# Patient Record
Sex: FEMALE | Race: BLACK OR AFRICAN AMERICAN | Employment: PART TIME | URBAN - NONMETROPOLITAN AREA
[De-identification: names, ages, dates, MRNs, and addresses within clinical notes are randomized per-mention and may not be internally consistent; named-entity substitution may affect disease eponyms.]

---

## 2017-02-09 ENCOUNTER — TELEPHONE (OUTPATIENT)
Dept: NEPHROLOGY | Age: 42
End: 2017-02-09

## 2017-04-20 ENCOUNTER — OFFICE VISIT (OUTPATIENT)
Dept: CARDIOLOGY | Age: 42
End: 2017-04-20

## 2017-04-20 VITALS
HEART RATE: 72 BPM | SYSTOLIC BLOOD PRESSURE: 150 MMHG | WEIGHT: 191 LBS | BODY MASS INDEX: 31.82 KG/M2 | HEIGHT: 65 IN | DIASTOLIC BLOOD PRESSURE: 92 MMHG

## 2017-04-20 DIAGNOSIS — I25.10 CORONARY ARTERY DISEASE INVOLVING NATIVE CORONARY ARTERY OF NATIVE HEART WITHOUT ANGINA PECTORIS: ICD-10-CM

## 2017-04-20 DIAGNOSIS — E78.01 FAMILIAL HYPERCHOLESTEROLEMIA: ICD-10-CM

## 2017-04-20 DIAGNOSIS — I10 ESSENTIAL HYPERTENSION: Primary | ICD-10-CM

## 2017-04-20 PROCEDURE — 99213 OFFICE O/P EST LOW 20 MIN: CPT | Performed by: NUCLEAR MEDICINE

## 2017-04-20 RX ORDER — HYDROXYZINE HYDROCHLORIDE 10 MG/1
10 TABLET, FILM COATED ORAL DAILY PRN
COMMUNITY
End: 2021-02-09 | Stop reason: ALTCHOICE

## 2017-04-20 RX ORDER — HYOSCYAMINE SULFATE 0.125 MG
125 TABLET ORAL EVERY 4 HOURS PRN
COMMUNITY
End: 2019-09-16

## 2017-04-20 RX ORDER — CHLORTHALIDONE 25 MG/1
25 TABLET ORAL DAILY
COMMUNITY
End: 2018-05-22 | Stop reason: ALTCHOICE

## 2017-04-20 RX ORDER — BUTALBITAL, ACETAMINOPHEN AND CAFFEINE 50; 325; 40 MG/1; MG/1; MG/1
1 TABLET ORAL EVERY 4 HOURS PRN
COMMUNITY
End: 2018-05-22 | Stop reason: ALTCHOICE

## 2018-04-19 ENCOUNTER — HOSPITAL ENCOUNTER (OUTPATIENT)
Age: 43
Discharge: HOME | End: 2018-04-19
Payer: COMMERCIAL

## 2018-04-19 LAB
ALBUMIN SERPL-MCNC: 4.3 G/DL (ref 3.5–5.1)
ALP BLD-CCNC: 86 U/L (ref 38–126)
ALT SERPL-CCNC: 7 U/L (ref 11–66)
ANION GAP SERPL CALCULATED.3IONS-SCNC: 13 MEQ/L (ref 8–16)
ANISOCYTOSIS: ABNORMAL
AST SERPL-CCNC: 9 U/L (ref 5–40)
BASOPHILS # BLD: 0.6 %
BASOPHILS ABSOLUTE: 0 THOU/MM3 (ref 0–0.1)
BILIRUB SERPL-MCNC: 0.3 MG/DL (ref 0.3–1.2)
BUN BLDV-MCNC: 8 MG/DL (ref 7–22)
CALCIUM SERPL-MCNC: 9.3 MG/DL (ref 8.5–10.5)
CHLORIDE BLD-SCNC: 100 MEQ/L (ref 98–111)
CHOLESTEROL, TOTAL: 188 MG/DL (ref 100–199)
CO2: 24 MEQ/L (ref 23–33)
CREAT SERPL-MCNC: 0.6 MG/DL (ref 0.4–1.2)
EOSINOPHIL # BLD: 1.2 %
EOSINOPHILS ABSOLUTE: 0.1 THOU/MM3 (ref 0–0.4)
GFR SERPL CREATININE-BSD FRML MDRD: > 90 ML/MIN/1.73M2
GLUCOSE BLD-MCNC: 105 MG/DL (ref 70–108)
HCT VFR BLD CALC: 36.9 % (ref 37–47)
HDLC SERPL-MCNC: 58 MG/DL
HEMOGLOBIN: 12.3 GM/DL (ref 12–16)
LDL CHOLESTEROL CALCULATED: 109 MG/DL
LYMPHOCYTES # BLD: 43.1 %
LYMPHOCYTES ABSOLUTE: 2.3 THOU/MM3 (ref 1–4.8)
MCH RBC QN AUTO: 27.8 PG (ref 27–31)
MCHC RBC AUTO-ENTMCNC: 33.3 GM/DL (ref 33–37)
MCV RBC AUTO: 83.6 FL (ref 81–99)
MONOCYTES # BLD: 7.3 %
MONOCYTES ABSOLUTE: 0.4 THOU/MM3 (ref 0.4–1.3)
NUCLEATED RED BLOOD CELLS: 0 /100 WBC
PDW BLD-RTO: 14.6 % (ref 11.5–14.5)
PLATELET # BLD: 322 THOU/MM3 (ref 130–400)
PMV BLD AUTO: 8.6 FL (ref 7.4–10.4)
POTASSIUM SERPL-SCNC: 3.8 MEQ/L (ref 3.5–5.2)
RBC # BLD: 4.41 MILL/MM3 (ref 4.2–5.4)
SEG NEUTROPHILS: 47.8 %
SEGMENTED NEUTROPHILS ABSOLUTE COUNT: 2.6 THOU/MM3 (ref 1.8–7.7)
SODIUM BLD-SCNC: 137 MEQ/L (ref 135–145)
TOTAL PROTEIN: 7.3 G/DL (ref 6.1–8)
TRIGL SERPL-MCNC: 107 MG/DL (ref 0–199)
TSH SERPL DL<=0.05 MIU/L-ACNC: 1.07 UIU/ML (ref 0.4–4.2)
WBC # BLD: 5.4 THOU/MM3 (ref 4.8–10.8)

## 2018-04-19 PROCEDURE — 84443 ASSAY THYROID STIM HORMONE: CPT

## 2018-04-19 PROCEDURE — 80053 COMPREHEN METABOLIC PANEL: CPT

## 2018-04-19 PROCEDURE — 80061 LIPID PANEL: CPT

## 2018-04-19 PROCEDURE — 85025 COMPLETE CBC W/AUTO DIFF WBC: CPT

## 2018-04-19 PROCEDURE — 36415 COLL VENOUS BLD VENIPUNCTURE: CPT

## 2018-05-02 ENCOUNTER — HOSPITAL ENCOUNTER (EMERGENCY)
Age: 43
Discharge: HOME | End: 2018-05-02
Payer: COMMERCIAL

## 2018-05-02 VITALS
SYSTOLIC BLOOD PRESSURE: 159 MMHG | BODY MASS INDEX: 32.65 KG/M2 | RESPIRATION RATE: 18 BRPM | OXYGEN SATURATION: 100 % | TEMPERATURE: 98.3 F | DIASTOLIC BLOOD PRESSURE: 88 MMHG | WEIGHT: 196 LBS | HEIGHT: 65 IN | HEART RATE: 79 BPM

## 2018-05-02 DIAGNOSIS — L02.91 ABSCESS: Primary | ICD-10-CM

## 2018-05-02 PROCEDURE — 99282 EMERGENCY DEPT VISIT SF MDM: CPT

## 2018-05-02 RX ORDER — CLINDAMYCIN HYDROCHLORIDE 150 MG/1
450 CAPSULE ORAL 3 TIMES DAILY
Qty: 90 CAPSULE | Refills: 0 | Status: SHIPPED | OUTPATIENT
Start: 2018-05-02 | End: 2018-05-12

## 2018-05-02 RX ORDER — HYDROCODONE BITARTRATE AND ACETAMINOPHEN 5; 325 MG/1; MG/1
1 TABLET ORAL EVERY 6 HOURS PRN
Qty: 10 TABLET | Refills: 0 | Status: SHIPPED | OUTPATIENT
Start: 2018-05-02 | End: 2018-05-05

## 2018-05-02 ASSESSMENT — PAIN SCALES - GENERAL: PAINLEVEL_OUTOF10: 7

## 2018-05-02 ASSESSMENT — PAIN DESCRIPTION - ORIENTATION: ORIENTATION: LOWER

## 2018-05-02 ASSESSMENT — ENCOUNTER SYMPTOMS
NAUSEA: 0
ROS SKIN COMMENTS: +ABSCESS
VOMITING: 0
COLOR CHANGE: 0

## 2018-05-02 ASSESSMENT — PAIN DESCRIPTION - DESCRIPTORS: DESCRIPTORS: SHARP

## 2018-05-02 ASSESSMENT — PAIN DESCRIPTION - LOCATION: LOCATION: BACK

## 2018-05-04 ENCOUNTER — HOSPITAL ENCOUNTER (EMERGENCY)
Age: 43
Discharge: HOME | End: 2018-05-04
Payer: COMMERCIAL

## 2018-05-04 VITALS
TEMPERATURE: 98.6 F | SYSTOLIC BLOOD PRESSURE: 159 MMHG | RESPIRATION RATE: 16 BRPM | HEART RATE: 95 BPM | DIASTOLIC BLOOD PRESSURE: 103 MMHG | WEIGHT: 196 LBS | HEIGHT: 65 IN | OXYGEN SATURATION: 99 % | BODY MASS INDEX: 32.65 KG/M2

## 2018-05-04 DIAGNOSIS — L03.319 CELLULITIS AND ABSCESS OF TRUNK: Primary | ICD-10-CM

## 2018-05-04 DIAGNOSIS — L02.219 CELLULITIS AND ABSCESS OF TRUNK: Primary | ICD-10-CM

## 2018-05-04 PROCEDURE — 10061 I&D ABSCESS COMP/MULTIPLE: CPT

## 2018-05-04 PROCEDURE — 99282 EMERGENCY DEPT VISIT SF MDM: CPT

## 2018-05-04 RX ORDER — LIDOCAINE HYDROCHLORIDE AND EPINEPHRINE 10; 10 MG/ML; UG/ML
20 INJECTION, SOLUTION INFILTRATION; PERINEURAL ONCE
Status: DISCONTINUED | OUTPATIENT
Start: 2018-05-04 | End: 2018-05-04 | Stop reason: HOSPADM

## 2018-05-04 ASSESSMENT — ENCOUNTER SYMPTOMS
EYE PAIN: 0
DIARRHEA: 0
SHORTNESS OF BREATH: 0
VOMITING: 0
NAUSEA: 0
SORE THROAT: 0
RHINORRHEA: 0
WHEEZING: 0
COUGH: 0
ABDOMINAL PAIN: 0
BACK PAIN: 0
EYE DISCHARGE: 0

## 2018-05-04 ASSESSMENT — PAIN DESCRIPTION - DESCRIPTORS: DESCRIPTORS: ACHING;BURNING;SHARP

## 2018-05-04 ASSESSMENT — PAIN DESCRIPTION - LOCATION: LOCATION: BACK

## 2018-05-04 ASSESSMENT — PAIN DESCRIPTION - PAIN TYPE: TYPE: ACUTE PAIN

## 2018-05-04 ASSESSMENT — PAIN DESCRIPTION - FREQUENCY: FREQUENCY: CONTINUOUS

## 2018-05-04 ASSESSMENT — PAIN SCALES - GENERAL: PAINLEVEL_OUTOF10: 9

## 2018-05-22 ENCOUNTER — TELEPHONE (OUTPATIENT)
Dept: CARDIOLOGY CLINIC | Age: 43
End: 2018-05-22

## 2018-05-22 ENCOUNTER — OFFICE VISIT (OUTPATIENT)
Dept: SURGERY | Age: 43
End: 2018-05-22
Payer: COMMERCIAL

## 2018-05-22 VITALS
DIASTOLIC BLOOD PRESSURE: 88 MMHG | RESPIRATION RATE: 18 BRPM | OXYGEN SATURATION: 99 % | SYSTOLIC BLOOD PRESSURE: 122 MMHG | HEIGHT: 65 IN | TEMPERATURE: 99.2 F | BODY MASS INDEX: 33.15 KG/M2 | HEART RATE: 88 BPM | WEIGHT: 199 LBS

## 2018-05-22 DIAGNOSIS — S21.209A WOUND OF BACK, UNSPECIFIED LATERALITY, INITIAL ENCOUNTER: Primary | ICD-10-CM

## 2018-05-22 PROCEDURE — G8417 CALC BMI ABV UP PARAM F/U: HCPCS | Performed by: SURGERY

## 2018-05-22 PROCEDURE — 1036F TOBACCO NON-USER: CPT | Performed by: SURGERY

## 2018-05-22 PROCEDURE — 99213 OFFICE O/P EST LOW 20 MIN: CPT | Performed by: SURGERY

## 2018-05-22 PROCEDURE — G8427 DOCREV CUR MEDS BY ELIG CLIN: HCPCS | Performed by: SURGERY

## 2018-05-22 RX ORDER — ERGOCALCIFEROL 1.25 MG/1
50000 CAPSULE ORAL WEEKLY
COMMUNITY

## 2018-05-22 RX ORDER — OMEPRAZOLE 40 MG/1
40 CAPSULE, DELAYED RELEASE ORAL DAILY PRN
COMMUNITY
End: 2019-09-16

## 2018-05-23 ENCOUNTER — TELEPHONE (OUTPATIENT)
Dept: SURGERY | Age: 43
End: 2018-05-23

## 2018-05-23 ENCOUNTER — OFFICE VISIT (OUTPATIENT)
Dept: CARDIOLOGY CLINIC | Age: 43
End: 2018-05-23
Payer: COMMERCIAL

## 2018-05-23 VITALS
BODY MASS INDEX: 32.82 KG/M2 | WEIGHT: 197 LBS | SYSTOLIC BLOOD PRESSURE: 132 MMHG | HEIGHT: 65 IN | HEART RATE: 65 BPM | DIASTOLIC BLOOD PRESSURE: 80 MMHG

## 2018-05-23 DIAGNOSIS — E78.01 FAMILIAL HYPERCHOLESTEROLEMIA: ICD-10-CM

## 2018-05-23 DIAGNOSIS — I10 ESSENTIAL HYPERTENSION: Primary | ICD-10-CM

## 2018-05-23 DIAGNOSIS — Z98.890 S/P CARDIAC CATH: ICD-10-CM

## 2018-05-23 PROCEDURE — 99213 OFFICE O/P EST LOW 20 MIN: CPT | Performed by: NUCLEAR MEDICINE

## 2018-05-23 PROCEDURE — G8427 DOCREV CUR MEDS BY ELIG CLIN: HCPCS | Performed by: NUCLEAR MEDICINE

## 2018-05-23 PROCEDURE — 93000 ELECTROCARDIOGRAM COMPLETE: CPT | Performed by: NUCLEAR MEDICINE

## 2018-05-23 PROCEDURE — G8417 CALC BMI ABV UP PARAM F/U: HCPCS | Performed by: NUCLEAR MEDICINE

## 2018-05-23 PROCEDURE — 1036F TOBACCO NON-USER: CPT | Performed by: NUCLEAR MEDICINE

## 2018-06-04 ENCOUNTER — ANESTHESIA EVENT (OUTPATIENT)
Dept: OPERATING ROOM | Age: 43
End: 2018-06-04
Payer: COMMERCIAL

## 2018-06-04 ENCOUNTER — ANESTHESIA (OUTPATIENT)
Dept: OPERATING ROOM | Age: 43
End: 2018-06-04
Payer: COMMERCIAL

## 2018-06-04 ENCOUNTER — HOSPITAL ENCOUNTER (OUTPATIENT)
Age: 43
Setting detail: OUTPATIENT SURGERY
Discharge: HOME | End: 2018-06-04
Attending: SURGERY | Admitting: SURGERY
Payer: COMMERCIAL

## 2018-06-04 VITALS
TEMPERATURE: 97.5 F | SYSTOLIC BLOOD PRESSURE: 125 MMHG | DIASTOLIC BLOOD PRESSURE: 64 MMHG | OXYGEN SATURATION: 100 % | RESPIRATION RATE: 10 BRPM

## 2018-06-04 VITALS
RESPIRATION RATE: 16 BRPM | HEIGHT: 65 IN | WEIGHT: 194.89 LBS | BODY MASS INDEX: 32.47 KG/M2 | SYSTOLIC BLOOD PRESSURE: 156 MMHG | DIASTOLIC BLOOD PRESSURE: 81 MMHG | HEART RATE: 72 BPM | TEMPERATURE: 97.6 F | OXYGEN SATURATION: 98 %

## 2018-06-04 DIAGNOSIS — L72.9 INFECTED CYST OF SKIN: Primary | ICD-10-CM

## 2018-06-04 DIAGNOSIS — L08.9 INFECTED CYST OF SKIN: Primary | ICD-10-CM

## 2018-06-04 LAB
GLUCOSE BLD-MCNC: 110 MG/DL (ref 70–108)
POTASSIUM SERPL-SCNC: 3.9 MEQ/L (ref 3.5–5.2)

## 2018-06-04 PROCEDURE — 7100000011 HC PHASE II RECOVERY - ADDTL 15 MIN: Performed by: SURGERY

## 2018-06-04 PROCEDURE — 84132 ASSAY OF SERUM POTASSIUM: CPT

## 2018-06-04 PROCEDURE — 6360000002 HC RX W HCPCS: Performed by: NURSE ANESTHETIST, CERTIFIED REGISTERED

## 2018-06-04 PROCEDURE — 82948 REAGENT STRIP/BLOOD GLUCOSE: CPT

## 2018-06-04 PROCEDURE — 2500000003 HC RX 250 WO HCPCS: Performed by: SURGERY

## 2018-06-04 PROCEDURE — 88304 TISSUE EXAM BY PATHOLOGIST: CPT

## 2018-06-04 PROCEDURE — 36415 COLL VENOUS BLD VENIPUNCTURE: CPT

## 2018-06-04 PROCEDURE — 11406 EXC TR-EXT B9+MARG >4.0 CM: CPT | Performed by: SURGERY

## 2018-06-04 PROCEDURE — 3700000000 HC ANESTHESIA ATTENDED CARE: Performed by: SURGERY

## 2018-06-04 PROCEDURE — 7100000001 HC PACU RECOVERY - ADDTL 15 MIN: Performed by: SURGERY

## 2018-06-04 PROCEDURE — 2580000003 HC RX 258: Performed by: NURSE ANESTHETIST, CERTIFIED REGISTERED

## 2018-06-04 PROCEDURE — 12032 INTMD RPR S/A/T/EXT 2.6-7.5: CPT | Performed by: SURGERY

## 2018-06-04 PROCEDURE — 7100000000 HC PACU RECOVERY - FIRST 15 MIN: Performed by: SURGERY

## 2018-06-04 PROCEDURE — 3600000012 HC SURGERY LEVEL 2 ADDTL 15MIN: Performed by: SURGERY

## 2018-06-04 PROCEDURE — 3600000002 HC SURGERY LEVEL 2 BASE: Performed by: SURGERY

## 2018-06-04 PROCEDURE — 2580000003 HC RX 258

## 2018-06-04 PROCEDURE — 7100000010 HC PHASE II RECOVERY - FIRST 15 MIN: Performed by: SURGERY

## 2018-06-04 PROCEDURE — 3700000001 HC ADD 15 MINUTES (ANESTHESIA): Performed by: SURGERY

## 2018-06-04 PROCEDURE — 2500000003 HC RX 250 WO HCPCS: Performed by: NURSE ANESTHETIST, CERTIFIED REGISTERED

## 2018-06-04 PROCEDURE — 6360000002 HC RX W HCPCS: Performed by: ANESTHESIOLOGY

## 2018-06-04 RX ORDER — ONDANSETRON 2 MG/ML
INJECTION INTRAMUSCULAR; INTRAVENOUS PRN
Status: DISCONTINUED | OUTPATIENT
Start: 2018-06-04 | End: 2018-06-04 | Stop reason: SDUPTHER

## 2018-06-04 RX ORDER — MIDAZOLAM HYDROCHLORIDE 1 MG/ML
INJECTION INTRAMUSCULAR; INTRAVENOUS PRN
Status: DISCONTINUED | OUTPATIENT
Start: 2018-06-04 | End: 2018-06-04 | Stop reason: SDUPTHER

## 2018-06-04 RX ORDER — LABETALOL HYDROCHLORIDE 5 MG/ML
5 INJECTION, SOLUTION INTRAVENOUS EVERY 10 MIN PRN
Status: DISCONTINUED | OUTPATIENT
Start: 2018-06-04 | End: 2018-06-04 | Stop reason: HOSPADM

## 2018-06-04 RX ORDER — OXYCODONE HYDROCHLORIDE AND ACETAMINOPHEN 5; 325 MG/1; MG/1
1 TABLET ORAL EVERY 6 HOURS PRN
Qty: 24 TABLET | Refills: 0 | Status: SHIPPED | OUTPATIENT
Start: 2018-06-04 | End: 2018-06-11

## 2018-06-04 RX ORDER — METOPROLOL SUCCINATE 25 MG/1
25 TABLET, EXTENDED RELEASE ORAL ONCE
Status: DISCONTINUED | OUTPATIENT
Start: 2018-06-04 | End: 2018-06-04 | Stop reason: HOSPADM

## 2018-06-04 RX ORDER — LIDOCAINE HYDROCHLORIDE 20 MG/ML
INJECTION, SOLUTION INFILTRATION; PERINEURAL PRN
Status: DISCONTINUED | OUTPATIENT
Start: 2018-06-04 | End: 2018-06-04 | Stop reason: SDUPTHER

## 2018-06-04 RX ORDER — SODIUM CHLORIDE 9 MG/ML
INJECTION, SOLUTION INTRAVENOUS CONTINUOUS PRN
Status: DISCONTINUED | OUTPATIENT
Start: 2018-06-04 | End: 2018-06-04 | Stop reason: SDUPTHER

## 2018-06-04 RX ORDER — SUCCINYLCHOLINE CHLORIDE 20 MG/ML
INJECTION INTRAMUSCULAR; INTRAVENOUS PRN
Status: DISCONTINUED | OUTPATIENT
Start: 2018-06-04 | End: 2018-06-04 | Stop reason: SDUPTHER

## 2018-06-04 RX ORDER — SODIUM CHLORIDE 9 MG/ML
INJECTION, SOLUTION INTRAVENOUS CONTINUOUS
Status: DISCONTINUED | OUTPATIENT
Start: 2018-06-04 | End: 2018-06-04 | Stop reason: HOSPADM

## 2018-06-04 RX ORDER — FENTANYL CITRATE 50 UG/ML
50 INJECTION, SOLUTION INTRAMUSCULAR; INTRAVENOUS EVERY 5 MIN PRN
Status: DISCONTINUED | OUTPATIENT
Start: 2018-06-04 | End: 2018-06-04 | Stop reason: HOSPADM

## 2018-06-04 RX ORDER — MEPERIDINE HYDROCHLORIDE 25 MG/ML
12.5 INJECTION INTRAMUSCULAR; INTRAVENOUS; SUBCUTANEOUS EVERY 5 MIN PRN
Status: DISCONTINUED | OUTPATIENT
Start: 2018-06-04 | End: 2018-06-04 | Stop reason: HOSPADM

## 2018-06-04 RX ORDER — BUPIVACAINE HYDROCHLORIDE AND EPINEPHRINE 5; 5 MG/ML; UG/ML
INJECTION, SOLUTION EPIDURAL; INTRACAUDAL; PERINEURAL PRN
Status: DISCONTINUED | OUTPATIENT
Start: 2018-06-04 | End: 2018-06-04 | Stop reason: HOSPADM

## 2018-06-04 RX ORDER — PROPOFOL 10 MG/ML
INJECTION, EMULSION INTRAVENOUS PRN
Status: DISCONTINUED | OUTPATIENT
Start: 2018-06-04 | End: 2018-06-04 | Stop reason: SDUPTHER

## 2018-06-04 RX ORDER — FENTANYL CITRATE 50 UG/ML
INJECTION, SOLUTION INTRAMUSCULAR; INTRAVENOUS PRN
Status: DISCONTINUED | OUTPATIENT
Start: 2018-06-04 | End: 2018-06-04 | Stop reason: SDUPTHER

## 2018-06-04 RX ORDER — DEXAMETHASONE SODIUM PHOSPHATE 4 MG/ML
INJECTION, SOLUTION INTRA-ARTICULAR; INTRALESIONAL; INTRAMUSCULAR; INTRAVENOUS; SOFT TISSUE PRN
Status: DISCONTINUED | OUTPATIENT
Start: 2018-06-04 | End: 2018-06-04 | Stop reason: SDUPTHER

## 2018-06-04 RX ORDER — ONDANSETRON 2 MG/ML
4 INJECTION INTRAMUSCULAR; INTRAVENOUS
Status: DISCONTINUED | OUTPATIENT
Start: 2018-06-04 | End: 2018-06-04 | Stop reason: HOSPADM

## 2018-06-04 RX ADMIN — HYDROMORPHONE HYDROCHLORIDE 0.25 MG: 1 INJECTION, SOLUTION INTRAMUSCULAR; INTRAVENOUS; SUBCUTANEOUS at 12:43

## 2018-06-04 RX ADMIN — SODIUM CHLORIDE: 9 INJECTION, SOLUTION INTRAVENOUS at 11:34

## 2018-06-04 RX ADMIN — DEXAMETHASONE SODIUM PHOSPHATE 4 MG: 4 INJECTION, SOLUTION INTRAMUSCULAR; INTRAVENOUS at 11:48

## 2018-06-04 RX ADMIN — Medication 140 MG: at 11:38

## 2018-06-04 RX ADMIN — LIDOCAINE HYDROCHLORIDE 100 MG: 20 INJECTION, SOLUTION INFILTRATION; PERINEURAL at 11:38

## 2018-06-04 RX ADMIN — PROPOFOL 170 MG: 10 INJECTION, EMULSION INTRAVENOUS at 11:38

## 2018-06-04 RX ADMIN — FENTANYL CITRATE 100 MCG: 50 INJECTION INTRAMUSCULAR; INTRAVENOUS at 11:38

## 2018-06-04 RX ADMIN — MIDAZOLAM HYDROCHLORIDE 2 MG: 1 INJECTION, SOLUTION INTRAMUSCULAR; INTRAVENOUS at 11:34

## 2018-06-04 RX ADMIN — ONDANSETRON HYDROCHLORIDE 4 MG: 4 INJECTION, SOLUTION INTRAMUSCULAR; INTRAVENOUS at 12:11

## 2018-06-04 RX ADMIN — SODIUM CHLORIDE: 9 INJECTION, SOLUTION INTRAVENOUS at 10:51

## 2018-06-04 ASSESSMENT — PULMONARY FUNCTION TESTS
PIF_VALUE: 19
PIF_VALUE: 2
PIF_VALUE: 19
PIF_VALUE: 18
PIF_VALUE: 16
PIF_VALUE: 20
PIF_VALUE: 2
PIF_VALUE: 23
PIF_VALUE: 19
PIF_VALUE: 19
PIF_VALUE: 1
PIF_VALUE: 19
PIF_VALUE: 18
PIF_VALUE: 19
PIF_VALUE: 1
PIF_VALUE: 21
PIF_VALUE: 2
PIF_VALUE: 18
PIF_VALUE: 19
PIF_VALUE: 1
PIF_VALUE: 19
PIF_VALUE: 19
PIF_VALUE: 20
PIF_VALUE: 1
PIF_VALUE: 19
PIF_VALUE: 20
PIF_VALUE: 19
PIF_VALUE: 7
PIF_VALUE: 19
PIF_VALUE: 19
PIF_VALUE: 2
PIF_VALUE: 19
PIF_VALUE: 0
PIF_VALUE: 18
PIF_VALUE: 16
PIF_VALUE: 18
PIF_VALUE: 19
PIF_VALUE: 18
PIF_VALUE: 18
PIF_VALUE: 19
PIF_VALUE: 18
PIF_VALUE: 19
PIF_VALUE: 18
PIF_VALUE: 18
PIF_VALUE: 2

## 2018-06-04 ASSESSMENT — PAIN SCALES - WONG BAKER
WONGBAKER_NUMERICALRESPONSE: 0
WONGBAKER_NUMERICALRESPONSE: 2

## 2018-06-04 ASSESSMENT — PAIN DESCRIPTION - PAIN TYPE
TYPE: SURGICAL PAIN
TYPE: SURGICAL PAIN

## 2018-06-04 ASSESSMENT — PAIN SCALES - GENERAL
PAINLEVEL_OUTOF10: 4
PAINLEVEL_OUTOF10: 5
PAINLEVEL_OUTOF10: 4
PAINLEVEL_OUTOF10: 0

## 2018-06-04 ASSESSMENT — PAIN DESCRIPTION - LOCATION
LOCATION: BACK
LOCATION: BACK

## 2018-06-04 ASSESSMENT — PAIN DESCRIPTION - PROGRESSION: CLINICAL_PROGRESSION: GRADUALLY IMPROVING

## 2018-06-04 ASSESSMENT — PAIN DESCRIPTION - ONSET: ONSET: ON-GOING

## 2018-06-05 ENCOUNTER — TELEPHONE (OUTPATIENT)
Dept: SURGERY | Age: 43
End: 2018-06-05

## 2018-06-07 ENCOUNTER — TELEPHONE (OUTPATIENT)
Dept: SURGERY | Age: 43
End: 2018-06-07

## 2018-06-11 ASSESSMENT — ENCOUNTER SYMPTOMS
RHINORRHEA: 0
PHOTOPHOBIA: 0
COLOR CHANGE: 0
CHOKING: 0
ABDOMINAL PAIN: 1
SINUS PAIN: 0
EYE REDNESS: 0
SORE THROAT: 0
BACK PAIN: 0
WHEEZING: 0
SHORTNESS OF BREATH: 0
SINUS PRESSURE: 0
EYE DISCHARGE: 0
APNEA: 0
TROUBLE SWALLOWING: 0
ABDOMINAL DISTENTION: 1
COUGH: 0
STRIDOR: 0
CHEST TIGHTNESS: 0
ANAL BLEEDING: 1
FACIAL SWELLING: 0
VOICE CHANGE: 0
EYE ITCHING: 0
EYE PAIN: 0

## 2018-06-14 ENCOUNTER — OFFICE VISIT (OUTPATIENT)
Dept: SURGERY | Age: 43
End: 2018-06-14

## 2018-06-14 ENCOUNTER — TELEPHONE (OUTPATIENT)
Dept: SURGERY | Age: 43
End: 2018-06-14

## 2018-06-14 VITALS
DIASTOLIC BLOOD PRESSURE: 84 MMHG | RESPIRATION RATE: 16 BRPM | WEIGHT: 196.9 LBS | HEIGHT: 65 IN | OXYGEN SATURATION: 98 % | TEMPERATURE: 97.2 F | HEART RATE: 90 BPM | SYSTOLIC BLOOD PRESSURE: 124 MMHG | BODY MASS INDEX: 32.8 KG/M2

## 2018-06-14 DIAGNOSIS — T14.8XXA WOUND INFECTION: Primary | ICD-10-CM

## 2018-06-14 DIAGNOSIS — Z98.890 HX OF REMOVAL OF CYST: ICD-10-CM

## 2018-06-14 DIAGNOSIS — L08.9 WOUND INFECTION: Primary | ICD-10-CM

## 2018-06-14 PROCEDURE — 99024 POSTOP FOLLOW-UP VISIT: CPT | Performed by: NURSE PRACTITIONER

## 2018-06-14 RX ORDER — CIPROFLOXACIN 500 MG/1
500 TABLET, FILM COATED ORAL 2 TIMES DAILY
Qty: 20 TABLET | Refills: 0 | Status: ON HOLD | OUTPATIENT
Start: 2018-06-14 | End: 2018-06-19

## 2018-06-14 ASSESSMENT — ENCOUNTER SYMPTOMS
VOICE CHANGE: 0
BACK PAIN: 0
EYE DISCHARGE: 0
APNEA: 0
STRIDOR: 0
BLOOD IN STOOL: 0
TROUBLE SWALLOWING: 0
DIARRHEA: 0
WHEEZING: 0
EYE ITCHING: 0
RHINORRHEA: 0
ROS SKIN COMMENTS: BACK
COUGH: 0
ABDOMINAL PAIN: 0
NAUSEA: 0
SORE THROAT: 0
CONSTIPATION: 0
COLOR CHANGE: 0
EYE REDNESS: 0
ANAL BLEEDING: 0
SINUS PRESSURE: 0
PHOTOPHOBIA: 0
ABDOMINAL DISTENTION: 0
EYE PAIN: 0
FACIAL SWELLING: 0
CHEST TIGHTNESS: 0
CHOKING: 0
SHORTNESS OF BREATH: 0
VOMITING: 0
RECTAL PAIN: 0

## 2018-06-18 ENCOUNTER — TELEPHONE (OUTPATIENT)
Dept: SURGERY | Age: 43
End: 2018-06-18

## 2018-06-18 ENCOUNTER — OFFICE VISIT (OUTPATIENT)
Dept: SURGERY | Age: 43
End: 2018-06-18

## 2018-06-18 VITALS
WEIGHT: 197.5 LBS | HEART RATE: 84 BPM | RESPIRATION RATE: 18 BRPM | BODY MASS INDEX: 32.9 KG/M2 | OXYGEN SATURATION: 97 % | DIASTOLIC BLOOD PRESSURE: 64 MMHG | SYSTOLIC BLOOD PRESSURE: 118 MMHG | HEIGHT: 65 IN | TEMPERATURE: 98.6 F

## 2018-06-18 DIAGNOSIS — Z51.89 VISIT FOR WOUND CHECK: ICD-10-CM

## 2018-06-18 DIAGNOSIS — Z22.322 MRSA (METHICILLIN RESISTANT STAPH AUREUS) CULTURE POSITIVE: Primary | ICD-10-CM

## 2018-06-18 PROCEDURE — 99024 POSTOP FOLLOW-UP VISIT: CPT | Performed by: NURSE PRACTITIONER

## 2018-06-18 RX ORDER — CLINDAMYCIN HYDROCHLORIDE 300 MG/1
300 CAPSULE ORAL 3 TIMES DAILY
Qty: 30 CAPSULE | Refills: 0 | Status: SHIPPED | OUTPATIENT
Start: 2018-06-18 | End: 2018-06-19 | Stop reason: SINTOL

## 2018-06-18 RX ORDER — IBUPROFEN 800 MG/1
800 TABLET ORAL EVERY 8 HOURS PRN
Qty: 21 TABLET | Refills: 1 | Status: ON HOLD | OUTPATIENT
Start: 2018-06-18 | End: 2019-09-23 | Stop reason: HOSPADM

## 2018-06-18 ASSESSMENT — ENCOUNTER SYMPTOMS
SHORTNESS OF BREATH: 0
WHEEZING: 0
EYE DISCHARGE: 0
CONSTIPATION: 0
VOICE CHANGE: 0
RECTAL PAIN: 0
NAUSEA: 0
SINUS PRESSURE: 0
COUGH: 0
BACK PAIN: 1
EYE ITCHING: 0
EYE PAIN: 0
APNEA: 0
ABDOMINAL DISTENTION: 0
TROUBLE SWALLOWING: 0
SORE THROAT: 0
SINUS PAIN: 0
ABDOMINAL PAIN: 0
EYE REDNESS: 0
BLOOD IN STOOL: 0
DIARRHEA: 0
CHEST TIGHTNESS: 0
COLOR CHANGE: 0
FACIAL SWELLING: 0
RHINORRHEA: 0
CHOKING: 0
ANAL BLEEDING: 0
STRIDOR: 0
PHOTOPHOBIA: 0
VOMITING: 0

## 2018-06-19 ENCOUNTER — HOSPITAL ENCOUNTER (INPATIENT)
Age: 43
LOS: 3 days | Discharge: HOME HEALTH | DRG: 721 | End: 2018-06-22
Attending: SURGERY | Admitting: SURGERY
Payer: COMMERCIAL

## 2018-06-19 ENCOUNTER — OFFICE VISIT (OUTPATIENT)
Dept: SURGERY | Age: 43
End: 2018-06-19

## 2018-06-19 VITALS
SYSTOLIC BLOOD PRESSURE: 130 MMHG | WEIGHT: 197 LBS | HEIGHT: 65 IN | BODY MASS INDEX: 32.82 KG/M2 | HEART RATE: 100 BPM | RESPIRATION RATE: 18 BRPM | DIASTOLIC BLOOD PRESSURE: 74 MMHG | TEMPERATURE: 98.5 F | OXYGEN SATURATION: 98 %

## 2018-06-19 DIAGNOSIS — Z51.89 VISIT FOR WOUND CHECK: Primary | ICD-10-CM

## 2018-06-19 PROBLEM — T81.9XXA POSTOPERATIVE OR SURGICAL COMPLICATION, INITIAL ENCOUNTER: Status: ACTIVE | Noted: 2018-06-19

## 2018-06-19 LAB
AEROBIC CULTURE: ABNORMAL
ANAEROBIC CULTURE: ABNORMAL
ANION GAP SERPL CALCULATED.3IONS-SCNC: 11 MEQ/L (ref 8–16)
BUN BLDV-MCNC: 8 MG/DL (ref 7–22)
CALCIUM SERPL-MCNC: 9.6 MG/DL (ref 8.5–10.5)
CHLORIDE BLD-SCNC: 102 MEQ/L (ref 98–111)
CO2: 26 MEQ/L (ref 23–33)
CREAT SERPL-MCNC: 0.6 MG/DL (ref 0.4–1.2)
GFR SERPL CREATININE-BSD FRML MDRD: > 90 ML/MIN/1.73M2
GLUCOSE BLD-MCNC: 85 MG/DL (ref 70–108)
GRAM STAIN RESULT: ABNORMAL
HCT VFR BLD CALC: 36.2 % (ref 37–47)
HEMOGLOBIN: 11.8 GM/DL (ref 12–16)
MCH RBC QN AUTO: 27.3 PG (ref 27–31)
MCHC RBC AUTO-ENTMCNC: 32.7 GM/DL (ref 33–37)
MCV RBC AUTO: 83.5 FL (ref 81–99)
ORGANISM: ABNORMAL
PDW BLD-RTO: 14.9 % (ref 11.5–14.5)
PLATELET # BLD: 463 THOU/MM3 (ref 130–400)
PMV BLD AUTO: 7.7 FL (ref 7.4–10.4)
POTASSIUM SERPL-SCNC: 4.3 MEQ/L (ref 3.5–5.2)
RBC # BLD: 4.34 MILL/MM3 (ref 4.2–5.4)
SODIUM BLD-SCNC: 139 MEQ/L (ref 135–145)
WBC # BLD: 7.1 THOU/MM3 (ref 4.8–10.8)

## 2018-06-19 PROCEDURE — 85027 COMPLETE CBC AUTOMATED: CPT

## 2018-06-19 PROCEDURE — 6370000000 HC RX 637 (ALT 250 FOR IP): Performed by: NURSE PRACTITIONER

## 2018-06-19 PROCEDURE — 99222 1ST HOSP IP/OBS MODERATE 55: CPT | Performed by: SURGERY

## 2018-06-19 PROCEDURE — 99024 POSTOP FOLLOW-UP VISIT: CPT | Performed by: NURSE PRACTITIONER

## 2018-06-19 PROCEDURE — 1200000000 HC SEMI PRIVATE

## 2018-06-19 PROCEDURE — 99222 1ST HOSP IP/OBS MODERATE 55: CPT | Performed by: NURSE PRACTITIONER

## 2018-06-19 PROCEDURE — 80048 BASIC METABOLIC PNL TOTAL CA: CPT

## 2018-06-19 PROCEDURE — 36415 COLL VENOUS BLD VENIPUNCTURE: CPT

## 2018-06-19 PROCEDURE — 6360000002 HC RX W HCPCS: Performed by: NURSE PRACTITIONER

## 2018-06-19 RX ORDER — OXYBUTYNIN CHLORIDE 10 MG/1
10 TABLET, EXTENDED RELEASE ORAL DAILY
Status: DISCONTINUED | OUTPATIENT
Start: 2018-06-19 | End: 2018-06-22 | Stop reason: HOSPADM

## 2018-06-19 RX ORDER — AMLODIPINE BESYLATE 5 MG/1
5 TABLET ORAL DAILY
Status: DISCONTINUED | OUTPATIENT
Start: 2018-06-20 | End: 2018-06-22 | Stop reason: HOSPADM

## 2018-06-19 RX ORDER — PANTOPRAZOLE SODIUM 40 MG/1
40 TABLET, DELAYED RELEASE ORAL
Status: DISCONTINUED | OUTPATIENT
Start: 2018-06-19 | End: 2018-06-22 | Stop reason: HOSPADM

## 2018-06-19 RX ORDER — LINEZOLID 2 MG/ML
600 INJECTION, SOLUTION INTRAVENOUS EVERY 12 HOURS
Status: DISCONTINUED | OUTPATIENT
Start: 2018-06-19 | End: 2018-06-22 | Stop reason: HOSPADM

## 2018-06-19 RX ORDER — HYOSCYAMINE SULFATE 0.125 MG
125 TABLET,DISINTEGRATING ORAL EVERY 4 HOURS PRN
Status: DISCONTINUED | OUTPATIENT
Start: 2018-06-19 | End: 2018-06-22 | Stop reason: HOSPADM

## 2018-06-19 RX ORDER — OMEPRAZOLE 40 MG/1
40 CAPSULE, DELAYED RELEASE ORAL EVERY MORNING
Status: DISCONTINUED | OUTPATIENT
Start: 2018-06-19 | End: 2018-06-19 | Stop reason: CLARIF

## 2018-06-19 RX ORDER — HYDROCODONE BITARTRATE AND ACETAMINOPHEN 5; 325 MG/1; MG/1
1 TABLET ORAL EVERY 6 HOURS PRN
Status: DISCONTINUED | OUTPATIENT
Start: 2018-06-19 | End: 2018-06-19

## 2018-06-19 RX ORDER — HYDROXYZINE HYDROCHLORIDE 10 MG/1
10 TABLET, FILM COATED ORAL DAILY PRN
Status: DISCONTINUED | OUTPATIENT
Start: 2018-06-19 | End: 2018-06-22 | Stop reason: HOSPADM

## 2018-06-19 RX ORDER — ATORVASTATIN CALCIUM 20 MG/1
20 TABLET, FILM COATED ORAL DAILY
Status: DISCONTINUED | OUTPATIENT
Start: 2018-06-19 | End: 2018-06-22 | Stop reason: HOSPADM

## 2018-06-19 RX ORDER — IBUPROFEN 800 MG/1
800 TABLET ORAL EVERY 8 HOURS PRN
Status: DISCONTINUED | OUTPATIENT
Start: 2018-06-19 | End: 2018-06-22 | Stop reason: HOSPADM

## 2018-06-19 RX ORDER — HYDROCODONE BITARTRATE AND ACETAMINOPHEN 5; 325 MG/1; MG/1
1 TABLET ORAL EVERY 6 HOURS PRN
Status: DISCONTINUED | OUTPATIENT
Start: 2018-06-19 | End: 2018-06-22 | Stop reason: HOSPADM

## 2018-06-19 RX ORDER — ONDANSETRON 2 MG/ML
4 INJECTION INTRAMUSCULAR; INTRAVENOUS EVERY 6 HOURS PRN
Status: DISCONTINUED | OUTPATIENT
Start: 2018-06-19 | End: 2018-06-22 | Stop reason: HOSPADM

## 2018-06-19 RX ORDER — METOPROLOL TARTRATE 100 MG/1
100 TABLET ORAL 2 TIMES DAILY
Status: DISCONTINUED | OUTPATIENT
Start: 2018-06-19 | End: 2018-06-22 | Stop reason: HOSPADM

## 2018-06-19 RX ADMIN — HYDROCODONE BITARTRATE AND ACETAMINOPHEN 1 TABLET: 5; 325 TABLET ORAL at 14:33

## 2018-06-19 RX ADMIN — IBUPROFEN 800 MG: 800 TABLET ORAL at 21:36

## 2018-06-19 RX ADMIN — ATORVASTATIN CALCIUM 20 MG: 20 TABLET, FILM COATED ORAL at 20:04

## 2018-06-19 RX ADMIN — METOPROLOL TARTRATE 100 MG: 100 TABLET, FILM COATED ORAL at 20:03

## 2018-06-19 RX ADMIN — IBUPROFEN 800 MG: 800 TABLET ORAL at 13:35

## 2018-06-19 RX ADMIN — LINEZOLID 600 MG: 600 INJECTION, SOLUTION INTRAVENOUS at 16:43

## 2018-06-19 RX ADMIN — OXYBUTYNIN CHLORIDE 10 MG: 10 TABLET, FILM COATED, EXTENDED RELEASE ORAL at 20:04

## 2018-06-19 RX ADMIN — METFORMIN HYDROCHLORIDE 500 MG: 500 TABLET ORAL at 17:26

## 2018-06-19 RX ADMIN — PANTOPRAZOLE SODIUM 40 MG: 40 TABLET, DELAYED RELEASE ORAL at 17:26

## 2018-06-19 RX ADMIN — ONDANSETRON 4 MG: 2 INJECTION INTRAMUSCULAR; INTRAVENOUS at 18:29

## 2018-06-19 RX ADMIN — HYDROCODONE BITARTRATE AND ACETAMINOPHEN 1 TABLET: 5; 325 TABLET ORAL at 23:37

## 2018-06-19 RX ADMIN — MUPIROCIN: 20 OINTMENT TOPICAL at 20:04

## 2018-06-19 ASSESSMENT — ENCOUNTER SYMPTOMS
BACK PAIN: 0
STRIDOR: 0
TROUBLE SWALLOWING: 0
NAUSEA: 0
SINUS PRESSURE: 0
DIARRHEA: 0
ANAL BLEEDING: 0
SORE THROAT: 0
CONSTIPATION: 0
ABDOMINAL DISTENTION: 0
COLOR CHANGE: 0
RHINORRHEA: 0
ABDOMINAL PAIN: 0
EYE REDNESS: 0
CHOKING: 0
SHORTNESS OF BREATH: 0
VOICE CHANGE: 0
EYE PAIN: 0
WHEEZING: 0
FACIAL SWELLING: 0
VOMITING: 0
APNEA: 0
RECTAL PAIN: 0
EYE ITCHING: 0
BLOOD IN STOOL: 0
PHOTOPHOBIA: 0
COUGH: 0
CHEST TIGHTNESS: 0
EYE DISCHARGE: 0

## 2018-06-19 ASSESSMENT — PAIN DESCRIPTION - ONSET: ONSET: ON-GOING

## 2018-06-19 ASSESSMENT — PAIN DESCRIPTION - PAIN TYPE: TYPE: ACUTE PAIN

## 2018-06-19 ASSESSMENT — PAIN SCALES - GENERAL
PAINLEVEL_OUTOF10: 8
PAINLEVEL_OUTOF10: 7
PAINLEVEL_OUTOF10: 7
PAINLEVEL_OUTOF10: 8
PAINLEVEL_OUTOF10: 5
PAINLEVEL_OUTOF10: 6

## 2018-06-19 ASSESSMENT — PAIN DESCRIPTION - LOCATION: LOCATION: HEAD

## 2018-06-19 ASSESSMENT — PAIN DESCRIPTION - DESCRIPTORS: DESCRIPTORS: HEADACHE

## 2018-06-19 ASSESSMENT — PAIN DESCRIPTION - FREQUENCY: FREQUENCY: CONTINUOUS

## 2018-06-20 PROCEDURE — APPSS30 APP SPLIT SHARED TIME 16-30 MINUTES: Performed by: NURSE PRACTITIONER

## 2018-06-20 PROCEDURE — 1200000000 HC SEMI PRIVATE

## 2018-06-20 PROCEDURE — 6370000000 HC RX 637 (ALT 250 FOR IP): Performed by: NURSE PRACTITIONER

## 2018-06-20 PROCEDURE — 99232 SBSQ HOSP IP/OBS MODERATE 35: CPT | Performed by: SURGERY

## 2018-06-20 PROCEDURE — 99024 POSTOP FOLLOW-UP VISIT: CPT | Performed by: NURSE PRACTITIONER

## 2018-06-20 PROCEDURE — 6360000002 HC RX W HCPCS: Performed by: NURSE PRACTITIONER

## 2018-06-20 RX ADMIN — METOPROLOL TARTRATE 100 MG: 100 TABLET, FILM COATED ORAL at 08:38

## 2018-06-20 RX ADMIN — ONDANSETRON 4 MG: 2 INJECTION INTRAMUSCULAR; INTRAVENOUS at 16:14

## 2018-06-20 RX ADMIN — AMLODIPINE BESYLATE 5 MG: 5 TABLET ORAL at 08:38

## 2018-06-20 RX ADMIN — LINEZOLID 600 MG: 600 INJECTION, SOLUTION INTRAVENOUS at 16:14

## 2018-06-20 RX ADMIN — IBUPROFEN 800 MG: 800 TABLET ORAL at 08:37

## 2018-06-20 RX ADMIN — METOPROLOL TARTRATE 100 MG: 100 TABLET, FILM COATED ORAL at 20:39

## 2018-06-20 RX ADMIN — HYDROCODONE BITARTRATE AND ACETAMINOPHEN 1 TABLET: 5; 325 TABLET ORAL at 16:24

## 2018-06-20 RX ADMIN — METFORMIN HYDROCHLORIDE 500 MG: 500 TABLET ORAL at 17:15

## 2018-06-20 RX ADMIN — OXYBUTYNIN CHLORIDE 10 MG: 10 TABLET, FILM COATED, EXTENDED RELEASE ORAL at 20:39

## 2018-06-20 RX ADMIN — IBUPROFEN 800 MG: 800 TABLET ORAL at 22:25

## 2018-06-20 RX ADMIN — LINEZOLID 600 MG: 600 INJECTION, SOLUTION INTRAVENOUS at 03:55

## 2018-06-20 RX ADMIN — PANTOPRAZOLE SODIUM 40 MG: 40 TABLET, DELAYED RELEASE ORAL at 05:50

## 2018-06-20 RX ADMIN — MUPIROCIN: 20 OINTMENT TOPICAL at 20:39

## 2018-06-20 RX ADMIN — MUPIROCIN: 20 OINTMENT TOPICAL at 08:39

## 2018-06-20 RX ADMIN — ATORVASTATIN CALCIUM 20 MG: 20 TABLET, FILM COATED ORAL at 20:39

## 2018-06-20 RX ADMIN — ONDANSETRON 4 MG: 2 INJECTION INTRAMUSCULAR; INTRAVENOUS at 03:55

## 2018-06-20 ASSESSMENT — PAIN SCALES - GENERAL
PAINLEVEL_OUTOF10: 4
PAINLEVEL_OUTOF10: 7
PAINLEVEL_OUTOF10: 7
PAINLEVEL_OUTOF10: 4
PAINLEVEL_OUTOF10: 5
PAINLEVEL_OUTOF10: 5

## 2018-06-20 ASSESSMENT — PAIN DESCRIPTION - PAIN TYPE
TYPE: ACUTE PAIN
TYPE: ACUTE PAIN

## 2018-06-20 ASSESSMENT — PAIN DESCRIPTION - ORIENTATION
ORIENTATION: LOWER
ORIENTATION: LOWER

## 2018-06-20 ASSESSMENT — PAIN DESCRIPTION - LOCATION
LOCATION: BACK
LOCATION: BACK

## 2018-06-21 LAB
ANION GAP SERPL CALCULATED.3IONS-SCNC: 12 MEQ/L (ref 8–16)
BUN BLDV-MCNC: 13 MG/DL (ref 7–22)
CALCIUM SERPL-MCNC: 9.2 MG/DL (ref 8.5–10.5)
CHLORIDE BLD-SCNC: 104 MEQ/L (ref 98–111)
CO2: 24 MEQ/L (ref 23–33)
CREAT SERPL-MCNC: 0.8 MG/DL (ref 0.4–1.2)
GFR SERPL CREATININE-BSD FRML MDRD: > 90 ML/MIN/1.73M2
GLUCOSE BLD-MCNC: 114 MG/DL (ref 70–108)
POTASSIUM SERPL-SCNC: 4 MEQ/L (ref 3.5–5.2)
SODIUM BLD-SCNC: 140 MEQ/L (ref 135–145)

## 2018-06-21 PROCEDURE — 99232 SBSQ HOSP IP/OBS MODERATE 35: CPT | Performed by: SURGERY

## 2018-06-21 PROCEDURE — 6370000000 HC RX 637 (ALT 250 FOR IP): Performed by: NURSE PRACTITIONER

## 2018-06-21 PROCEDURE — 6360000002 HC RX W HCPCS: Performed by: NURSE PRACTITIONER

## 2018-06-21 PROCEDURE — 36415 COLL VENOUS BLD VENIPUNCTURE: CPT

## 2018-06-21 PROCEDURE — 80048 BASIC METABOLIC PNL TOTAL CA: CPT

## 2018-06-21 PROCEDURE — 99024 POSTOP FOLLOW-UP VISIT: CPT | Performed by: NURSE PRACTITIONER

## 2018-06-21 PROCEDURE — 1200000000 HC SEMI PRIVATE

## 2018-06-21 PROCEDURE — APPSS30 APP SPLIT SHARED TIME 16-30 MINUTES: Performed by: NURSE PRACTITIONER

## 2018-06-21 RX ADMIN — METOPROLOL TARTRATE 100 MG: 100 TABLET, FILM COATED ORAL at 21:06

## 2018-06-21 RX ADMIN — AMLODIPINE BESYLATE 5 MG: 5 TABLET ORAL at 08:53

## 2018-06-21 RX ADMIN — LINEZOLID 600 MG: 600 INJECTION, SOLUTION INTRAVENOUS at 16:04

## 2018-06-21 RX ADMIN — HYDROXYZINE HYDROCHLORIDE 10 MG: 10 TABLET ORAL at 16:37

## 2018-06-21 RX ADMIN — IBUPROFEN 800 MG: 800 TABLET ORAL at 21:11

## 2018-06-21 RX ADMIN — ONDANSETRON 4 MG: 2 INJECTION INTRAMUSCULAR; INTRAVENOUS at 04:04

## 2018-06-21 RX ADMIN — ONDANSETRON 4 MG: 2 INJECTION INTRAMUSCULAR; INTRAVENOUS at 16:08

## 2018-06-21 RX ADMIN — METOPROLOL TARTRATE 100 MG: 100 TABLET, FILM COATED ORAL at 08:53

## 2018-06-21 RX ADMIN — LINEZOLID 600 MG: 600 INJECTION, SOLUTION INTRAVENOUS at 04:04

## 2018-06-21 RX ADMIN — OXYBUTYNIN CHLORIDE 10 MG: 10 TABLET, FILM COATED, EXTENDED RELEASE ORAL at 21:06

## 2018-06-21 RX ADMIN — MUPIROCIN: 20 OINTMENT TOPICAL at 08:52

## 2018-06-21 RX ADMIN — PANTOPRAZOLE SODIUM 40 MG: 40 TABLET, DELAYED RELEASE ORAL at 06:52

## 2018-06-21 RX ADMIN — MUPIROCIN: 20 OINTMENT TOPICAL at 21:06

## 2018-06-21 RX ADMIN — ATORVASTATIN CALCIUM 20 MG: 20 TABLET, FILM COATED ORAL at 21:06

## 2018-06-21 ASSESSMENT — PAIN DESCRIPTION - DESCRIPTORS: DESCRIPTORS: BURNING

## 2018-06-21 ASSESSMENT — PAIN DESCRIPTION - PROGRESSION
CLINICAL_PROGRESSION: NOT CHANGED

## 2018-06-21 ASSESSMENT — PAIN DESCRIPTION - ONSET: ONSET: ON-GOING

## 2018-06-21 ASSESSMENT — PAIN SCALES - GENERAL
PAINLEVEL_OUTOF10: 4
PAINLEVEL_OUTOF10: 4
PAINLEVEL_OUTOF10: 5
PAINLEVEL_OUTOF10: 4

## 2018-06-21 ASSESSMENT — PAIN DESCRIPTION - PAIN TYPE: TYPE: ACUTE PAIN;SURGICAL PAIN

## 2018-06-21 ASSESSMENT — PAIN DESCRIPTION - ORIENTATION: ORIENTATION: LOWER

## 2018-06-21 ASSESSMENT — PAIN DESCRIPTION - LOCATION: LOCATION: BACK

## 2018-06-21 ASSESSMENT — PAIN DESCRIPTION - FREQUENCY: FREQUENCY: CONTINUOUS

## 2018-06-22 VITALS
SYSTOLIC BLOOD PRESSURE: 113 MMHG | TEMPERATURE: 98.6 F | DIASTOLIC BLOOD PRESSURE: 59 MMHG | RESPIRATION RATE: 18 BRPM | BODY MASS INDEX: 32.4 KG/M2 | WEIGHT: 194.5 LBS | OXYGEN SATURATION: 98 % | HEIGHT: 65 IN | HEART RATE: 70 BPM

## 2018-06-22 PROCEDURE — 99239 HOSP IP/OBS DSCHRG MGMT >30: CPT | Performed by: SURGERY

## 2018-06-22 PROCEDURE — 6370000000 HC RX 637 (ALT 250 FOR IP): Performed by: NURSE PRACTITIONER

## 2018-06-22 PROCEDURE — 99239 HOSP IP/OBS DSCHRG MGMT >30: CPT | Performed by: NURSE PRACTITIONER

## 2018-06-22 PROCEDURE — 6360000002 HC RX W HCPCS: Performed by: NURSE PRACTITIONER

## 2018-06-22 RX ORDER — ONDANSETRON 4 MG/1
4 TABLET, FILM COATED ORAL EVERY 8 HOURS PRN
Qty: 20 TABLET | Refills: 0 | Status: ON HOLD | OUTPATIENT
Start: 2018-06-22 | End: 2019-09-17

## 2018-06-22 RX ORDER — LINEZOLID 600 MG/1
600 TABLET, FILM COATED ORAL 2 TIMES DAILY
Qty: 20 TABLET | Refills: 0 | Status: SHIPPED | OUTPATIENT
Start: 2018-06-22 | End: 2018-07-02

## 2018-06-22 RX ADMIN — ONDANSETRON 4 MG: 2 INJECTION INTRAMUSCULAR; INTRAVENOUS at 04:08

## 2018-06-22 RX ADMIN — LINEZOLID 600 MG: 600 INJECTION, SOLUTION INTRAVENOUS at 04:08

## 2018-06-22 RX ADMIN — METOPROLOL TARTRATE 100 MG: 100 TABLET, FILM COATED ORAL at 08:01

## 2018-06-22 RX ADMIN — LINEZOLID 600 MG: 600 INJECTION, SOLUTION INTRAVENOUS at 15:42

## 2018-06-22 RX ADMIN — PANTOPRAZOLE SODIUM 40 MG: 40 TABLET, DELAYED RELEASE ORAL at 06:14

## 2018-06-22 RX ADMIN — MUPIROCIN: 20 OINTMENT TOPICAL at 08:02

## 2018-06-22 RX ADMIN — METFORMIN HYDROCHLORIDE 500 MG: 500 TABLET ORAL at 17:13

## 2018-06-22 RX ADMIN — ONDANSETRON 4 MG: 2 INJECTION INTRAMUSCULAR; INTRAVENOUS at 15:42

## 2018-06-22 RX ADMIN — AMLODIPINE BESYLATE 5 MG: 5 TABLET ORAL at 08:01

## 2018-06-22 ASSESSMENT — PAIN DESCRIPTION - PROGRESSION
CLINICAL_PROGRESSION: NOT CHANGED

## 2018-06-22 ASSESSMENT — PAIN DESCRIPTION - ORIENTATION: ORIENTATION: LOWER

## 2018-06-22 ASSESSMENT — PAIN DESCRIPTION - LOCATION: LOCATION: BACK

## 2018-06-22 ASSESSMENT — PAIN DESCRIPTION - DESCRIPTORS: DESCRIPTORS: BURNING

## 2018-06-22 ASSESSMENT — PAIN SCALES - GENERAL
PAINLEVEL_OUTOF10: 0
PAINLEVEL_OUTOF10: 3

## 2018-06-22 ASSESSMENT — PAIN DESCRIPTION - FREQUENCY: FREQUENCY: CONTINUOUS

## 2018-06-22 ASSESSMENT — PAIN DESCRIPTION - PAIN TYPE: TYPE: ACUTE PAIN;SURGICAL PAIN

## 2018-06-22 ASSESSMENT — PAIN DESCRIPTION - ONSET: ONSET: ON-GOING

## 2018-06-26 ENCOUNTER — TELEPHONE (OUTPATIENT)
Dept: SURGERY | Age: 43
End: 2018-06-26

## 2018-06-28 ENCOUNTER — OFFICE VISIT (OUTPATIENT)
Dept: SURGERY | Age: 43
End: 2018-06-28

## 2018-06-28 VITALS
TEMPERATURE: 98.8 F | HEART RATE: 81 BPM | OXYGEN SATURATION: 98 % | SYSTOLIC BLOOD PRESSURE: 124 MMHG | DIASTOLIC BLOOD PRESSURE: 82 MMHG | RESPIRATION RATE: 16 BRPM | BODY MASS INDEX: 33.39 KG/M2 | HEIGHT: 65 IN | WEIGHT: 200.4 LBS

## 2018-06-28 DIAGNOSIS — Z51.89 VISIT FOR WOUND CHECK: Primary | ICD-10-CM

## 2018-06-28 PROCEDURE — 99024 POSTOP FOLLOW-UP VISIT: CPT | Performed by: NURSE PRACTITIONER

## 2018-06-28 ASSESSMENT — ENCOUNTER SYMPTOMS
SORE THROAT: 0
COLOR CHANGE: 0
SHORTNESS OF BREATH: 0
ABDOMINAL PAIN: 0
RECTAL PAIN: 0
ABDOMINAL DISTENTION: 0
APNEA: 0
CHOKING: 0
SINUS PRESSURE: 0
DIARRHEA: 0
RHINORRHEA: 0
VOICE CHANGE: 0
EYE ITCHING: 0
STRIDOR: 0
COUGH: 0
CONSTIPATION: 0
ROS SKIN COMMENTS: BACK
ANAL BLEEDING: 0
NAUSEA: 0
EYE DISCHARGE: 0
WHEEZING: 0
EYE REDNESS: 0
EYE PAIN: 0
VOMITING: 0
FACIAL SWELLING: 0
TROUBLE SWALLOWING: 0
CHEST TIGHTNESS: 0
BLOOD IN STOOL: 0
PHOTOPHOBIA: 0

## 2018-07-05 ENCOUNTER — OFFICE VISIT (OUTPATIENT)
Dept: SURGERY | Age: 43
End: 2018-07-05

## 2018-07-05 VITALS
OXYGEN SATURATION: 99 % | RESPIRATION RATE: 18 BRPM | SYSTOLIC BLOOD PRESSURE: 148 MMHG | HEART RATE: 106 BPM | DIASTOLIC BLOOD PRESSURE: 94 MMHG | TEMPERATURE: 99.5 F

## 2018-07-05 DIAGNOSIS — Z51.89 VISIT FOR WOUND CHECK: Primary | ICD-10-CM

## 2018-07-05 PROCEDURE — 99024 POSTOP FOLLOW-UP VISIT: CPT | Performed by: NURSE PRACTITIONER

## 2018-07-05 ASSESSMENT — ENCOUNTER SYMPTOMS
EYE ITCHING: 0
SORE THROAT: 0
ABDOMINAL DISTENTION: 0
PHOTOPHOBIA: 0
BLOOD IN STOOL: 0
FACIAL SWELLING: 0
ANAL BLEEDING: 0
VOMITING: 0
CHEST TIGHTNESS: 0
EYE DISCHARGE: 0
WHEEZING: 0
SINUS PRESSURE: 0
GASTROINTESTINAL NEGATIVE: 1
DIARRHEA: 0
ABDOMINAL PAIN: 0
BACK PAIN: 0
APNEA: 0
COUGH: 0
CHOKING: 0
VOICE CHANGE: 0
CONSTIPATION: 0
TROUBLE SWALLOWING: 0
EYE REDNESS: 0
SINUS PAIN: 0
RECTAL PAIN: 0
RESPIRATORY NEGATIVE: 1
SHORTNESS OF BREATH: 0
NAUSEA: 1
RHINORRHEA: 0
EYE PAIN: 0
COLOR CHANGE: 0
STRIDOR: 0

## 2018-07-05 NOTE — PROGRESS NOTES
needed for Pain 21 tablet 1    metFORMIN (GLUCOPHAGE) 500 MG tablet Take 500 mg by mouth Daily with supper      omeprazole (PRILOSEC) 40 MG delayed release capsule Take 40 mg by mouth daily as needed       vitamin D (ERGOCALCIFEROL) 10182 units CAPS capsule Take 50,000 Units by mouth once a week      hydrOXYzine (ATARAX) 10 MG tablet Take 10 mg by mouth daily as needed       hyoscyamine (ANASPAZ;LEVSIN) 125 MCG tablet Take 125 mcg by mouth every 4 hours as needed for Cramping      atorvastatin (LIPITOR) 20 MG tablet TAKE 1 TABLET BY MOUTH DAILY 30 tablet 8    oxybutynin (DITROPAN XL) 10 MG CR tablet Take 1 tablet by mouth daily 30 tablet 2    amLODIPine (NORVASC) 5 MG tablet Take 1 tablet by mouth daily 30 tablet 3    metoprolol (LOPRESSOR) 100 MG tablet Take 1 tablet by mouth 2 times daily 60 tablet 11    ondansetron (ZOFRAN) 4 MG tablet Take 1 tablet by mouth every 8 hours as needed for Nausea or Vomiting 20 tablet 0     No current facility-administered medications for this visit. 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 Shiloh Gunderson, DO    Penicillins Hives    Clindamycin/Lincomycin Rash    Ciprofloxacin Hives    Doxycycline Hives    Lisinopril      Attacked kidneys       Subjective:      Review of Systems   Constitutional: Positive for activity change and fever. Respiratory: Negative. Cardiovascular: Negative. Gastrointestinal: Negative. Genitourinary: Negative. Musculoskeletal: Negative. Neurological: Negative. Psychiatric/Behavioral: Negative.         Objective:     BP (!) 148/94 (Site: Right Arm, Position: Sitting, Cuff Size: Medium Adult)   Pulse 106   Temp 99.5 °F (37.5 °C) (Tympanic)   Resp 18   LMP 01/20/2013   SpO2 99%     Wt Readings from Last 3 Encounters:   06/28/18 200 lb 6.4 oz (90.9 kg) 06/19/18 194 lb 8 oz (88.2 kg)   06/19/18 197 lb (89.4 kg)       Physical Exam   Constitutional: She appears well-developed. HENT:   Head: Normocephalic. Neck: Normal range of motion. Pulmonary/Chest: Effort normal.   Abdominal: Soft. Musculoskeletal: Normal range of motion. Neurological: She is alert. Skin: Skin is warm. Assessment/Plan:     Venkata Vitale was seen today for wound check. Diagnoses and all orders for this visit:    Visit for wound check        Return in about 10 days (around 7/15/2018). Patient Instructions   Light packing wet to dry, if any changes in wound please call office for a recheck      Discussed use, benefit, and side effects of prescribed medications. All patient questions answered. Pt voiced understanding.      Electronically signed by JUAN Parkinson CNP on 7/5/2018 at 2:34 PM

## 2018-07-05 NOTE — PROGRESS NOTES
every 4 hours as needed for Cramping      atorvastatin (LIPITOR) 20 MG tablet TAKE 1 TABLET BY MOUTH DAILY 30 tablet 8    oxybutynin (DITROPAN XL) 10 MG CR tablet Take 1 tablet by mouth daily 30 tablet 2    amLODIPine (NORVASC) 5 MG tablet Take 1 tablet by mouth daily 30 tablet 3    metoprolol (LOPRESSOR) 100 MG tablet Take 1 tablet by mouth 2 times daily 60 tablet 11    ondansetron (ZOFRAN) 4 MG tablet Take 1 tablet by mouth every 8 hours as needed for Nausea or Vomiting 20 tablet 0     No current facility-administered medications for this visit. 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 Juana Gunderson, DO    Penicillins Hives    Clindamycin/Lincomycin Rash    Ciprofloxacin Hives    Doxycycline Hives    Lisinopril      Attacked kidneys       Subjective:      Review of Systems   Constitutional: Positive for appetite change, diaphoresis, fatigue and fever. Negative for activity change, chills and unexpected weight change. HENT: Negative for congestion, dental problem, drooling, ear discharge, ear pain, facial swelling, hearing loss, mouth sores, nosebleeds, postnasal drip, rhinorrhea, sinus pain, sinus pressure, sneezing, sore throat, tinnitus, trouble swallowing and voice change. Eyes: Negative for photophobia, pain, discharge, redness, itching and visual disturbance. Respiratory: Negative for apnea, cough, choking, chest tightness, shortness of breath, wheezing and stridor. Cardiovascular: Negative for chest pain, palpitations and leg swelling. Gastrointestinal: Positive for nausea. Negative for abdominal distention, abdominal pain, anal bleeding, blood in stool, constipation, diarrhea, rectal pain and vomiting.    Genitourinary: Negative for decreased urine volume, difficulty urinating, dyspareunia, dysuria, enuresis, flank pain, frequency, genital sores, hematuria, menstrual problem, pelvic pain, urgency, vaginal bleeding, vaginal discharge and vaginal pain. Musculoskeletal: Negative for arthralgias, back pain, gait problem, joint swelling, myalgias, neck pain and neck stiffness. Skin: Positive for wound. Negative for color change, pallor and rash. Neurological: Negative for dizziness, tremors, seizures, syncope, facial asymmetry, speech difficulty, weakness, light-headedness, numbness and headaches. Hematological: Negative for adenopathy. Does not bruise/bleed easily. Psychiatric/Behavioral: Positive for sleep disturbance. Negative for agitation, behavioral problems, confusion, decreased concentration, dysphoric mood, hallucinations, self-injury and suicidal ideas. The patient is not nervous/anxious and is not hyperactive. Objective:     BP (!) 148/94 (Site: Right Arm, Position: Sitting, Cuff Size: Medium Adult)   Pulse 106   Temp 99.5 °F (37.5 °C) (Tympanic)   Resp 18   LMP 01/20/2013   SpO2 99%     Wt Readings from Last 3 Encounters:   06/28/18 200 lb 6.4 oz (90.9 kg)   06/19/18 194 lb 8 oz (88.2 kg)   06/19/18 197 lb (89.4 kg)       Physical Exam    Assessment/Plan:     There are no diagnoses linked to this encounter. No Follow-up on file. Patient Instructions   Light packing wet to dry, if any changes in wound please call office for a recheck      Discussed use, benefit, and side effects of prescribed medications. All patient questions answered. Pt voiced understanding.      Electronically signed by JUAN Moody CNP on 7/5/2018 at 2:22 PM

## 2018-07-17 ENCOUNTER — OFFICE VISIT (OUTPATIENT)
Dept: SURGERY | Age: 43
End: 2018-07-17

## 2018-07-17 VITALS
HEIGHT: 65 IN | WEIGHT: 201 LBS | RESPIRATION RATE: 16 BRPM | DIASTOLIC BLOOD PRESSURE: 82 MMHG | OXYGEN SATURATION: 98 % | HEART RATE: 89 BPM | SYSTOLIC BLOOD PRESSURE: 130 MMHG | TEMPERATURE: 98.8 F | BODY MASS INDEX: 33.49 KG/M2

## 2018-07-17 DIAGNOSIS — Z51.89 VISIT FOR WOUND CHECK: Primary | ICD-10-CM

## 2018-07-17 PROCEDURE — 99024 POSTOP FOLLOW-UP VISIT: CPT | Performed by: NURSE PRACTITIONER

## 2018-07-17 ASSESSMENT — ENCOUNTER SYMPTOMS
BLOOD IN STOOL: 0
ANAL BLEEDING: 0
ROS SKIN COMMENTS: BACK
EYE PAIN: 0
VOMITING: 0
CHEST TIGHTNESS: 0
STRIDOR: 0
CHOKING: 0
RECTAL PAIN: 0
CONSTIPATION: 0
FACIAL SWELLING: 0
SORE THROAT: 0
EYE ITCHING: 0
ABDOMINAL PAIN: 0
BACK PAIN: 1
WHEEZING: 0
COUGH: 0
ABDOMINAL DISTENTION: 0
APNEA: 0
VOICE CHANGE: 0
EYE DISCHARGE: 0
NAUSEA: 0
EYE REDNESS: 0
TROUBLE SWALLOWING: 0
SINUS PRESSURE: 0
DIARRHEA: 0
SHORTNESS OF BREATH: 0
COLOR CHANGE: 0
RHINORRHEA: 0
PHOTOPHOBIA: 0

## 2018-07-17 NOTE — PATIENT INSTRUCTIONS
Continue to watch wound and watch for signs of infection,   Follow up with PCP for back pain   Call if any changes to the wound  Take ibuprofen for pain

## 2018-07-17 NOTE — PROGRESS NOTES
[facility]  [facility]  [address] [address] [address]  [address] [address]  Dept: [phone]  Dept Fax: [phone]  Loc: [phone]    Visit Date: 7/17/2018       Nini Londono is a 38 y.o. female who presents today for:  Chief Complaint   Patient presents with    Post-Op Check     10 day fu- S/p Excisional debridement; skin, subcutaneous tissue to muscle, 6 x 4-6/4/18-last seen in office 6/28       HPI:     Reese Walker presents today for a  post op wound check. Today She is doing better. She states she overall improved from last visit. The surgical wound looks good, pink and moist. The wound is closing nicely. The size is 1.5cm depth x 3cm length. No fevers, no signs of infection. She is in good spirits today. She is having back pain, no known injuries or strains. This could muscle strain from having the wound and carrying herself differently. She takes ibuprofen for relief and it is bothersome. I discussed following up with PCP for x rays to make sure there is no injury. She may need to start building strength in her lower back. Therapy could be an option if needed.   Follow up in 2 weeks for a wound check     Past Medical History:   Diagnosis Date    Diabetes mellitus ([address]     Hypertension     Unspecified cerebral artery occlusion with cerebral infarction 1995      Past Surgical History:   Procedure Laterality Date    BREAST SURGERY Left 02/10/2015    I & D Dr. Sloan Bending  April 2013     total    HYSTEROSCOPY      ME EXC SKIN BENIG 2.1-3CM TRUNK,ARM,LEG N/A 6/4/2018    EXCISION OF BACK MASS performed by Ivy Nunez MD at [address] [place] [address] History   Problem Relation Age of Onset    Heart Disease Mother     High Blood Pressure Mother     Arthritis Mother     Breast Cancer Maternal Cousin         dx premenapausal    High Blood Pressure Sister      Social History   Substance Use Topics    Smoking status: Never Smoker

## 2018-07-31 ENCOUNTER — OFFICE VISIT (OUTPATIENT)
Dept: SURGERY | Age: 43
End: 2018-07-31

## 2018-07-31 VITALS
SYSTOLIC BLOOD PRESSURE: 118 MMHG | HEART RATE: 84 BPM | BODY MASS INDEX: 32.92 KG/M2 | RESPIRATION RATE: 18 BRPM | OXYGEN SATURATION: 98 % | HEIGHT: 65 IN | TEMPERATURE: 97.4 F | WEIGHT: 197.6 LBS | DIASTOLIC BLOOD PRESSURE: 80 MMHG

## 2018-07-31 DIAGNOSIS — Z51.89 VISIT FOR WOUND CHECK: Primary | ICD-10-CM

## 2018-07-31 PROCEDURE — 99024 POSTOP FOLLOW-UP VISIT: CPT | Performed by: NURSE PRACTITIONER

## 2018-07-31 ASSESSMENT — ENCOUNTER SYMPTOMS
RHINORRHEA: 0
EYE DISCHARGE: 0
VOMITING: 0
FACIAL SWELLING: 0
SINUS PRESSURE: 0
CHEST TIGHTNESS: 0
APNEA: 0
BACK PAIN: 0
ANAL BLEEDING: 0
EYE ITCHING: 0
EYE REDNESS: 0
ABDOMINAL PAIN: 0
EYE PAIN: 0
ABDOMINAL DISTENTION: 0
ROS SKIN COMMENTS: BACK
TROUBLE SWALLOWING: 0
CONSTIPATION: 0
BLOOD IN STOOL: 0
CHOKING: 0
NAUSEA: 0
SORE THROAT: 0
COUGH: 0
VOICE CHANGE: 0
DIARRHEA: 0
COLOR CHANGE: 0
RECTAL PAIN: 0
WHEEZING: 0
SHORTNESS OF BREATH: 0
STRIDOR: 0
PHOTOPHOBIA: 0

## 2018-07-31 NOTE — PROGRESS NOTES
[address]  [address] [address]  Dept: [phone]  Dept Fax: [phone]  Loc: [phone]    Visit Date: 7/31/2018       Jim Green is a 38 y.o. female who presents today for:  Chief Complaint   Patient presents with    Post-Op Check     S/p Excisional debridement; skin, subcutaneous tissue to muscle, 6 x 4-6/4/18-last seen in office 6/28       HPI:     William Colon presents today for a post op wound check. Today She has no complaints today, she states she feels much better, the wound was very superficial. May apply dry dressing until healed. No signs of infection. The wound looks good.   No follow up needed, call with concerns       Past Medical History:   Diagnosis Date    Diabetes mellitus ([address]     Hypertension     Unspecified cerebral artery occlusion with cerebral infarction 1995      Past Surgical History:   Procedure Laterality Date    BREAST SURGERY Left 02/10/2015    I & D Dr. Jemal Bain  April 2013     total    HYSTEROSCOPY      LA EXC SKIN BENIG 2.1-3CM TRUNK,ARM,LEG N/A 6/4/2018    EXCISION OF BACK MASS performed by Ama Mattson MD at [facility] History   Problem Relation Age of Onset    Heart Disease Mother     High Blood Pressure Mother     Arthritis Mother     Breast Cancer Maternal Cousin         dx premenapausal    High Blood Pressure Sister      Social History   Substance Use Topics    Smoking status: Never Smoker    Smokeless tobacco: Never Used    Alcohol use No        Current Outpatient Prescriptions   Medication Sig Dispense Refill    ondansetron (ZOFRAN) 4 MG tablet Take 1 tablet by mouth every 8 hours as needed for Nausea or Vomiting 20 tablet 0    ibuprofen (IBU) 800 MG tablet Take 1 tablet by mouth every 8 hours as needed for Pain 21 tablet 1    metFORMIN (GLUCOPHAGE) 500 MG tablet Take 500 mg by mouth Daily with supper      constipation, diarrhea, nausea, rectal pain and vomiting. Genitourinary: Negative for decreased urine volume, difficulty urinating, dyspareunia, dysuria, enuresis, flank pain, frequency, genital sores, hematuria, menstrual problem, pelvic pain, urgency, vaginal bleeding, vaginal discharge and vaginal pain. Musculoskeletal: Negative for arthralgias, back pain, gait problem, joint swelling, myalgias, neck pain and neck stiffness. Skin: Positive for wound. Negative for color change, pallor and rash. back   Neurological: Negative for dizziness, tremors, seizures, syncope, facial asymmetry, speech difficulty, weakness, light-headedness, numbness and headaches. Hematological: Negative for adenopathy. Does not bruise/bleed easily. Psychiatric/Behavioral: Negative for agitation, behavioral problems, confusion, decreased concentration, dysphoric mood, hallucinations, self-injury, sleep disturbance and suicidal ideas. The patient is not nervous/anxious and is not hyperactive. Objective:     /80 (Site: Right Arm, Position: Sitting, Cuff Size: Medium Adult)   Pulse 84   Temp 97.4 °F (36.3 °C) (Tympanic)   Resp 18   Ht 5' 5\" (1.651 m)   Wt 197 lb 9.6 oz (89.6 kg)   LMP 01/20/2013   SpO2 98%   Breastfeeding? No   BMI 32.88 kg/m²     Wt Readings from Last 3 Encounters:   07/31/18 197 lb 9.6 oz (89.6 kg)   07/17/18 201 lb (91.2 kg)   06/28/18 200 lb 6.4 oz (90.9 kg)       Physical Exam   Constitutional: She appears well-developed. HENT:   Head: Normocephalic. Neck: Normal range of motion. Pulmonary/Chest: Effort normal.   Abdominal: Soft. Musculoskeletal: Normal range of motion. Neurological: She is alert. Skin: Skin is warm. Assessment/Plan:     There are no diagnoses linked to this encounter. Return if symptoms worsen or fail to improve.     Patient Instructions   No follow up needed, call with concerns       Discussed use, benefit, and side effects of prescribed

## 2018-08-03 ENCOUNTER — TELEPHONE (OUTPATIENT)
Dept: SURGERY | Age: 43
End: 2018-08-03

## 2018-08-08 DIAGNOSIS — Z86.14 HISTORY OF MRSA INFECTION: ICD-10-CM

## 2018-08-08 DIAGNOSIS — Z86.14 HISTORY OF MRSA INFECTION: Primary | ICD-10-CM

## 2018-08-10 LAB — MRSA SCREEN: NORMAL

## 2018-09-27 NOTE — TELEPHONE ENCOUNTER
Pt was in office at [facility] pt BP was 172/118 and the 160/102  HR 86. Pt has no symptoms. Any recommendations?

## 2018-09-28 RX ORDER — AMLODIPINE BESYLATE AND BENAZEPRIL HYDROCHLORIDE 5; 20 MG/1; MG/1
1 CAPSULE ORAL DAILY
COMMUNITY
End: 2018-09-28 | Stop reason: SDUPTHER

## 2018-09-28 RX ORDER — AMLODIPINE BESYLATE AND BENAZEPRIL HYDROCHLORIDE 5; 20 MG/1; MG/1
1 CAPSULE ORAL DAILY
Qty: 30 CAPSULE | Refills: 11 | Status: SHIPPED | OUTPATIENT
Start: 2018-09-28 | End: 2019-09-03

## 2019-03-13 ENCOUNTER — HOSPITAL ENCOUNTER (EMERGENCY)
Age: 44
Discharge: HOME | End: 2019-03-13
Payer: COMMERCIAL

## 2019-03-13 VITALS
HEART RATE: 65 BPM | RESPIRATION RATE: 16 BRPM | WEIGHT: 195 LBS | DIASTOLIC BLOOD PRESSURE: 73 MMHG | HEIGHT: 65 IN | TEMPERATURE: 97.9 F | OXYGEN SATURATION: 99 % | SYSTOLIC BLOOD PRESSURE: 145 MMHG | BODY MASS INDEX: 32.49 KG/M2

## 2019-03-13 DIAGNOSIS — G44.89 OTHER HEADACHE SYNDROME: Primary | ICD-10-CM

## 2019-03-13 PROCEDURE — 96374 THER/PROPH/DIAG INJ IV PUSH: CPT

## 2019-03-13 PROCEDURE — 96375 TX/PRO/DX INJ NEW DRUG ADDON: CPT

## 2019-03-13 PROCEDURE — 2580000003 HC RX 258: Performed by: PHYSICIAN ASSISTANT

## 2019-03-13 PROCEDURE — 99283 EMERGENCY DEPT VISIT LOW MDM: CPT

## 2019-03-13 PROCEDURE — 6360000002 HC RX W HCPCS: Performed by: PHYSICIAN ASSISTANT

## 2019-03-13 RX ORDER — METOCLOPRAMIDE HYDROCHLORIDE 5 MG/ML
10 INJECTION INTRAMUSCULAR; INTRAVENOUS ONCE
Status: COMPLETED | OUTPATIENT
Start: 2019-03-13 | End: 2019-03-13

## 2019-03-13 RX ORDER — LORAZEPAM 2 MG/ML
1 INJECTION INTRAMUSCULAR ONCE
Status: COMPLETED | OUTPATIENT
Start: 2019-03-13 | End: 2019-03-13

## 2019-03-13 RX ORDER — BUTALBITAL, ACETAMINOPHEN AND CAFFEINE 50; 325; 40 MG/1; MG/1; MG/1
1 TABLET ORAL EVERY 4 HOURS PRN
Qty: 20 TABLET | Refills: 0 | Status: SHIPPED | OUTPATIENT
Start: 2019-03-13 | End: 2021-03-16

## 2019-03-13 RX ORDER — KETOROLAC TROMETHAMINE 30 MG/ML
30 INJECTION, SOLUTION INTRAMUSCULAR; INTRAVENOUS ONCE
Status: COMPLETED | OUTPATIENT
Start: 2019-03-13 | End: 2019-03-13

## 2019-03-13 RX ORDER — 0.9 % SODIUM CHLORIDE 0.9 %
1000 INTRAVENOUS SOLUTION INTRAVENOUS ONCE
Status: COMPLETED | OUTPATIENT
Start: 2019-03-13 | End: 2019-03-13

## 2019-03-13 RX ADMIN — METOCLOPRAMIDE 10 MG: 5 INJECTION, SOLUTION INTRAMUSCULAR; INTRAVENOUS at 11:43

## 2019-03-13 RX ADMIN — LORAZEPAM 1 MG: 2 INJECTION INTRAMUSCULAR; INTRAVENOUS at 11:45

## 2019-03-13 RX ADMIN — KETOROLAC TROMETHAMINE 30 MG: 30 INJECTION, SOLUTION INTRAMUSCULAR at 11:44

## 2019-03-13 RX ADMIN — SODIUM CHLORIDE 1000 ML: 9 INJECTION, SOLUTION INTRAVENOUS at 11:44

## 2019-03-13 ASSESSMENT — ENCOUNTER SYMPTOMS
PHOTOPHOBIA: 0
EYE DISCHARGE: 0
COUGH: 0
EYE PAIN: 0
WHEEZING: 0
ABDOMINAL PAIN: 0
SHORTNESS OF BREATH: 0
RHINORRHEA: 0
DIARRHEA: 0
VOMITING: 0
SORE THROAT: 0
BACK PAIN: 0
NAUSEA: 0

## 2019-03-13 ASSESSMENT — PAIN DESCRIPTION - ONSET: ONSET: ON-GOING

## 2019-03-13 ASSESSMENT — PAIN DESCRIPTION - PROGRESSION: CLINICAL_PROGRESSION: GRADUALLY WORSENING

## 2019-03-13 ASSESSMENT — PAIN DESCRIPTION - LOCATION: LOCATION: HEAD

## 2019-03-13 ASSESSMENT — PAIN SCALES - GENERAL
PAINLEVEL_OUTOF10: 5
PAINLEVEL_OUTOF10: 10
PAINLEVEL_OUTOF10: 10

## 2019-03-13 ASSESSMENT — PAIN DESCRIPTION - DESCRIPTORS: DESCRIPTORS: THROBBING;SHARP

## 2019-03-13 ASSESSMENT — PAIN DESCRIPTION - ORIENTATION: ORIENTATION: POSTERIOR

## 2019-03-13 ASSESSMENT — PAIN DESCRIPTION - PAIN TYPE: TYPE: ACUTE PAIN

## 2019-03-13 ASSESSMENT — PAIN DESCRIPTION - FREQUENCY: FREQUENCY: CONTINUOUS

## 2019-06-06 ENCOUNTER — HOSPITAL ENCOUNTER (OUTPATIENT)
Age: 44
Setting detail: SPECIMEN
Discharge: HOME | End: 2019-06-06
Payer: COMMERCIAL

## 2019-06-06 LAB
ABSOLUTE EOS #: 0.07 K/UL (ref 0–0.44)
ABSOLUTE IMMATURE GRANULOCYTE: <0.03 K/UL (ref 0–0.3)
ABSOLUTE LYMPH #: 2.38 K/UL (ref 1.1–3.7)
ABSOLUTE MONO #: 0.37 K/UL (ref 0.1–1.2)
ALBUMIN SERPL-MCNC: 4.3 G/DL (ref 3.5–5.2)
ALBUMIN/GLOBULIN RATIO: 1.6 (ref 1–2.5)
ALP BLD-CCNC: 95 U/L (ref 35–104)
ALT SERPL-CCNC: 8 U/L (ref 5–33)
ANION GAP SERPL CALCULATED.3IONS-SCNC: 11 MMOL/L (ref 9–17)
AST SERPL-CCNC: 8 U/L
BASOPHILS # BLD: 1 % (ref 0–2)
BASOPHILS ABSOLUTE: 0.03 K/UL (ref 0–0.2)
BILIRUB SERPL-MCNC: 0.19 MG/DL (ref 0.3–1.2)
BUN BLDV-MCNC: 7 MG/DL (ref 6–20)
BUN/CREAT BLD: ABNORMAL (ref 9–20)
CALCIUM SERPL-MCNC: 9.4 MG/DL (ref 8.6–10.4)
CHLORIDE BLD-SCNC: 106 MMOL/L (ref 98–107)
CHOLESTEROL/HDL RATIO: 3.5
CHOLESTEROL: 159 MG/DL
CO2: 25 MMOL/L (ref 20–31)
CREAT SERPL-MCNC: 0.56 MG/DL (ref 0.5–0.9)
DIFFERENTIAL TYPE: ABNORMAL
EOSINOPHILS RELATIVE PERCENT: 1 % (ref 1–4)
GFR AFRICAN AMERICAN: >60 ML/MIN
GFR NON-AFRICAN AMERICAN: >60 ML/MIN
GFR SERPL CREATININE-BSD FRML MDRD: ABNORMAL ML/MIN/{1.73_M2}
GFR SERPL CREATININE-BSD FRML MDRD: ABNORMAL ML/MIN/{1.73_M2}
GLUCOSE BLD-MCNC: 95 MG/DL (ref 70–99)
HCT VFR BLD CALC: 40.9 % (ref 36.3–47.1)
HDLC SERPL-MCNC: 45 MG/DL
HEMOGLOBIN: 12.4 G/DL (ref 11.9–15.1)
IMMATURE GRANULOCYTES: 0 %
LDL CHOLESTEROL: 99 MG/DL (ref 0–130)
LYMPHOCYTES # BLD: 48 % (ref 24–43)
MCH RBC QN AUTO: 27 PG (ref 25.2–33.5)
MCHC RBC AUTO-ENTMCNC: 30.3 G/DL (ref 28.4–34.8)
MCV RBC AUTO: 89.1 FL (ref 82.6–102.9)
MONOCYTES # BLD: 7 % (ref 3–12)
NRBC AUTOMATED: 0 PER 100 WBC
PDW BLD-RTO: 14.8 % (ref 11.8–14.4)
PLATELET # BLD: 375 K/UL (ref 138–453)
PLATELET ESTIMATE: ABNORMAL
PMV BLD AUTO: 10.6 FL (ref 8.1–13.5)
POTASSIUM SERPL-SCNC: 3.9 MMOL/L (ref 3.7–5.3)
RBC # BLD: 4.59 M/UL (ref 3.95–5.11)
RBC # BLD: ABNORMAL 10*6/UL
SEG NEUTROPHILS: 43 % (ref 36–65)
SEGMENTED NEUTROPHILS ABSOLUTE COUNT: 2.12 K/UL (ref 1.5–8.1)
SODIUM BLD-SCNC: 142 MMOL/L (ref 135–144)
TOTAL PROTEIN: 7 G/DL (ref 6.4–8.3)
TRIGL SERPL-MCNC: 74 MG/DL
VITAMIN D 25-HYDROXY: 12.7 NG/ML (ref 30–100)
VLDLC SERPL CALC-MCNC: NORMAL MG/DL (ref 1–30)
WBC # BLD: 5 K/UL (ref 3.5–11.3)
WBC # BLD: ABNORMAL 10*3/UL

## 2019-09-03 ENCOUNTER — OFFICE VISIT (OUTPATIENT)
Dept: CARDIOLOGY CLINIC | Age: 44
End: 2019-09-03
Payer: COMMERCIAL

## 2019-09-03 VITALS
HEART RATE: 83 BPM | DIASTOLIC BLOOD PRESSURE: 74 MMHG | SYSTOLIC BLOOD PRESSURE: 136 MMHG | WEIGHT: 191.2 LBS | BODY MASS INDEX: 31.82 KG/M2

## 2019-09-03 DIAGNOSIS — E78.01 FAMILIAL HYPERCHOLESTEROLEMIA: ICD-10-CM

## 2019-09-03 DIAGNOSIS — I10 ESSENTIAL HYPERTENSION: Primary | ICD-10-CM

## 2019-09-03 DIAGNOSIS — I25.10 CORONARY ARTERY DISEASE INVOLVING NATIVE CORONARY ARTERY OF NATIVE HEART WITHOUT ANGINA PECTORIS: ICD-10-CM

## 2019-09-03 PROCEDURE — 1036F TOBACCO NON-USER: CPT | Performed by: NUCLEAR MEDICINE

## 2019-09-03 PROCEDURE — G8599 NO ASA/ANTIPLAT THER USE RNG: HCPCS | Performed by: NUCLEAR MEDICINE

## 2019-09-03 PROCEDURE — 93000 ELECTROCARDIOGRAM COMPLETE: CPT | Performed by: NUCLEAR MEDICINE

## 2019-09-03 PROCEDURE — 99214 OFFICE O/P EST MOD 30 MIN: CPT | Performed by: NUCLEAR MEDICINE

## 2019-09-03 PROCEDURE — G8427 DOCREV CUR MEDS BY ELIG CLIN: HCPCS | Performed by: NUCLEAR MEDICINE

## 2019-09-03 PROCEDURE — G8417 CALC BMI ABV UP PARAM F/U: HCPCS | Performed by: NUCLEAR MEDICINE

## 2019-09-03 RX ORDER — CLONIDINE HYDROCHLORIDE 0.2 MG/1
0.2 TABLET ORAL 2 TIMES DAILY
Status: ON HOLD | COMMUNITY
End: 2019-09-17

## 2019-09-03 RX ORDER — AMLODIPINE BESYLATE 5 MG/1
5 TABLET ORAL DAILY
Qty: 30 TABLET | Refills: 11 | Status: SHIPPED | OUTPATIENT
Start: 2019-09-03 | End: 2021-03-16

## 2019-09-03 RX ORDER — CHLORTHALIDONE 25 MG/1
25 TABLET ORAL DAILY
COMMUNITY
End: 2021-03-16

## 2019-09-03 NOTE — PROGRESS NOTES
100 MG tablet Take 1 tablet by mouth 2 times daily 60 tablet 11    butalbital-acetaminophen-caffeine (ESGIC) -40 MG per tablet Take 1 tablet by mouth every 4 hours as needed for Headaches (Patient not taking: Reported on 9/3/2019) 20 tablet 0    amLODIPine-benazepril (LOTREL) 5-20 MG per capsule Take 1 capsule by mouth daily (Patient not taking: Reported on 9/3/2019) 30 capsule 11    ondansetron (ZOFRAN) 4 MG tablet Take 1 tablet by mouth every 8 hours as needed for Nausea or Vomiting (Patient not taking: Reported on 9/3/2019) 20 tablet 0    ibuprofen (IBU) 800 MG tablet Take 1 tablet by mouth every 8 hours as needed for Pain 21 tablet 1    omeprazole (PRILOSEC) 40 MG delayed release capsule Take 40 mg by mouth daily as needed       vitamin D (ERGOCALCIFEROL) 66554 units CAPS capsule Take 50,000 Units by mouth once a week      hydrOXYzine (ATARAX) 10 MG tablet Take 10 mg by mouth daily as needed       hyoscyamine (ANASPAZ;LEVSIN) 125 MCG tablet Take 125 mcg by mouth every 4 hours as needed for Cramping      oxybutynin (DITROPAN XL) 10 MG CR tablet Take 1 tablet by mouth daily (Patient not taking: Reported on 9/3/2019) 30 tablet 2     No current facility-administered medications for this visit. 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 Tawnya Gunedrson,     Penicillins Hives    Clindamycin/Lincomycin Rash    Ciprofloxacin Hives    Doxycycline Hives    Lisinopril      Attacked kidneys     Health Maintenance   Topic Date Due    HIV screen  08/12/1992    DTaP/Tdap/Td vaccine (1 - Tdap) 08/12/1996    Cervical cancer screen  08/12/1998    A1C test (Diabetic or Prediabetic)  02/10/2016    Flu vaccine (1) 09/01/2019    Potassium monitoring  06/06/2020    Creatinine monitoring  06/06/2020    Lipid screen  06/06/2024    Pneumococcal 0-64 years Vaccine  Aged Out       Subjective:  Review of Systems  General:   No fever, no chills, No fatigue or weight loss  Pulmonary:    No dyspnea, no wheezing  Cardiac:    Denies recent chest pain,   GI:     No nausea or vomiting, no abdominal pain  Neuro:    No dizziness or light headedness,   Musculoskeletal:  No recent active issues  Extremities:   No edema, good peripheral pulses        Objective:  Physical Exam  /74 (Site: Right Upper Arm, Position: Sitting, Cuff Size: Large Adult)   Pulse 83   Wt 191 lb 3.2 oz (86.7 kg)   LMP 01/20/2013   BMI 31.82 kg/m²   General:   Well developed, well nourished  Lungs:   Clear to auscultation  Heart:    Normal S1 S2, Slight murmur. no rubs, no gallops  Abdomen:   Soft, non tender, no organomegalies, positive bowel sounds  Extremities:   No edema, no cyanosis, good peripheral pulses  Neurological:   Awake, alert, oriented. No obvious focal deficits  Musculoskelatal:  No obvious deformities    Assessment:      Diagnosis Orders   1. Essential hypertension  EKG 12 lead   2. Familial hypercholesterolemia     3. Coronary artery disease involving native coronary artery of native heart without angina pectoris       Uncontrolled BP  Issues with ACEi before  Atypical symptoms but higher risk patient   Chest pain at times      Plan:  No follow-ups on file. Stop hydralazine  Start amlodipine 5 mg   Increase the dose if needed  Non invasive cardiac testing will be ordered to further evaluate for any ischemic or structural heart disease as a cause of the patient symptoms. We will proceed with a Stress Cardiolite test and echo soon. Continue risk factor modification and medical management  Thank you for allowing me to participate in the care of your patient.  Please don't hesitate to contact me regarding any further issues related to the patient care    Orders Placed:  Orders Placed This Encounter   Procedures    EKG 12 lead     Order Specific Question:   Reason for Exam?

## 2019-09-13 ENCOUNTER — TELEPHONE (OUTPATIENT)
Dept: CARDIOLOGY CLINIC | Age: 44
End: 2019-09-13

## 2019-09-13 DIAGNOSIS — I25.10 CORONARY ARTERY DISEASE INVOLVING NATIVE CORONARY ARTERY OF NATIVE HEART WITHOUT ANGINA PECTORIS: Primary | ICD-10-CM

## 2019-09-13 DIAGNOSIS — R07.9 CHEST PAIN AT REST: ICD-10-CM

## 2019-09-13 NOTE — TELEPHONE ENCOUNTER
[address] [place] [address] is requesting a peer to peer  [place] [facility]  Peer to peer# [phone]  Tracking# [id number]    Please advise.

## 2019-09-16 ENCOUNTER — APPOINTMENT (OUTPATIENT)
Dept: CT IMAGING | Age: 44
DRG: 364 | End: 2019-09-16
Payer: COMMERCIAL

## 2019-09-16 ENCOUNTER — HOSPITAL ENCOUNTER (INPATIENT)
Age: 44
LOS: 6 days | Discharge: HOME HEALTH | DRG: 364 | End: 2019-09-23
Attending: EMERGENCY MEDICINE | Admitting: HOSPITALIST
Payer: COMMERCIAL

## 2019-09-16 DIAGNOSIS — B95.62 MRSA CELLULITIS: Primary | ICD-10-CM

## 2019-09-16 DIAGNOSIS — L03.90 MRSA CELLULITIS: Primary | ICD-10-CM

## 2019-09-16 DIAGNOSIS — Z98.890 S/P DEBRIDEMENT: ICD-10-CM

## 2019-09-16 LAB
ALBUMIN SERPL-MCNC: 4.3 G/DL (ref 3.5–5.1)
ALP BLD-CCNC: 82 U/L (ref 38–126)
ALT SERPL-CCNC: 6 U/L (ref 11–66)
AMPHETAMINE+METHAMPHETAMINE URINE SCREEN: NEGATIVE
ANION GAP SERPL CALCULATED.3IONS-SCNC: 14 MEQ/L (ref 8–16)
APTT: 32.9 SECONDS (ref 22–38)
AST SERPL-CCNC: 8 U/L (ref 5–40)
BACTERIA: ABNORMAL /HPF
BARBITURATE QUANTITATIVE URINE: NEGATIVE
BASOPHILS # BLD: 0.2 %
BASOPHILS ABSOLUTE: 0 THOU/MM3 (ref 0–0.1)
BENZODIAZEPINE QUANTITATIVE URINE: NEGATIVE
BILIRUB SERPL-MCNC: 0.3 MG/DL (ref 0.3–1.2)
BILIRUBIN DIRECT: < 0.2 MG/DL (ref 0–0.3)
BILIRUBIN URINE: NEGATIVE
BLOOD, URINE: ABNORMAL
BUN BLDV-MCNC: 8 MG/DL (ref 7–22)
CALCIUM SERPL-MCNC: 9.4 MG/DL (ref 8.5–10.5)
CANNABINOID QUANTITATIVE URINE: NEGATIVE
CASTS 2: ABNORMAL /LPF
CASTS UA: ABNORMAL /LPF
CHARACTER, URINE: CLEAR
CHLORIDE BLD-SCNC: 100 MEQ/L (ref 98–111)
CO2: 22 MEQ/L (ref 23–33)
COCAINE METABOLITE QUANTITATIVE URINE: NEGATIVE
COLOR: YELLOW
CREAT SERPL-MCNC: 0.8 MG/DL (ref 0.4–1.2)
CRYSTALS, UA: ABNORMAL
EKG ATRIAL RATE: 108 BPM
EKG P AXIS: 39 DEGREES
EKG P-R INTERVAL: 140 MS
EKG Q-T INTERVAL: 332 MS
EKG QRS DURATION: 74 MS
EKG QTC CALCULATION (BAZETT): 444 MS
EKG T AXIS: -8 DEGREES
EKG VENTRICULAR RATE: 108 BPM
EOSINOPHIL # BLD: 0.1 %
EOSINOPHILS ABSOLUTE: 0 THOU/MM3 (ref 0–0.4)
EPITHELIAL CELLS, UA: ABNORMAL /HPF
ERYTHROCYTE [DISTWIDTH] IN BLOOD BY AUTOMATED COUNT: 15 % (ref 11.5–14.5)
ERYTHROCYTE [DISTWIDTH] IN BLOOD BY AUTOMATED COUNT: 47.5 FL (ref 35–45)
ETHYL ALCOHOL, SERUM: < 0.01 %
GLUCOSE BLD-MCNC: 132 MG/DL (ref 70–108)
GLUCOSE URINE: NEGATIVE MG/DL
HCT VFR BLD CALC: 36.5 % (ref 37–47)
HEMOGLOBIN: 11.5 GM/DL (ref 12–16)
IMMATURE GRANS (ABS): 0.11 THOU/MM3 (ref 0–0.07)
IMMATURE GRANULOCYTES: 1 %
INR BLD: 1.15 (ref 0.85–1.13)
KETONES, URINE: NEGATIVE
LEUKOCYTE ESTERASE, URINE: NEGATIVE
LIPASE: 14.6 U/L (ref 5.6–51.3)
LYMPHOCYTES # BLD: 7.1 %
LYMPHOCYTES ABSOLUTE: 1.2 THOU/MM3 (ref 1–4.8)
MAGNESIUM: 1.8 MG/DL (ref 1.6–2.4)
MCH RBC QN AUTO: 27.3 PG (ref 26–33)
MCHC RBC AUTO-ENTMCNC: 31.5 GM/DL (ref 32.2–35.5)
MCV RBC AUTO: 86.5 FL (ref 81–99)
MISCELLANEOUS 2: ABNORMAL
MONOCYTES # BLD: 6.5 %
MONOCYTES ABSOLUTE: 1.1 THOU/MM3 (ref 0.4–1.3)
NITRITE, URINE: NEGATIVE
NUCLEATED RED BLOOD CELLS: 0 /100 WBC
OPIATES, URINE: POSITIVE
OSMOLALITY CALCULATION: 272.2 MOSMOL/KG (ref 275–300)
OXYCODONE: NEGATIVE
PH UA: 6.5 (ref 5–9)
PHENCYCLIDINE QUANTITATIVE URINE: NEGATIVE
PLATELET # BLD: 315 THOU/MM3 (ref 130–400)
PMV BLD AUTO: 9.9 FL (ref 9.4–12.4)
POTASSIUM SERPL-SCNC: 3.4 MEQ/L (ref 3.5–5.2)
PRO-BNP: 83.1 PG/ML (ref 0–450)
PROTEIN UA: NEGATIVE
RBC # BLD: 4.22 MILL/MM3 (ref 4.2–5.4)
RBC URINE: ABNORMAL /HPF
RENAL EPITHELIAL, UA: ABNORMAL
SEG NEUTROPHILS: 85.5 %
SEGMENTED NEUTROPHILS ABSOLUTE COUNT: 14.8 THOU/MM3 (ref 1.8–7.7)
SODIUM BLD-SCNC: 136 MEQ/L (ref 135–145)
SPECIFIC GRAVITY, URINE: 1.02 (ref 1–1.03)
TOTAL PROTEIN: 7.5 G/DL (ref 6.1–8)
TROPONIN T: < 0.01 NG/ML
UROBILINOGEN, URINE: 0.2 EU/DL (ref 0–1)
WBC # BLD: 17.3 THOU/MM3 (ref 4.8–10.8)
WBC UA: ABNORMAL /HPF
YEAST: ABNORMAL

## 2019-09-16 PROCEDURE — 82248 BILIRUBIN DIRECT: CPT

## 2019-09-16 PROCEDURE — 36415 COLL VENOUS BLD VENIPUNCTURE: CPT

## 2019-09-16 PROCEDURE — 96374 THER/PROPH/DIAG INJ IV PUSH: CPT

## 2019-09-16 PROCEDURE — 2580000003 HC RX 258: Performed by: EMERGENCY MEDICINE

## 2019-09-16 PROCEDURE — 99285 EMERGENCY DEPT VISIT HI MDM: CPT

## 2019-09-16 PROCEDURE — 83735 ASSAY OF MAGNESIUM: CPT

## 2019-09-16 PROCEDURE — G0480 DRUG TEST DEF 1-7 CLASSES: HCPCS

## 2019-09-16 PROCEDURE — 84484 ASSAY OF TROPONIN QUANT: CPT

## 2019-09-16 PROCEDURE — 74177 CT ABD & PELVIS W/CONTRAST: CPT

## 2019-09-16 PROCEDURE — 85730 THROMBOPLASTIN TIME PARTIAL: CPT

## 2019-09-16 PROCEDURE — 83880 ASSAY OF NATRIURETIC PEPTIDE: CPT

## 2019-09-16 PROCEDURE — 85610 PROTHROMBIN TIME: CPT

## 2019-09-16 PROCEDURE — 85025 COMPLETE CBC W/AUTO DIFF WBC: CPT

## 2019-09-16 PROCEDURE — 2709999900 HC NON-CHARGEABLE SUPPLY

## 2019-09-16 PROCEDURE — 80307 DRUG TEST PRSMV CHEM ANLYZR: CPT

## 2019-09-16 PROCEDURE — 93005 ELECTROCARDIOGRAM TRACING: CPT | Performed by: EMERGENCY MEDICINE

## 2019-09-16 PROCEDURE — 6360000004 HC RX CONTRAST MEDICATION: Performed by: EMERGENCY MEDICINE

## 2019-09-16 PROCEDURE — 6360000002 HC RX W HCPCS: Performed by: EMERGENCY MEDICINE

## 2019-09-16 PROCEDURE — 96375 TX/PRO/DX INJ NEW DRUG ADDON: CPT

## 2019-09-16 PROCEDURE — 80053 COMPREHEN METABOLIC PANEL: CPT

## 2019-09-16 PROCEDURE — 81001 URINALYSIS AUTO W/SCOPE: CPT

## 2019-09-16 PROCEDURE — C9113 INJ PANTOPRAZOLE SODIUM, VIA: HCPCS | Performed by: EMERGENCY MEDICINE

## 2019-09-16 PROCEDURE — 83690 ASSAY OF LIPASE: CPT

## 2019-09-16 RX ORDER — ONDANSETRON 2 MG/ML
4 INJECTION INTRAMUSCULAR; INTRAVENOUS ONCE
Status: COMPLETED | OUTPATIENT
Start: 2019-09-16 | End: 2019-09-16

## 2019-09-16 RX ORDER — 0.9 % SODIUM CHLORIDE 0.9 %
10 VIAL (ML) INJECTION DAILY
Status: DISCONTINUED | OUTPATIENT
Start: 2019-09-17 | End: 2019-09-23 | Stop reason: HOSPADM

## 2019-09-16 RX ORDER — PANTOPRAZOLE SODIUM 40 MG/10ML
40 INJECTION, POWDER, LYOPHILIZED, FOR SOLUTION INTRAVENOUS ONCE
Status: COMPLETED | OUTPATIENT
Start: 2019-09-16 | End: 2019-09-16

## 2019-09-16 RX ORDER — 0.9 % SODIUM CHLORIDE 0.9 %
1000 INTRAVENOUS SOLUTION INTRAVENOUS ONCE
Status: COMPLETED | OUTPATIENT
Start: 2019-09-16 | End: 2019-09-16

## 2019-09-16 RX ORDER — MORPHINE SULFATE 4 MG/ML
4 INJECTION, SOLUTION INTRAMUSCULAR; INTRAVENOUS ONCE
Status: COMPLETED | OUTPATIENT
Start: 2019-09-16 | End: 2019-09-16

## 2019-09-16 RX ADMIN — SODIUM CHLORIDE 1000 ML: 9 INJECTION, SOLUTION INTRAVENOUS at 21:47

## 2019-09-16 RX ADMIN — MORPHINE SULFATE 4 MG: 4 INJECTION INTRAVENOUS at 21:47

## 2019-09-16 RX ADMIN — ONDANSETRON 4 MG: 2 INJECTION INTRAMUSCULAR; INTRAVENOUS at 21:19

## 2019-09-16 RX ADMIN — PANTOPRAZOLE SODIUM 40 MG: 40 INJECTION, POWDER, FOR SOLUTION INTRAVENOUS at 21:19

## 2019-09-16 RX ADMIN — IOPAMIDOL 80 ML: 755 INJECTION, SOLUTION INTRAVENOUS at 22:34

## 2019-09-16 ASSESSMENT — ENCOUNTER SYMPTOMS
WHEEZING: 0
EYE DISCHARGE: 0
SINUS PRESSURE: 0
ABDOMINAL DISTENTION: 1
ABDOMINAL PAIN: 0
CHEST TIGHTNESS: 0
SHORTNESS OF BREATH: 0
TROUBLE SWALLOWING: 0
COUGH: 0
PHOTOPHOBIA: 0
BLOOD IN STOOL: 0
CONSTIPATION: 0
VOMITING: 0
EYE ITCHING: 0
EYE REDNESS: 0
EYE PAIN: 0
RHINORRHEA: 0
DIARRHEA: 0
VOICE CHANGE: 0
CHOKING: 0
SORE THROAT: 0
NAUSEA: 0
BACK PAIN: 0

## 2019-09-16 ASSESSMENT — PAIN SCALES - GENERAL
PAINLEVEL_OUTOF10: 10
PAINLEVEL_OUTOF10: 8

## 2019-09-16 ASSESSMENT — PAIN DESCRIPTION - PAIN TYPE
TYPE: ACUTE PAIN
TYPE: ACUTE PAIN

## 2019-09-16 ASSESSMENT — PAIN DESCRIPTION - LOCATION
LOCATION: ABDOMEN
LOCATION: ABDOMEN

## 2019-09-16 ASSESSMENT — PAIN DESCRIPTION - DESCRIPTORS: DESCRIPTORS: SHARP

## 2019-09-16 ASSESSMENT — PAIN DESCRIPTION - FREQUENCY: FREQUENCY: CONTINUOUS

## 2019-09-16 ASSESSMENT — PAIN DESCRIPTION - ORIENTATION: ORIENTATION: RIGHT;LOWER

## 2019-09-17 LAB
GLUCOSE BLD-MCNC: 103 MG/DL (ref 70–108)
GLUCOSE BLD-MCNC: 125 MG/DL (ref 70–108)
GLUCOSE BLD-MCNC: 133 MG/DL (ref 70–108)
GLUCOSE BLD-MCNC: 150 MG/DL (ref 70–108)
GLUCOSE BLD-MCNC: 161 MG/DL (ref 70–108)
GLUCOSE BLD-MCNC: 99 MG/DL (ref 70–108)

## 2019-09-17 PROCEDURE — 2580000003 HC RX 258: Performed by: HOSPITALIST

## 2019-09-17 PROCEDURE — 6360000002 HC RX W HCPCS: Performed by: HOSPITALIST

## 2019-09-17 PROCEDURE — 2709999900 HC NON-CHARGEABLE SUPPLY

## 2019-09-17 PROCEDURE — 6370000000 HC RX 637 (ALT 250 FOR IP): Performed by: HOSPITALIST

## 2019-09-17 PROCEDURE — 99223 1ST HOSP IP/OBS HIGH 75: CPT | Performed by: HOSPITALIST

## 2019-09-17 PROCEDURE — 2580000003 HC RX 258: Performed by: EMERGENCY MEDICINE

## 2019-09-17 PROCEDURE — 2580000003 HC RX 258: Performed by: INTERNAL MEDICINE

## 2019-09-17 PROCEDURE — 96375 TX/PRO/DX INJ NEW DRUG ADDON: CPT

## 2019-09-17 PROCEDURE — 82948 REAGENT STRIP/BLOOD GLUCOSE: CPT

## 2019-09-17 PROCEDURE — 6360000002 HC RX W HCPCS: Performed by: EMERGENCY MEDICINE

## 2019-09-17 PROCEDURE — 6370000000 HC RX 637 (ALT 250 FOR IP): Performed by: INTERNAL MEDICINE

## 2019-09-17 PROCEDURE — 6370000000 HC RX 637 (ALT 250 FOR IP): Performed by: EMERGENCY MEDICINE

## 2019-09-17 PROCEDURE — 1200000000 HC SEMI PRIVATE

## 2019-09-17 RX ORDER — OXYBUTYNIN CHLORIDE 10 MG/1
10 TABLET, EXTENDED RELEASE ORAL DAILY
Status: DISCONTINUED | OUTPATIENT
Start: 2019-09-17 | End: 2019-09-23 | Stop reason: HOSPADM

## 2019-09-17 RX ORDER — HYDROXYZINE HYDROCHLORIDE 10 MG/1
10 TABLET, FILM COATED ORAL EVERY 6 HOURS PRN
Status: DISCONTINUED | OUTPATIENT
Start: 2019-09-17 | End: 2019-09-23 | Stop reason: HOSPADM

## 2019-09-17 RX ORDER — SODIUM CHLORIDE 0.9 % (FLUSH) 0.9 %
10 SYRINGE (ML) INJECTION EVERY 12 HOURS SCHEDULED
Status: DISCONTINUED | OUTPATIENT
Start: 2019-09-17 | End: 2019-09-23 | Stop reason: HOSPADM

## 2019-09-17 RX ORDER — POTASSIUM CHLORIDE 750 MG/1
40 TABLET, FILM COATED, EXTENDED RELEASE ORAL ONCE
Status: COMPLETED | OUTPATIENT
Start: 2019-09-17 | End: 2019-09-17

## 2019-09-17 RX ORDER — CLONIDINE HYDROCHLORIDE 0.2 MG/1
0.2 TABLET ORAL 2 TIMES DAILY
Status: DISCONTINUED | OUTPATIENT
Start: 2019-09-17 | End: 2019-09-23 | Stop reason: HOSPADM

## 2019-09-17 RX ORDER — METOPROLOL TARTRATE 50 MG/1
100 TABLET, FILM COATED ORAL 2 TIMES DAILY
Status: DISCONTINUED | OUTPATIENT
Start: 2019-09-17 | End: 2019-09-23 | Stop reason: HOSPADM

## 2019-09-17 RX ORDER — KETOROLAC TROMETHAMINE 30 MG/ML
15 INJECTION, SOLUTION INTRAMUSCULAR; INTRAVENOUS ONCE
Status: COMPLETED | OUTPATIENT
Start: 2019-09-17 | End: 2019-09-17

## 2019-09-17 RX ORDER — AMLODIPINE BESYLATE 5 MG/1
5 TABLET ORAL DAILY
Status: DISCONTINUED | OUTPATIENT
Start: 2019-09-17 | End: 2019-09-23 | Stop reason: HOSPADM

## 2019-09-17 RX ORDER — ACETAMINOPHEN 325 MG/1
650 TABLET ORAL EVERY 6 HOURS PRN
Status: DISCONTINUED | OUTPATIENT
Start: 2019-09-17 | End: 2019-09-23 | Stop reason: HOSPADM

## 2019-09-17 RX ORDER — OXYCODONE HYDROCHLORIDE AND ACETAMINOPHEN 5; 325 MG/1; MG/1
1 TABLET ORAL ONCE
Status: COMPLETED | OUTPATIENT
Start: 2019-09-17 | End: 2019-09-17

## 2019-09-17 RX ORDER — FENTANYL CITRATE 50 UG/ML
25 INJECTION, SOLUTION INTRAMUSCULAR; INTRAVENOUS ONCE
Status: COMPLETED | OUTPATIENT
Start: 2019-09-17 | End: 2019-09-17

## 2019-09-17 RX ORDER — 0.9 % SODIUM CHLORIDE 0.9 %
500 INTRAVENOUS SOLUTION INTRAVENOUS ONCE
Status: COMPLETED | OUTPATIENT
Start: 2019-09-17 | End: 2019-09-17

## 2019-09-17 RX ORDER — METFORMIN HYDROCHLORIDE 500 MG/1
500 TABLET, EXTENDED RELEASE ORAL NIGHTLY
COMMUNITY

## 2019-09-17 RX ORDER — ONDANSETRON 2 MG/ML
4 INJECTION INTRAMUSCULAR; INTRAVENOUS EVERY 6 HOURS PRN
Status: DISCONTINUED | OUTPATIENT
Start: 2019-09-17 | End: 2019-09-23 | Stop reason: HOSPADM

## 2019-09-17 RX ORDER — IBUPROFEN 800 MG/1
800 TABLET ORAL EVERY 6 HOURS PRN
Status: ON HOLD | COMMUNITY
End: 2019-09-23 | Stop reason: HOSPADM

## 2019-09-17 RX ORDER — SODIUM CHLORIDE 0.9 % (FLUSH) 0.9 %
10 SYRINGE (ML) INJECTION PRN
Status: DISCONTINUED | OUTPATIENT
Start: 2019-09-17 | End: 2019-09-23 | Stop reason: HOSPADM

## 2019-09-17 RX ORDER — BUTALBITAL, ACETAMINOPHEN AND CAFFEINE 50; 325; 40 MG/1; MG/1; MG/1
1 TABLET ORAL EVERY 6 HOURS PRN
Status: DISCONTINUED | OUTPATIENT
Start: 2019-09-17 | End: 2019-09-23 | Stop reason: HOSPADM

## 2019-09-17 RX ORDER — SODIUM CHLORIDE 9 MG/ML
INJECTION, SOLUTION INTRAVENOUS CONTINUOUS
Status: DISCONTINUED | OUTPATIENT
Start: 2019-09-17 | End: 2019-09-21

## 2019-09-17 RX ORDER — CHLORTHALIDONE 25 MG/1
25 TABLET ORAL DAILY
Status: DISCONTINUED | OUTPATIENT
Start: 2019-09-17 | End: 2019-09-23 | Stop reason: HOSPADM

## 2019-09-17 RX ORDER — ATORVASTATIN CALCIUM 20 MG/1
20 TABLET, FILM COATED ORAL NIGHTLY
Status: DISCONTINUED | OUTPATIENT
Start: 2019-09-17 | End: 2019-09-23 | Stop reason: HOSPADM

## 2019-09-17 RX ADMIN — MEROPENEM 1 G: 1 INJECTION, POWDER, FOR SOLUTION INTRAVENOUS at 03:25

## 2019-09-17 RX ADMIN — KETOROLAC TROMETHAMINE 15 MG: 30 INJECTION, SOLUTION INTRAMUSCULAR at 00:33

## 2019-09-17 RX ADMIN — METFORMIN HYDROCHLORIDE 500 MG: 500 TABLET ORAL at 02:26

## 2019-09-17 RX ADMIN — VANCOMYCIN HYDROCHLORIDE 1250 MG: 5 INJECTION, POWDER, LYOPHILIZED, FOR SOLUTION INTRAVENOUS at 13:27

## 2019-09-17 RX ADMIN — INSULIN LISPRO 1 UNITS: 100 INJECTION, SOLUTION INTRAVENOUS; SUBCUTANEOUS at 20:24

## 2019-09-17 RX ADMIN — OXYCODONE HYDROCHLORIDE AND ACETAMINOPHEN 1 TABLET: 5; 325 TABLET ORAL at 00:35

## 2019-09-17 RX ADMIN — ACETAMINOPHEN 650 MG: 325 TABLET ORAL at 20:13

## 2019-09-17 RX ADMIN — ENOXAPARIN SODIUM 40 MG: 40 INJECTION SUBCUTANEOUS at 13:27

## 2019-09-17 RX ADMIN — SODIUM CHLORIDE: 9 INJECTION, SOLUTION INTRAVENOUS at 02:26

## 2019-09-17 RX ADMIN — SODIUM CHLORIDE 500 ML: 9 INJECTION, SOLUTION INTRAVENOUS at 17:45

## 2019-09-17 RX ADMIN — ATORVASTATIN CALCIUM 20 MG: 20 TABLET, FILM COATED ORAL at 20:24

## 2019-09-17 RX ADMIN — FENTANYL CITRATE 25 MCG: 50 INJECTION INTRAMUSCULAR; INTRAVENOUS at 01:13

## 2019-09-17 RX ADMIN — POTASSIUM CHLORIDE 40 MEQ: 750 TABLET, FILM COATED, EXTENDED RELEASE ORAL at 13:27

## 2019-09-17 RX ADMIN — INSULIN LISPRO 1 UNITS: 100 INJECTION, SOLUTION INTRAVENOUS; SUBCUTANEOUS at 02:58

## 2019-09-17 RX ADMIN — VANCOMYCIN HYDROCHLORIDE 1250 MG: 1 INJECTION, POWDER, LYOPHILIZED, FOR SOLUTION INTRAVENOUS at 01:14

## 2019-09-17 RX ADMIN — ONDANSETRON 4 MG: 2 INJECTION INTRAMUSCULAR; INTRAVENOUS at 13:27

## 2019-09-17 RX ADMIN — ACETAMINOPHEN 650 MG: 325 TABLET ORAL at 14:59

## 2019-09-17 RX ADMIN — METOPROLOL TARTRATE 100 MG: 50 TABLET, FILM COATED ORAL at 02:27

## 2019-09-17 RX ADMIN — CLONIDINE HYDROCHLORIDE 0.2 MG: 0.2 TABLET ORAL at 02:29

## 2019-09-17 ASSESSMENT — PAIN DESCRIPTION - PROGRESSION
CLINICAL_PROGRESSION: NOT CHANGED
CLINICAL_PROGRESSION: GRADUALLY IMPROVING
CLINICAL_PROGRESSION: GRADUALLY IMPROVING

## 2019-09-17 ASSESSMENT — PAIN DESCRIPTION - ORIENTATION
ORIENTATION: RIGHT;LOWER
ORIENTATION: RIGHT;LOWER
ORIENTATION: RIGHT;LEFT
ORIENTATION: RIGHT;LOWER

## 2019-09-17 ASSESSMENT — PAIN DESCRIPTION - LOCATION
LOCATION: ABDOMEN
LOCATION: HEAD
LOCATION: ABDOMEN

## 2019-09-17 ASSESSMENT — PAIN SCALES - GENERAL
PAINLEVEL_OUTOF10: 8
PAINLEVEL_OUTOF10: 5
PAINLEVEL_OUTOF10: 4
PAINLEVEL_OUTOF10: 7
PAINLEVEL_OUTOF10: 8
PAINLEVEL_OUTOF10: 8
PAINLEVEL_OUTOF10: 5
PAINLEVEL_OUTOF10: 7
PAINLEVEL_OUTOF10: 5
PAINLEVEL_OUTOF10: 5

## 2019-09-17 ASSESSMENT — PAIN DESCRIPTION - DESCRIPTORS
DESCRIPTORS: SHARP
DESCRIPTORS: ACHING

## 2019-09-17 ASSESSMENT — PAIN DESCRIPTION - ONSET
ONSET: ON-GOING
ONSET: ON-GOING

## 2019-09-17 ASSESSMENT — PAIN DESCRIPTION - PAIN TYPE
TYPE: ACUTE PAIN

## 2019-09-17 ASSESSMENT — PAIN DESCRIPTION - FREQUENCY
FREQUENCY: CONTINUOUS

## 2019-09-18 LAB
ALBUMIN SERPL-MCNC: 3.1 G/DL (ref 3.5–5.1)
ALP BLD-CCNC: 64 U/L (ref 38–126)
ALT SERPL-CCNC: < 5 U/L (ref 11–66)
ANION GAP SERPL CALCULATED.3IONS-SCNC: 11 MEQ/L (ref 8–16)
AST SERPL-CCNC: 6 U/L (ref 5–40)
BILIRUB SERPL-MCNC: 0.3 MG/DL (ref 0.3–1.2)
BUN BLDV-MCNC: 5 MG/DL (ref 7–22)
CALCIUM SERPL-MCNC: 8.6 MG/DL (ref 8.5–10.5)
CHLORIDE BLD-SCNC: 105 MEQ/L (ref 98–111)
CO2: 20 MEQ/L (ref 23–33)
CREAT SERPL-MCNC: 0.7 MG/DL (ref 0.4–1.2)
ERYTHROCYTE [DISTWIDTH] IN BLOOD BY AUTOMATED COUNT: 15.2 % (ref 11.5–14.5)
ERYTHROCYTE [DISTWIDTH] IN BLOOD BY AUTOMATED COUNT: 49.1 FL (ref 35–45)
GFR SERPL CREATININE-BSD FRML MDRD: > 90 ML/MIN/1.73M2
GLUCOSE BLD-MCNC: 105 MG/DL (ref 70–108)
GLUCOSE BLD-MCNC: 108 MG/DL (ref 70–108)
GLUCOSE BLD-MCNC: 114 MG/DL (ref 70–108)
GLUCOSE BLD-MCNC: 82 MG/DL (ref 70–108)
GLUCOSE BLD-MCNC: 97 MG/DL (ref 70–108)
HCT VFR BLD CALC: 31.5 % (ref 37–47)
HEMOGLOBIN: 9.8 GM/DL (ref 12–16)
MCH RBC QN AUTO: 27.4 PG (ref 26–33)
MCHC RBC AUTO-ENTMCNC: 31.1 GM/DL (ref 32.2–35.5)
MCV RBC AUTO: 88 FL (ref 81–99)
PLATELET # BLD: 254 THOU/MM3 (ref 130–400)
PMV BLD AUTO: 9.9 FL (ref 9.4–12.4)
POTASSIUM REFLEX MAGNESIUM: 3.7 MEQ/L (ref 3.5–5.2)
POTASSIUM SERPL-SCNC: 3.7 MEQ/L (ref 3.5–5.2)
RBC # BLD: 3.58 MILL/MM3 (ref 4.2–5.4)
SODIUM BLD-SCNC: 136 MEQ/L (ref 135–145)
TOTAL PROTEIN: 6 G/DL (ref 6.1–8)
VANCOMYCIN TROUGH: 6.7 UG/ML (ref 5–15)
WBC # BLD: 14.2 THOU/MM3 (ref 4.8–10.8)

## 2019-09-18 PROCEDURE — 6360000002 HC RX W HCPCS: Performed by: HOSPITALIST

## 2019-09-18 PROCEDURE — 80202 ASSAY OF VANCOMYCIN: CPT

## 2019-09-18 PROCEDURE — 36415 COLL VENOUS BLD VENIPUNCTURE: CPT

## 2019-09-18 PROCEDURE — 80053 COMPREHEN METABOLIC PANEL: CPT

## 2019-09-18 PROCEDURE — 6370000000 HC RX 637 (ALT 250 FOR IP): Performed by: HOSPITALIST

## 2019-09-18 PROCEDURE — 85027 COMPLETE CBC AUTOMATED: CPT

## 2019-09-18 PROCEDURE — 2580000003 HC RX 258: Performed by: HOSPITALIST

## 2019-09-18 PROCEDURE — 2709999900 HC NON-CHARGEABLE SUPPLY

## 2019-09-18 PROCEDURE — 2580000003 HC RX 258: Performed by: PHARMACIST

## 2019-09-18 PROCEDURE — 87077 CULTURE AEROBIC IDENTIFY: CPT

## 2019-09-18 PROCEDURE — 1200000000 HC SEMI PRIVATE

## 2019-09-18 PROCEDURE — 87186 SC STD MICRODIL/AGAR DIL: CPT

## 2019-09-18 PROCEDURE — 80048 BASIC METABOLIC PNL TOTAL CA: CPT

## 2019-09-18 PROCEDURE — 82948 REAGENT STRIP/BLOOD GLUCOSE: CPT

## 2019-09-18 PROCEDURE — 87070 CULTURE OTHR SPECIMN AEROBIC: CPT

## 2019-09-18 PROCEDURE — 87205 SMEAR GRAM STAIN: CPT

## 2019-09-18 PROCEDURE — 99232 SBSQ HOSP IP/OBS MODERATE 35: CPT | Performed by: INTERNAL MEDICINE

## 2019-09-18 PROCEDURE — 87147 CULTURE TYPE IMMUNOLOGIC: CPT

## 2019-09-18 PROCEDURE — 6360000002 HC RX W HCPCS: Performed by: PHARMACIST

## 2019-09-18 RX ADMIN — VANCOMYCIN HYDROCHLORIDE 1250 MG: 5 INJECTION, POWDER, LYOPHILIZED, FOR SOLUTION INTRAVENOUS at 00:03

## 2019-09-18 RX ADMIN — SODIUM CHLORIDE: 9 INJECTION, SOLUTION INTRAVENOUS at 00:03

## 2019-09-18 RX ADMIN — CHLORTHALIDONE 25 MG: 25 TABLET ORAL at 11:02

## 2019-09-18 RX ADMIN — AMLODIPINE BESYLATE 5 MG: 5 TABLET ORAL at 11:04

## 2019-09-18 RX ADMIN — VANCOMYCIN HYDROCHLORIDE 1000 MG: 1 INJECTION, POWDER, LYOPHILIZED, FOR SOLUTION INTRAVENOUS at 21:27

## 2019-09-18 RX ADMIN — CLONIDINE HYDROCHLORIDE 0.2 MG: 0.2 TABLET ORAL at 21:20

## 2019-09-18 RX ADMIN — METOPROLOL TARTRATE 100 MG: 50 TABLET, FILM COATED ORAL at 11:02

## 2019-09-18 RX ADMIN — METOPROLOL TARTRATE 100 MG: 50 TABLET, FILM COATED ORAL at 21:20

## 2019-09-18 RX ADMIN — CLONIDINE HYDROCHLORIDE 0.2 MG: 0.2 TABLET ORAL at 11:02

## 2019-09-18 RX ADMIN — SODIUM CHLORIDE: 9 INJECTION, SOLUTION INTRAVENOUS at 14:42

## 2019-09-18 RX ADMIN — BUTALBITAL, ACETAMINOPHEN, AND CAFFEINE 1 TABLET: 50; 325; 40 TABLET ORAL at 00:04

## 2019-09-18 RX ADMIN — ATORVASTATIN CALCIUM 20 MG: 20 TABLET, FILM COATED ORAL at 21:20

## 2019-09-18 RX ADMIN — Medication 10 ML: at 21:20

## 2019-09-18 ASSESSMENT — PAIN SCALES - GENERAL
PAINLEVEL_OUTOF10: 0
PAINLEVEL_OUTOF10: 6
PAINLEVEL_OUTOF10: 0
PAINLEVEL_OUTOF10: 7

## 2019-09-19 ENCOUNTER — ANESTHESIA (OUTPATIENT)
Dept: OPERATING ROOM | Age: 44
DRG: 364 | End: 2019-09-19
Payer: COMMERCIAL

## 2019-09-19 ENCOUNTER — ANESTHESIA EVENT (OUTPATIENT)
Dept: OPERATING ROOM | Age: 44
DRG: 364 | End: 2019-09-19
Payer: COMMERCIAL

## 2019-09-19 VITALS
RESPIRATION RATE: 7 BRPM | DIASTOLIC BLOOD PRESSURE: 63 MMHG | SYSTOLIC BLOOD PRESSURE: 121 MMHG | OXYGEN SATURATION: 100 %

## 2019-09-19 LAB
AEROBIC CULTURE: ABNORMAL
AEROBIC CULTURE: ABNORMAL
ANION GAP SERPL CALCULATED.3IONS-SCNC: 12 MEQ/L (ref 8–16)
BUN BLDV-MCNC: 5 MG/DL (ref 7–22)
CALCIUM SERPL-MCNC: 9 MG/DL (ref 8.5–10.5)
CHLORIDE BLD-SCNC: 104 MEQ/L (ref 98–111)
CO2: 21 MEQ/L (ref 23–33)
CREAT SERPL-MCNC: 0.6 MG/DL (ref 0.4–1.2)
ERYTHROCYTE [DISTWIDTH] IN BLOOD BY AUTOMATED COUNT: 15 % (ref 11.5–14.5)
ERYTHROCYTE [DISTWIDTH] IN BLOOD BY AUTOMATED COUNT: 48.6 FL (ref 35–45)
GFR SERPL CREATININE-BSD FRML MDRD: > 90 ML/MIN/1.73M2
GLUCOSE BLD-MCNC: 104 MG/DL (ref 70–108)
GLUCOSE BLD-MCNC: 148 MG/DL (ref 70–108)
GLUCOSE BLD-MCNC: 148 MG/DL (ref 70–108)
GLUCOSE BLD-MCNC: 88 MG/DL (ref 70–108)
GLUCOSE BLD-MCNC: 95 MG/DL (ref 70–108)
GRAM STAIN RESULT: ABNORMAL
HCT VFR BLD CALC: 32.6 % (ref 37–47)
HEMOGLOBIN: 10 GM/DL (ref 12–16)
MCH RBC QN AUTO: 27.1 PG (ref 26–33)
MCHC RBC AUTO-ENTMCNC: 30.7 GM/DL (ref 32.2–35.5)
MCV RBC AUTO: 88.3 FL (ref 81–99)
ORGANISM: ABNORMAL
PLATELET # BLD: 268 THOU/MM3 (ref 130–400)
PMV BLD AUTO: 9.8 FL (ref 9.4–12.4)
POTASSIUM SERPL-SCNC: 3.5 MEQ/L (ref 3.5–5.2)
RBC # BLD: 3.69 MILL/MM3 (ref 4.2–5.4)
SODIUM BLD-SCNC: 137 MEQ/L (ref 135–145)
WBC # BLD: 9.9 THOU/MM3 (ref 4.8–10.8)

## 2019-09-19 PROCEDURE — 99253 IP/OBS CNSLTJ NEW/EST LOW 45: CPT | Performed by: SURGERY

## 2019-09-19 PROCEDURE — 2580000003 HC RX 258: Performed by: SURGERY

## 2019-09-19 PROCEDURE — 6370000000 HC RX 637 (ALT 250 FOR IP): Performed by: SURGERY

## 2019-09-19 PROCEDURE — 6360000002 HC RX W HCPCS: Performed by: ANESTHESIOLOGY

## 2019-09-19 PROCEDURE — 87147 CULTURE TYPE IMMUNOLOGIC: CPT

## 2019-09-19 PROCEDURE — 1200000000 HC SEMI PRIVATE

## 2019-09-19 PROCEDURE — 6370000000 HC RX 637 (ALT 250 FOR IP): Performed by: HOSPITALIST

## 2019-09-19 PROCEDURE — 36415 COLL VENOUS BLD VENIPUNCTURE: CPT

## 2019-09-19 PROCEDURE — 0J9B0ZZ DRAINAGE OF PERINEUM SUBCUTANEOUS TISSUE AND FASCIA, OPEN APPROACH: ICD-10-PCS | Performed by: SURGERY

## 2019-09-19 PROCEDURE — 6360000002 HC RX W HCPCS: Performed by: SURGERY

## 2019-09-19 PROCEDURE — 3700000001 HC ADD 15 MINUTES (ANESTHESIA): Performed by: SURGERY

## 2019-09-19 PROCEDURE — 87070 CULTURE OTHR SPECIMN AEROBIC: CPT

## 2019-09-19 PROCEDURE — 56405 I&D VULVA/PERINEAL ABSCESS: CPT | Performed by: SURGERY

## 2019-09-19 PROCEDURE — 87077 CULTURE AEROBIC IDENTIFY: CPT

## 2019-09-19 PROCEDURE — 2709999900 HC NON-CHARGEABLE SUPPLY: Performed by: SURGERY

## 2019-09-19 PROCEDURE — 87075 CULTR BACTERIA EXCEPT BLOOD: CPT

## 2019-09-19 PROCEDURE — 7100000001 HC PACU RECOVERY - ADDTL 15 MIN: Performed by: SURGERY

## 2019-09-19 PROCEDURE — 7100000000 HC PACU RECOVERY - FIRST 15 MIN: Performed by: SURGERY

## 2019-09-19 PROCEDURE — 80048 BASIC METABOLIC PNL TOTAL CA: CPT

## 2019-09-19 PROCEDURE — 3600000002 HC SURGERY LEVEL 2 BASE: Performed by: SURGERY

## 2019-09-19 PROCEDURE — 2580000003 HC RX 258: Performed by: PHARMACIST

## 2019-09-19 PROCEDURE — 87186 SC STD MICRODIL/AGAR DIL: CPT

## 2019-09-19 PROCEDURE — 6360000002 HC RX W HCPCS: Performed by: PHARMACIST

## 2019-09-19 PROCEDURE — 2580000003 HC RX 258: Performed by: HOSPITALIST

## 2019-09-19 PROCEDURE — 3600000012 HC SURGERY LEVEL 2 ADDTL 15MIN: Performed by: SURGERY

## 2019-09-19 PROCEDURE — 85027 COMPLETE CBC AUTOMATED: CPT

## 2019-09-19 PROCEDURE — 3700000000 HC ANESTHESIA ATTENDED CARE: Performed by: SURGERY

## 2019-09-19 PROCEDURE — 82948 REAGENT STRIP/BLOOD GLUCOSE: CPT

## 2019-09-19 PROCEDURE — 99231 SBSQ HOSP IP/OBS SF/LOW 25: CPT | Performed by: INTERNAL MEDICINE

## 2019-09-19 PROCEDURE — 2709999900 HC NON-CHARGEABLE SUPPLY

## 2019-09-19 PROCEDURE — 87205 SMEAR GRAM STAIN: CPT

## 2019-09-19 RX ORDER — PROMETHAZINE HYDROCHLORIDE 25 MG/ML
12.5 INJECTION, SOLUTION INTRAMUSCULAR; INTRAVENOUS
Status: DISCONTINUED | OUTPATIENT
Start: 2019-09-19 | End: 2019-09-19 | Stop reason: HOSPADM

## 2019-09-19 RX ORDER — MEPERIDINE HYDROCHLORIDE 25 MG/ML
12.5 INJECTION INTRAMUSCULAR; INTRAVENOUS; SUBCUTANEOUS EVERY 5 MIN PRN
Status: DISCONTINUED | OUTPATIENT
Start: 2019-09-19 | End: 2019-09-19 | Stop reason: HOSPADM

## 2019-09-19 RX ORDER — FENTANYL CITRATE 50 UG/ML
25 INJECTION, SOLUTION INTRAMUSCULAR; INTRAVENOUS EVERY 5 MIN PRN
Status: DISCONTINUED | OUTPATIENT
Start: 2019-09-19 | End: 2019-09-19 | Stop reason: HOSPADM

## 2019-09-19 RX ORDER — ONDANSETRON 2 MG/ML
INJECTION INTRAMUSCULAR; INTRAVENOUS PRN
Status: DISCONTINUED | OUTPATIENT
Start: 2019-09-19 | End: 2019-09-19 | Stop reason: SDUPTHER

## 2019-09-19 RX ORDER — OXYCODONE HYDROCHLORIDE AND ACETAMINOPHEN 5; 325 MG/1; MG/1
1 TABLET ORAL EVERY 4 HOURS PRN
Status: DISCONTINUED | OUTPATIENT
Start: 2019-09-19 | End: 2019-09-23 | Stop reason: HOSPADM

## 2019-09-19 RX ORDER — DEXAMETHASONE SODIUM PHOSPHATE 4 MG/ML
INJECTION, SOLUTION INTRA-ARTICULAR; INTRALESIONAL; INTRAMUSCULAR; INTRAVENOUS; SOFT TISSUE PRN
Status: DISCONTINUED | OUTPATIENT
Start: 2019-09-19 | End: 2019-09-19 | Stop reason: SDUPTHER

## 2019-09-19 RX ORDER — FENTANYL CITRATE 50 UG/ML
50 INJECTION, SOLUTION INTRAMUSCULAR; INTRAVENOUS EVERY 5 MIN PRN
Status: DISCONTINUED | OUTPATIENT
Start: 2019-09-19 | End: 2019-09-19 | Stop reason: HOSPADM

## 2019-09-19 RX ORDER — LABETALOL 20 MG/4 ML (5 MG/ML) INTRAVENOUS SYRINGE
5 EVERY 10 MIN PRN
Status: DISCONTINUED | OUTPATIENT
Start: 2019-09-19 | End: 2019-09-19 | Stop reason: HOSPADM

## 2019-09-19 RX ORDER — FENTANYL CITRATE 50 UG/ML
INJECTION, SOLUTION INTRAMUSCULAR; INTRAVENOUS PRN
Status: DISCONTINUED | OUTPATIENT
Start: 2019-09-19 | End: 2019-09-19 | Stop reason: SDUPTHER

## 2019-09-19 RX ORDER — PROPOFOL 10 MG/ML
INJECTION, EMULSION INTRAVENOUS PRN
Status: DISCONTINUED | OUTPATIENT
Start: 2019-09-19 | End: 2019-09-19 | Stop reason: SDUPTHER

## 2019-09-19 RX ADMIN — SODIUM CHLORIDE: 9 INJECTION, SOLUTION INTRAVENOUS at 06:08

## 2019-09-19 RX ADMIN — OXYCODONE HYDROCHLORIDE AND ACETAMINOPHEN 1 TABLET: 5; 325 TABLET ORAL at 20:11

## 2019-09-19 RX ADMIN — DEXAMETHASONE SODIUM PHOSPHATE 8 MG: 4 INJECTION, SOLUTION INTRAMUSCULAR; INTRAVENOUS at 12:48

## 2019-09-19 RX ADMIN — ATORVASTATIN CALCIUM 20 MG: 20 TABLET, FILM COATED ORAL at 20:10

## 2019-09-19 RX ADMIN — SODIUM CHLORIDE: 9 INJECTION, SOLUTION INTRAVENOUS at 14:30

## 2019-09-19 RX ADMIN — METOPROLOL TARTRATE 100 MG: 50 TABLET, FILM COATED ORAL at 09:35

## 2019-09-19 RX ADMIN — ONDANSETRON HYDROCHLORIDE 4 MG: 4 INJECTION, SOLUTION INTRAMUSCULAR; INTRAVENOUS at 12:48

## 2019-09-19 RX ADMIN — VANCOMYCIN HYDROCHLORIDE 1000 MG: 1 INJECTION, POWDER, LYOPHILIZED, FOR SOLUTION INTRAVENOUS at 14:30

## 2019-09-19 RX ADMIN — HYDROMORPHONE HYDROCHLORIDE 0.5 MG: 1 INJECTION, SOLUTION INTRAMUSCULAR; INTRAVENOUS; SUBCUTANEOUS at 15:58

## 2019-09-19 RX ADMIN — FENTANYL CITRATE 50 MCG: 50 INJECTION INTRAMUSCULAR; INTRAVENOUS at 13:24

## 2019-09-19 RX ADMIN — FENTANYL CITRATE 50 MCG: 50 INJECTION INTRAMUSCULAR; INTRAVENOUS at 13:48

## 2019-09-19 RX ADMIN — PROPOFOL 150 MG: 10 INJECTION, EMULSION INTRAVENOUS at 12:48

## 2019-09-19 RX ADMIN — METOPROLOL TARTRATE 100 MG: 50 TABLET, FILM COATED ORAL at 20:10

## 2019-09-19 RX ADMIN — VANCOMYCIN HYDROCHLORIDE 1000 MG: 1 INJECTION, POWDER, LYOPHILIZED, FOR SOLUTION INTRAVENOUS at 21:56

## 2019-09-19 RX ADMIN — FENTANYL CITRATE 50 MCG: 50 INJECTION INTRAMUSCULAR; INTRAVENOUS at 12:48

## 2019-09-19 RX ADMIN — AMLODIPINE BESYLATE 5 MG: 5 TABLET ORAL at 09:35

## 2019-09-19 RX ADMIN — FENTANYL CITRATE 50 MCG: 50 INJECTION INTRAMUSCULAR; INTRAVENOUS at 13:30

## 2019-09-19 RX ADMIN — CLONIDINE HYDROCHLORIDE 0.2 MG: 0.2 TABLET ORAL at 20:10

## 2019-09-19 RX ADMIN — INSULIN LISPRO 1 UNITS: 100 INJECTION, SOLUTION INTRAVENOUS; SUBCUTANEOUS at 22:05

## 2019-09-19 RX ADMIN — VANCOMYCIN HYDROCHLORIDE 1000 MG: 1 INJECTION, POWDER, LYOPHILIZED, FOR SOLUTION INTRAVENOUS at 04:46

## 2019-09-19 RX ADMIN — FENTANYL CITRATE 50 MCG: 50 INJECTION INTRAMUSCULAR; INTRAVENOUS at 13:01

## 2019-09-19 RX ADMIN — METFORMIN HYDROCHLORIDE 500 MG: 500 TABLET ORAL at 18:00

## 2019-09-19 ASSESSMENT — PAIN DESCRIPTION - PAIN TYPE
TYPE: SURGICAL PAIN
TYPE: ACUTE PAIN

## 2019-09-19 ASSESSMENT — PULMONARY FUNCTION TESTS
PIF_VALUE: 14
PIF_VALUE: 2
PIF_VALUE: 11
PIF_VALUE: 20
PIF_VALUE: 3
PIF_VALUE: 21
PIF_VALUE: 10
PIF_VALUE: 12
PIF_VALUE: 5
PIF_VALUE: 11
PIF_VALUE: 2
PIF_VALUE: 10
PIF_VALUE: 2
PIF_VALUE: 2
PIF_VALUE: 0
PIF_VALUE: 5
PIF_VALUE: 11
PIF_VALUE: 17
PIF_VALUE: 2
PIF_VALUE: 10
PIF_VALUE: 10
PIF_VALUE: 3
PIF_VALUE: 8
PIF_VALUE: 2

## 2019-09-19 ASSESSMENT — PAIN SCALES - GENERAL
PAINLEVEL_OUTOF10: 7
PAINLEVEL_OUTOF10: 7
PAINLEVEL_OUTOF10: 3
PAINLEVEL_OUTOF10: 4
PAINLEVEL_OUTOF10: 6
PAINLEVEL_OUTOF10: 3
PAINLEVEL_OUTOF10: 8
PAINLEVEL_OUTOF10: 0
PAINLEVEL_OUTOF10: 8
PAINLEVEL_OUTOF10: 8

## 2019-09-19 ASSESSMENT — PAIN DESCRIPTION - ORIENTATION
ORIENTATION: RIGHT;LEFT

## 2019-09-19 ASSESSMENT — PAIN DESCRIPTION - LOCATION
LOCATION: GROIN
LOCATION: ABDOMEN
LOCATION: GROIN
LOCATION: GROIN

## 2019-09-19 ASSESSMENT — PAIN DESCRIPTION - ONSET
ONSET: ON-GOING

## 2019-09-19 ASSESSMENT — PAIN DESCRIPTION - PROGRESSION
CLINICAL_PROGRESSION: NOT CHANGED

## 2019-09-19 ASSESSMENT — PAIN - FUNCTIONAL ASSESSMENT: PAIN_FUNCTIONAL_ASSESSMENT: ACTIVITIES ARE NOT PREVENTED

## 2019-09-19 ASSESSMENT — PAIN DESCRIPTION - FREQUENCY
FREQUENCY: CONTINUOUS

## 2019-09-19 ASSESSMENT — PAIN DESCRIPTION - DESCRIPTORS
DESCRIPTORS: ACHING

## 2019-09-19 NOTE — ANESTHESIA PRE PROCEDURE
dosing (placeholder)   Other RX Placeholder Redia Muta, DO        sodium chloride (PF) 0.9 % injection 10 mL  10 mL Intravenous Daily Redia Muta, DO           Allergies: Allergies   Allergen Reactions    Latex Hives    Bactrim [Sulfamethoxazole-Trimethoprim] Other (See Comments)     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 Myrnajulio Kinger, DO    Penicillins Hives    Clindamycin/Lincomycin Rash    Ciprofloxacin Hives    Doxycycline Hives    Lisinopril      Attacked kidneys       Problem List:    Patient Active Problem List   Diagnosis Code    Left breast abscess N61.1    Fibrocystic breast changes N60.19    Cellulitis L03.90    Acute kidney injury ([facility] N17.9    Vomiting R11.10    HTN (hypertension) I10    S/P cardiac cath- 6/22/16-  All coronaries are patent but LAD. there is moderate eccentric plaque noted on the proximal and ostail LAD, giving ostail prox LAD 30% nstenosis, edp 13 mmhg, ef 65%- med R Z98.890    Postoperative or surgical complication, initial encounter T81. 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 Jose Omer  April 2013     total    HYSTEROSCOPY      GA EXC SKIN BENIG 2.1-3CM TRUNK,ARM,LEG N/A 6/4/2018    EXCISION OF BACK MASS performed by David Galdamez MD at [facility] History:    Social History     Tobacco Use    Smoking status: Never Smoker    Smokeless tobacco: Never Used   Substance Use Topics    Alcohol use:  No                                Counseling given: Not Answered      Vital Signs (Current):   Vitals:    09/18/19 2310 09/19/19 0343 09/19/19 0915 09/19/19 1130   BP: 131/79 126/75 134/81 (!)

## 2019-09-20 LAB
ANION GAP SERPL CALCULATED.3IONS-SCNC: 12 MEQ/L (ref 8–16)
BUN BLDV-MCNC: 9 MG/DL (ref 7–22)
CALCIUM SERPL-MCNC: 9.5 MG/DL (ref 8.5–10.5)
CHLORIDE BLD-SCNC: 101 MEQ/L (ref 98–111)
CO2: 24 MEQ/L (ref 23–33)
CREAT SERPL-MCNC: 0.6 MG/DL (ref 0.4–1.2)
ERYTHROCYTE [DISTWIDTH] IN BLOOD BY AUTOMATED COUNT: 14.7 % (ref 11.5–14.5)
ERYTHROCYTE [DISTWIDTH] IN BLOOD BY AUTOMATED COUNT: 47.2 FL (ref 35–45)
GFR SERPL CREATININE-BSD FRML MDRD: > 90 ML/MIN/1.73M2
GLUCOSE BLD-MCNC: 102 MG/DL (ref 70–108)
GLUCOSE BLD-MCNC: 125 MG/DL (ref 70–108)
GLUCOSE BLD-MCNC: 134 MG/DL (ref 70–108)
GLUCOSE BLD-MCNC: 146 MG/DL (ref 70–108)
GLUCOSE BLD-MCNC: 149 MG/DL (ref 70–108)
HCT VFR BLD CALC: 33.4 % (ref 37–47)
HEMOGLOBIN: 10.7 GM/DL (ref 12–16)
MCH RBC QN AUTO: 27.8 PG (ref 26–33)
MCHC RBC AUTO-ENTMCNC: 32 GM/DL (ref 32.2–35.5)
MCV RBC AUTO: 86.8 FL (ref 81–99)
PLATELET # BLD: 342 THOU/MM3 (ref 130–400)
PMV BLD AUTO: 9.9 FL (ref 9.4–12.4)
POTASSIUM SERPL-SCNC: 4.7 MEQ/L (ref 3.5–5.2)
RBC # BLD: 3.85 MILL/MM3 (ref 4.2–5.4)
SODIUM BLD-SCNC: 137 MEQ/L (ref 135–145)
WBC # BLD: 14.2 THOU/MM3 (ref 4.8–10.8)

## 2019-09-20 PROCEDURE — 36415 COLL VENOUS BLD VENIPUNCTURE: CPT

## 2019-09-20 PROCEDURE — 6360000002 HC RX W HCPCS: Performed by: SURGERY

## 2019-09-20 PROCEDURE — 80202 ASSAY OF VANCOMYCIN: CPT

## 2019-09-20 PROCEDURE — 2580000003 HC RX 258: Performed by: SURGERY

## 2019-09-20 PROCEDURE — APPSS30 APP SPLIT SHARED TIME 16-30 MINUTES: Performed by: NURSE PRACTITIONER

## 2019-09-20 PROCEDURE — 85027 COMPLETE CBC AUTOMATED: CPT

## 2019-09-20 PROCEDURE — 82948 REAGENT STRIP/BLOOD GLUCOSE: CPT

## 2019-09-20 PROCEDURE — 6370000000 HC RX 637 (ALT 250 FOR IP): Performed by: SURGERY

## 2019-09-20 PROCEDURE — 80048 BASIC METABOLIC PNL TOTAL CA: CPT

## 2019-09-20 PROCEDURE — 2709999900 HC NON-CHARGEABLE SUPPLY

## 2019-09-20 PROCEDURE — 6370000000 HC RX 637 (ALT 250 FOR IP): Performed by: NURSE PRACTITIONER

## 2019-09-20 PROCEDURE — 99024 POSTOP FOLLOW-UP VISIT: CPT | Performed by: NURSE PRACTITIONER

## 2019-09-20 PROCEDURE — 99232 SBSQ HOSP IP/OBS MODERATE 35: CPT | Performed by: INTERNAL MEDICINE

## 2019-09-20 PROCEDURE — 1200000000 HC SEMI PRIVATE

## 2019-09-20 RX ORDER — DOCUSATE SODIUM 100 MG/1
100 CAPSULE, LIQUID FILLED ORAL DAILY
Status: DISCONTINUED | OUTPATIENT
Start: 2019-09-20 | End: 2019-09-23 | Stop reason: HOSPADM

## 2019-09-20 RX ORDER — POLYETHYLENE GLYCOL 3350 17 G/17G
17 POWDER, FOR SOLUTION ORAL DAILY PRN
Status: DISCONTINUED | OUTPATIENT
Start: 2019-09-20 | End: 2019-09-23 | Stop reason: HOSPADM

## 2019-09-20 RX ADMIN — OXYCODONE HYDROCHLORIDE AND ACETAMINOPHEN 1 TABLET: 5; 325 TABLET ORAL at 08:55

## 2019-09-20 RX ADMIN — HYDROMORPHONE HYDROCHLORIDE 0.5 MG: 1 INJECTION, SOLUTION INTRAMUSCULAR; INTRAVENOUS; SUBCUTANEOUS at 12:18

## 2019-09-20 RX ADMIN — DOCUSATE SODIUM 100 MG: 100 CAPSULE, LIQUID FILLED ORAL at 12:28

## 2019-09-20 RX ADMIN — VANCOMYCIN HYDROCHLORIDE 1000 MG: 1 INJECTION, POWDER, LYOPHILIZED, FOR SOLUTION INTRAVENOUS at 21:18

## 2019-09-20 RX ADMIN — CHLORTHALIDONE 25 MG: 25 TABLET ORAL at 08:46

## 2019-09-20 RX ADMIN — VANCOMYCIN HYDROCHLORIDE 1000 MG: 1 INJECTION, POWDER, LYOPHILIZED, FOR SOLUTION INTRAVENOUS at 12:28

## 2019-09-20 RX ADMIN — CLONIDINE HYDROCHLORIDE 0.2 MG: 0.2 TABLET ORAL at 20:38

## 2019-09-20 RX ADMIN — ATORVASTATIN CALCIUM 20 MG: 20 TABLET, FILM COATED ORAL at 20:38

## 2019-09-20 RX ADMIN — VANCOMYCIN HYDROCHLORIDE 1000 MG: 1 INJECTION, POWDER, LYOPHILIZED, FOR SOLUTION INTRAVENOUS at 05:32

## 2019-09-20 RX ADMIN — OXYBUTYNIN CHLORIDE 10 MG: 10 TABLET, EXTENDED RELEASE ORAL at 08:46

## 2019-09-20 RX ADMIN — Medication 10 ML: at 20:39

## 2019-09-20 RX ADMIN — HYDROMORPHONE HYDROCHLORIDE 0.5 MG: 1 INJECTION, SOLUTION INTRAMUSCULAR; INTRAVENOUS; SUBCUTANEOUS at 00:29

## 2019-09-20 RX ADMIN — METOPROLOL TARTRATE 100 MG: 50 TABLET, FILM COATED ORAL at 20:38

## 2019-09-20 RX ADMIN — Medication 10 ML: at 08:47

## 2019-09-20 RX ADMIN — OXYCODONE HYDROCHLORIDE AND ACETAMINOPHEN 1 TABLET: 5; 325 TABLET ORAL at 23:02

## 2019-09-20 RX ADMIN — METFORMIN HYDROCHLORIDE 500 MG: 500 TABLET ORAL at 18:10

## 2019-09-20 RX ADMIN — METOPROLOL TARTRATE 100 MG: 50 TABLET, FILM COATED ORAL at 08:46

## 2019-09-20 RX ADMIN — SODIUM CHLORIDE: 9 INJECTION, SOLUTION INTRAVENOUS at 00:35

## 2019-09-20 RX ADMIN — CLONIDINE HYDROCHLORIDE 0.2 MG: 0.2 TABLET ORAL at 08:46

## 2019-09-20 RX ADMIN — AMLODIPINE BESYLATE 5 MG: 5 TABLET ORAL at 08:46

## 2019-09-20 RX ADMIN — ENOXAPARIN SODIUM 40 MG: 40 INJECTION SUBCUTANEOUS at 08:56

## 2019-09-20 ASSESSMENT — PAIN DESCRIPTION - DESCRIPTORS
DESCRIPTORS: ACHING

## 2019-09-20 ASSESSMENT — PAIN SCALES - GENERAL
PAINLEVEL_OUTOF10: 9
PAINLEVEL_OUTOF10: 8
PAINLEVEL_OUTOF10: 2
PAINLEVEL_OUTOF10: 4
PAINLEVEL_OUTOF10: 6
PAINLEVEL_OUTOF10: 8
PAINLEVEL_OUTOF10: 5
PAINLEVEL_OUTOF10: 7
PAINLEVEL_OUTOF10: 3

## 2019-09-20 ASSESSMENT — PAIN DESCRIPTION - PROGRESSION
CLINICAL_PROGRESSION: NOT CHANGED

## 2019-09-20 ASSESSMENT — PAIN DESCRIPTION - ORIENTATION
ORIENTATION: MID;LOWER
ORIENTATION: RIGHT;LEFT
ORIENTATION: MID
ORIENTATION: MID;LOWER
ORIENTATION: MID

## 2019-09-20 ASSESSMENT — PAIN DESCRIPTION - PAIN TYPE
TYPE: SURGICAL PAIN

## 2019-09-20 ASSESSMENT — PAIN DESCRIPTION - FREQUENCY
FREQUENCY: CONTINUOUS

## 2019-09-20 ASSESSMENT — PAIN DESCRIPTION - LOCATION
LOCATION: OTHER (COMMENT)
LOCATION: ABDOMEN

## 2019-09-20 ASSESSMENT — PAIN DESCRIPTION - ONSET
ONSET: ON-GOING

## 2019-09-20 ASSESSMENT — PAIN - FUNCTIONAL ASSESSMENT
PAIN_FUNCTIONAL_ASSESSMENT: ACTIVITIES ARE NOT PREVENTED

## 2019-09-21 LAB
ERYTHROCYTE [DISTWIDTH] IN BLOOD BY AUTOMATED COUNT: 14.8 % (ref 11.5–14.5)
ERYTHROCYTE [DISTWIDTH] IN BLOOD BY AUTOMATED COUNT: 48 FL (ref 35–45)
GLUCOSE BLD-MCNC: 83 MG/DL (ref 70–108)
GLUCOSE BLD-MCNC: 92 MG/DL (ref 70–108)
GLUCOSE BLD-MCNC: 93 MG/DL (ref 70–108)
HCT VFR BLD CALC: 30.3 % (ref 37–47)
HEMOGLOBIN: 9.5 GM/DL (ref 12–16)
MCH RBC QN AUTO: 27.5 PG (ref 26–33)
MCHC RBC AUTO-ENTMCNC: 31.4 GM/DL (ref 32.2–35.5)
MCV RBC AUTO: 87.6 FL (ref 81–99)
PLATELET # BLD: 332 THOU/MM3 (ref 130–400)
PMV BLD AUTO: 9.3 FL (ref 9.4–12.4)
RBC # BLD: 3.46 MILL/MM3 (ref 4.2–5.4)
VANCOMYCIN TROUGH: 13.9 UG/ML (ref 5–15)
WBC # BLD: 9.1 THOU/MM3 (ref 4.8–10.8)

## 2019-09-21 PROCEDURE — 36415 COLL VENOUS BLD VENIPUNCTURE: CPT

## 2019-09-21 PROCEDURE — 6360000002 HC RX W HCPCS: Performed by: SURGERY

## 2019-09-21 PROCEDURE — 6370000000 HC RX 637 (ALT 250 FOR IP): Performed by: NURSE PRACTITIONER

## 2019-09-21 PROCEDURE — 6370000000 HC RX 637 (ALT 250 FOR IP): Performed by: SURGERY

## 2019-09-21 PROCEDURE — 85027 COMPLETE CBC AUTOMATED: CPT

## 2019-09-21 PROCEDURE — 99024 POSTOP FOLLOW-UP VISIT: CPT | Performed by: SURGERY

## 2019-09-21 PROCEDURE — 99232 SBSQ HOSP IP/OBS MODERATE 35: CPT | Performed by: INTERNAL MEDICINE

## 2019-09-21 PROCEDURE — 2580000003 HC RX 258: Performed by: SURGERY

## 2019-09-21 PROCEDURE — 1200000000 HC SEMI PRIVATE

## 2019-09-21 PROCEDURE — 2709999900 HC NON-CHARGEABLE SUPPLY

## 2019-09-21 PROCEDURE — 82948 REAGENT STRIP/BLOOD GLUCOSE: CPT

## 2019-09-21 RX ORDER — OXYCODONE HYDROCHLORIDE AND ACETAMINOPHEN 5; 325 MG/1; MG/1
1 TABLET ORAL EVERY 6 HOURS PRN
Qty: 28 TABLET | Refills: 0 | Status: SHIPPED | OUTPATIENT
Start: 2019-09-21 | End: 2019-09-28

## 2019-09-21 RX ADMIN — AMLODIPINE BESYLATE 5 MG: 5 TABLET ORAL at 09:29

## 2019-09-21 RX ADMIN — METOPROLOL TARTRATE 100 MG: 50 TABLET, FILM COATED ORAL at 09:28

## 2019-09-21 RX ADMIN — VANCOMYCIN HYDROCHLORIDE 1000 MG: 1 INJECTION, POWDER, LYOPHILIZED, FOR SOLUTION INTRAVENOUS at 05:03

## 2019-09-21 RX ADMIN — VANCOMYCIN HYDROCHLORIDE 1000 MG: 1 INJECTION, POWDER, LYOPHILIZED, FOR SOLUTION INTRAVENOUS at 21:26

## 2019-09-21 RX ADMIN — ENOXAPARIN SODIUM 40 MG: 40 INJECTION SUBCUTANEOUS at 09:30

## 2019-09-21 RX ADMIN — ATORVASTATIN CALCIUM 20 MG: 20 TABLET, FILM COATED ORAL at 21:25

## 2019-09-21 RX ADMIN — VANCOMYCIN HYDROCHLORIDE 1000 MG: 1 INJECTION, POWDER, LYOPHILIZED, FOR SOLUTION INTRAVENOUS at 12:58

## 2019-09-21 RX ADMIN — Medication 10 ML: at 21:26

## 2019-09-21 RX ADMIN — METOPROLOL TARTRATE 100 MG: 50 TABLET, FILM COATED ORAL at 21:25

## 2019-09-21 RX ADMIN — Medication 10 ML: at 09:20

## 2019-09-21 RX ADMIN — DOCUSATE SODIUM 100 MG: 100 CAPSULE, LIQUID FILLED ORAL at 09:30

## 2019-09-21 RX ADMIN — POLYETHYLENE GLYCOL 3350 17 G: 17 POWDER, FOR SOLUTION ORAL at 09:30

## 2019-09-21 RX ADMIN — HYDROMORPHONE HYDROCHLORIDE 0.5 MG: 1 INJECTION, SOLUTION INTRAMUSCULAR; INTRAVENOUS; SUBCUTANEOUS at 05:03

## 2019-09-21 RX ADMIN — OXYCODONE HYDROCHLORIDE AND ACETAMINOPHEN 1 TABLET: 5; 325 TABLET ORAL at 21:27

## 2019-09-21 RX ADMIN — CHLORTHALIDONE 25 MG: 25 TABLET ORAL at 09:29

## 2019-09-21 RX ADMIN — CLONIDINE HYDROCHLORIDE 0.2 MG: 0.2 TABLET ORAL at 21:25

## 2019-09-21 RX ADMIN — ONDANSETRON 4 MG: 2 INJECTION INTRAMUSCULAR; INTRAVENOUS at 09:20

## 2019-09-21 ASSESSMENT — PAIN DESCRIPTION - FREQUENCY
FREQUENCY: CONTINUOUS
FREQUENCY: CONTINUOUS

## 2019-09-21 ASSESSMENT — PAIN DESCRIPTION - DESCRIPTORS
DESCRIPTORS: ACHING

## 2019-09-21 ASSESSMENT — PAIN DESCRIPTION - PROGRESSION
CLINICAL_PROGRESSION: GRADUALLY IMPROVING
CLINICAL_PROGRESSION: GRADUALLY WORSENING
CLINICAL_PROGRESSION: GRADUALLY IMPROVING

## 2019-09-21 ASSESSMENT — PAIN DESCRIPTION - ONSET
ONSET: ON-GOING

## 2019-09-21 ASSESSMENT — PAIN DESCRIPTION - PAIN TYPE
TYPE: SURGICAL PAIN
TYPE: SURGICAL PAIN
TYPE: ACUTE PAIN

## 2019-09-21 ASSESSMENT — PAIN SCALES - GENERAL
PAINLEVEL_OUTOF10: 8
PAINLEVEL_OUTOF10: 8
PAINLEVEL_OUTOF10: 5

## 2019-09-21 ASSESSMENT — PAIN - FUNCTIONAL ASSESSMENT
PAIN_FUNCTIONAL_ASSESSMENT: ACTIVITIES ARE NOT PREVENTED
PAIN_FUNCTIONAL_ASSESSMENT: ACTIVITIES ARE NOT PREVENTED

## 2019-09-21 ASSESSMENT — PAIN DESCRIPTION - ORIENTATION
ORIENTATION: LOWER;MID
ORIENTATION: RIGHT
ORIENTATION: LOWER;MID

## 2019-09-21 ASSESSMENT — PAIN DESCRIPTION - LOCATION
LOCATION: HAND
LOCATION: ABDOMEN
LOCATION: ABDOMEN

## 2019-09-22 LAB
ERYTHROCYTE [DISTWIDTH] IN BLOOD BY AUTOMATED COUNT: 14.9 % (ref 11.5–14.5)
ERYTHROCYTE [DISTWIDTH] IN BLOOD BY AUTOMATED COUNT: 47.3 FL (ref 35–45)
GLUCOSE BLD-MCNC: 110 MG/DL (ref 70–108)
GLUCOSE BLD-MCNC: 124 MG/DL (ref 70–108)
GLUCOSE BLD-MCNC: 131 MG/DL (ref 70–108)
GLUCOSE BLD-MCNC: 140 MG/DL (ref 70–108)
HCT VFR BLD CALC: 32.1 % (ref 37–47)
HEMOGLOBIN: 10.2 GM/DL (ref 12–16)
MCH RBC QN AUTO: 27.6 PG (ref 26–33)
MCHC RBC AUTO-ENTMCNC: 31.8 GM/DL (ref 32.2–35.5)
MCV RBC AUTO: 86.8 FL (ref 81–99)
PLATELET # BLD: 369 THOU/MM3 (ref 130–400)
PMV BLD AUTO: 9.4 FL (ref 9.4–12.4)
RBC # BLD: 3.7 MILL/MM3 (ref 4.2–5.4)
WBC # BLD: 8.6 THOU/MM3 (ref 4.8–10.8)

## 2019-09-22 PROCEDURE — 99232 SBSQ HOSP IP/OBS MODERATE 35: CPT | Performed by: INTERNAL MEDICINE

## 2019-09-22 PROCEDURE — 6360000002 HC RX W HCPCS: Performed by: SURGERY

## 2019-09-22 PROCEDURE — 85027 COMPLETE CBC AUTOMATED: CPT

## 2019-09-22 PROCEDURE — 2580000003 HC RX 258: Performed by: SURGERY

## 2019-09-22 PROCEDURE — 6370000000 HC RX 637 (ALT 250 FOR IP): Performed by: SURGERY

## 2019-09-22 PROCEDURE — 6370000000 HC RX 637 (ALT 250 FOR IP): Performed by: NURSE PRACTITIONER

## 2019-09-22 PROCEDURE — 2709999900 HC NON-CHARGEABLE SUPPLY

## 2019-09-22 PROCEDURE — 1200000000 HC SEMI PRIVATE

## 2019-09-22 PROCEDURE — 94760 N-INVAS EAR/PLS OXIMETRY 1: CPT

## 2019-09-22 PROCEDURE — 36415 COLL VENOUS BLD VENIPUNCTURE: CPT

## 2019-09-22 PROCEDURE — 82948 REAGENT STRIP/BLOOD GLUCOSE: CPT

## 2019-09-22 RX ADMIN — INSULIN LISPRO 1 UNITS: 100 INJECTION, SOLUTION INTRAVENOUS; SUBCUTANEOUS at 20:13

## 2019-09-22 RX ADMIN — OXYBUTYNIN CHLORIDE 10 MG: 10 TABLET, EXTENDED RELEASE ORAL at 09:35

## 2019-09-22 RX ADMIN — VANCOMYCIN HYDROCHLORIDE 1000 MG: 1 INJECTION, POWDER, LYOPHILIZED, FOR SOLUTION INTRAVENOUS at 20:05

## 2019-09-22 RX ADMIN — Medication 10 ML: at 20:05

## 2019-09-22 RX ADMIN — OXYCODONE HYDROCHLORIDE AND ACETAMINOPHEN 1 TABLET: 5; 325 TABLET ORAL at 21:15

## 2019-09-22 RX ADMIN — Medication 10 ML: at 09:36

## 2019-09-22 RX ADMIN — VANCOMYCIN HYDROCHLORIDE 1000 MG: 1 INJECTION, POWDER, LYOPHILIZED, FOR SOLUTION INTRAVENOUS at 04:28

## 2019-09-22 RX ADMIN — ENOXAPARIN SODIUM 40 MG: 40 INJECTION SUBCUTANEOUS at 09:36

## 2019-09-22 RX ADMIN — CLONIDINE HYDROCHLORIDE 0.2 MG: 0.2 TABLET ORAL at 09:35

## 2019-09-22 RX ADMIN — AMLODIPINE BESYLATE 5 MG: 5 TABLET ORAL at 09:35

## 2019-09-22 RX ADMIN — DOCUSATE SODIUM 100 MG: 100 CAPSULE, LIQUID FILLED ORAL at 09:35

## 2019-09-22 RX ADMIN — CHLORTHALIDONE 25 MG: 25 TABLET ORAL at 09:35

## 2019-09-22 RX ADMIN — METOPROLOL TARTRATE 100 MG: 50 TABLET, FILM COATED ORAL at 09:35

## 2019-09-22 RX ADMIN — VANCOMYCIN HYDROCHLORIDE 1000 MG: 1 INJECTION, POWDER, LYOPHILIZED, FOR SOLUTION INTRAVENOUS at 12:49

## 2019-09-22 RX ADMIN — POLYETHYLENE GLYCOL 3350 17 G: 17 POWDER, FOR SOLUTION ORAL at 09:37

## 2019-09-22 RX ADMIN — ATORVASTATIN CALCIUM 20 MG: 20 TABLET, FILM COATED ORAL at 20:05

## 2019-09-22 RX ADMIN — SODIUM CHLORIDE, PRESERVATIVE FREE 10 ML: 5 INJECTION INTRAVENOUS at 04:27

## 2019-09-22 ASSESSMENT — PAIN - FUNCTIONAL ASSESSMENT
PAIN_FUNCTIONAL_ASSESSMENT: ACTIVITIES ARE NOT PREVENTED

## 2019-09-22 ASSESSMENT — PAIN DESCRIPTION - ONSET
ONSET: ON-GOING

## 2019-09-22 ASSESSMENT — PAIN DESCRIPTION - DESCRIPTORS
DESCRIPTORS: ACHING
DESCRIPTORS: ACHING;CONSTANT

## 2019-09-22 ASSESSMENT — PAIN DESCRIPTION - ORIENTATION
ORIENTATION: LOWER;MID
ORIENTATION: LOWER
ORIENTATION: LOWER;MID
ORIENTATION: LOWER

## 2019-09-22 ASSESSMENT — PAIN DESCRIPTION - PROGRESSION
CLINICAL_PROGRESSION: NOT CHANGED

## 2019-09-22 ASSESSMENT — PAIN DESCRIPTION - PAIN TYPE
TYPE: ACUTE PAIN;SURGICAL PAIN
TYPE: ACUTE PAIN;SURGICAL PAIN
TYPE: ACUTE PAIN
TYPE: ACUTE PAIN

## 2019-09-22 ASSESSMENT — PAIN DESCRIPTION - FREQUENCY
FREQUENCY: CONTINUOUS

## 2019-09-22 ASSESSMENT — PAIN SCALES - GENERAL
PAINLEVEL_OUTOF10: 7
PAINLEVEL_OUTOF10: 6
PAINLEVEL_OUTOF10: 6
PAINLEVEL_OUTOF10: 5

## 2019-09-22 ASSESSMENT — PAIN DESCRIPTION - LOCATION
LOCATION: ABDOMEN

## 2019-09-23 VITALS
RESPIRATION RATE: 16 BRPM | SYSTOLIC BLOOD PRESSURE: 131 MMHG | WEIGHT: 195.2 LBS | HEART RATE: 67 BPM | HEIGHT: 65 IN | DIASTOLIC BLOOD PRESSURE: 66 MMHG | BODY MASS INDEX: 32.52 KG/M2 | OXYGEN SATURATION: 97 % | TEMPERATURE: 98.3 F

## 2019-09-23 LAB
CREAT SERPL-MCNC: 1 MG/DL (ref 0.4–1.2)
GLUCOSE BLD-MCNC: 118 MG/DL (ref 70–108)
GLUCOSE BLD-MCNC: 119 MG/DL (ref 70–108)

## 2019-09-23 PROCEDURE — 99239 HOSP IP/OBS DSCHRG MGMT >30: CPT | Performed by: INTERNAL MEDICINE

## 2019-09-23 PROCEDURE — 6360000002 HC RX W HCPCS: Performed by: SURGERY

## 2019-09-23 PROCEDURE — 82948 REAGENT STRIP/BLOOD GLUCOSE: CPT

## 2019-09-23 PROCEDURE — 82565 ASSAY OF CREATININE: CPT

## 2019-09-23 PROCEDURE — 6370000000 HC RX 637 (ALT 250 FOR IP): Performed by: NURSE PRACTITIONER

## 2019-09-23 PROCEDURE — 6370000000 HC RX 637 (ALT 250 FOR IP): Performed by: SURGERY

## 2019-09-23 PROCEDURE — 2709999900 HC NON-CHARGEABLE SUPPLY

## 2019-09-23 PROCEDURE — 36415 COLL VENOUS BLD VENIPUNCTURE: CPT

## 2019-09-23 PROCEDURE — 2580000003 HC RX 258: Performed by: SURGERY

## 2019-09-23 RX ORDER — OXYBUTYNIN CHLORIDE 10 MG/1
10 TABLET, EXTENDED RELEASE ORAL DAILY
Qty: 30 TABLET | Refills: 3 | Status: SHIPPED | OUTPATIENT
Start: 2019-09-24 | End: 2021-03-16

## 2019-09-23 RX ORDER — PSEUDOEPHEDRINE HCL 30 MG
100 TABLET ORAL DAILY
Qty: 30 CAPSULE | Refills: 0 | Status: SHIPPED | OUTPATIENT
Start: 2019-09-24 | End: 2021-02-09

## 2019-09-23 RX ORDER — SULFAMETHOXAZOLE AND TRIMETHOPRIM 800; 160 MG/1; MG/1
1 TABLET ORAL 2 TIMES DAILY
Qty: 10 TABLET | Refills: 0 | Status: SHIPPED | OUTPATIENT
Start: 2019-09-23 | End: 2019-09-28

## 2019-09-23 RX ADMIN — AMLODIPINE BESYLATE 5 MG: 5 TABLET ORAL at 09:10

## 2019-09-23 RX ADMIN — METOPROLOL TARTRATE 100 MG: 50 TABLET, FILM COATED ORAL at 12:41

## 2019-09-23 RX ADMIN — DOCUSATE SODIUM 100 MG: 100 CAPSULE, LIQUID FILLED ORAL at 09:12

## 2019-09-23 RX ADMIN — VANCOMYCIN HYDROCHLORIDE 1000 MG: 1 INJECTION, POWDER, LYOPHILIZED, FOR SOLUTION INTRAVENOUS at 13:06

## 2019-09-23 RX ADMIN — ENOXAPARIN SODIUM 40 MG: 40 INJECTION SUBCUTANEOUS at 09:12

## 2019-09-23 RX ADMIN — VANCOMYCIN HYDROCHLORIDE 1000 MG: 1 INJECTION, POWDER, LYOPHILIZED, FOR SOLUTION INTRAVENOUS at 05:04

## 2019-09-23 RX ADMIN — MAGNESIUM HYDROXIDE 30 ML: 400 SUSPENSION ORAL at 05:07

## 2019-09-23 RX ADMIN — OXYBUTYNIN CHLORIDE 10 MG: 10 TABLET, EXTENDED RELEASE ORAL at 09:10

## 2019-09-23 RX ADMIN — SODIUM CHLORIDE, PRESERVATIVE FREE 10 ML: 5 INJECTION INTRAVENOUS at 05:04

## 2019-09-23 ASSESSMENT — PAIN DESCRIPTION - PROGRESSION
CLINICAL_PROGRESSION: NOT CHANGED
CLINICAL_PROGRESSION: NOT CHANGED

## 2019-09-23 ASSESSMENT — PAIN SCALES - GENERAL
PAINLEVEL_OUTOF10: 4
PAINLEVEL_OUTOF10: 6

## 2019-09-23 ASSESSMENT — PAIN DESCRIPTION - LOCATION
LOCATION: ABDOMEN
LOCATION: ABDOMEN

## 2019-09-23 ASSESSMENT — PAIN DESCRIPTION - FREQUENCY
FREQUENCY: CONTINUOUS
FREQUENCY: CONTINUOUS

## 2019-09-23 ASSESSMENT — PAIN DESCRIPTION - DESCRIPTORS
DESCRIPTORS: ACHING
DESCRIPTORS: ACHING;CONSTANT

## 2019-09-23 ASSESSMENT — PAIN DESCRIPTION - ORIENTATION
ORIENTATION: LOWER;MID
ORIENTATION: LOWER;MID

## 2019-09-23 ASSESSMENT — PAIN DESCRIPTION - ONSET
ONSET: ON-GOING
ONSET: ON-GOING

## 2019-09-23 ASSESSMENT — PAIN DESCRIPTION - PAIN TYPE
TYPE: ACUTE PAIN
TYPE: ACUTE PAIN;SURGICAL PAIN

## 2019-09-24 LAB
AEROBIC CULTURE: ABNORMAL
ANAEROBIC CULTURE: ABNORMAL
GRAM STAIN RESULT: ABNORMAL
ORGANISM: ABNORMAL

## 2019-09-25 ENCOUNTER — HOSPITAL ENCOUNTER (OUTPATIENT)
Dept: NON INVASIVE DIAGNOSTICS | Age: 44
Discharge: HOME | End: 2019-09-25
Payer: COMMERCIAL

## 2019-09-25 DIAGNOSIS — I25.10 CORONARY ARTERY DISEASE INVOLVING NATIVE CORONARY ARTERY OF NATIVE HEART WITHOUT ANGINA PECTORIS: ICD-10-CM

## 2019-09-25 DIAGNOSIS — R07.9 CHEST PAIN AT REST: ICD-10-CM

## 2019-09-25 PROCEDURE — 93017 CV STRESS TEST TRACING ONLY: CPT

## 2019-10-02 ENCOUNTER — OFFICE VISIT (OUTPATIENT)
Dept: SURGERY | Age: 44
End: 2019-10-02

## 2019-10-02 VITALS
BODY MASS INDEX: 31.28 KG/M2 | HEART RATE: 92 BPM | TEMPERATURE: 97.7 F | OXYGEN SATURATION: 100 % | SYSTOLIC BLOOD PRESSURE: 128 MMHG | WEIGHT: 188 LBS | DIASTOLIC BLOOD PRESSURE: 82 MMHG | RESPIRATION RATE: 20 BRPM

## 2019-10-02 DIAGNOSIS — R63.4 WEIGHT LOSS: ICD-10-CM

## 2019-10-02 DIAGNOSIS — R11.0 NAUSEA: ICD-10-CM

## 2019-10-02 DIAGNOSIS — Z51.89 VISIT FOR WOUND CHECK: Primary | ICD-10-CM

## 2019-10-02 LAB
GFR SERPL CREATININE-BSD FRML MDRD: 61 ML/MIN/1.73M2
GFR SERPL CREATININE-BSD FRML MDRD: 79 ML/MIN/1.73M2

## 2019-10-02 PROCEDURE — 99024 POSTOP FOLLOW-UP VISIT: CPT | Performed by: NURSE PRACTITIONER

## 2019-10-02 RX ORDER — ONDANSETRON 4 MG/1
4 TABLET, FILM COATED ORAL EVERY 6 HOURS PRN
Qty: 20 TABLET | Refills: 1 | Status: SHIPPED | OUTPATIENT
Start: 2019-10-02 | End: 2021-02-09 | Stop reason: ALTCHOICE

## 2019-10-02 ASSESSMENT — ENCOUNTER SYMPTOMS
EYE DISCHARGE: 0
DIARRHEA: 0
SINUS PAIN: 0
SHORTNESS OF BREATH: 0
SINUS PRESSURE: 0
RECTAL PAIN: 0
EYE ITCHING: 0
EYE REDNESS: 0
NAUSEA: 1
EYE PAIN: 0
BACK PAIN: 0
CHOKING: 0
RHINORRHEA: 0
TROUBLE SWALLOWING: 0
ABDOMINAL PAIN: 0
SORE THROAT: 0
COLOR CHANGE: 0
FACIAL SWELLING: 0
STRIDOR: 0
BLOOD IN STOOL: 0
ROS SKIN COMMENTS: SURGICAL INCISION
ANAL BLEEDING: 0
WHEEZING: 0
PHOTOPHOBIA: 0
VOICE CHANGE: 0
VOMITING: 1
APNEA: 0
CONSTIPATION: 0
COUGH: 0
ABDOMINAL DISTENTION: 0
CHEST TIGHTNESS: 0

## 2020-10-28 ENCOUNTER — HOSPITAL ENCOUNTER (OUTPATIENT)
Age: 45
Setting detail: SPECIMEN
Discharge: HOME | End: 2020-10-28
Payer: COMMERCIAL

## 2020-10-28 LAB
ABSOLUTE EOS #: 0.07 K/UL (ref 0–0.44)
ABSOLUTE IMMATURE GRANULOCYTE: <0.03 K/UL (ref 0–0.3)
ABSOLUTE LYMPH #: 2.33 K/UL (ref 1.1–3.7)
ABSOLUTE MONO #: 0.51 K/UL (ref 0.1–1.2)
ALBUMIN SERPL-MCNC: 4.2 G/DL (ref 3.5–5.2)
ALBUMIN/GLOBULIN RATIO: 1.4 (ref 1–2.5)
ALP BLD-CCNC: 89 U/L (ref 35–104)
ALT SERPL-CCNC: 9 U/L (ref 5–33)
ANION GAP SERPL CALCULATED.3IONS-SCNC: 14 MMOL/L (ref 9–17)
AST SERPL-CCNC: 10 U/L
BASOPHILS # BLD: 0 % (ref 0–2)
BASOPHILS ABSOLUTE: <0.03 K/UL (ref 0–0.2)
BILIRUB SERPL-MCNC: 0.21 MG/DL (ref 0.3–1.2)
BUN BLDV-MCNC: 12 MG/DL (ref 6–20)
BUN/CREAT BLD: ABNORMAL (ref 9–20)
CALCIUM SERPL-MCNC: 9.1 MG/DL (ref 8.6–10.4)
CHLORIDE BLD-SCNC: 103 MMOL/L (ref 98–107)
CHOLESTEROL/HDL RATIO: 3.6
CHOLESTEROL: 160 MG/DL
CO2: 22 MMOL/L (ref 20–31)
CREAT SERPL-MCNC: 0.54 MG/DL (ref 0.5–0.9)
DIFFERENTIAL TYPE: ABNORMAL
EOSINOPHILS RELATIVE PERCENT: 1 % (ref 1–4)
GFR AFRICAN AMERICAN: >60 ML/MIN
GFR NON-AFRICAN AMERICAN: >60 ML/MIN
GFR SERPL CREATININE-BSD FRML MDRD: ABNORMAL ML/MIN/{1.73_M2}
GFR SERPL CREATININE-BSD FRML MDRD: ABNORMAL ML/MIN/{1.73_M2}
GLUCOSE BLD-MCNC: 120 MG/DL (ref 70–99)
HCT VFR BLD CALC: 39.5 % (ref 36.3–47.1)
HDLC SERPL-MCNC: 45 MG/DL
HEMOGLOBIN: 12 G/DL (ref 11.9–15.1)
IMMATURE GRANULOCYTES: 0 %
LDL CHOLESTEROL: 97 MG/DL (ref 0–130)
LYMPHOCYTES # BLD: 41 % (ref 24–43)
MCH RBC QN AUTO: 27 PG (ref 25.2–33.5)
MCHC RBC AUTO-ENTMCNC: 30.4 G/DL (ref 28.4–34.8)
MCV RBC AUTO: 88.8 FL (ref 82.6–102.9)
MONOCYTES # BLD: 9 % (ref 3–12)
NRBC AUTOMATED: 0 PER 100 WBC
PDW BLD-RTO: 14.6 % (ref 11.8–14.4)
PLATELET # BLD: 353 K/UL (ref 138–453)
PLATELET ESTIMATE: ABNORMAL
PMV BLD AUTO: 10.3 FL (ref 8.1–13.5)
POTASSIUM SERPL-SCNC: 3.7 MMOL/L (ref 3.7–5.3)
RBC # BLD: 4.45 M/UL (ref 3.95–5.11)
RBC # BLD: ABNORMAL 10*6/UL
SEG NEUTROPHILS: 49 % (ref 36–65)
SEGMENTED NEUTROPHILS ABSOLUTE COUNT: 2.78 K/UL (ref 1.5–8.1)
SODIUM BLD-SCNC: 139 MMOL/L (ref 135–144)
TOTAL PROTEIN: 7.1 G/DL (ref 6.4–8.3)
TRIGL SERPL-MCNC: 89 MG/DL
VLDLC SERPL CALC-MCNC: NORMAL MG/DL (ref 1–30)
WBC # BLD: 5.7 K/UL (ref 3.5–11.3)
WBC # BLD: ABNORMAL 10*3/UL

## 2021-01-28 ENCOUNTER — HOSPITAL ENCOUNTER (EMERGENCY)
Age: 46
Discharge: HOME | End: 2021-01-28
Payer: COMMERCIAL

## 2021-01-28 ENCOUNTER — APPOINTMENT (OUTPATIENT)
Dept: INTERVENTIONAL RADIOLOGY/VASCULAR | Age: 46
End: 2021-01-28
Payer: COMMERCIAL

## 2021-01-28 VITALS
RESPIRATION RATE: 18 BRPM | OXYGEN SATURATION: 100 % | DIASTOLIC BLOOD PRESSURE: 90 MMHG | TEMPERATURE: 98.4 F | HEART RATE: 101 BPM | SYSTOLIC BLOOD PRESSURE: 168 MMHG

## 2021-01-28 DIAGNOSIS — I16.0 HYPERTENSIVE URGENCY: ICD-10-CM

## 2021-01-28 DIAGNOSIS — S86.811A STRAIN OF RIGHT CALF MUSCLE: Primary | ICD-10-CM

## 2021-01-28 LAB
ANION GAP SERPL CALCULATED.3IONS-SCNC: 10 MEQ/L (ref 8–16)
BASOPHILS # BLD: 0.3 %
BASOPHILS ABSOLUTE: 0 THOU/MM3 (ref 0–0.1)
BUN BLDV-MCNC: 9 MG/DL (ref 7–22)
CALCIUM SERPL-MCNC: 9.7 MG/DL (ref 8.5–10.5)
CHLORIDE BLD-SCNC: 103 MEQ/L (ref 98–111)
CO2: 25 MEQ/L (ref 23–33)
CREAT SERPL-MCNC: 0.6 MG/DL (ref 0.4–1.2)
D-DIMER QUANTITATIVE: 251 NG/ML FEU (ref 0–500)
EKG ATRIAL RATE: 101 BPM
EKG P AXIS: 52 DEGREES
EKG P-R INTERVAL: 136 MS
EKG Q-T INTERVAL: 328 MS
EKG QRS DURATION: 70 MS
EKG QTC CALCULATION (BAZETT): 425 MS
EKG R AXIS: 19 DEGREES
EKG T AXIS: 14 DEGREES
EKG VENTRICULAR RATE: 101 BPM
EOSINOPHIL # BLD: 0.6 %
EOSINOPHILS ABSOLUTE: 0.1 THOU/MM3 (ref 0–0.4)
ERYTHROCYTE [DISTWIDTH] IN BLOOD BY AUTOMATED COUNT: 14.6 % (ref 11.5–14.5)
ERYTHROCYTE [DISTWIDTH] IN BLOOD BY AUTOMATED COUNT: 46.6 FL (ref 35–45)
GFR SERPL CREATININE-BSD FRML MDRD: > 90 ML/MIN/1.73M2
GLUCOSE BLD-MCNC: 115 MG/DL (ref 70–108)
HCT VFR BLD CALC: 40 % (ref 37–47)
HEMOGLOBIN: 12.8 GM/DL (ref 12–16)
IMMATURE GRANS (ABS): 0.02 THOU/MM3 (ref 0–0.07)
IMMATURE GRANULOCYTES: 0.2 %
LYMPHOCYTES # BLD: 18.2 %
LYMPHOCYTES ABSOLUTE: 1.8 THOU/MM3 (ref 1–4.8)
MCH RBC QN AUTO: 27.8 PG (ref 26–33)
MCHC RBC AUTO-ENTMCNC: 32 GM/DL (ref 32.2–35.5)
MCV RBC AUTO: 87 FL (ref 81–99)
MONOCYTES # BLD: 6.3 %
MONOCYTES ABSOLUTE: 0.6 THOU/MM3 (ref 0.4–1.3)
NUCLEATED RED BLOOD CELLS: 0 /100 WBC
OSMOLALITY CALCULATION: 275.3 MOSMOL/KG (ref 275–300)
PLATELET # BLD: 351 THOU/MM3 (ref 130–400)
PMV BLD AUTO: 9.7 FL (ref 9.4–12.4)
POTASSIUM SERPL-SCNC: 4.2 MEQ/L (ref 3.5–5.2)
RBC # BLD: 4.6 MILL/MM3 (ref 4.2–5.4)
SEG NEUTROPHILS: 74.4 %
SEGMENTED NEUTROPHILS ABSOLUTE COUNT: 7.3 THOU/MM3 (ref 1.8–7.7)
SODIUM BLD-SCNC: 138 MEQ/L (ref 135–145)
TROPONIN T: < 0.01 NG/ML
WBC # BLD: 9.8 THOU/MM3 (ref 4.8–10.8)

## 2021-01-28 PROCEDURE — 93005 ELECTROCARDIOGRAM TRACING: CPT | Performed by: EMERGENCY MEDICINE

## 2021-01-28 PROCEDURE — 84484 ASSAY OF TROPONIN QUANT: CPT

## 2021-01-28 PROCEDURE — 85025 COMPLETE CBC W/AUTO DIFF WBC: CPT

## 2021-01-28 PROCEDURE — 6370000000 HC RX 637 (ALT 250 FOR IP): Performed by: NURSE PRACTITIONER

## 2021-01-28 PROCEDURE — 85379 FIBRIN DEGRADATION QUANT: CPT

## 2021-01-28 PROCEDURE — 99282 EMERGENCY DEPT VISIT SF MDM: CPT

## 2021-01-28 PROCEDURE — 93971 EXTREMITY STUDY: CPT

## 2021-01-28 PROCEDURE — 80048 BASIC METABOLIC PNL TOTAL CA: CPT

## 2021-01-28 PROCEDURE — 36415 COLL VENOUS BLD VENIPUNCTURE: CPT

## 2021-01-28 RX ORDER — CLONIDINE HYDROCHLORIDE 0.1 MG/1
0.1 TABLET ORAL ONCE
Status: COMPLETED | OUTPATIENT
Start: 2021-01-28 | End: 2021-01-28

## 2021-01-28 RX ADMIN — CLONIDINE HYDROCHLORIDE 0.1 MG: 0.1 TABLET ORAL at 13:25

## 2021-01-28 ASSESSMENT — PAIN SCALES - GENERAL: PAINLEVEL_OUTOF10: 6

## 2021-01-28 ASSESSMENT — ENCOUNTER SYMPTOMS
CHEST TIGHTNESS: 0
SHORTNESS OF BREATH: 0
COLOR CHANGE: 1
BACK PAIN: 0

## 2021-01-28 ASSESSMENT — PAIN DESCRIPTION - LOCATION: LOCATION: LEG

## 2021-01-28 ASSESSMENT — PAIN DESCRIPTION - PAIN TYPE: TYPE: ACUTE PAIN

## 2021-01-28 ASSESSMENT — PAIN DESCRIPTION - DESCRIPTORS: DESCRIPTORS: SHARP

## 2021-01-28 ASSESSMENT — PAIN DESCRIPTION - ORIENTATION: ORIENTATION: RIGHT

## 2021-01-28 NOTE — ED PROVIDER NOTES
[facility] Emergency Department    CHIEF COMPLAINT       Chief Complaint   Patient presents with    Hypertension    Leg Pain     right       Nurses Notes reviewed and I agree except as noted in the HPI. HISTORY OF PRESENT ILLNESS    Sravani Rocha tita 39 y.o. female who presents to the ED for evaluation of right leg pain. Patient states she had a virtual visit today, they noted her blood pressure was very elevated. She notes she is been having pain to her right lower leg. For the last couple of weeks. She noted some bruising to the medial calf recently. She denies any trauma. She denies a history of blood clotting disorder. She notes she has had kidney failure in the past but does not describe how that occurred. She does have a history of hypertension. Says she has been taking her medicines appropriately. She had she got her second COVID-19 vaccine yesterday. She denies fevers or chills, she had some sharp intermittent chest pain. She denies diarrhea nausea or vomiting. Denies any change in urination. Denies decreased urination. She notes some slight swelling to her right lower leg. Electronic medical record she does have a history of CVA, diabetes, hypertension. HPI was provided by the patient. REVIEW OF SYSTEMS     Review of Systems   Constitutional: Negative for activity change, chills, fatigue and fever. Respiratory: Negative for chest tightness and shortness of breath. Cardiovascular: Positive for chest pain. Genitourinary: Negative for decreased urine volume and difficulty urinating. Musculoskeletal: Positive for myalgias. Negative for arthralgias, back pain, gait problem and joint swelling. Skin: Positive for color change. Negative for rash and wound. Allergic/Immunologic: Negative for immunocompromised state. Neurological: Negative for dizziness, weakness, light-headedness, numbness and headaches. Hematological: Does not bruise/bleed easily. Psychiatric/Behavioral: Negative for agitation, behavioral problems and confusion. PAST MEDICAL HISTORY     Past Medical History:   Diagnosis Date    Diabetes mellitus ([facility] [address]     Hypertension     Unspecified cerebral artery occlusion with cerebral infarction 1995       SURGICALHISTORY      has a past surgical history that includes cyst removal; hysteroscopy; Hysterectomy (April 2013); Breast surgery (Left, 02/10/2015); pr exc skin benig 2.1-3cm trunk,arm,leg (N/A, 6/4/2018); and DISSECTION GROIN (N/A, 9/19/2019).     CURRENT MEDICATIONS       Discharge Medication List as of 1/28/2021  1:45 PM      CONTINUE these medications which have NOT CHANGED    Details   ondansetron (ZOFRAN) 4 MG tablet Take 1 tablet by mouth every 6 hours as needed for Nausea, Disp-20 tablet, R-1Normal      oxybutynin (DITROPAN XL) 10 MG extended release tablet Take 1 tablet by mouth daily, Disp-30 tablet, R-3Normal      docusate sodium (COLACE, DULCOLAX) 100 MG CAPS Take 100 mg by mouth daily, Disp-30 capsule, R-0Normal      cloNIDine (CATAPRES) patch Place 0.2 mg onto the skin every 7 days On FridaysHistorical Med      metFORMIN (GLUCOPHAGE-XR) 500 MG extended release tablet Take 500 mg by mouth daily (with breakfast) Does not take everydayHistorical Med      chlorthalidone (HYGROTON) 25 MG tablet Take 25 mg by mouth dailyHistorical Med      amLODIPine (NORVASC) 5 MG tablet Take 1 tablet by mouth daily, Disp-30 tablet, R-11Normal      butalbital-acetaminophen-caffeine (ESGIC) -40 MG per tablet Take 1 tablet by mouth every 4 hours as needed for Headaches, Disp-20 tablet, R-0Print      vitamin D (ERGOCALCIFEROL) 62335 units CAPS capsule Take 50,000 Units by mouth once a weekHistorical Med      hydrOXYzine (ATARAX) 10 MG tablet Take 10 mg by mouth daily as needed Historical Med      atorvastatin (LIPITOR) 20 MG tablet TAKE 1 TABLET BY MOUTH DAILY, Disp-30 tablet, R-8Normal      metoprolol (LOPRESSOR) 100 MG tablet Take 1 tablet ** Merged History Encounter **            PHYSICAL EXAM     INITIAL VITALS:  temperature is 98.4 °F (36.9 °C). Her blood pressure is 168/90 (abnormal) and her pulse is 101. Her respiration is 18 and oxygen saturation is 100%. Physical Exam  Vitals signs and nursing note reviewed. Constitutional:       Appearance: Normal appearance. She is overweight. HENT:      Head: Normocephalic. Nose: Nose normal.      Mouth/Throat:      Mouth: Mucous membranes are moist.   Neck:      Musculoskeletal: Normal range of motion and neck supple. No neck rigidity. Cardiovascular:      Rate and Rhythm: Normal rate. Pulses: Normal pulses. Pulmonary:      Effort: Pulmonary effort is normal.      Breath sounds: Normal breath sounds. Musculoskeletal:      Right knee: She exhibits normal range of motion and no swelling. Right upper leg: She exhibits tenderness. She exhibits no bony tenderness and no swelling. Right lower leg: She exhibits tenderness. She exhibits no bony tenderness, no swelling and no deformity. No edema. Neurological:      Mental Status: She is alert. DIFFERENTIAL DIAGNOSIS:   DVT, contusion, superficial venous thrombosis, phlebitis, PE, hypertensive urgency, hypertensive emergency    DIAGNOSTIC RESULTS          RADIOLOGY: non-plainfilm images(s) such as CT, Ultrasound and MRI are read by the radiologist.  Plain radiographic images are visualized and preliminarily interpreted by the emergency physician unless otherwise stated below. VL DUP LOWER EXTREMITY VENOUS RIGHT   Final Result   No evidence of a DVT. **This report has been created using voice recognition software. It may contain minor errors which are inherent in voice recognition technology. **      Final report electronically signed by DR Thu Nichols on 1/28/2021 1:24 PM            LABS:   Labs Reviewed   CBC WITH AUTO DIFFERENTIAL - Abnormal; Notable for the following components:       Result Value    [facility] 32.0 (*)     RDW-CV 14.6 (*)     RDW-SD 46.6 (*)     All other components within normal limits   BASIC METABOLIC PANEL - Abnormal; Notable for the following components:    Glucose 115 (*)     All other components within normal limits   TROPONIN   D-DIMER, QUANTITATIVE   ANION GAP   GLOMERULAR FILTRATION RATE, ESTIMATED   OSMOLALITY       EMERGENCY DEPARTMENT COURSE:   Vitals:    Vitals:    01/28/21 1146 01/28/21 1147 01/28/21 1324   BP:  (!) 180/86 (!) 168/90   Pulse: 101     Resp: 18     Temp: 98.4 °F (36.9 °C)     SpO2: 100%       MDM    Patient was seen and evaluated in the emergency department, patient appears to be in no acute distress, vital signs are reviewed, significant hypertension was noted. Physical exam is completed, she has significant tenderness to the right medial calf, she has pain into her medial thigh, she has some ecchymosis to the medial calf, appears to be consistent with her varicose veins. Labs and imaging were ordered. No significant findings were noted. No endorgan damage, no DVT noted. Think this is most likely a muscle strain, most likely the color changes been present she just has never noticed it. She is advised to ice, elevate, use topical Voltaren. She is advised to take clonidine if she is symptomatic and her blood pressures greater than 811 systolically. She verbalized understanding plan of care. Medications   cloNIDine (CATAPRES) tablet 0.1 mg (0.1 mg Oral Given 1/28/21 1325)       Patient was seenindependently by myself. The patient's final impression and disposition and plan was determined by myself. CRITICAL CARE:   None    CONSULTS:  None    PROCEDURES:  None    FINAL IMPRESSION     1. Strain of right calf muscle    2.  Hypertensive urgency          DISPOSITION/PLAN   Patient discharged in stable condition  PATIENT REFERREDTO:  JUAN Xiong - CNP  [facility]  [phone]    Call   For follow up and evaluation      DISCHARGE

## 2021-01-28 NOTE — ED TRIAGE NOTES
Pt presents to the ED for hypertension. Pt states she had an appointment with her PCP today regarding leg pain and her BP was 195/132.

## 2021-02-09 ENCOUNTER — OFFICE VISIT (OUTPATIENT)
Dept: CARDIOLOGY CLINIC | Age: 46
End: 2021-02-09
Payer: COMMERCIAL

## 2021-02-09 VITALS
HEIGHT: 65 IN | SYSTOLIC BLOOD PRESSURE: 170 MMHG | WEIGHT: 205 LBS | DIASTOLIC BLOOD PRESSURE: 102 MMHG | HEART RATE: 68 BPM | BODY MASS INDEX: 34.16 KG/M2

## 2021-02-09 DIAGNOSIS — E78.5 DYSLIPIDEMIA: ICD-10-CM

## 2021-02-09 DIAGNOSIS — I10 UNCONTROLLED HYPERTENSION: Primary | ICD-10-CM

## 2021-02-09 DIAGNOSIS — Z98.890 S/P CARDIAC CATH: ICD-10-CM

## 2021-02-09 PROCEDURE — G8484 FLU IMMUNIZE NO ADMIN: HCPCS | Performed by: NURSE PRACTITIONER

## 2021-02-09 PROCEDURE — 1036F TOBACCO NON-USER: CPT | Performed by: NURSE PRACTITIONER

## 2021-02-09 PROCEDURE — 99213 OFFICE O/P EST LOW 20 MIN: CPT | Performed by: NURSE PRACTITIONER

## 2021-02-09 PROCEDURE — G8427 DOCREV CUR MEDS BY ELIG CLIN: HCPCS | Performed by: NURSE PRACTITIONER

## 2021-02-09 PROCEDURE — G8417 CALC BMI ABV UP PARAM F/U: HCPCS | Performed by: NURSE PRACTITIONER

## 2021-02-09 RX ORDER — LOSARTAN POTASSIUM 25 MG/1
25 TABLET ORAL DAILY
Qty: 90 TABLET | Refills: 1 | Status: SHIPPED | OUTPATIENT
Start: 2021-02-09 | End: 2021-03-16

## 2021-02-09 RX ORDER — HYDRALAZINE HYDROCHLORIDE 25 MG/1
25 TABLET, FILM COATED ORAL DAILY
COMMUNITY
End: 2021-03-16

## 2021-02-09 RX ORDER — CLONIDINE HYDROCHLORIDE 0.3 MG/1
0.3 TABLET ORAL 4 TIMES DAILY
COMMUNITY
End: 2021-11-05

## 2021-02-09 RX ORDER — OMEPRAZOLE 20 MG/1
20 CAPSULE, DELAYED RELEASE ORAL DAILY
COMMUNITY
End: 2021-11-05

## 2021-02-09 NOTE — PROGRESS NOTES
[facility] PROFESSIONAL SERVICES  [facility]   [address]   [address] [address]   Dept: [phone]   Dept Fax: [phone]   Loc: [phone]      Chief Complaint   Patient presents with    Follow-Up from [address] Hypertension     ED visit f/u for HTN in 38 y/o female with history of HTN for many years, mild CAD, HLD. Denies chest pain, palpitations, ÓSCAR, lightheadedness, dizziness or syncope. Reports some sob with exertion and notices some chest pressure when B/P is elevated. Not having symptoms on a daily basis. Has been active at work and home without chest pain or sob. Denies chest pain, palpitations, ÓSCAR, lightheadedness, dizziness or syncope. Reports taking lisinopril years ago without difficulty until antibiotics were started and then had kidney failure that resolved with stopping antibiotic and lisinopril. She is on several antihypertensives and remains with higher B/P. Home readings are 170's-190's/80's-100's per patient. She states she has never had any renal artery ultrasounds. She has been eating fast food and some higher sodium foods and adding pink himalayan salt. Cardiologist:  Dr. Julia Glasgow, last seen 9/3/2019        General:   No fever, no chills, No fatigue or weight loss  Pulmonary:    +intermittent dyspnea on exertion, no wheezing  Cardiac:    Denies recent chest pain   GI:     No nausea or vomiting, no abdominal pain  Neuro:    No dizziness or light headedness  Musculoskeletal:  No recent active issues  Extremities:   No edema, good peripheral pulses      Past Medical History:   Diagnosis Date    Diabetes mellitus ([address]     Hypertension     Unspecified cerebral artery occlusion with cerebral infarction 1995       Allergies   Allergen Reactions    Latex Hives    Bactrim [Sulfamethoxazole-Trimethoprim] Other (See Comments)     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 [facility] [place]  Smoking status: Never Smoker    Smokeless tobacco: Never Used   Substance and Sexual Activity    Alcohol use: No    Drug use: No    Sexual activity: Yes     Partners: Male   Lifestyle    Physical activity     Days per week: None     Minutes per session: None    Stress: None   Relationships    Social connections     Talks on phone: None     Gets together: None     Attends Holiness service: None     Active member of club or organization: None     Attends meetings of clubs or organizations: None     Relationship status: None    Intimate partner violence     Fear of current or ex partner: None     Emotionally abused: None     Physically abused: None     Forced sexual activity: None   Other Topics Concern    None   Social History Narrative    ** Merged History Encounter **            Family History   Problem Relation Age of Onset    Heart Disease Mother     High Blood Pressure Mother     Arthritis Mother     Breast Cancer Maternal Cousin         dx premenapausal    High Blood Pressure Sister        Blood pressure (!) 170/102, pulse 68, height 5' 5\" (1.651 m), weight 205 lb (93 kg), last menstrual period 01/20/2013, not currently breastfeeding. General:   Well developed, well nourished  Lungs:   Clear to auscultation  Heart:    Normal S1 S2, No murmur, rubs, or gallops  Abdomen:   Soft, non tender, no organomegalies, positive bowel sounds  Extremities:   No edema, no cyanosis, good peripheral pulses  Neurological:   Awake, alert, oriented. No obvious focal deficits  Musculoskeletal:  No obvious deformities    EKG:          Diagnosis Orders   1. Uncontrolled hypertension     2. S/P cardiac cath- 6/22/16-  All coronaries are patent but LAD. there is moderate eccentric plaque noted on the proximal and ostail LAD, giving ostail prox LAD 30% nstenosis, edp 13 mmhg, ef 65%- med R     3.  Dyslipidemia         Orders Placed This Encounter   Procedures    US DUP ABD PEL RETRO SCROT COMPLETE

## 2021-02-16 ENCOUNTER — HOSPITAL ENCOUNTER (OUTPATIENT)
Age: 46
Discharge: HOME | End: 2021-02-16
Payer: COMMERCIAL

## 2021-02-16 DIAGNOSIS — I10 UNCONTROLLED HYPERTENSION: ICD-10-CM

## 2021-02-16 LAB
ANION GAP SERPL CALCULATED.3IONS-SCNC: 9 MEQ/L (ref 8–16)
BUN BLDV-MCNC: 12 MG/DL (ref 7–22)
CALCIUM SERPL-MCNC: 9.7 MG/DL (ref 8.5–10.5)
CHLORIDE BLD-SCNC: 104 MEQ/L (ref 98–111)
CO2: 26 MEQ/L (ref 23–33)
CREAT SERPL-MCNC: 0.8 MG/DL (ref 0.4–1.2)
GLUCOSE BLD-MCNC: 88 MG/DL (ref 70–108)
MAGNESIUM: 2.1 MG/DL (ref 1.6–2.4)
POTASSIUM SERPL-SCNC: 4.2 MEQ/L (ref 3.5–5.2)
SODIUM BLD-SCNC: 139 MEQ/L (ref 135–145)

## 2021-02-16 PROCEDURE — 36415 COLL VENOUS BLD VENIPUNCTURE: CPT

## 2021-02-16 PROCEDURE — 83735 ASSAY OF MAGNESIUM: CPT

## 2021-02-16 PROCEDURE — 80048 BASIC METABOLIC PNL TOTAL CA: CPT

## 2021-02-19 ENCOUNTER — HOSPITAL ENCOUNTER (OUTPATIENT)
Dept: ULTRASOUND IMAGING | Age: 46
Discharge: HOME | End: 2021-02-19
Payer: COMMERCIAL

## 2021-02-19 DIAGNOSIS — I10 UNCONTROLLED HYPERTENSION: ICD-10-CM

## 2021-02-19 PROCEDURE — 93975 VASCULAR STUDY: CPT

## 2021-02-25 ENCOUNTER — HOSPITAL ENCOUNTER (OUTPATIENT)
Age: 46
Setting detail: SPECIMEN
Discharge: HOME | End: 2021-02-25
Payer: COMMERCIAL

## 2021-02-25 LAB
ABSOLUTE EOS #: 0.08 K/UL (ref 0–0.44)
ABSOLUTE IMMATURE GRANULOCYTE: <0.03 K/UL (ref 0–0.3)
ABSOLUTE LYMPH #: 3.57 K/UL (ref 1.1–3.7)
ABSOLUTE MONO #: 0.52 K/UL (ref 0.1–1.2)
ALBUMIN SERPL-MCNC: 4.4 G/DL (ref 3.5–5.2)
ALBUMIN/GLOBULIN RATIO: 1.5 (ref 1–2.5)
ALP BLD-CCNC: 86 U/L (ref 35–104)
ALT SERPL-CCNC: 8 U/L (ref 5–33)
ANION GAP SERPL CALCULATED.3IONS-SCNC: 10 MMOL/L (ref 9–17)
AST SERPL-CCNC: 11 U/L
BASOPHILS # BLD: 1 % (ref 0–2)
BASOPHILS ABSOLUTE: 0.04 K/UL (ref 0–0.2)
BILIRUB SERPL-MCNC: 0.21 MG/DL (ref 0.3–1.2)
BUN BLDV-MCNC: 7 MG/DL (ref 6–20)
BUN/CREAT BLD: ABNORMAL (ref 9–20)
CALCIUM SERPL-MCNC: 9.2 MG/DL (ref 8.6–10.4)
CHLORIDE BLD-SCNC: 102 MMOL/L (ref 98–107)
CO2: 26 MMOL/L (ref 20–31)
CREAT SERPL-MCNC: 0.54 MG/DL (ref 0.5–0.9)
DIFFERENTIAL TYPE: ABNORMAL
EOSINOPHILS RELATIVE PERCENT: 1 % (ref 1–4)
GFR AFRICAN AMERICAN: >60 ML/MIN
GFR NON-AFRICAN AMERICAN: >60 ML/MIN
GFR SERPL CREATININE-BSD FRML MDRD: ABNORMAL ML/MIN/{1.73_M2}
GFR SERPL CREATININE-BSD FRML MDRD: ABNORMAL ML/MIN/{1.73_M2}
GLUCOSE BLD-MCNC: 95 MG/DL (ref 70–99)
HCT VFR BLD CALC: 36.1 % (ref 36.3–47.1)
HEMOGLOBIN: 11.2 G/DL (ref 11.9–15.1)
IMMATURE GRANULOCYTES: 0 %
LYMPHOCYTES # BLD: 45 % (ref 24–43)
MAGNESIUM: 1.9 MG/DL (ref 1.6–2.6)
MCH RBC QN AUTO: 27.1 PG (ref 25.2–33.5)
MCHC RBC AUTO-ENTMCNC: 31 G/DL (ref 28.4–34.8)
MCV RBC AUTO: 87.4 FL (ref 82.6–102.9)
MONOCYTES # BLD: 7 % (ref 3–12)
NRBC AUTOMATED: 0 PER 100 WBC
PDW BLD-RTO: 14.7 % (ref 11.8–14.4)
PLATELET # BLD: 355 K/UL (ref 138–453)
PLATELET ESTIMATE: ABNORMAL
PMV BLD AUTO: 10.3 FL (ref 8.1–13.5)
POTASSIUM SERPL-SCNC: 3.4 MMOL/L (ref 3.7–5.3)
RBC # BLD: 4.13 M/UL (ref 3.95–5.11)
RBC # BLD: ABNORMAL 10*6/UL
SEG NEUTROPHILS: 46 % (ref 36–65)
SEGMENTED NEUTROPHILS ABSOLUTE COUNT: 3.58 K/UL (ref 1.5–8.1)
SODIUM BLD-SCNC: 138 MMOL/L (ref 135–144)
TOTAL PROTEIN: 7.3 G/DL (ref 6.4–8.3)
WBC # BLD: 7.8 K/UL (ref 3.5–11.3)
WBC # BLD: ABNORMAL 10*3/UL

## 2021-02-26 ENCOUNTER — HOSPITAL ENCOUNTER (OUTPATIENT)
Age: 46
Setting detail: SPECIMEN
Discharge: HOME | End: 2021-02-26
Payer: COMMERCIAL

## 2021-03-01 LAB
METANEPH/PLASMA INTERP: NORMAL
METANEPHRINE: 0.12 NMOL/L (ref 0–0.49)
NORMETANEPHRINE PLASMA: 0.35 NMOL/L (ref 0–0.89)

## 2021-03-03 LAB — GFR SERPL CREATININE-BSD FRML MDRD: 78 ML/MIN/1.73M2

## 2021-03-04 LAB
RENIN ACTIVITY: 1.3 NG/ML/HR
RENIN COMMENT: NORMAL

## 2021-03-16 ENCOUNTER — OFFICE VISIT (OUTPATIENT)
Dept: NEPHROLOGY | Age: 46
End: 2021-03-16
Payer: COMMERCIAL

## 2021-03-16 ENCOUNTER — TELEPHONE (OUTPATIENT)
Dept: NEPHROLOGY | Age: 46
End: 2021-03-16

## 2021-03-16 VITALS
SYSTOLIC BLOOD PRESSURE: 193 MMHG | DIASTOLIC BLOOD PRESSURE: 107 MMHG | WEIGHT: 202 LBS | HEART RATE: 101 BPM | OXYGEN SATURATION: 95 % | BODY MASS INDEX: 33.61 KG/M2

## 2021-03-16 DIAGNOSIS — I70.1 RENAL ARTERY STENOSIS (HCC): ICD-10-CM

## 2021-03-16 DIAGNOSIS — I10 ESSENTIAL HYPERTENSION: Primary | ICD-10-CM

## 2021-03-16 PROCEDURE — G8417 CALC BMI ABV UP PARAM F/U: HCPCS | Performed by: INTERNAL MEDICINE

## 2021-03-16 PROCEDURE — G8427 DOCREV CUR MEDS BY ELIG CLIN: HCPCS | Performed by: INTERNAL MEDICINE

## 2021-03-16 PROCEDURE — 99203 OFFICE O/P NEW LOW 30 MIN: CPT | Performed by: INTERNAL MEDICINE

## 2021-03-16 PROCEDURE — G8484 FLU IMMUNIZE NO ADMIN: HCPCS | Performed by: INTERNAL MEDICINE

## 2021-03-16 PROCEDURE — 1036F TOBACCO NON-USER: CPT | Performed by: INTERNAL MEDICINE

## 2021-03-16 RX ORDER — IBUPROFEN 800 MG/1
800 TABLET ORAL EVERY 6 HOURS PRN
Status: ON HOLD | COMMUNITY
End: 2021-03-31

## 2021-03-16 RX ORDER — LOSARTAN POTASSIUM 100 MG/1
100 TABLET ORAL DAILY
COMMUNITY

## 2021-03-16 RX ORDER — POTASSIUM CHLORIDE 20 MEQ/1
20 TABLET, EXTENDED RELEASE ORAL DAILY
COMMUNITY
End: 2021-03-16

## 2021-03-16 RX ORDER — CHLORTHALIDONE 25 MG/1
25 TABLET ORAL DAILY
Qty: 60 TABLET | Refills: 1 | Status: SHIPPED | OUTPATIENT
Start: 2021-03-16 | End: 2021-04-06 | Stop reason: ALTCHOICE

## 2021-03-16 RX ORDER — MAGNESIUM OXIDE 400 MG/1
400 TABLET ORAL DAILY
COMMUNITY

## 2021-03-16 RX ORDER — POTASSIUM CHLORIDE 20 MEQ/1
20 TABLET, EXTENDED RELEASE ORAL 2 TIMES DAILY
Qty: 60 TABLET | Refills: 3 | Status: SHIPPED | OUTPATIENT
Start: 2021-03-16 | End: 2021-04-06 | Stop reason: ALTCHOICE

## 2021-03-16 NOTE — TELEPHONE ENCOUNTER
I called [employer] to see if pt needs prior auth for code 95368. Per [facility] at [facility], this code does not require prior auth. The reference number is:  [id number]. Do you want Dr. Darrius Gary to do stenting if pt needs it?

## 2021-03-16 NOTE — TELEPHONE ENCOUNTER
I spoke w/Dr. Rangel Rajan and he states Dr. Dionne Quintana can do the stenting if it is appropriate.

## 2021-03-16 NOTE — PROGRESS NOTES
Nephrology Consult Note  Patient's Name: Faina Craft  10:42 AM  3/16/2021    Nephrologist: Roni Perez    Reason for Consult:  Uncontrolled  Requesting Physician:  No att. providers found  PCP: JUAN Lara CNP    Chief Complaint: High blood pressure  Assessment   Diagnosis Orders   1. Essential hypertension  Basic Metabolic Panel    Magnesium   2. Renal artery stenosis (HCC)  IR ANGIO RENAL SUPERSELECT INCL RIGHT    IR ANGIO RENAL SUPERSELECT INCL LEFT    Catecholamines, Plasma Fractionated    TSH with Reflex    Aldosterone    Renin    Basic Metabolic Panel    Magnesium         Plan    1. I discussed my thoughts at length with the patient. 2.  She appeared to have understood. 3.  I addressed her questions. 4.  It may be true that she does have a strong family history  of hypertension, however she developed hypertension at age 25 discovered incidentally after she had a stroke at that time. Therefore a strong case can be made for possible secondary hypertension. 5.  Discussed that with her. 6.  Renal artery stenosis or fibromuscular dysplasia is high in the differential diagnosis for secondary hypertension due to the fact that  she developed acute kidney injury when she was placed on lisinopril according to her. That was in 2015. I have not verified that. 7.  We will proceed with bilateral renal angiogram.  8.  Procedure and indication discussed with her. 9.  TSH and free T4 levels  10. Plasma aldosterone  renin ratio  11. Plasma free catecholamine levels  12. I do realize some of these were done in the recent past  13. Increased chlorthalidone from 25 mg a day to twice a day  14. Increase potassium supplement from 20 mEq a day to twice a day  15. Low-salt diet information provided to her  16. I strongly encouraged her to exercise and lose some weight  17.   Check serum creatinine level before the CT with intravenous contrast since the last serum creatinine level was  3 months ago though it was normal consistently  18. Return visit in 4 weeks with home blood pressure record        History Obtained From: Patient and electronic medical record  History of Present Ilness:    June Mcintosh is a 39 y.o. female with history of hypertension diagnosed at age 25 when she had a cerebrovascular accident. Since then, the hypertension has been difficult to control. Currently she is on 4 antihypertensive agents. Blood pressure remains high. She has palpitations  sometimes. She also has diaphoresis especially at night. She has a strong family history of hypertension mostly from her mom's side. Her sister also has high blood pressure difficult to control but she had it in her 25s and may be early 35s. No use of illicit drugs according to her. She had  multiple diagnostic evaluations done including catecholamine level that were normal.  The last last thyroid study was in April 2018 and it was normal.  Renin activity level is also normal but aldosterone level was not checked. She has had an ultrasound of the kidneys done that was normal with Doppler. CT scan abdomen and pelvis was also normal.  In any case she was asked to see us for evaluation and management. .  No chest pain. No shortness of breath. No nausea vomiting. No fever or chills. No headaches. No abdominal pain.     Past Medical History:   Diagnosis Date    Diabetes mellitus ([address]     Hypertension     Unspecified cerebral artery occlusion with cerebral infarction 1995       Past Surgical History:   Procedure Laterality Date    BREAST SURGERY Left 02/10/2015    I & D Dr. Butch Eaton N/A 9/19/2019    I&D OF PUBIC ABSCESS performed by David Kearns MD at [address] [place] [address]  April 2013     total    HYSTEROSCOPY      MS EXC SKIN BENIG 2.1-3CM TRUNK,ARM,LEG N/A 6/4/2018    EXCISION OF BACK MASS performed by David Kearns MD at [place] [address] History   Problem Relation Age of Onset    Heart Disease Mother     High Blood Pressure Mother     Arthritis Mother     Breast Cancer Maternal Cousin         dx premenapausal    High Blood Pressure Sister         reports that she has never smoked. She has never used smokeless tobacco. She reports that she does not drink alcohol or use drugs. Allergies:  Latex, Bactrim [sulfamethoxazole-trimethoprim], Penicillins, Clindamycin/lincomycin, Ciprofloxacin, Doxycycline, and Lisinopril    Current Medications:    Current Outpatient Medications   Medication Sig Dispense Refill    potassium chloride (KLOR-CON M) 20 MEQ extended release tablet Take 20 mEq by mouth daily      magnesium oxide (MAG-OX) 400 MG tablet Take 400 mg by mouth daily      losartan (COZAAR) 100 MG tablet Take 100 mg by mouth daily      ibuprofen (ADVIL;MOTRIN) 800 MG tablet Take 800 mg by mouth every 6 hours as needed for Pain      cloNIDine (CATAPRES) 0.3 MG tablet Take 0.3 mg by mouth 4 times daily       omeprazole (PRILOSEC) 20 MG delayed release capsule Take 20 mg by mouth daily      metFORMIN (GLUCOPHAGE-XR) 500 MG extended release tablet Take 500 mg by mouth daily (with breakfast) Does not take everyday      chlorthalidone (HYGROTON) 25 MG tablet Take 25 mg by mouth daily      vitamin D (ERGOCALCIFEROL) 24512 units CAPS capsule Take 50,000 Units by mouth once a week      atorvastatin (LIPITOR) 20 MG tablet TAKE 1 TABLET BY MOUTH DAILY (Patient taking differently: Take 20 mg by mouth nightly ) 30 tablet 8    metoprolol (LOPRESSOR) 100 MG tablet Take 1 tablet by mouth 2 times daily 60 tablet 11     No current facility-administered medications for this visit. No current facility-administered medications for this visit. Review of Systems:   Pertinent positives stated above in HPI. All other systems were reviewed and were negative. ROS: As in history of present illness. Other systems are negative.     Physical exam:   Constitutional:    Vitals:  Vitals:    03/16/21

## 2021-03-17 LAB
MISCELLANEOUS LAB TEST RESULT: NORMAL
TEST NAME: NORMAL

## 2021-03-17 NOTE — TELEPHONE ENCOUNTER
Pt is scheduled on 3/31/21 at 6 am.  She needs to report to 2E cardiovascular unit. She is to be NPO after midnight, no aspirin or blood thinners 5 days prior and she needs a .

## 2021-03-18 ENCOUNTER — OFFICE VISIT (OUTPATIENT)
Dept: CARDIOLOGY CLINIC | Age: 46
End: 2021-03-18
Payer: COMMERCIAL

## 2021-03-18 VITALS
DIASTOLIC BLOOD PRESSURE: 88 MMHG | HEIGHT: 65 IN | HEART RATE: 84 BPM | BODY MASS INDEX: 33.66 KG/M2 | WEIGHT: 202 LBS | SYSTOLIC BLOOD PRESSURE: 154 MMHG

## 2021-03-18 DIAGNOSIS — I25.10 CORONARY ARTERY DISEASE INVOLVING NATIVE CORONARY ARTERY OF NATIVE HEART WITHOUT ANGINA PECTORIS: ICD-10-CM

## 2021-03-18 DIAGNOSIS — I10 ESSENTIAL HYPERTENSION: Primary | ICD-10-CM

## 2021-03-18 PROCEDURE — G8484 FLU IMMUNIZE NO ADMIN: HCPCS | Performed by: NUCLEAR MEDICINE

## 2021-03-18 PROCEDURE — G8428 CUR MEDS NOT DOCUMENT: HCPCS | Performed by: NUCLEAR MEDICINE

## 2021-03-18 PROCEDURE — 99214 OFFICE O/P EST MOD 30 MIN: CPT | Performed by: NUCLEAR MEDICINE

## 2021-03-18 PROCEDURE — 1036F TOBACCO NON-USER: CPT | Performed by: NUCLEAR MEDICINE

## 2021-03-18 PROCEDURE — G8417 CALC BMI ABV UP PARAM F/U: HCPCS | Performed by: NUCLEAR MEDICINE

## 2021-03-18 NOTE — PROGRESS NOTES
[address]  [address]  [place] [address]  Dept: [phone]  Dept Fax: [phone]  Loc: [phone]    Visit Date: 3/18/2021    Faina Craft is a 39 y.o. female who presents todayfor:  Chief Complaint   Patient presents with    Check-Up    Hypertension    Coronary Artery Disease   still uncontrolled HTN   Does have palpitation   Does have mild CAD  some chest pain at times   Some fatigue  No syncope  Complaint with meds  Going for renal angiogram soon   Got a us by nephrology         HPI:  HPI  Past Medical History:   Diagnosis Date    Diabetes mellitus ([facility] [address]     Hypertension     Unspecified cerebral artery occlusion with cerebral infarction 1995      Past Surgical History:   Procedure Laterality Date    BREAST SURGERY Left 02/10/2015    I & D Dr. Raciel Graves N/A 9/19/2019    I&D OF PUBIC ABSCESS performed by Gala Walker MD at [address]  April 2013     total    HYSTEROSCOPY      CO EXC SKIN BENIG 2.1-3CM TRUNK,ARM,LEG N/A 6/4/2018    EXCISION OF BACK MASS performed by Gala Walker MD at [facility] History   Problem Relation Age of Onset    Heart Disease Mother     High Blood Pressure Mother     Arthritis Mother     Breast Cancer Maternal Cousin         dx premenapausal    High Blood Pressure Sister      Social History     Tobacco Use    Smoking status: Never Smoker    Smokeless tobacco: Never Used   Substance Use Topics    Alcohol use: No      Current Outpatient Medications   Medication Sig Dispense Refill    magnesium oxide (MAG-OX) 400 MG tablet Take 400 mg by mouth daily      losartan (COZAAR) 100 MG tablet Take 100 mg by mouth daily      ibuprofen (ADVIL;MOTRIN) 800 MG tablet Take 800 mg by mouth every 6 hours as needed for Pain      potassium chloride (KLOR-CON M) 20 MEQ extended release tablet Take 1 tablet by mouth 2 times daily 60 tablet 3    chlorthalidone (HYGROTON) 25 MG tablet Take 1 tablet by mouth daily 60 tablet 1    cloNIDine (CATAPRES) 0.3 MG tablet Take 0.3 mg by mouth 4 times daily       omeprazole (PRILOSEC) 20 MG delayed release capsule Take 20 mg by mouth daily      metFORMIN (GLUCOPHAGE-XR) 500 MG extended release tablet Take 500 mg by mouth daily (with breakfast) Does not take everyday      vitamin D (ERGOCALCIFEROL) 93149 units CAPS capsule Take 50,000 Units by mouth once a week      atorvastatin (LIPITOR) 20 MG tablet TAKE 1 TABLET BY MOUTH DAILY (Patient taking differently: Take 20 mg by mouth nightly ) 30 tablet 8    metoprolol (LOPRESSOR) 100 MG tablet Take 1 tablet by mouth 2 times daily 60 tablet 11     No current facility-administered medications for this visit. 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 Burgess Caprice Gunderson, DO    Penicillins Hives    Clindamycin/Lincomycin Rash    Ciprofloxacin Hives    Doxycycline Hives    Lisinopril      Attacked kidneys     Health Maintenance   Topic Date Due    Hepatitis C screen  Never done    HIV screen  Never done    Cervical cancer screen  Never done    A1C test (Diabetic or Prediabetic)  02/10/2016    Flu vaccine (1) Never done    Lipid screen  10/28/2021    Potassium monitoring  02/25/2022    Creatinine monitoring  02/25/2022    DTaP/Tdap/Td vaccine (2 - Td) 08/06/2029    COVID-19 Vaccine  Completed    Hepatitis A vaccine  Aged Out    Hepatitis B vaccine  Aged Out    Hib vaccine  Aged Out    Meningococcal (ACWY) vaccine  Aged Out    Pneumococcal 0-64 years Vaccine  Aged Out       Subjective:  Review of Systems  General:   No fever, no chills, No fatigue or weight loss  Pulmonary:    some dyspnea, no wheezing  Cardiac:    Denies recent chest pain,   GI:     No nausea or vomiting, no abdominal pain  Neuro:    No dizziness or light headedness,   Musculoskeletal:  No recent active issues  Extremities:   No edema, no obvious claudication       Objective:  Physical Exam  BP (!) 154/88   Pulse 84   Ht 5' 5\" (1.651 m)   Wt 202 lb (91.6 kg)   LMP 01/20/2013   BMI 33.61 kg/m²   General:   Well developed, well nourished  Lungs:   Clear to auscultation  Heart:    Normal S1 S2, Slight murmur. no rubs, no gallops  Abdomen:   Soft, non tender, no organomegalies, positive bowel sounds  Extremities:   No edema, no cyanosis, good peripheral pulses  Neurological:   Awake, alert, oriented. No obvious focal deficits  Musculoskelatal:  No obvious deformities    Assessment:      Diagnosis Orders   1. Essential hypertension     2. Coronary artery disease involving native coronary artery of native heart without angina pectoris     as above  Possible arrhythmia   Uncontrolled HTN   Followed by renal       Plan:  No follow-ups on file. Change to diovan after renal angiogram   Getting a renal angiogram soon   holter  Decide after that   Continue risk factor modification and medical management  Thank you for allowing me to participate in the care of your patient. Please don't hesitate to contact me regarding any further issues related to the patient care    Orders Placed:  No orders of the defined types were placed in this encounter. Medications Prescribed:  No orders of the defined types were placed in this encounter. Discussed use, benefit, and side effects of prescribed medications. All patient questions answered. Pt voicedunderstanding. Instructed to continue current medications, diet and exercise. Continue risk factor modification and medical management. Patient agreed with treatment plan. Follow up as directed.     Electronically signedby Kim Monk MD on 3/18/2021 at 12:34 PM

## 2021-03-30 RX ORDER — MIDAZOLAM HYDROCHLORIDE 2 MG/2ML
1 INJECTION, SOLUTION INTRAMUSCULAR; INTRAVENOUS ONCE
Status: CANCELLED | OUTPATIENT
Start: 2021-03-30 | End: 2021-03-30

## 2021-03-30 RX ORDER — SODIUM CHLORIDE 450 MG/100ML
INJECTION, SOLUTION INTRAVENOUS CONTINUOUS
Status: CANCELLED | OUTPATIENT
Start: 2021-03-30

## 2021-03-30 RX ORDER — FENTANYL CITRATE 50 UG/ML
50 INJECTION, SOLUTION INTRAMUSCULAR; INTRAVENOUS ONCE
Status: CANCELLED | OUTPATIENT
Start: 2021-03-30 | End: 2021-03-30

## 2021-03-31 ENCOUNTER — HOSPITAL ENCOUNTER (OUTPATIENT)
Dept: INTERVENTIONAL RADIOLOGY/VASCULAR | Age: 46
Discharge: HOME | End: 2021-03-31
Attending: RADIOLOGY | Admitting: RADIOLOGY
Payer: COMMERCIAL

## 2021-03-31 VITALS
WEIGHT: 202 LBS | RESPIRATION RATE: 15 BRPM | SYSTOLIC BLOOD PRESSURE: 122 MMHG | HEART RATE: 65 BPM | TEMPERATURE: 99 F | DIASTOLIC BLOOD PRESSURE: 74 MMHG | BODY MASS INDEX: 33.66 KG/M2 | HEIGHT: 65 IN | OXYGEN SATURATION: 98 %

## 2021-03-31 LAB
APTT: 29.4 SECONDS (ref 22–38)
CREAT SERPL-MCNC: 0.6 MG/DL (ref 0.4–1.2)
ERYTHROCYTE [DISTWIDTH] IN BLOOD BY AUTOMATED COUNT: 14.9 % (ref 11.5–14.5)
ERYTHROCYTE [DISTWIDTH] IN BLOOD BY AUTOMATED COUNT: 48.5 FL (ref 35–45)
GFR SERPL CREATININE-BSD FRML MDRD: > 90 ML/MIN/1.73M2
HCT VFR BLD CALC: 38.2 % (ref 37–47)
HEMOGLOBIN: 11.5 GM/DL (ref 12–16)
INR BLD: 0.93 (ref 0.85–1.13)
MCH RBC QN AUTO: 26.6 PG (ref 26–33)
MCHC RBC AUTO-ENTMCNC: 30.1 GM/DL (ref 32.2–35.5)
MCV RBC AUTO: 88.4 FL (ref 81–99)
PLATELET # BLD: 323 THOU/MM3 (ref 130–400)
PMV BLD AUTO: 10 FL (ref 9.4–12.4)
RBC # BLD: 4.32 MILL/MM3 (ref 4.2–5.4)
TSH SERPL DL<=0.05 MIU/L-ACNC: 2.78 UIU/ML (ref 0.4–4.2)
WBC # BLD: 7.1 THOU/MM3 (ref 4.8–10.8)

## 2021-03-31 PROCEDURE — 2580000003 HC RX 258: Performed by: RADIOLOGY

## 2021-03-31 PROCEDURE — 85027 COMPLETE CBC AUTOMATED: CPT

## 2021-03-31 PROCEDURE — 75625 CONTRAST EXAM ABDOMINL AORTA: CPT | Performed by: RADIOLOGY

## 2021-03-31 PROCEDURE — 6360000004 HC RX CONTRAST MEDICATION: Performed by: RADIOLOGY

## 2021-03-31 PROCEDURE — 85730 THROMBOPLASTIN TIME PARTIAL: CPT

## 2021-03-31 PROCEDURE — 82565 ASSAY OF CREATININE: CPT

## 2021-03-31 PROCEDURE — 82384 ASSAY THREE CATECHOLAMINES: CPT

## 2021-03-31 PROCEDURE — 84244 ASSAY OF RENIN: CPT

## 2021-03-31 PROCEDURE — 36415 COLL VENOUS BLD VENIPUNCTURE: CPT

## 2021-03-31 PROCEDURE — 82088 ASSAY OF ALDOSTERONE: CPT

## 2021-03-31 PROCEDURE — 6360000002 HC RX W HCPCS: Performed by: RADIOLOGY

## 2021-03-31 PROCEDURE — 85610 PROTHROMBIN TIME: CPT

## 2021-03-31 PROCEDURE — 36200 PLACE CATHETER IN AORTA: CPT | Performed by: RADIOLOGY

## 2021-03-31 PROCEDURE — 6360000002 HC RX W HCPCS

## 2021-03-31 PROCEDURE — 2500000003 HC RX 250 WO HCPCS

## 2021-03-31 PROCEDURE — 84443 ASSAY THYROID STIM HORMONE: CPT

## 2021-03-31 PROCEDURE — C1894 INTRO/SHEATH, NON-LASER: HCPCS

## 2021-03-31 RX ORDER — FENTANYL CITRATE 50 UG/ML
50 INJECTION, SOLUTION INTRAMUSCULAR; INTRAVENOUS ONCE
Status: COMPLETED | OUTPATIENT
Start: 2021-03-31 | End: 2021-03-31

## 2021-03-31 RX ORDER — MIDAZOLAM HYDROCHLORIDE 2 MG/2ML
1 INJECTION, SOLUTION INTRAMUSCULAR; INTRAVENOUS ONCE
Status: COMPLETED | OUTPATIENT
Start: 2021-03-31 | End: 2021-03-31

## 2021-03-31 RX ORDER — BACITRACIN, NEOMYCIN, POLYMYXIN B 400; 3.5; 5 [USP'U]/G; MG/G; [USP'U]/G
OINTMENT TOPICAL ONCE
Status: DISCONTINUED | OUTPATIENT
Start: 2021-03-31 | End: 2021-03-31 | Stop reason: HOSPADM

## 2021-03-31 RX ORDER — SODIUM CHLORIDE 450 MG/100ML
INJECTION, SOLUTION INTRAVENOUS CONTINUOUS
Status: DISCONTINUED | OUTPATIENT
Start: 2021-03-31 | End: 2021-03-31 | Stop reason: HOSPADM

## 2021-03-31 RX ADMIN — SODIUM CHLORIDE: 4.5 INJECTION, SOLUTION INTRAVENOUS at 07:11

## 2021-03-31 RX ADMIN — IOPAMIDOL 25 ML: 612 INJECTION, SOLUTION INTRAVENOUS at 08:28

## 2021-03-31 RX ADMIN — FENTANYL CITRATE 50 MCG: 50 INJECTION, SOLUTION INTRAMUSCULAR; INTRAVENOUS at 08:13

## 2021-03-31 RX ADMIN — MIDAZOLAM 1 MG: 1 INJECTION INTRAMUSCULAR; INTRAVENOUS at 08:20

## 2021-03-31 RX ADMIN — MIDAZOLAM 1 MG: 1 INJECTION INTRAMUSCULAR; INTRAVENOUS at 08:13

## 2021-03-31 RX ADMIN — FENTANYL CITRATE 50 MCG: 50 INJECTION, SOLUTION INTRAMUSCULAR; INTRAVENOUS at 08:20

## 2021-03-31 ASSESSMENT — PAIN SCALES - GENERAL: PAINLEVEL_OUTOF10: 0

## 2021-03-31 NOTE — PROGRESS NOTES
9895 Patient admitted to [facility]  Ambulatory for renal angiogram.  Patient NPO. Patient accompanied by significant other. Vital signs obtained. Assessment and data collection intiated. Oriented to room. Policies and procedures for 2E explained. All questions answered with no further questions at this time. Fall prevention and safety precautions discussed with patient. [id number] plan reviewed with patient and significant other. Patient and significant other verbalize understanding of the plan of care and contribute to goal setting.         0620Spoke with patient regarding holding metformin after procedure due to dye reaction, voices understanding, instructed patient additional instructions on discharge. 2349 To radiology per bed, stable condition. 8651 Care taken over from radiology, rt groin stable . Patient reinstructed on bedrest, instructed to keep legs straight, not to cross legs, not to lift head off of pillow, not to laugh hard, if coughs to guard site with hand, voices understanding, taking water without difficulty. 1030 Up in [place],  tolerated activity well. 1057 Discharge instructions given, voices understanding. 1212 Discharged per wheelchair, stable condition.

## 2021-03-31 NOTE — PROGRESS NOTES
0730 Patient received in IR for procedure. 0737 This procedure has been fully reviewed with the patient and written informed consent has been obtained. 317 Dr. Heidi Mckinley in; spoke to patient. 9951 Procedure started with Dr. Heidi Mckinley. 2300 Procedure completed; patient tolerated well. Angio seal device deployed to right femoral artery. 0814 Bacitracin oint, gauze and op site to right femoral site; area soft to touch with no bleeding noted. 9806 Patient on bed; comfort ensured. Right femoral dressing remains dry and intact with area soft. 1621 [facility] Patient taken to 2E via bed. Right femoral dressing remains dry and intact with area soft.

## 2021-03-31 NOTE — H&P
[address] [address]  Sedation/Analgesia History & Physical    Pt Name: Cody Varghese  MRN: [id number]  YOB: 1975  Provider Performing Procedure: Radha Jha MD  Primary Care Physician: JUAN Lozoya CNP    PRE-PROCEDURE   DNR-CCA/DNR-CC []Yes [x]No  Brief History/Pre-Procedure Diagnosis: hyjpertension          MEDICAL HISTORY  []CAD/Valve  []Liver Disease  []Lung Disease []Diabetes  []Hypertension []Renal Disease  []Additional information:       has a past medical history of Diabetes mellitus ([facility] [address] Hypertension, and Unspecified cerebral artery occlusion with cerebral infarction. SURGICAL HISTORY   has a past surgical history that includes cyst removal; hysteroscopy; Hysterectomy (April 2013); Breast surgery (Left, 02/10/2015); pr exc skin benig 2.1-3cm trunk,arm,leg (N/A, 6/4/2018); and DISSECTION GROIN (N/A, 9/19/2019).   Additional information:       ALLERGIES   Allergies as of 03/31/2021 - Review Complete 03/31/2021   Allergen Reaction Noted    Latex Hives 11/14/2012    Bactrim [sulfamethoxazole-trimethoprim] Other (See Comments) 02/13/2015    Penicillins Hives 06/14/2018    Clindamycin/lincomycin Rash 06/19/2018    Ciprofloxacin Hives 06/14/2018    Doxycycline Hives 08/09/2016    Lisinopril  12/08/2015     Additional information:       MEDICATIONS   Coumadin Use Last 5 Days [x]No []Yes  Antiplatelet drug therapy use last 5 days  [x]No []Yes  Other anticoagulant use last 5 days  [x]No []Yes    Current Facility-Administered Medications:     0.45 % sodium chloride infusion, , Intravenous, Continuous, Juanita Anderson MD, Last Rate: 20 mL/hr at 03/31/21 0711, New Bag at 03/31/21 0711    fentaNYL (SUBLIMAZE) injection 50 mcg, 50 mcg, Intravenous, Once, Juanita Anderson MD    iopamidol (ISOVUE-300) 61 % injection 100 mL, 100 mL, Intravenous, Once, Juanita Anderson MD    midazolam PF (VERSED) injection 1 mg, 1 mg, Intravenous, Once, Juanita Anderson MD  Prior to Admission medications    Medication Sig Start Date End Date Taking?  Authorizing Provider   magnesium oxide (MAG-OX) 400 MG tablet Take 400 mg by mouth daily   Yes Historical Provider, MD   losartan (COZAAR) 100 MG tablet Take 100 mg by mouth daily   Yes Historical Provider, MD   potassium chloride (KLOR-CON M) 20 MEQ extended release tablet Take 1 tablet by mouth 2 times daily 3/16/21  Yes Reji Alcantar MD   chlorthalidone (HYGROTON) 25 MG tablet Take 1 tablet by mouth daily 3/16/21  Yes Reji Alcantar MD   cloNIDine (CATAPRES) 0.3 MG tablet Take 0.3 mg by mouth 4 times daily    Yes Historical Provider, MD   omeprazole (PRILOSEC) 20 MG delayed release capsule Take 20 mg by mouth daily   Yes Historical Provider, MD   metFORMIN (GLUCOPHAGE-XR) 500 MG extended release tablet Take 500 mg by mouth daily (with breakfast) Does not take everyday   Yes Historical Provider, MD   vitamin D (ERGOCALCIFEROL) 52382 units CAPS capsule Take 50,000 Units by mouth once a week   Yes Historical Provider, MD   atorvastatin (LIPITOR) 20 MG tablet TAKE 1 TABLET BY MOUTH DAILY  Patient taking differently: Take 20 mg by mouth nightly  11/2/16  Yes Kasia Adame DO   metoprolol (LOPRESSOR) 100 MG tablet Take 1 tablet by mouth 2 times daily 3/8/16  Yes Quan Will MD     Additional information:       VITAL SIGNS   Vitals:    03/31/21 0810   BP: (!) 136/92   Pulse: 74   Resp: 18   Temp:    SpO2: 98%       PHYSICAL:   Heart:  [x]Regular rate and rhythm  []Other:    Lungs:  [x]Clear    []Other:    Abdomen: [x]Soft    []Other:    Mental Status: [x]Alert & Oriented  []Other:      PLANNED PROCEDURE   []Biospy [x]Arteriogram              []Drainage   []Mediport Insertion  []Fistulogram []IV access       []Vertebroplasty / Augmentation  []IVC filter []Dialysis catheter []Biliary drainage  []Other: []CAPD Catheter []Nephrostomy Tube / Stent  SEDATION  Planned agent:[x]Midazolam []Meperidine [x]Sublimaze []Dilaudid []Morphine     []Diazepam []Other:     ASA Classification:  [x]1 []2 []3 []4 []5  Class 1: A normal healthy patient  Class 2: Pt with mild to moderate systemic disease  Class 3: Severe systemic disease or disturbance  Class 4: Severe systemic disorders that are already life threatening. Class 5: Moribund pt with little chances of survival, for more than 24 hours. Mallampati I Airway Classification:   [x]1 []2 []3 []4    [x]Pre-procedure diagnostic studies complete and results available. Comment:    [x]Previous sedation/anesthesia experiences assessed. Comment:    [x]The patient is an appropriate candidate to undergo the planned procedure sedation and anesthesia. (Refer to nursing sedation/analgesia documentation record)  [x]Formulation and discussion of sedation/procedure plan, risks, and expectations with patient and/or responsible adult completed. [x]Patient examined immediately prior to the procedure.  (Refer to nursing sedation/analgesia documentation record)    Jean Kauffman MD  Electronically signed 3/31/2021 at 8:14 AM

## 2021-03-31 NOTE — OP NOTE
Department of Radiology  Post Procedure Progress Note      Pre-Procedure Diagnosis:  hypertension    Procedure Performed:  Renal angio    Anesthesia: local / versed and fentanyl    Findings: successful, normal    Immediate Complications:  None    Estimated Blood Loss: minimal    SEE DICTATED PROCEDURE NOTE FOR COMPLETE DETAILS.     Nanda Spencer MD   3/31/2021 8:27 AM

## 2021-03-31 NOTE — H&P
Formulation and discussion of sedation / procedure plans, risks, benefits, side effects and alternatives with patient and/or responsible adult completed.     Electronically signed by Tiffany Brandon MD on 3/31/2021 at 8:14 AM

## 2021-04-01 ENCOUNTER — HOSPITAL ENCOUNTER (OUTPATIENT)
Dept: NON INVASIVE DIAGNOSTICS | Age: 46
Discharge: HOME | End: 2021-04-01
Payer: COMMERCIAL

## 2021-04-01 PROCEDURE — 93225 XTRNL ECG REC<48 HRS REC: CPT

## 2021-04-01 PROCEDURE — 93226 XTRNL ECG REC<48 HR SCAN A/R: CPT

## 2021-04-01 NOTE — PROGRESS NOTES
4/1/2021 1515 Call placed. Spoke with patient directly. Patient states she is a \"little sore\" after the procedure yesterday. Patient states she took tylenol last night and it helped. Patient denies bleeding, bruising, or edema at puncture site. Patient denies having any post procedure questions. Patient states she is currently at [facility]) having a Holter monitor placed.

## 2021-04-02 ENCOUNTER — HOSPITAL ENCOUNTER (EMERGENCY)
Age: 46
Discharge: HOME | End: 2021-04-02
Attending: FAMILY MEDICINE
Payer: COMMERCIAL

## 2021-04-02 ENCOUNTER — APPOINTMENT (OUTPATIENT)
Dept: GENERAL RADIOLOGY | Age: 46
End: 2021-04-02
Payer: COMMERCIAL

## 2021-04-02 ENCOUNTER — APPOINTMENT (OUTPATIENT)
Dept: CT IMAGING | Age: 46
End: 2021-04-02
Payer: COMMERCIAL

## 2021-04-02 VITALS
OXYGEN SATURATION: 99 % | DIASTOLIC BLOOD PRESSURE: 84 MMHG | TEMPERATURE: 98.6 F | HEART RATE: 95 BPM | RESPIRATION RATE: 18 BRPM | WEIGHT: 200 LBS | HEIGHT: 65 IN | SYSTOLIC BLOOD PRESSURE: 167 MMHG | BODY MASS INDEX: 33.32 KG/M2

## 2021-04-02 DIAGNOSIS — G43.909 MIGRAINE WITHOUT STATUS MIGRAINOSUS, NOT INTRACTABLE, UNSPECIFIED MIGRAINE TYPE: Primary | ICD-10-CM

## 2021-04-02 LAB
ALBUMIN SERPL-MCNC: 4.3 G/DL (ref 3.5–5.1)
ALDOSTERONE: 18.6 NG/DL
ALP BLD-CCNC: 98 U/L (ref 38–126)
ALT SERPL-CCNC: 7 U/L (ref 11–66)
ANION GAP SERPL CALCULATED.3IONS-SCNC: 12 MEQ/L (ref 8–16)
AST SERPL-CCNC: 9 U/L (ref 5–40)
BACTERIA: ABNORMAL /HPF
BASOPHILS # BLD: 0.4 %
BASOPHILS ABSOLUTE: 0 THOU/MM3 (ref 0–0.1)
BILIRUB SERPL-MCNC: 0.2 MG/DL (ref 0.3–1.2)
BILIRUBIN URINE: NEGATIVE
BLOOD, URINE: ABNORMAL
BUN BLDV-MCNC: 6 MG/DL (ref 7–22)
CALCIUM SERPL-MCNC: 9.4 MG/DL (ref 8.5–10.5)
CASTS 2: ABNORMAL /LPF
CASTS UA: ABNORMAL /LPF
CHARACTER, URINE: CLEAR
CHLORIDE BLD-SCNC: 103 MEQ/L (ref 98–111)
CO2: 23 MEQ/L (ref 23–33)
COLOR: YELLOW
CREAT SERPL-MCNC: 0.6 MG/DL (ref 0.4–1.2)
CRYSTALS, UA: ABNORMAL
EKG ATRIAL RATE: 91 BPM
EKG P AXIS: 49 DEGREES
EKG P-R INTERVAL: 148 MS
EKG Q-T INTERVAL: 356 MS
EKG QRS DURATION: 72 MS
EKG QTC CALCULATION (BAZETT): 437 MS
EKG R AXIS: 6 DEGREES
EKG T AXIS: 9 DEGREES
EKG VENTRICULAR RATE: 91 BPM
EOSINOPHIL # BLD: 1.7 %
EOSINOPHILS ABSOLUTE: 0.1 THOU/MM3 (ref 0–0.4)
EPITHELIAL CELLS, UA: ABNORMAL /HPF
ERYTHROCYTE [DISTWIDTH] IN BLOOD BY AUTOMATED COUNT: 14.8 % (ref 11.5–14.5)
ERYTHROCYTE [DISTWIDTH] IN BLOOD BY AUTOMATED COUNT: 47.2 FL (ref 35–45)
GFR SERPL CREATININE-BSD FRML MDRD: > 90 ML/MIN/1.73M2
GLUCOSE BLD-MCNC: 143 MG/DL (ref 70–108)
GLUCOSE URINE: NEGATIVE MG/DL
HCT VFR BLD CALC: 40.3 % (ref 37–47)
HEMOGLOBIN: 12.6 GM/DL (ref 12–16)
IMMATURE GRANS (ABS): 0.02 THOU/MM3 (ref 0–0.07)
IMMATURE GRANULOCYTES: 0.2 %
KETONES, URINE: NEGATIVE
LEUKOCYTE ESTERASE, URINE: NEGATIVE
LYMPHOCYTES # BLD: 32.8 %
LYMPHOCYTES ABSOLUTE: 2.8 THOU/MM3 (ref 1–4.8)
MCH RBC QN AUTO: 27 PG (ref 26–33)
MCHC RBC AUTO-ENTMCNC: 31.3 GM/DL (ref 32.2–35.5)
MCV RBC AUTO: 86.5 FL (ref 81–99)
MISCELLANEOUS 2: ABNORMAL
MONOCYTES # BLD: 7 %
MONOCYTES ABSOLUTE: 0.6 THOU/MM3 (ref 0.4–1.3)
NITRITE, URINE: NEGATIVE
NUCLEATED RED BLOOD CELLS: 0 /100 WBC
OSMOLALITY CALCULATION: 275.8 MOSMOL/KG (ref 275–300)
PH UA: 7.5 (ref 5–9)
PLATELET # BLD: 348 THOU/MM3 (ref 130–400)
PMV BLD AUTO: 10.2 FL (ref 9.4–12.4)
POTASSIUM REFLEX MAGNESIUM: 3.7 MEQ/L (ref 3.5–5.2)
PREGNANCY, SERUM: NEGATIVE
PROTEIN UA: NEGATIVE
RBC # BLD: 4.66 MILL/MM3 (ref 4.2–5.4)
RBC URINE: ABNORMAL /HPF
RENAL EPITHELIAL, UA: ABNORMAL
SEG NEUTROPHILS: 57.9 %
SEGMENTED NEUTROPHILS ABSOLUTE COUNT: 4.9 THOU/MM3 (ref 1.8–7.7)
SODIUM BLD-SCNC: 138 MEQ/L (ref 135–145)
SPECIFIC GRAVITY, URINE: 1.01 (ref 1–1.03)
TOTAL PROTEIN: 7.6 G/DL (ref 6.1–8)
TROPONIN T: < 0.01 NG/ML
UROBILINOGEN, URINE: 0.2 EU/DL (ref 0–1)
WBC # BLD: 8.5 THOU/MM3 (ref 4.8–10.8)
WBC UA: ABNORMAL /HPF
YEAST: ABNORMAL

## 2021-04-02 PROCEDURE — 71045 X-RAY EXAM CHEST 1 VIEW: CPT

## 2021-04-02 PROCEDURE — 93010 ELECTROCARDIOGRAM REPORT: CPT | Performed by: INTERNAL MEDICINE

## 2021-04-02 PROCEDURE — 84484 ASSAY OF TROPONIN QUANT: CPT

## 2021-04-02 PROCEDURE — 6370000000 HC RX 637 (ALT 250 FOR IP): Performed by: STUDENT IN AN ORGANIZED HEALTH CARE EDUCATION/TRAINING PROGRAM

## 2021-04-02 PROCEDURE — 85025 COMPLETE CBC W/AUTO DIFF WBC: CPT

## 2021-04-02 PROCEDURE — 96374 THER/PROPH/DIAG INJ IV PUSH: CPT

## 2021-04-02 PROCEDURE — 36415 COLL VENOUS BLD VENIPUNCTURE: CPT

## 2021-04-02 PROCEDURE — 6360000002 HC RX W HCPCS: Performed by: STUDENT IN AN ORGANIZED HEALTH CARE EDUCATION/TRAINING PROGRAM

## 2021-04-02 PROCEDURE — 96372 THER/PROPH/DIAG INJ SC/IM: CPT

## 2021-04-02 PROCEDURE — 99285 EMERGENCY DEPT VISIT HI MDM: CPT

## 2021-04-02 PROCEDURE — 96375 TX/PRO/DX INJ NEW DRUG ADDON: CPT

## 2021-04-02 PROCEDURE — 84703 CHORIONIC GONADOTROPIN ASSAY: CPT

## 2021-04-02 PROCEDURE — 2580000003 HC RX 258: Performed by: STUDENT IN AN ORGANIZED HEALTH CARE EDUCATION/TRAINING PROGRAM

## 2021-04-02 PROCEDURE — 81001 URINALYSIS AUTO W/SCOPE: CPT

## 2021-04-02 PROCEDURE — 70450 CT HEAD/BRAIN W/O DYE: CPT

## 2021-04-02 PROCEDURE — 93005 ELECTROCARDIOGRAM TRACING: CPT | Performed by: STUDENT IN AN ORGANIZED HEALTH CARE EDUCATION/TRAINING PROGRAM

## 2021-04-02 PROCEDURE — 80053 COMPREHEN METABOLIC PANEL: CPT

## 2021-04-02 RX ORDER — KETOROLAC TROMETHAMINE 30 MG/ML
15 INJECTION, SOLUTION INTRAMUSCULAR; INTRAVENOUS ONCE
Status: COMPLETED | OUTPATIENT
Start: 2021-04-02 | End: 2021-04-02

## 2021-04-02 RX ORDER — AMLODIPINE BESYLATE 10 MG/1
10 TABLET ORAL DAILY
COMMUNITY
End: 2021-11-05

## 2021-04-02 RX ORDER — PROCHLORPERAZINE EDISYLATE 5 MG/ML
10 INJECTION INTRAMUSCULAR; INTRAVENOUS ONCE
Status: COMPLETED | OUTPATIENT
Start: 2021-04-02 | End: 2021-04-02

## 2021-04-02 RX ORDER — ACETAMINOPHEN 500 MG
1000 TABLET ORAL ONCE
Status: COMPLETED | OUTPATIENT
Start: 2021-04-02 | End: 2021-04-02

## 2021-04-02 RX ORDER — 0.9 % SODIUM CHLORIDE 0.9 %
1000 INTRAVENOUS SOLUTION INTRAVENOUS ONCE
Status: COMPLETED | OUTPATIENT
Start: 2021-04-02 | End: 2021-04-02

## 2021-04-02 RX ORDER — METOCLOPRAMIDE HYDROCHLORIDE 5 MG/ML
10 INJECTION INTRAMUSCULAR; INTRAVENOUS ONCE
Status: COMPLETED | OUTPATIENT
Start: 2021-04-02 | End: 2021-04-02

## 2021-04-02 RX ORDER — DEXAMETHASONE SODIUM PHOSPHATE 4 MG/ML
6 INJECTION, SOLUTION INTRA-ARTICULAR; INTRALESIONAL; INTRAMUSCULAR; INTRAVENOUS; SOFT TISSUE ONCE
Status: COMPLETED | OUTPATIENT
Start: 2021-04-02 | End: 2021-04-02

## 2021-04-02 RX ORDER — DIPHENHYDRAMINE HYDROCHLORIDE 50 MG/ML
25 INJECTION INTRAMUSCULAR; INTRAVENOUS ONCE
Status: COMPLETED | OUTPATIENT
Start: 2021-04-02 | End: 2021-04-02

## 2021-04-02 RX ORDER — PROMETHAZINE HYDROCHLORIDE 25 MG/ML
6.25 INJECTION, SOLUTION INTRAMUSCULAR; INTRAVENOUS ONCE
Status: COMPLETED | OUTPATIENT
Start: 2021-04-02 | End: 2021-04-02

## 2021-04-02 RX ADMIN — SODIUM CHLORIDE 1000 ML: 9 INJECTION, SOLUTION INTRAVENOUS at 09:35

## 2021-04-02 RX ADMIN — PROMETHAZINE HYDROCHLORIDE 6.25 MG: 25 INJECTION INTRAMUSCULAR; INTRAVENOUS at 10:57

## 2021-04-02 RX ADMIN — METOCLOPRAMIDE 10 MG: 5 INJECTION, SOLUTION INTRAMUSCULAR; INTRAVENOUS at 09:37

## 2021-04-02 RX ADMIN — CLONIDINE HYDROCHLORIDE 0.3 MG: 0.2 TABLET ORAL at 12:27

## 2021-04-02 RX ADMIN — PROCHLORPERAZINE EDISYLATE 10 MG: 5 INJECTION INTRAMUSCULAR; INTRAVENOUS at 12:28

## 2021-04-02 RX ADMIN — KETOROLAC TROMETHAMINE 15 MG: 30 INJECTION, SOLUTION INTRAMUSCULAR; INTRAVENOUS at 10:58

## 2021-04-02 RX ADMIN — ACETAMINOPHEN 1000 MG: 500 TABLET ORAL at 09:35

## 2021-04-02 RX ADMIN — DEXAMETHASONE SODIUM PHOSPHATE 6 MG: 4 INJECTION, SOLUTION INTRA-ARTICULAR; INTRALESIONAL; INTRAMUSCULAR; INTRAVENOUS; SOFT TISSUE at 12:28

## 2021-04-02 RX ADMIN — DIPHENHYDRAMINE HYDROCHLORIDE 25 MG: 50 INJECTION, SOLUTION INTRAMUSCULAR; INTRAVENOUS at 12:28

## 2021-04-02 ASSESSMENT — ENCOUNTER SYMPTOMS
DIARRHEA: 0
COLOR CHANGE: 0
EYE REDNESS: 0
NAUSEA: 0
VOMITING: 0
ABDOMINAL PAIN: 0
EYE DISCHARGE: 0
SHORTNESS OF BREATH: 0
SINUS PRESSURE: 0
SINUS PAIN: 0
RHINORRHEA: 0
ABDOMINAL DISTENTION: 0
CHEST TIGHTNESS: 0
EYE ITCHING: 0

## 2021-04-02 ASSESSMENT — PAIN SCALES - GENERAL
PAINLEVEL_OUTOF10: 7
PAINLEVEL_OUTOF10: 8
PAINLEVEL_OUTOF10: 10

## 2021-04-02 ASSESSMENT — PAIN DESCRIPTION - LOCATION: LOCATION: HEAD

## 2021-04-02 ASSESSMENT — PAIN DESCRIPTION - PAIN TYPE: TYPE: ACUTE PAIN

## 2021-04-02 NOTE — ED NOTES
Patient back from CT in stable condition. patient states medication did not help with headache.  Patient 1 assist to bathroom for urine specimen     Mireille Cueto RN  04/02/21 6737

## 2021-04-02 NOTE — ED NOTES
Patient states that her headache is down to a 7 and that she feels like she could go home and take her blood pressure medication.       Jeramie Agustin RN  04/02/21 6711

## 2021-04-02 NOTE — ED NOTES
Patient transported to Radiology department via [employer] in stable condition.        Kranthi Rao RN  04/02/21 6143

## 2021-04-02 NOTE — ED PROVIDER NOTES
[facility] ENCOUNTER          Pt Name: Nico Peacock  MRN: [id number]  [place] 1975  Date of evaluation: 4/2/2021  Treating Resident Physician: Esdras Rosen DO  Supervising Physician: Jong Borja MD    CHIEF COMPLAINT       Chief Complaint   Patient presents with    Migraine    Hypertension     History obtained from the patient. HISTORY OF PRESENT ILLNESS    HPI  Nico Peacock is a 39 y.o. female who presents to the emergency department for evaluation of headache. The patient states that she recently had a renal angiogram to evaluate for persistent hypertension and states that her blood pressure is normally in the 328Y systolic. The patient states that she developed a migraine that night approximately 3 days ago and describes her migraine as a constant, throbbing, localized to the left side of the head and left face and radiating to the left shoulder and arm. The patient endorses photophobia, denies nausea or vomiting or visual scotoma. She reports that this migraine headache that she has is different from her previous migraines because she typically sees a yellow spot in her field of vision. The patient also states that she has had some chest pain which she states feels like \"heartburn. \"     She has a history of a stroke in 1995 per chart review and follows with Dr. Amy Fonseca and Dr. Rasheed Toure. Results are currently pending to evaluate the cause of her hypertension. She has not taken her hypertension medications today. The patient has no other acute complaints at this time. REVIEW OF SYSTEMS   Review of Systems   Constitutional: Negative for fever. HENT: Negative for rhinorrhea, sinus pressure and sinus pain. Eyes: Negative for discharge, redness and itching. Respiratory: Negative for chest tightness and shortness of breath. Cardiovascular: Positive for chest pain.    Gastrointestinal: Negative for abdominal distention, abdominal pain, diarrhea, nausea and vomiting. Genitourinary: Negative for difficulty urinating, dysuria, frequency, hematuria and urgency. Musculoskeletal: Negative for arthralgias. Skin: Negative for color change and pallor. Neurological: Positive for headaches. Negative for dizziness and light-headedness. PAST MEDICAL AND SURGICAL HISTORY     Past Medical History:   Diagnosis Date    Diabetes mellitus ([address]     Hypertension     Unspecified cerebral artery occlusion with cerebral infarction 1995     Past Surgical History:   Procedure Laterality Date    BREAST SURGERY Left 02/10/2015    I & D Dr. Isabelle Manzo N/A 9/19/2019    I&D OF PUBIC ABSCESS performed by Tianna Loza MD at [address]  April 2013     total    HYSTEROSCOPY      GA EXC SKIN BENIG 2.1-3CM TRUNK,ARM,LEG N/A 6/4/2018    EXCISION OF BACK MASS performed by Tianna Loza MD at [address]   No current facility-administered medications for this encounter.      Current Outpatient Medications:     amLODIPine (NORVASC) 10 MG tablet, Take 10 mg by mouth daily, Disp: , Rfl:     magnesium oxide (MAG-OX) 400 MG tablet, Take 400 mg by mouth daily, Disp: , Rfl:     potassium chloride (KLOR-CON M) 20 MEQ extended release tablet, Take 1 tablet by mouth 2 times daily, Disp: 60 tablet, Rfl: 3    chlorthalidone (HYGROTON) 25 MG tablet, Take 1 tablet by mouth daily, Disp: 60 tablet, Rfl: 1    cloNIDine (CATAPRES) 0.3 MG tablet, Take 0.3 mg by mouth 4 times daily , Disp: , Rfl:     omeprazole (PRILOSEC) 20 MG delayed release capsule, Take 20 mg by mouth daily, Disp: , Rfl:     metFORMIN (GLUCOPHAGE-XR) 500 MG extended release tablet, Take 500 mg by mouth daily (with breakfast) Does not take everyday, Disp: , Rfl:     vitamin D (ERGOCALCIFEROL) 89934 units CAPS capsule, Take 50,000 Units by mouth once a week, Disp: , Rfl:     atorvastatin (LIPITOR) 20 MG tablet, TAKE 1 TABLET BY MOUTH DAILY (Patient taking differently: Take 20 mg by mouth nightly ), Disp: 30 tablet, Rfl: 8    metoprolol (LOPRESSOR) 100 MG tablet, Take 1 tablet by mouth 2 times daily, Disp: 60 tablet, Rfl: 11    losartan (COZAAR) 100 MG tablet, Take 100 mg by mouth daily, Disp: , Rfl:       SOCIAL HISTORY     Social History     Social History Narrative    ** Merged History Encounter **          Social History     Tobacco Use    Smoking status: Never Smoker    Smokeless tobacco: Never Used   Substance Use Topics    Alcohol use: No    Drug use: No         ALLERGIES     Allergies   Allergen Reactions    Latex Hives    Bactrim [Sulfamethoxazole-Trimethoprim] Other (See Comments)     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 Tamiko Gunderson, DO    Penicillins Hives    Clindamycin/Lincomycin Rash    Ciprofloxacin Hives    Doxycycline Hives    Lisinopril      Attacked kidneys         FAMILY HISTORY     Family History   Problem Relation Age of Onset    Heart Disease Mother     High Blood Pressure Mother     Arthritis Mother     Breast Cancer Maternal Cousin         dx premenapausal    High Blood Pressure Sister          PREVIOUS RECORDS   Previous records reviewed: as noted in HPI      PHYSICAL EXAM     ED Triage Vitals [04/02/21 0857]   BP Temp Temp Source Pulse Resp SpO2 Height Weight   (!) 202/112 98.6 °F (37 °C) Oral 105 20 98 % 5' 5\" (1.651 m) 200 lb (90.7 kg)     Initial vital signs and nursing assessment reviewed and abnormal from HTN, however this is the baseline blood pressure per the patient. Also abnormal from tachycardia. Body mass index is 33.28 kg/m². Pulsoximetry is normal per my interpretation. Additional Vital Signs:  Vitals:    04/02/21 1323   BP: (!) 167/84   Pulse: 95   Resp: 18   Temp:    SpO2: 99%       Physical Exam  Vitals signs and nursing note reviewed. Constitutional:       General: She is not in acute distress. Appearance: She is ill-appearing. She is not toxic-appearing. HENT:      Head: Normocephalic and atraumatic. Nose: Nose normal. No congestion or rhinorrhea. Mouth/Throat:      Mouth: Mucous membranes are moist.      Pharynx: Oropharynx is clear. No oropharyngeal exudate or posterior oropharyngeal erythema. Eyes:      Extraocular Movements: Extraocular movements intact. Pupils: Pupils are equal, round, and reactive to light. Neck:      Musculoskeletal: Normal range of motion and neck supple. Cardiovascular:      Rate and Rhythm: Normal rate and regular rhythm. Pulses: Normal pulses. Heart sounds: Normal heart sounds. Pulmonary:      Effort: Pulmonary effort is normal.      Breath sounds: Normal breath sounds. Abdominal:      General: Abdomen is flat. There is no distension. Palpations: Abdomen is soft. Tenderness: There is abdominal tenderness. Comments: The patient reports mild RLQ tenderness to palpation. The patient did not initially complain of abdominal pain. Musculoskeletal: Normal range of motion. General: No swelling or tenderness. Right lower leg: No edema. Left lower leg: No edema. Skin:     General: Skin is warm and dry. Neurological:      General: No focal deficit present. Mental Status: She is alert and oriented to person, place, and time. Mental status is at baseline. Cranial Nerves: Cranial nerve deficit present. Sensory: Sensory deficit present. Motor: No weakness. Comments: The patient reports numbness to the left side of the face to soft touch. Remainder of CN are normal.  The patient reports difficulty with left-sided finger-to-nose testing due to left arm heaviness and to the severity of her headache.        EKG Interpretation    Interpreted by emergency department physician    Rhythm: normal sinus   Rate: 91  Axis: normal  Ectopy: none  Conduction: normal  ST Segments: normal  T Waves: normal and (0 mLs Intravenous Stopped 4/2/21 1035)   acetaminophen (TYLENOL) tablet 1,000 mg (1,000 mg Oral Given 4/2/21 0935)   metoclopramide (REGLAN) injection 10 mg (10 mg Intravenous Given 4/2/21 0937)   promethazine (PHENERGAN) injection 6.25 mg (6.25 mg Intramuscular Given 4/2/21 1057)   ketorolac (TORADOL) injection 15 mg (15 mg Intravenous Given 4/2/21 1058)   prochlorperazine (COMPAZINE) injection 10 mg (10 mg Intravenous Given 4/2/21 1228)   diphenhydrAMINE (BENADRYL) injection 25 mg (25 mg Intravenous Given 4/2/21 1228)   dexamethasone (DECADRON) injection 6 mg (6 mg Intravenous Given 4/2/21 1228)   cloNIDine (CATAPRES) tablet 0.3 mg (0.3 mg Oral Given 4/2/21 1227)         ED COURSE     ED Course as of Apr 02 1355   Fri Apr 02, 2021   1334 Reporting headache severity improved after ED observation and migraine cocktail and clonidine. Patient asking to be discharged home. Patient declining wanting further evaluation of left-sided heaviness and facial numbness with CTA or MRI. Patient declining wanting admission to the hospital and preferred to monitor symptoms at home.    [DO]      ED Course User Index  [DO] Emiliano Milner DO       Strict return precautions and follow up instructions were discussed with the patient prior to discharge, with which the patient agrees. MEDICATION CHANGES     Discharge Medication List as of 4/2/2021  1:39 PM            FINAL DISPOSITION   Work-up in the emergency department is remarkable for a CBC and CMP which were grossly within normal limits and a EKG and troponin which were nonconcerning for ACS. The patient was treated with a migraine cocktail which included Tylenol, Reglan, Phenergan, Toradol, Decadron, and Benadryl and reported improvement in her symptoms throughout the emergency department course. Headache severity at the time of discharge was reported at a 5/10 decreased from 10/10 from her emergency department presentation.  The patient was also given a home dose of clonidine with a decrease in her blood pressure. We discussed with the patient obtaining further neuro imaging including a CTA or MRA of her head, however the patient and the patient's family wanted to defer this because of concerns for adverse contrast side effects to the kidneys. We offered the patient admission to the hospital, which was also declined and the patient for to monitor her symptoms at home. The patient is instructed to return to the emergency department if experiencing any new or worsening symptoms, if her headache does not improve, or if her symptoms are not adequately controlled with Tylenol over-the-counter. The patient verbalized understanding and all questions were answered. The patient was subsequently discharged home from the emergency department. The patient is instructed to:  Please do not hesitate to return to the emergency department if you are experiencing any new or worsening symptoms such as those listed in your discharge paperwork. Please follow up with your primary care provider and with the neurology clinic regarding your emergency department visit today. Keep your follow up appointments with your cardiologist and nephrologist.    It is ok to use tylenol over the counter as directed on the bottle for headaches. Final diagnoses:   Migraine without status migrainosus, not intractable, unspecified migraine type     Condition: condition: good  Dispo: Discharge to home      This transcription was electronically signed. Parts of this transcriptions may have been dictated by use of voice recognition software and electronically transcribed, and parts may have been transcribed with the assistance of an ED scribe. The transcription may contain errors not detected in proofreading. Please refer to my supervising physician's documentation if my documentation differs.     Electronically Signed: Adam Cui, 04/02/21, 1:55 PM          Adam Cui DO  Resident  04/02/21 6771

## 2021-04-04 LAB — CATECHOLAMINES FRACT FREE PLASMA: NORMAL

## 2021-04-06 ENCOUNTER — OFFICE VISIT (OUTPATIENT)
Dept: NEPHROLOGY | Age: 46
End: 2021-04-06
Payer: COMMERCIAL

## 2021-04-06 VITALS
BODY MASS INDEX: 34.11 KG/M2 | SYSTOLIC BLOOD PRESSURE: 173 MMHG | DIASTOLIC BLOOD PRESSURE: 98 MMHG | WEIGHT: 205 LBS | HEART RATE: 104 BPM | OXYGEN SATURATION: 97 %

## 2021-04-06 DIAGNOSIS — G44.011 INTRACTABLE EPISODIC CLUSTER HEADACHE: ICD-10-CM

## 2021-04-06 DIAGNOSIS — I10 ESSENTIAL HYPERTENSION: Primary | ICD-10-CM

## 2021-04-06 LAB
ACQUISITION DURATION: NORMAL S
AVERAGE HEART RATE: 84 BPM
HOOKUP DATE: NORMAL
HOOKUP TIME: NORMAL
MAX HEART RATE TIME/DATE: NORMAL
MAX HEART RATE: 123 BPM
MIN HEART RATE TIME/DATE: NORMAL
MIN HEART RATE: 66 BPM
NUMBER OF QRS COMPLEXES: NORMAL
NUMBER OF SUPRAVENTRICULAR COUPLETS: 0
NUMBER OF SUPRAVENTRICULAR ECTOPICS: 7
NUMBER OF SUPRAVENTRICULAR ISOLATED BEATS: 7
NUMBER OF VENTRICULAR BIGEMINAL CYCLES: 0
NUMBER OF VENTRICULAR COUPLETS: 0
NUMBER OF VENTRICULAR ECTOPICS: 2
RENIN ACTIVITY: 1.2 NG/ML/HR

## 2021-04-06 PROCEDURE — 99213 OFFICE O/P EST LOW 20 MIN: CPT | Performed by: INTERNAL MEDICINE

## 2021-04-06 PROCEDURE — 1036F TOBACCO NON-USER: CPT | Performed by: INTERNAL MEDICINE

## 2021-04-06 PROCEDURE — G8427 DOCREV CUR MEDS BY ELIG CLIN: HCPCS | Performed by: INTERNAL MEDICINE

## 2021-04-06 PROCEDURE — G8417 CALC BMI ABV UP PARAM F/U: HCPCS | Performed by: INTERNAL MEDICINE

## 2021-04-06 RX ORDER — SPIRONOLACTONE 25 MG/1
25 TABLET ORAL 2 TIMES DAILY
Qty: 180 TABLET | Refills: 1 | Status: SHIPPED | OUTPATIENT
Start: 2021-04-06 | End: 2021-04-20

## 2021-04-06 NOTE — PATIENT INSTRUCTIONS
Please bring your medication list with next appointment. Check your blood pressure twice a day mornings and evenings at least 7 days before the next appointment and bring the record to the office.

## 2021-04-06 NOTE — PROGRESS NOTES
Renal Progress Note    Assessment and Plan:      Diagnosis Orders   1. Essential hypertension     2. Intractable episodic cluster headache       PLAN:  I discussed my thoughts at length with the patient and explained. They understood. I addressed their questions. We discontinue chlorthalidone. Discontinue potassium supplement as well  Spironolactone 25 mg twice a day. I emphasized exercise and weight loss again. I emphasized low-salt diet again. Return visit in 2 weeks with 1 week blood pressure record twice a day mornings and evenings. If still high blood pressure at that time, we may add minoxidil and discontinue amlodipine. We may also replace losartan with valsartan. Patient Active Problem List   Diagnosis    Left breast abscess    Fibrocystic breast changes    Cellulitis    Acute kidney injury ([facility] [address]    Vomiting    HTN (hypertension)    S/P cardiac cath- 6/22/16-  All coronaries are patent but LAD. there is moderate eccentric plaque noted on the proximal and ostail LAD, giving ostail prox LAD 30% nstenosis, edp 13 mmhg, ef 65%- med R    Postoperative or surgical complication, initial encounter    MRSA cellulitis    S/P debridement    Gastroesophageal reflux disease       Subjective:   Chief complaint:  Chief Complaint   Patient presents with    Hypertension      HPI:This is a follow up visit for   Ms. Kwasi Guzman who is here today for return appointment. I saw her in the initial visit about 4 weeks ago for uncontrolled hypertension. We did a bilateral renal angiogram which was normal.  We also did aldosterone renin ratio which was normal as well. Plasma catecholamine levels are also normal.  Meanwhile, she thinks that she has worsening headache since  renal angiogram.  She was seen by Dr. Aleena Vega recently who recommended Diovan. Home blood pressure remains still quite high. No chest pain or shortness of breath. No nausea vomiting. No fever chills.   No exacerbating or relieving factor for the headache. ROS:  Pertinent positives stated above in HPI. All other systems were reviewed and were negative. Medications:     Current Outpatient Medications   Medication Sig Dispense Refill    amLODIPine (NORVASC) 10 MG tablet Take 10 mg by mouth daily      magnesium oxide (MAG-OX) 400 MG tablet Take 400 mg by mouth daily      losartan (COZAAR) 100 MG tablet Take 100 mg by mouth daily      potassium chloride (KLOR-CON M) 20 MEQ extended release tablet Take 1 tablet by mouth 2 times daily 60 tablet 3    chlorthalidone (HYGROTON) 25 MG tablet Take 1 tablet by mouth daily 60 tablet 1    cloNIDine (CATAPRES) 0.3 MG tablet Take 0.3 mg by mouth 4 times daily       omeprazole (PRILOSEC) 20 MG delayed release capsule Take 20 mg by mouth daily      metFORMIN (GLUCOPHAGE-XR) 500 MG extended release tablet Take 500 mg by mouth daily (with breakfast) Does not take everyday      vitamin D (ERGOCALCIFEROL) 97799 units CAPS capsule Take 50,000 Units by mouth once a week      atorvastatin (LIPITOR) 20 MG tablet TAKE 1 TABLET BY MOUTH DAILY (Patient taking differently: Take 20 mg by mouth nightly ) 30 tablet 8    metoprolol (LOPRESSOR) 100 MG tablet Take 1 tablet by mouth 2 times daily 60 tablet 11     No current facility-administered medications for this visit.         Lab Results:    CBC:   Lab Results   Component Value Date    WBC 8.5 04/02/2021    HGB 12.6 04/02/2021    HCT 40.3 04/02/2021    MCV 86.5 04/02/2021     04/02/2021     BMP:    Lab Results   Component Value Date     04/02/2021     02/25/2021     02/16/2021    K 3.7 04/02/2021    K 3.4 (L) 02/25/2021    K 4.2 02/16/2021     04/02/2021     02/25/2021     02/16/2021    CO2 23 04/02/2021    CO2 26 02/25/2021    CO2 26 02/16/2021    BUN 6 (L) 04/02/2021    BUN 7 02/25/2021    BUN 12 02/16/2021    CREATININE 0.6 04/02/2021    CREATININE 0.6 03/31/2021    CREATININE 0.54 02/25/2021    GLUCOSE 143 (H) 04/02/2021    GLUCOSE 95 02/25/2021    GLUCOSE 88 02/16/2021      Hepatic:   Lab Results   Component Value Date    AST 9 04/02/2021    AST 11 02/25/2021    AST 10 10/28/2020    ALT 7 (L) 04/02/2021    ALT 8 02/25/2021    ALT 9 10/28/2020    BILITOT 0.2 (L) 04/02/2021    BILITOT 0.21 (L) 02/25/2021    BILITOT 0.21 (L) 10/28/2020    ALKPHOS 98 04/02/2021    ALKPHOS 86 02/25/2021    ALKPHOS 89 10/28/2020     BNP: No results found for: BNP  Lipids:   Lab Results   Component Value Date    CHOL 160 10/28/2020    HDL 45 10/28/2020     INR:   Lab Results   Component Value Date    INR 0.93 03/31/2021    INR 1.15 (H) 09/16/2019    INR 1.00 04/02/2013     URINE:   Lab Results   Component Value Date    NAUR 51 02/11/2015    PROTUR 8.0 02/14/2015     Lab Results   Component Value Date    NITRU NEGATIVE 04/02/2021    COLORU YELLOW 04/02/2021    PHUR 7.5 04/02/2021    WBCUA NONE SEEN 04/02/2021    RBCUA 0-2 04/02/2021    YEAST NONE SEEN 04/02/2021    BACTERIA NONE SEEN 04/02/2021    SPECGRAV 1.030 01/30/2013    LEUKOCYTESUR NEGATIVE 04/02/2021    UROBILINOGEN 0.2 04/02/2021    BILIRUBINUR NEGATIVE 04/02/2021    BLOODU TRACE 04/02/2021    GLUCOSEU NEGATIVE 04/02/2021    KETUA NEGATIVE 04/02/2021      Microalbumen/Creatinine ratio:  No components found for: RUCREAT    Objective:   Vitals: BP (!) 173/98 (Site: Right Upper Arm, Position: Sitting, Cuff Size: Large Adult)   Pulse 104   Wt 205 lb (93 kg)   LMP 01/20/2013   SpO2 97%   BMI 34.11 kg/m²      Constitutional:  Alert, awake, no apparent distress  Skin:normal with no rash or any lesions  HEENT:Pupils are reactive . Throat is clear. Oral mucosa is moist.  Neck:supple with no thyromegaly or bruit   Cardiovascular:  S1, S2 without murmur   Respiratory:  Clear to auscultation with no wheezes or rales  Abdomen: +bowel sound, soft, non tender and no bruit  Ext: No LE edema  Musculoskeletal:Intact  Neuro:Alert, awake and oriented with no obvious focal deficit.   Speech is normal. Electronically signed by Shahida Solorzano MD on 4/6/2021 at 8:46 AM   **This report has been created using voice recognition software. It maycontain minor  errors which are inherent in voice recognition technology. **

## 2021-04-12 ENCOUNTER — HOSPITAL ENCOUNTER (EMERGENCY)
Age: 46
Discharge: HOME | End: 2021-04-13
Attending: EMERGENCY MEDICINE
Payer: COMMERCIAL

## 2021-04-12 DIAGNOSIS — J40 BRONCHITIS: Primary | ICD-10-CM

## 2021-04-12 PROCEDURE — 85025 COMPLETE CBC W/AUTO DIFF WBC: CPT

## 2021-04-12 PROCEDURE — 80053 COMPREHEN METABOLIC PANEL: CPT

## 2021-04-12 PROCEDURE — 99284 EMERGENCY DEPT VISIT MOD MDM: CPT

## 2021-04-12 PROCEDURE — 83735 ASSAY OF MAGNESIUM: CPT

## 2021-04-12 PROCEDURE — 36415 COLL VENOUS BLD VENIPUNCTURE: CPT

## 2021-04-12 PROCEDURE — 83690 ASSAY OF LIPASE: CPT

## 2021-04-13 ENCOUNTER — APPOINTMENT (OUTPATIENT)
Dept: CT IMAGING | Age: 46
End: 2021-04-13
Payer: COMMERCIAL

## 2021-04-13 VITALS
HEIGHT: 65 IN | RESPIRATION RATE: 22 BRPM | OXYGEN SATURATION: 98 % | BODY MASS INDEX: 33.66 KG/M2 | SYSTOLIC BLOOD PRESSURE: 165 MMHG | TEMPERATURE: 98 F | WEIGHT: 202 LBS | DIASTOLIC BLOOD PRESSURE: 76 MMHG | HEART RATE: 85 BPM

## 2021-04-13 LAB
ALBUMIN SERPL-MCNC: 4.1 G/DL (ref 3.5–5.1)
ALP BLD-CCNC: 78 U/L (ref 38–126)
ALT SERPL-CCNC: 9 U/L (ref 11–66)
AMPHETAMINE+METHAMPHETAMINE URINE SCREEN: NEGATIVE
ANION GAP SERPL CALCULATED.3IONS-SCNC: 14 MEQ/L (ref 8–16)
AST SERPL-CCNC: 10 U/L (ref 5–40)
BACTERIA: ABNORMAL /HPF
BARBITURATE QUANTITATIVE URINE: NEGATIVE
BASOPHILS # BLD: 0.4 %
BASOPHILS ABSOLUTE: 0 THOU/MM3 (ref 0–0.1)
BENZODIAZEPINE QUANTITATIVE URINE: NEGATIVE
BILIRUB SERPL-MCNC: < 0.2 MG/DL (ref 0.3–1.2)
BILIRUBIN URINE: NEGATIVE
BLOOD, URINE: ABNORMAL
BUN BLDV-MCNC: 9 MG/DL (ref 7–22)
CALCIUM SERPL-MCNC: 9 MG/DL (ref 8.5–10.5)
CANNABINOID QUANTITATIVE URINE: NEGATIVE
CASTS 2: ABNORMAL /LPF
CASTS UA: ABNORMAL /LPF
CHARACTER, URINE: CLEAR
CHLORIDE BLD-SCNC: 104 MEQ/L (ref 98–111)
CO2: 21 MEQ/L (ref 23–33)
COCAINE METABOLITE QUANTITATIVE URINE: NEGATIVE
COLOR: YELLOW
CREAT SERPL-MCNC: 0.6 MG/DL (ref 0.4–1.2)
CRYSTALS, UA: ABNORMAL
EOSINOPHIL # BLD: 0.7 %
EOSINOPHILS ABSOLUTE: 0.1 THOU/MM3 (ref 0–0.4)
EPITHELIAL CELLS, UA: ABNORMAL /HPF
ERYTHROCYTE [DISTWIDTH] IN BLOOD BY AUTOMATED COUNT: 15 % (ref 11.5–14.5)
ERYTHROCYTE [DISTWIDTH] IN BLOOD BY AUTOMATED COUNT: 47.8 FL (ref 35–45)
GFR SERPL CREATININE-BSD FRML MDRD: > 90 ML/MIN/1.73M2
GLUCOSE BLD-MCNC: 106 MG/DL (ref 70–108)
GLUCOSE URINE: NEGATIVE MG/DL
HCT VFR BLD CALC: 35.8 % (ref 37–47)
HEMOGLOBIN: 11.2 GM/DL (ref 12–16)
IMMATURE GRANS (ABS): 0.03 THOU/MM3 (ref 0–0.07)
IMMATURE GRANULOCYTES: 0.3 %
KETONES, URINE: NEGATIVE
LEUKOCYTE ESTERASE, URINE: NEGATIVE
LIPASE: 16.9 U/L (ref 5.6–51.3)
LYMPHOCYTES # BLD: 33.3 %
LYMPHOCYTES ABSOLUTE: 3.9 THOU/MM3 (ref 1–4.8)
MAGNESIUM: 2.2 MG/DL (ref 1.6–2.4)
MCH RBC QN AUTO: 27.1 PG (ref 26–33)
MCHC RBC AUTO-ENTMCNC: 31.3 GM/DL (ref 32.2–35.5)
MCV RBC AUTO: 86.7 FL (ref 81–99)
MISCELLANEOUS 2: ABNORMAL
MONOCYTES # BLD: 7.8 %
MONOCYTES ABSOLUTE: 0.9 THOU/MM3 (ref 0.4–1.3)
NITRITE, URINE: NEGATIVE
NUCLEATED RED BLOOD CELLS: 0 /100 WBC
OPIATES, URINE: NEGATIVE
OSMOLALITY CALCULATION: 276.6 MOSMOL/KG (ref 275–300)
OXYCODONE: NEGATIVE
PH UA: 6.5 (ref 5–9)
PHENCYCLIDINE QUANTITATIVE URINE: NEGATIVE
PLATELET # BLD: 365 THOU/MM3 (ref 130–400)
PMV BLD AUTO: 10.1 FL (ref 9.4–12.4)
POTASSIUM SERPL-SCNC: 3.6 MEQ/L (ref 3.5–5.2)
PROTEIN UA: ABNORMAL
RBC # BLD: 4.13 MILL/MM3 (ref 4.2–5.4)
RBC URINE: ABNORMAL /HPF
RENAL EPITHELIAL, UA: ABNORMAL
SARS-COV-2, NAAT: NOT DETECTED
SEG NEUTROPHILS: 57.5 %
SEGMENTED NEUTROPHILS ABSOLUTE COUNT: 6.7 THOU/MM3 (ref 1.8–7.7)
SODIUM BLD-SCNC: 139 MEQ/L (ref 135–145)
SPECIFIC GRAVITY, URINE: 1.02 (ref 1–1.03)
TOTAL PROTEIN: 7.1 G/DL (ref 6.1–8)
UROBILINOGEN, URINE: 1 EU/DL (ref 0–1)
WBC # BLD: 11.6 THOU/MM3 (ref 4.8–10.8)
WBC UA: ABNORMAL /HPF
YEAST: ABNORMAL

## 2021-04-13 PROCEDURE — 6360000004 HC RX CONTRAST MEDICATION: Performed by: EMERGENCY MEDICINE

## 2021-04-13 PROCEDURE — 87635 SARS-COV-2 COVID-19 AMP PRB: CPT

## 2021-04-13 PROCEDURE — 71275 CT ANGIOGRAPHY CHEST: CPT

## 2021-04-13 PROCEDURE — 80307 DRUG TEST PRSMV CHEM ANLYZR: CPT

## 2021-04-13 PROCEDURE — 81001 URINALYSIS AUTO W/SCOPE: CPT

## 2021-04-13 PROCEDURE — 74177 CT ABD & PELVIS W/CONTRAST: CPT

## 2021-04-13 PROCEDURE — 6370000000 HC RX 637 (ALT 250 FOR IP): Performed by: EMERGENCY MEDICINE

## 2021-04-13 RX ORDER — AZITHROMYCIN 500 MG/1
500 TABLET, FILM COATED ORAL DAILY
Qty: 5 TABLET | Refills: 0 | Status: SHIPPED | OUTPATIENT
Start: 2021-04-13 | End: 2021-04-18

## 2021-04-13 RX ORDER — CETIRIZINE HYDROCHLORIDE 10 MG/1
10 TABLET ORAL DAILY
Qty: 30 TABLET | Refills: 0 | Status: SHIPPED | OUTPATIENT
Start: 2021-04-13 | End: 2021-05-13

## 2021-04-13 RX ORDER — GUAIFENESIN/DEXTROMETHORPHAN 100-10MG/5
5 SYRUP ORAL EVERY 4 HOURS PRN
Status: DISCONTINUED | OUTPATIENT
Start: 2021-04-13 | End: 2021-04-13 | Stop reason: HOSPADM

## 2021-04-13 RX ORDER — ALBUTEROL SULFATE 90 UG/1
2 AEROSOL, METERED RESPIRATORY (INHALATION) EVERY 6 HOURS PRN
Status: DISCONTINUED | OUTPATIENT
Start: 2021-04-13 | End: 2021-04-13 | Stop reason: HOSPADM

## 2021-04-13 RX ADMIN — GUAIFENESIN AND DEXTROMETHORPHAN 5 ML: 100; 10 SYRUP ORAL at 02:07

## 2021-04-13 RX ADMIN — ALBUTEROL SULFATE 2 PUFF: 90 AEROSOL, METERED RESPIRATORY (INHALATION) at 02:07

## 2021-04-13 RX ADMIN — IOPAMIDOL 80 ML: 755 INJECTION, SOLUTION INTRAVENOUS at 01:16

## 2021-04-13 ASSESSMENT — ENCOUNTER SYMPTOMS
WHEEZING: 0
RHINORRHEA: 0
VOMITING: 0
CONSTIPATION: 0
NAUSEA: 0
EYE PAIN: 0
SINUS PRESSURE: 0
TROUBLE SWALLOWING: 0
SORE THROAT: 0
COUGH: 1
BLOOD IN STOOL: 0
EYE REDNESS: 0
BACK PAIN: 0
EYE ITCHING: 0
ABDOMINAL PAIN: 0
VOICE CHANGE: 0
PHOTOPHOBIA: 0
DIARRHEA: 0
SHORTNESS OF BREATH: 1
ABDOMINAL DISTENTION: 0
CHOKING: 0
CHEST TIGHTNESS: 1
EYE DISCHARGE: 0

## 2021-04-13 NOTE — ED NOTES
Pt resting in bed at this time. Vitals reassessed, respirs normal. Denies further needs, call light in reach.       [employer]  04/13/21 9868

## 2021-04-13 NOTE — ED NOTES
Pt resting in bed at this time. Urine sample collected and sent. Vitals reassessed, respirs normal. Denies further needs, call light in reach.       [employer]  04/13/21 0044

## 2021-04-13 NOTE — ED NOTES
Pt roomed in ED at this time, primary complaint is flank pain which began a week ago but got worse this evening. Reports no nausea or vomiting. No blood in urine. No dizziness or blurred vision. Sees renal specialist for chronic hypertension. Vitals assessed, respirs normal, intake assessment completed.       [employer]  04/12/21 [id number]

## 2021-04-13 NOTE — ED PROVIDER NOTES
constipation, diarrhea, nausea and vomiting. Endocrine: Negative for polydipsia, polyphagia and polyuria. Genitourinary: Positive for flank pain. Negative for decreased urine volume, difficulty urinating, dysuria, enuresis, frequency, hematuria and urgency. Musculoskeletal: Negative for arthralgias, back pain, gait problem, myalgias, neck pain and neck stiffness. Skin: Negative for pallor and rash. Allergic/Immunologic: Negative for immunocompromised state. Neurological: Negative for dizziness, tremors, seizures, syncope, facial asymmetry, weakness, light-headedness, numbness and headaches. Hematological: Negative for adenopathy. Does not bruise/bleed easily. Psychiatric/Behavioral: Negative for agitation, hallucinations and suicidal ideas. The patient is not nervous/anxious. PAST MEDICAL HISTORY    has a past medical history of Diabetes mellitus ([facility] [address] Hypertension, and Unspecified cerebral artery occlusion with cerebral infarction. SURGICAL HISTORY      has a past surgical history that includes cyst removal; hysteroscopy; Hysterectomy (April 2013); Breast surgery (Left, 02/10/2015); pr exc skin benig 2.1-3cm trunk,arm,leg (N/A, 6/4/2018); and DISSECTION GROIN (N/A, 9/19/2019).     CURRENT MEDICATIONS       Previous Medications    AMLODIPINE (NORVASC) 10 MG TABLET    Take 10 mg by mouth daily    ATORVASTATIN (LIPITOR) 20 MG TABLET    TAKE 1 TABLET BY MOUTH DAILY    CLONIDINE (CATAPRES) 0.3 MG TABLET    Take 0.3 mg by mouth 4 times daily     LOSARTAN (COZAAR) 100 MG TABLET    Take 100 mg by mouth daily    MAGNESIUM OXIDE (MAG-OX) 400 MG TABLET    Take 400 mg by mouth daily    METFORMIN (GLUCOPHAGE-XR) 500 MG EXTENDED RELEASE TABLET    Take 500 mg by mouth daily (with breakfast) Does not take everyday    METOPROLOL (LOPRESSOR) 100 MG TABLET    Take 1 tablet by mouth 2 times daily    OMEPRAZOLE (PRILOSEC) 20 MG DELAYED RELEASE CAPSULE    Take 20 mg by mouth daily    SPIRONOLACTONE (ALDACTONE) 25 MG TABLET    Take 1 tablet by mouth 2 times daily    VITAMIN D (ERGOCALCIFEROL) 00445 UNITS CAPS CAPSULE    Take 50,000 Units by mouth once a week       ALLERGIES     is allergic to latex; bactrim [sulfamethoxazole-trimethoprim]; penicillins; clindamycin/lincomycin; ciprofloxacin; doxycycline; and lisinopril. FAMILY HISTORY     She indicated that her mother is alive. She indicated that her father is alive. She indicated that both of her sisters are alive. She indicated that the status of her maternal cousin is unknown.   family history includes Arthritis in her mother; Breast Cancer in her maternal cousin; Heart Disease in her mother; High Blood Pressure in her mother and sister. SOCIAL HISTORY      reports that she has never smoked. She has never used smokeless tobacco. She reports that she does not drink alcohol or use drugs. PHYSICAL EXAM     INITIAL VITALS:  height is 5' 5\" (1.651 m) and weight is 202 lb (91.6 kg). Her oral temperature is 98 °F (36.7 °C). Her blood pressure is 165/76 (abnormal) and her pulse is 85. Her respiration is 22 and oxygen saturation is 98%. Physical Exam  Vitals signs and nursing note reviewed. Constitutional:       General: She is not in acute distress. Appearance: She is well-developed. She is not diaphoretic. HENT:      Head: Normocephalic and atraumatic. Right Ear: External ear normal.      Left Ear: External ear normal.      Nose: Nose normal.      Mouth/Throat:      Pharynx: No oropharyngeal exudate. Eyes:      General: No scleral icterus. Right eye: No discharge. Left eye: No discharge. Conjunctiva/sclera: Conjunctivae normal.      Pupils: Pupils are equal, round, and reactive to light. Neck:      Musculoskeletal: Normal range of motion and neck supple. Thyroid: No thyromegaly. Vascular: No JVD. Trachea: No tracheal deviation. Cardiovascular:      Rate and Rhythm: Normal rate and regular rhythm. Pulses:           Carotid pulses are 2+ on the right side and 2+ on the left side. Radial pulses are 2+ on the right side and 2+ on the left side. Femoral pulses are 2+ on the right side and 2+ on the left side. Popliteal pulses are 2+ on the right side and 2+ on the left side. Dorsalis pedis pulses are 2+ on the right side and 2+ on the left side. Posterior tibial pulses are 2+ on the right side and 2+ on the left side. Heart sounds: Normal heart sounds, S1 normal and S2 normal. No murmur. No friction rub. No gallop. Pulmonary:      Effort: Pulmonary effort is normal.      Breath sounds: No stridor. Examination of the left-middle field reveals wheezing. Examination of the left-lower field reveals decreased breath sounds. Decreased breath sounds and wheezing present. No rhonchi or rales. Chest:      Chest wall: No tenderness. Abdominal:      General: Bowel sounds are normal. There is no distension. Palpations: Abdomen is soft. There is no mass. Tenderness: There is no abdominal tenderness. There is no guarding or rebound. Hernia: No hernia is present. Musculoskeletal: Normal range of motion. General: No tenderness. Right lower leg: No edema. Left lower leg: No edema. Lymphadenopathy:      Cervical: No cervical adenopathy. Skin:     General: Skin is warm and dry. Capillary Refill: Capillary refill takes less than 2 seconds. Findings: No bruising, ecchymosis, lesion or rash. Neurological:      General: No focal deficit present. Mental Status: She is alert and oriented to person, place, and time. Mental status is at baseline. Cranial Nerves: No cranial nerve deficit. Coordination: Coordination normal.      Deep Tendon Reflexes: Reflexes are normal and symmetric. Psychiatric:         Speech: Speech normal.         Behavior: Behavior normal.         Thought Content:  Thought content normal.         Judgment: MAGNESIUM   URINE DRUG SCREEN   ANION GAP   OSMOLALITY   GLOMERULAR FILTRATION RATE, ESTIMATED       EMERGENCY DEPARTMENT COURSE:   Vitals:    Vitals:    04/12/21 2343 04/13/21 0043 04/13/21 0155   BP: (!) 173/93 (!) 152/76 (!) 165/76   Pulse: 111 94 85   Resp: 21 18 22   Temp: 98 °F (36.7 °C)     TempSrc: Oral     SpO2: 96% 94% 98%   Weight: 202 lb (91.6 kg)     Height: 5' 5\" (1.651 m)       Patient was assessed at bedside appropriate labs and imaging were ordered. Because of the patient's varied complaints and differing areas of pain and difficulty multiple CAT scans were ordered. Patient had a very complicated presentation she was complaining about multiple different areas of pain. Here today the patient's labs were within normal limits CT scans were both within normal limits. At this point I feel it more than likely represents a bronchitis. Patient will be sent home with an albuterol inhaler and Robitussin cough medicine she is also having cetirizine added to this. We discussed this at bedside with the patient who understood and agreed with the plan. Patient is subsequently discharged home in good condition. Patient has what appears to be bronchitis. Patient is instructed use Tylenol Motrin for any pain. Patient has been given a short course of antibiotic she is instructed to take those as prescribed. She is also been given an albuterol inhaler and cough medication. Patient is instructed to follow-up with her primary care physician to do so within the next 1 to 2 days. Patient is instructed return to the nearest emergency room immediately for any new or worsening complaints. CRITICAL CARE:   None    CONSULTS:  None    PROCEDURES:  None    FINAL IMPRESSION      1.  Bronchitis          DISPOSITION/PLAN   Discharge    PATIENT REFERRED TO:  [facility], APRN - CNP  [address] [address]    Call in 1 day      Talita Barrett MD  [facility], [address]  [facility] [id number]    Call in 1 day        DISCHARGE MEDICATIONS:  New Prescriptions    AZITHROMYCIN (ZITHROMAX) 500 MG TABLET    Take 1 tablet by mouth daily for 5 days    CETIRIZINE (ZYRTEC) 10 MG TABLET    Take 1 tablet by mouth daily       (Please note that portions of this note were completed with a voice recognition program.  Efforts were made to edit the dictations but occasionally words are mis-transcribed.)    Fatimah eBrmudez, [address] [place] [address],   04/13/21 8118

## 2021-04-20 ENCOUNTER — OFFICE VISIT (OUTPATIENT)
Dept: NEPHROLOGY | Age: 46
End: 2021-04-20
Payer: COMMERCIAL

## 2021-04-20 VITALS
SYSTOLIC BLOOD PRESSURE: 166 MMHG | WEIGHT: 203.4 LBS | DIASTOLIC BLOOD PRESSURE: 96 MMHG | OXYGEN SATURATION: 96 % | HEART RATE: 87 BPM | BODY MASS INDEX: 33.85 KG/M2

## 2021-04-20 DIAGNOSIS — I10 ESSENTIAL HYPERTENSION: Primary | ICD-10-CM

## 2021-04-20 PROCEDURE — 1036F TOBACCO NON-USER: CPT | Performed by: INTERNAL MEDICINE

## 2021-04-20 PROCEDURE — G8417 CALC BMI ABV UP PARAM F/U: HCPCS | Performed by: INTERNAL MEDICINE

## 2021-04-20 PROCEDURE — 99212 OFFICE O/P EST SF 10 MIN: CPT | Performed by: INTERNAL MEDICINE

## 2021-04-20 PROCEDURE — G8427 DOCREV CUR MEDS BY ELIG CLIN: HCPCS | Performed by: INTERNAL MEDICINE

## 2021-04-20 RX ORDER — SPIRONOLACTONE 25 MG/1
50 TABLET ORAL 2 TIMES DAILY
Qty: 180 TABLET | Refills: 2 | Status: SHIPPED | OUTPATIENT
Start: 2021-04-20 | End: 2021-05-24

## 2021-04-20 NOTE — PATIENT INSTRUCTIONS
Change the spironolactone from 25 mg twice a day to 50 mg 1 tablet in the morning and 1 tablet at bedtime  Take the amlodipine in the afternoon  Take the losartan in the morning

## 2021-04-20 NOTE — PROGRESS NOTES
blood pressure which I reviewed. ROS:  Pertinent positives stated above in HPI. All other systems were reviewed and were negative. Medications:     Current Outpatient Medications   Medication Sig Dispense Refill    cetirizine (ZYRTEC) 10 MG tablet Take 1 tablet by mouth daily 30 tablet 0    spironolactone (ALDACTONE) 25 MG tablet Take 1 tablet by mouth 2 times daily 180 tablet 1    amLODIPine (NORVASC) 10 MG tablet Take 10 mg by mouth daily      magnesium oxide (MAG-OX) 400 MG tablet Take 400 mg by mouth daily      losartan (COZAAR) 100 MG tablet Take 100 mg by mouth daily      cloNIDine (CATAPRES) 0.3 MG tablet Take 0.3 mg by mouth 4 times daily       omeprazole (PRILOSEC) 20 MG delayed release capsule Take 20 mg by mouth daily      metFORMIN (GLUCOPHAGE-XR) 500 MG extended release tablet Take 500 mg by mouth daily (with breakfast) Does not take everyday      vitamin D (ERGOCALCIFEROL) 05505 units CAPS capsule Take 50,000 Units by mouth once a week      atorvastatin (LIPITOR) 20 MG tablet TAKE 1 TABLET BY MOUTH DAILY (Patient taking differently: Take 20 mg by mouth nightly ) 30 tablet 8    metoprolol (LOPRESSOR) 100 MG tablet Take 1 tablet by mouth 2 times daily 60 tablet 11     No current facility-administered medications for this visit.         Lab Results:    CBC:   Lab Results   Component Value Date    WBC 11.6 (H) 04/12/2021    HGB 11.2 (L) 04/12/2021    HCT 35.8 (L) 04/12/2021    MCV 86.7 04/12/2021     04/12/2021     BMP:    Lab Results   Component Value Date     04/12/2021     04/02/2021     02/25/2021    K 3.6 04/12/2021    K 3.7 04/02/2021    K 3.4 (L) 02/25/2021     04/12/2021     04/02/2021     02/25/2021    CO2 21 (L) 04/12/2021    CO2 23 04/02/2021    CO2 26 02/25/2021    BUN 9 04/12/2021    BUN 6 (L) 04/02/2021    BUN 7 02/25/2021    CREATININE 0.6 04/12/2021    CREATININE 0.6 04/02/2021    CREATININE 0.6 03/31/2021    GLUCOSE 106 04/12/2021 GLUCOSE 143 (H) 04/02/2021    GLUCOSE 95 02/25/2021      Hepatic:   Lab Results   Component Value Date    AST 10 04/12/2021    AST 9 04/02/2021    AST 11 02/25/2021    ALT 9 (L) 04/12/2021    ALT 7 (L) 04/02/2021    ALT 8 02/25/2021    BILITOT <0.2 (L) 04/12/2021    BILITOT 0.2 (L) 04/02/2021    BILITOT 0.21 (L) 02/25/2021    ALKPHOS 78 04/12/2021    ALKPHOS 98 04/02/2021    ALKPHOS 86 02/25/2021     BNP: No results found for: BNP  Lipids:   Lab Results   Component Value Date    CHOL 160 10/28/2020    HDL 45 10/28/2020     INR:   Lab Results   Component Value Date    INR 0.93 03/31/2021    INR 1.15 (H) 09/16/2019    INR 1.00 04/02/2013     URINE:   Lab Results   Component Value Date    NAUR 51 02/11/2015    PROTUR 8.0 02/14/2015     Lab Results   Component Value Date    NITRU NEGATIVE 04/13/2021    COLORU YELLOW 04/13/2021    PHUR 6.5 04/13/2021    WBCUA 5-9 04/13/2021    RBCUA 3-5 04/13/2021    YEAST NONE SEEN 04/13/2021    BACTERIA FEW 04/13/2021    SPECGRAV 1.030 01/30/2013    LEUKOCYTESUR NEGATIVE 04/13/2021    UROBILINOGEN 1.0 04/13/2021    BILIRUBINUR NEGATIVE 04/13/2021    BLOODU TRACE 04/13/2021    GLUCOSEU NEGATIVE 04/13/2021    KETUA NEGATIVE 04/13/2021      Microalbumen/Creatinine ratio:  No components found for: RUCREAT    Objective:   Vitals: BP (!) 166/96 (Site: Right Upper Arm, Position: Sitting, Cuff Size: Large Adult)   Pulse 87   Wt 203 lb 6.4 oz (92.3 kg)   LMP 01/20/2013   SpO2 96%   BMI 33.85 kg/m²      Constitutional:  Alert, awake, no apparent distress  Skin:normal with no rash or any lesions  HEENT:Pupils are reactive . Throat is clear. Oral mucosa is moist.  Neck:supple with no thyromegaly or bruit   Cardiovascular:  S1, S2 without murmur   Respiratory:  Clear to auscultation with no wheezes or rales  Abdomen: +bowel sound, soft, non tender and no bruit  Ext: No LE edema  Musculoskeletal:Intact  Neuro:Alert, awake and oriented with no obvious focal deficit.   Speech is normal. Electronically signed by Malcolm Hylton MD on 4/20/2021 at 8:43 AM   **This report has been created using voice recognition software. It maycontain minor  errors which are inherent in voice recognition technology. **

## 2021-05-17 ENCOUNTER — HOSPITAL ENCOUNTER (OUTPATIENT)
Age: 46
Discharge: HOME | End: 2021-05-17
Payer: COMMERCIAL

## 2021-05-17 DIAGNOSIS — I10 ESSENTIAL HYPERTENSION: ICD-10-CM

## 2021-05-17 LAB
ANION GAP SERPL CALCULATED.3IONS-SCNC: 11 MEQ/L (ref 8–16)
BUN BLDV-MCNC: 8 MG/DL (ref 7–22)
CALCIUM SERPL-MCNC: 9.3 MG/DL (ref 8.5–10.5)
CHLORIDE BLD-SCNC: 102 MEQ/L (ref 98–111)
CO2: 26 MEQ/L (ref 23–33)
CREAT SERPL-MCNC: 0.7 MG/DL (ref 0.4–1.2)
GFR SERPL CREATININE-BSD FRML MDRD: > 90 ML/MIN/1.73M2
GLUCOSE BLD-MCNC: 140 MG/DL (ref 70–108)
POTASSIUM SERPL-SCNC: 3.8 MEQ/L (ref 3.5–5.2)
SODIUM BLD-SCNC: 139 MEQ/L (ref 135–145)

## 2021-05-17 PROCEDURE — 80048 BASIC METABOLIC PNL TOTAL CA: CPT

## 2021-05-17 PROCEDURE — 36415 COLL VENOUS BLD VENIPUNCTURE: CPT

## 2021-05-24 ENCOUNTER — OFFICE VISIT (OUTPATIENT)
Dept: NEPHROLOGY | Age: 46
End: 2021-05-24
Payer: COMMERCIAL

## 2021-05-24 VITALS
OXYGEN SATURATION: 96 % | DIASTOLIC BLOOD PRESSURE: 104 MMHG | HEART RATE: 82 BPM | WEIGHT: 206.4 LBS | TEMPERATURE: 98.2 F | BODY MASS INDEX: 34.35 KG/M2 | SYSTOLIC BLOOD PRESSURE: 176 MMHG

## 2021-05-24 DIAGNOSIS — I10 ESSENTIAL HYPERTENSION: Primary | ICD-10-CM

## 2021-05-24 PROCEDURE — 99213 OFFICE O/P EST LOW 20 MIN: CPT | Performed by: INTERNAL MEDICINE

## 2021-05-24 PROCEDURE — G8427 DOCREV CUR MEDS BY ELIG CLIN: HCPCS | Performed by: INTERNAL MEDICINE

## 2021-05-24 PROCEDURE — 1036F TOBACCO NON-USER: CPT | Performed by: INTERNAL MEDICINE

## 2021-05-24 PROCEDURE — G8417 CALC BMI ABV UP PARAM F/U: HCPCS | Performed by: INTERNAL MEDICINE

## 2021-05-24 RX ORDER — SPIRONOLACTONE 25 MG/1
100 TABLET ORAL 2 TIMES DAILY
Qty: 180 TABLET | Refills: 2 | Status: SHIPPED | OUTPATIENT
Start: 2021-05-24 | End: 2021-09-20

## 2021-05-24 NOTE — PROGRESS NOTES
Renal Progress Note    Assessment and Plan:      Diagnosis Orders   1. Essential hypertension       PLAN:   I discussed with the patient  She understood. I addressed her questions. Ultrasound tech to start if discussed with her. Plasma aldosterone renin ratio is normal  Catecholamine level is normal  Renal angiogram is normal  TSH is normal  Home medications reviewed  Home blood pressures reviewed  Blood pressure remains relatively high  We will increase his spironolactone from 50 mg twice a day to 200 mg twice a day  Echocardiogram  Return visit in 4 weeks with labs at home blood pressure record of 1 week taken twice a day mornings and-evenings        Patient Active Problem List   Diagnosis    Left breast abscess    Fibrocystic breast changes    Cellulitis    [facility] kidney injury ([facility] [address]    Vomiting    HTN (hypertension)    S/P cardiac cath- 6/22/16-  All coronaries are patent but LAD. there is moderate eccentric plaque noted on the proximal and ostail LAD, giving ostail prox LAD 30% nstenosis, edp 13 mmhg, ef 65%- med R    Postoperative or surgical complication, initial encounter    MRSA cellulitis    S/P debridement    Gastroesophageal reflux disease       Subjective:   Chief complaint:  Chief Complaint   Patient presents with    Hypertension      HPI:This is a follow up visit for Ms. Lno Miguel who is here today for return appointment. I see her for hypertension. She was last in about 4 weeks ago. Doing well since then except for her occasional migraine headaches. No chest pain or shortness of breath. She brought a record of her home blood pressure which I reviewed. Systolic  mostly in the 125D millimeters mercury. Systolic, diastolic mostly in the 90T    ROS:  Pertinent positives stated above in HPI. All other systems were reviewed and were negative.   Medications:     Current Outpatient Medications   Medication Sig Dispense Refill    spironolactone (ALDACTONE) 25 MG tablet Take 2 tablets by mouth 2 times daily 180 tablet 2    amLODIPine (NORVASC) 10 MG tablet Take 10 mg by mouth daily      magnesium oxide (MAG-OX) 400 MG tablet Take 400 mg by mouth daily      losartan (COZAAR) 100 MG tablet Take 100 mg by mouth daily      cloNIDine (CATAPRES) 0.3 MG tablet Take 0.3 mg by mouth 4 times daily       omeprazole (PRILOSEC) 20 MG delayed release capsule Take 20 mg by mouth daily      metFORMIN (GLUCOPHAGE-XR) 500 MG extended release tablet Take 500 mg by mouth daily (with breakfast) Does not take everyday      vitamin D (ERGOCALCIFEROL) 66436 units CAPS capsule Take 50,000 Units by mouth once a week      atorvastatin (LIPITOR) 20 MG tablet TAKE 1 TABLET BY MOUTH DAILY (Patient taking differently: Take 20 mg by mouth nightly ) 30 tablet 8    metoprolol (LOPRESSOR) 100 MG tablet Take 1 tablet by mouth 2 times daily 60 tablet 11     No current facility-administered medications for this visit.        Lab Results:    CBC:   Lab Results   Component Value Date    WBC 11.6 (H) 04/12/2021    HGB 11.2 (L) 04/12/2021    HCT 35.8 (L) 04/12/2021    MCV 86.7 04/12/2021     04/12/2021     BMP:    Lab Results   Component Value Date     05/17/2021     04/12/2021     04/02/2021    K 3.8 05/17/2021    K 3.6 04/12/2021    K 3.7 04/02/2021     05/17/2021     04/12/2021     04/02/2021    CO2 26 05/17/2021    CO2 21 (L) 04/12/2021    CO2 23 04/02/2021    BUN 8 05/17/2021    BUN 9 04/12/2021    BUN 6 (L) 04/02/2021    CREATININE 0.7 05/17/2021    CREATININE 0.6 04/12/2021    CREATININE 0.6 04/02/2021    GLUCOSE 140 (H) 05/17/2021    GLUCOSE 106 04/12/2021    GLUCOSE 143 (H) 04/02/2021      Hepatic:   Lab Results   Component Value Date    AST 10 04/12/2021    AST 9 04/02/2021    AST 11 02/25/2021    ALT 9 (L) 04/12/2021    ALT 7 (L) 04/02/2021    ALT 8 02/25/2021    BILITOT <0.2 (L) 04/12/2021    BILITOT 0.2 (L) 04/02/2021    BILITOT 0.21 (L) 02/25/2021    ALKPHOS 78 04/12/2021 ALKPHOS 98 04/02/2021    ALKPHOS 86 02/25/2021     BNP: No results found for: BNP  Lipids:   Lab Results   Component Value Date    CHOL 160 10/28/2020    HDL 45 10/28/2020     INR:   Lab Results   Component Value Date    INR 0.93 03/31/2021    INR 1.15 (H) 09/16/2019    INR 1.00 04/02/2013     URINE:   Lab Results   Component Value Date    NAUR 51 02/11/2015    PROTUR 8.0 02/14/2015     Lab Results   Component Value Date    NITRU NEGATIVE 04/13/2021    COLORU YELLOW 04/13/2021    PHUR 6.5 04/13/2021    WBCUA 5-9 04/13/2021    RBCUA 3-5 04/13/2021    YEAST NONE SEEN 04/13/2021    BACTERIA FEW 04/13/2021    SPECGRAV 1.030 01/30/2013    LEUKOCYTESUR NEGATIVE 04/13/2021    UROBILINOGEN 1.0 04/13/2021    BILIRUBINUR NEGATIVE 04/13/2021    BLOODU TRACE 04/13/2021    GLUCOSEU NEGATIVE 04/13/2021    KETUA NEGATIVE 04/13/2021      Microalbumen/Creatinine ratio:  No components found for: RUCREAT    Objective:   Vitals: BP (!) 176/104 (Site: Right Upper Arm, Position: Sitting, Cuff Size: Large Adult)   Pulse 82   Temp 98.2 °F (36.8 °C)   Wt 206 lb 6.4 oz (93.6 kg)   LMP 01/20/2013   SpO2 96%   BMI 34.35 kg/m²      Constitutional: Well-developed middle-age lady alert, awake, no apparent distress  Skin:normal with no rash or any lesions  HEENT:Pupils are reactive . Throat is clear. Oral mucosa is moist.  Neck:supple with no thyromegaly or bruit   Cardiovascular:  S1, S2 without murmur   Respiratory:  Clear to auscultation with no wheezes or rales  Abdomen: +bowel sound, soft, non tender and no bruit  Ext: No LE edema  Musculoskeletal:Intact  Neuro:Alert, awake and oriented with no obvious focal deficit. Speech is normal.    Electronically signed by Eb Gaston MD on 5/24/2021 at 8:54 AM   **This report has been created using voice recognition software. It maycontain minor  errors which are inherent in voice recognition technology. **

## 2021-05-24 NOTE — PROGRESS NOTES
Pt states that since being off the potassium, she has complained of leg cramps. She also states she has been having headaches about three times per week.

## 2021-05-28 ENCOUNTER — HOSPITAL ENCOUNTER (OUTPATIENT)
Dept: NON INVASIVE DIAGNOSTICS | Age: 46
Discharge: HOME | End: 2021-05-28
Payer: COMMERCIAL

## 2021-05-28 DIAGNOSIS — I10 ESSENTIAL HYPERTENSION: ICD-10-CM

## 2021-05-28 LAB
LV EF: 60 %
LVEF MODALITY: NORMAL

## 2021-05-28 PROCEDURE — 93306 TTE W/DOPPLER COMPLETE: CPT

## 2021-06-01 ENCOUNTER — TELEPHONE (OUTPATIENT)
Dept: NEPHROLOGY | Age: 46
End: 2021-06-01

## 2021-06-08 ENCOUNTER — TELEPHONE (OUTPATIENT)
Dept: NEPHROLOGY | Age: 46
End: 2021-06-08

## 2021-06-08 NOTE — TELEPHONE ENCOUNTER
Dr. Ana Sanchez spoke w/pt's ins and Echo is approved through 6/25/21.  [id number] is the approval number.

## 2021-06-21 ENCOUNTER — OFFICE VISIT (OUTPATIENT)
Dept: NEPHROLOGY | Age: 46
End: 2021-06-21
Payer: COMMERCIAL

## 2021-06-21 VITALS
HEART RATE: 81 BPM | SYSTOLIC BLOOD PRESSURE: 148 MMHG | DIASTOLIC BLOOD PRESSURE: 87 MMHG | OXYGEN SATURATION: 94 % | WEIGHT: 201.6 LBS | BODY MASS INDEX: 33.55 KG/M2

## 2021-06-21 DIAGNOSIS — I10 ESSENTIAL HYPERTENSION: Primary | ICD-10-CM

## 2021-06-21 PROCEDURE — 99213 OFFICE O/P EST LOW 20 MIN: CPT | Performed by: INTERNAL MEDICINE

## 2021-06-21 PROCEDURE — G8417 CALC BMI ABV UP PARAM F/U: HCPCS | Performed by: INTERNAL MEDICINE

## 2021-06-21 PROCEDURE — 1036F TOBACCO NON-USER: CPT | Performed by: INTERNAL MEDICINE

## 2021-06-21 PROCEDURE — G8427 DOCREV CUR MEDS BY ELIG CLIN: HCPCS | Performed by: INTERNAL MEDICINE

## 2021-06-21 RX ORDER — CHLORTHALIDONE 25 MG/1
25 TABLET ORAL DAILY
COMMUNITY
Start: 2021-05-24 | End: 2021-06-21

## 2021-06-21 RX ORDER — CHLORTHALIDONE 25 MG/1
25 TABLET ORAL 2 TIMES DAILY
Qty: 60 TABLET | Refills: 2 | Status: SHIPPED | OUTPATIENT
Start: 2021-06-21 | End: 2021-09-12

## 2021-06-21 RX ORDER — POTASSIUM CHLORIDE 20 MEQ/1
20 TABLET, EXTENDED RELEASE ORAL DAILY
COMMUNITY
Start: 2021-05-24 | End: 2021-07-13

## 2021-06-21 NOTE — PROGRESS NOTES
Renal Progress Note    Assessment and Plan:      Diagnosis Orders   1. Essential hypertension       PLAN:  I discussed my thoughts with the patient. She understood. Echocardiogram report reviewed with her  It is unremarkable. Medications reviewed  Increase chlorthalidone from 25 mg a day to twice a day  BMP in a week with magnesium level for possible l hypokalemia and hypomagnesemia   Return visit in 4 weeks with BMP magnesium again  The rationale for the blood test discussed with the patient      Patient Active Problem List   Diagnosis    Left breast abscess    Fibrocystic breast changes    Cellulitis    Acute kidney injury ([address]    Vomiting    HTN (hypertension)    S/P cardiac cath- 6/22/16-  All coronaries are patent but LAD. there is moderate eccentric plaque noted on the proximal and ostail LAD, giving ostail prox LAD 30% nstenosis, edp 13 mmhg, ef 65%- med R    Postoperative or surgical complication, initial encounter    MRSA cellulitis    S/P debridement    Gastroesophageal reflux disease       Subjective:   Chief complaint:  Chief Complaint   Patient presents with    Hypertension      HPI:This is a follow up visit for Ms Antwon Bull is here today for return appointment. I see for uncontrolled hypertension. She was last seen about 4 weeks ago. We did echocardiogram at that time which is normal.  Doing well otherwise except for her chronic headache. Denies any chest pain,shortness of breath. ROS:  Pertinent positives stated above in HPI. All other systems were reviewed and were negative.   Medications:     Current Outpatient Medications   Medication Sig Dispense Refill    chlorthalidone (HYGROTON) 25 MG tablet Take 25 mg by mouth daily       potassium chloride (KLOR-CON M) 20 MEQ extended release tablet Take 20 mEq by mouth daily       spironolactone (ALDACTONE) 25 MG tablet Take 4 tablets by mouth 2 times daily 180 tablet 2    amLODIPine (NORVASC) 10 MG tablet Take 10 mg by mouth daily  magnesium oxide (MAG-OX) 400 MG tablet Take 400 mg by mouth daily      losartan (COZAAR) 100 MG tablet Take 100 mg by mouth daily      cloNIDine (CATAPRES) 0.3 MG tablet Take 0.3 mg by mouth 4 times daily       omeprazole (PRILOSEC) 20 MG delayed release capsule Take 20 mg by mouth daily      metFORMIN (GLUCOPHAGE-XR) 500 MG extended release tablet Take 500 mg by mouth daily (with breakfast) Does not take everyday      vitamin D (ERGOCALCIFEROL) 15466 units CAPS capsule Take 50,000 Units by mouth once a week      atorvastatin (LIPITOR) 20 MG tablet TAKE 1 TABLET BY MOUTH DAILY (Patient taking differently: Take 20 mg by mouth nightly ) 30 tablet 8    metoprolol (LOPRESSOR) 100 MG tablet Take 1 tablet by mouth 2 times daily 60 tablet 11     No current facility-administered medications for this visit.        Lab Results:    CBC:   Lab Results   Component Value Date    WBC 11.6 (H) 04/12/2021    HGB 11.2 (L) 04/12/2021    HCT 35.8 (L) 04/12/2021    MCV 86.7 04/12/2021     04/12/2021     BMP:    Lab Results   Component Value Date     05/17/2021     04/12/2021     04/02/2021    K 3.8 05/17/2021    K 3.6 04/12/2021    K 3.7 04/02/2021     05/17/2021     04/12/2021     04/02/2021    CO2 26 05/17/2021    CO2 21 (L) 04/12/2021    CO2 23 04/02/2021    BUN 8 05/17/2021    BUN 9 04/12/2021    BUN 6 (L) 04/02/2021    CREATININE 0.7 05/17/2021    CREATININE 0.6 04/12/2021    CREATININE 0.6 04/02/2021    GLUCOSE 140 (H) 05/17/2021    GLUCOSE 106 04/12/2021    GLUCOSE 143 (H) 04/02/2021      Hepatic:   Lab Results   Component Value Date    AST 10 04/12/2021    AST 9 04/02/2021    AST 11 02/25/2021    ALT 9 (L) 04/12/2021    ALT 7 (L) 04/02/2021    ALT 8 02/25/2021    BILITOT <0.2 (L) 04/12/2021    BILITOT 0.2 (L) 04/02/2021    BILITOT 0.21 (L) 02/25/2021    ALKPHOS 78 04/12/2021    ALKPHOS 98 04/02/2021    ALKPHOS 86 02/25/2021     BNP: No results found for: BNP  Lipids:   Lab Results   Component Value Date    CHOL 160 10/28/2020    HDL 45 10/28/2020     INR:   Lab Results   Component Value Date    INR 0.93 03/31/2021    INR 1.15 (H) 09/16/2019    INR 1.00 04/02/2013     URINE:   Lab Results   Component Value Date    NAUR 51 02/11/2015    PROTUR 8.0 02/14/2015     Lab Results   Component Value Date    NITRU NEGATIVE 04/13/2021    COLORU YELLOW 04/13/2021    PHUR 6.5 04/13/2021    WBCUA 5-9 04/13/2021    RBCUA 3-5 04/13/2021    YEAST NONE SEEN 04/13/2021    BACTERIA FEW 04/13/2021    SPECGRAV 1.030 01/30/2013    LEUKOCYTESUR NEGATIVE 04/13/2021    UROBILINOGEN 1.0 04/13/2021    BILIRUBINUR NEGATIVE 04/13/2021    BLOODU TRACE 04/13/2021    GLUCOSEU NEGATIVE 04/13/2021    KETUA NEGATIVE 04/13/2021      Microalbumen/Creatinine ratio:  No components found for: RUCREAT    Objective:   Vitals: BP (!) 148/87 (Site: Left Upper Arm, Position: Sitting, Cuff Size: Large Adult)   Pulse 81   Wt 201 lb 9.6 oz (91.4 kg)   LMP 01/20/2013   SpO2 94%   BMI 33.55 kg/m²      Constitutional:  Alert, awake, no apparent distress  Skin:normal with no rash or any lesions  HEENT:Pupils are reactive . Throat is clear. Oral mucosa is moist.  Neck:supple with no thyromegaly or bruit   Cardiovascular:  S1, S2 without murmur   Respiratory:  Clear to auscultation with no wheezes or rales  Abdomen: +bowel sound, soft, non tender and no bruit  Ext: No LE edema  Musculoskeletal:Intact  Neuro:Alert, awake and oriented with no obvious focal deficit. Speech is normal.**    Electronically signed by Elza Lam MD on 6/21/2021 at 8:48 AM   **This report has been created using voice recognition software. It maycontain minor  errors which are inherent in voice recognition technology. **

## 2021-06-28 ENCOUNTER — HOSPITAL ENCOUNTER (OUTPATIENT)
Age: 46
Discharge: HOME | End: 2021-06-28
Payer: COMMERCIAL

## 2021-06-28 DIAGNOSIS — I10 ESSENTIAL HYPERTENSION: ICD-10-CM

## 2021-06-28 LAB
ANION GAP SERPL CALCULATED.3IONS-SCNC: 10 MEQ/L (ref 8–16)
BUN BLDV-MCNC: 6 MG/DL (ref 7–22)
CALCIUM SERPL-MCNC: 9.4 MG/DL (ref 8.5–10.5)
CHLORIDE BLD-SCNC: 103 MEQ/L (ref 98–111)
CO2: 25 MEQ/L (ref 23–33)
CREAT SERPL-MCNC: 0.6 MG/DL (ref 0.4–1.2)
GFR SERPL CREATININE-BSD FRML MDRD: > 90 ML/MIN/1.73M2
GLUCOSE BLD-MCNC: 142 MG/DL (ref 70–108)
MAGNESIUM: 2 MG/DL (ref 1.6–2.4)
POTASSIUM SERPL-SCNC: 3.9 MEQ/L (ref 3.5–5.2)
SODIUM BLD-SCNC: 138 MEQ/L (ref 135–145)

## 2021-06-28 PROCEDURE — 80048 BASIC METABOLIC PNL TOTAL CA: CPT

## 2021-06-28 PROCEDURE — 83735 ASSAY OF MAGNESIUM: CPT

## 2021-06-28 PROCEDURE — 36415 COLL VENOUS BLD VENIPUNCTURE: CPT

## 2021-07-13 RX ORDER — POTASSIUM CHLORIDE 20 MEQ/1
TABLET, EXTENDED RELEASE ORAL
Qty: 180 TABLET | Refills: 3 | Status: SHIPPED | OUTPATIENT
Start: 2021-07-13

## 2021-07-19 ENCOUNTER — HOSPITAL ENCOUNTER (OUTPATIENT)
Age: 46
Setting detail: SPECIMEN
Discharge: HOME | End: 2021-07-19
Payer: COMMERCIAL

## 2021-07-19 DIAGNOSIS — I10 ESSENTIAL HYPERTENSION: ICD-10-CM

## 2021-07-19 LAB
ANION GAP SERPL CALCULATED.3IONS-SCNC: 16 MMOL/L (ref 9–17)
BUN BLDV-MCNC: 8 MG/DL (ref 6–20)
BUN/CREAT BLD: ABNORMAL (ref 9–20)
CALCIUM SERPL-MCNC: 9.4 MG/DL (ref 8.6–10.4)
CHLORIDE BLD-SCNC: 105 MMOL/L (ref 98–107)
CO2: 19 MMOL/L (ref 20–31)
CREAT SERPL-MCNC: 0.58 MG/DL (ref 0.5–0.9)
GFR AFRICAN AMERICAN: >60 ML/MIN
GFR NON-AFRICAN AMERICAN: >60 ML/MIN
GFR SERPL CREATININE-BSD FRML MDRD: ABNORMAL ML/MIN/{1.73_M2}
GFR SERPL CREATININE-BSD FRML MDRD: ABNORMAL ML/MIN/{1.73_M2}
GLUCOSE BLD-MCNC: 98 MG/DL (ref 70–99)
MAGNESIUM: 2.3 MG/DL (ref 1.6–2.6)
POTASSIUM SERPL-SCNC: 4.1 MMOL/L (ref 3.7–5.3)
SODIUM BLD-SCNC: 140 MMOL/L (ref 135–144)

## 2021-07-26 ENCOUNTER — OFFICE VISIT (OUTPATIENT)
Dept: NEPHROLOGY | Age: 46
End: 2021-07-26
Payer: COMMERCIAL

## 2021-07-26 VITALS
BODY MASS INDEX: 33.58 KG/M2 | HEART RATE: 83 BPM | DIASTOLIC BLOOD PRESSURE: 89 MMHG | SYSTOLIC BLOOD PRESSURE: 156 MMHG | OXYGEN SATURATION: 97 % | TEMPERATURE: 98.2 F | WEIGHT: 201.8 LBS

## 2021-07-26 DIAGNOSIS — I10 ESSENTIAL HYPERTENSION: Primary | ICD-10-CM

## 2021-07-26 DIAGNOSIS — G44.011 INTRACTABLE EPISODIC CLUSTER HEADACHE: ICD-10-CM

## 2021-07-26 PROCEDURE — G8417 CALC BMI ABV UP PARAM F/U: HCPCS | Performed by: INTERNAL MEDICINE

## 2021-07-26 PROCEDURE — 99213 OFFICE O/P EST LOW 20 MIN: CPT | Performed by: INTERNAL MEDICINE

## 2021-07-26 PROCEDURE — G8427 DOCREV CUR MEDS BY ELIG CLIN: HCPCS | Performed by: INTERNAL MEDICINE

## 2021-07-26 PROCEDURE — 1036F TOBACCO NON-USER: CPT | Performed by: INTERNAL MEDICINE

## 2021-07-26 RX ORDER — CLONIDINE HYDROCHLORIDE 0.1 MG/1
0.1 TABLET ORAL 2 TIMES DAILY
Qty: 60 TABLET | Refills: 3 | Status: SHIPPED | OUTPATIENT
Start: 2021-07-26 | End: 2021-12-13

## 2021-07-26 NOTE — PATIENT INSTRUCTIONS
Please take the new clonidine 0.1 mg when the top blood pressure number called systolic is above 284 or the bottom number, diastolic is above 489. You can take that up to 4 times a day.

## 2021-07-26 NOTE — PROGRESS NOTES
 spironolactone (ALDACTONE) 25 MG tablet Take 4 tablets by mouth 2 times daily 180 tablet 2    amLODIPine (NORVASC) 10 MG tablet Take 10 mg by mouth daily      magnesium oxide (MAG-OX) 400 MG tablet Take 400 mg by mouth daily      losartan (COZAAR) 100 MG tablet Take 100 mg by mouth daily      cloNIDine (CATAPRES) 0.3 MG tablet Take 0.3 mg by mouth 4 times daily       omeprazole (PRILOSEC) 20 MG delayed release capsule Take 20 mg by mouth daily      metFORMIN (GLUCOPHAGE-XR) 500 MG extended release tablet Take 500 mg by mouth daily (with breakfast) Does not take everyday      vitamin D (ERGOCALCIFEROL) 15130 units CAPS capsule Take 50,000 Units by mouth once a week      atorvastatin (LIPITOR) 20 MG tablet TAKE 1 TABLET BY MOUTH DAILY (Patient taking differently: Take 20 mg by mouth nightly ) 30 tablet 8    metoprolol (LOPRESSOR) 100 MG tablet Take 1 tablet by mouth 2 times daily 60 tablet 11     No current facility-administered medications for this visit.        Lab Results:    CBC:   Lab Results   Component Value Date    WBC 11.6 (H) 04/12/2021    HGB 11.2 (L) 04/12/2021    HCT 35.8 (L) 04/12/2021    MCV 86.7 04/12/2021     04/12/2021     BMP:    Lab Results   Component Value Date     07/19/2021     06/28/2021     05/17/2021    K 4.1 07/19/2021    K 3.9 06/28/2021    K 3.8 05/17/2021     07/19/2021     06/28/2021     05/17/2021    CO2 19 (L) 07/19/2021    CO2 25 06/28/2021    CO2 26 05/17/2021    BUN 8 07/19/2021    BUN 6 (L) 06/28/2021    BUN 8 05/17/2021    CREATININE 0.58 07/19/2021    CREATININE 0.6 06/28/2021    CREATININE 0.7 05/17/2021    GLUCOSE 98 07/19/2021    GLUCOSE 142 (H) 06/28/2021    GLUCOSE 140 (H) 05/17/2021      Hepatic:   Lab Results   Component Value Date    AST 10 04/12/2021    AST 9 04/02/2021    AST 11 02/25/2021    ALT 9 (L) 04/12/2021    ALT 7 (L) 04/02/2021    ALT 8 02/25/2021    BILITOT <0.2 (L) 04/12/2021    BILITOT 0.2 (L) 04/02/2021    BILITOT 0.21 (L) 02/25/2021    ALKPHOS 78 04/12/2021    ALKPHOS 98 04/02/2021    ALKPHOS 86 02/25/2021     BNP: No results found for: BNP  Lipids:   Lab Results   Component Value Date    CHOL 160 10/28/2020    HDL 45 10/28/2020     INR:   Lab Results   Component Value Date    INR 0.93 03/31/2021    INR 1.15 (H) 09/16/2019    INR 1.00 04/02/2013     URINE:   Lab Results   Component Value Date    NAUR 51 02/11/2015    PROTUR 8.0 02/14/2015     Lab Results   Component Value Date    NITRU NEGATIVE 04/13/2021    COLORU YELLOW 04/13/2021    PHUR 6.5 04/13/2021    WBCUA 5-9 04/13/2021    RBCUA 3-5 04/13/2021    YEAST NONE SEEN 04/13/2021    BACTERIA FEW 04/13/2021    SPECGRAV 1.030 01/30/2013    LEUKOCYTESUR NEGATIVE 04/13/2021    UROBILINOGEN 1.0 04/13/2021    BILIRUBINUR NEGATIVE 04/13/2021    BLOODU TRACE 04/13/2021    GLUCOSEU NEGATIVE 04/13/2021    KETUA NEGATIVE 04/13/2021      Microalbumen/Creatinine ratio:  No components found for: RUCREAT    Objective:   Vitals: BP (!) 156/89 (Site: Right Upper Arm, Position: Sitting, Cuff Size: Large Adult)   Pulse 83   Temp 98.2 °F (36.8 °C)   Wt 201 lb 12.8 oz (91.5 kg)   LMP 01/20/2013   SpO2 97%   BMI 33.58 kg/m²      Constitutional:  Alert, awake, no apparent distress  Skin:normal with no rash or any lesions  HEENT:Pupils are reactive . Throat is clear. Oral mucosa is moist.  Neck:supple with no thyromegaly or bruit   Cardiovascular:  S1, S2 without murmur   Respiratory:  Clear to auscultation with no wheezes or rales  Abdomen: +bowel sound, soft, non tender and no bruit  Ext: NoLE edema  Musculoskeletal:Intact  Neuro:Alert, awake and oriented with no obvious focal deficit. Speech is normal.  Electronically signed by Evie Silverio MD on 7/26/2021 at 10:26 AM   **This report has been created using voice recognition software. It maycontain minor  errors which are inherent in voice recognition technology. **

## 2021-09-13 ENCOUNTER — HOSPITAL ENCOUNTER (EMERGENCY)
Age: 46
Discharge: HOME | End: 2021-09-13
Payer: COMMERCIAL

## 2021-09-13 ENCOUNTER — APPOINTMENT (OUTPATIENT)
Dept: GENERAL RADIOLOGY | Age: 46
End: 2021-09-13
Payer: COMMERCIAL

## 2021-09-13 VITALS
HEART RATE: 84 BPM | TEMPERATURE: 98.1 F | RESPIRATION RATE: 18 BRPM | DIASTOLIC BLOOD PRESSURE: 98 MMHG | OXYGEN SATURATION: 98 % | SYSTOLIC BLOOD PRESSURE: 171 MMHG

## 2021-09-13 DIAGNOSIS — S62.001A CLOSED NONDISPLACED FRACTURE OF SCAPHOID OF RIGHT WRIST, UNSPECIFIED PORTION OF SCAPHOID, INITIAL ENCOUNTER: Primary | ICD-10-CM

## 2021-09-13 DIAGNOSIS — S50.11XA CONTUSION OF RIGHT FOREARM, INITIAL ENCOUNTER: ICD-10-CM

## 2021-09-13 PROCEDURE — 73130 X-RAY EXAM OF HAND: CPT

## 2021-09-13 PROCEDURE — 99282 EMERGENCY DEPT VISIT SF MDM: CPT

## 2021-09-13 PROCEDURE — 73090 X-RAY EXAM OF FOREARM: CPT

## 2021-09-13 RX ORDER — CHLORTHALIDONE 25 MG/1
TABLET ORAL
Qty: 60 TABLET | Refills: 2 | Status: SHIPPED | OUTPATIENT
Start: 2021-09-13 | End: 2021-12-07

## 2021-09-13 ASSESSMENT — PAIN SCALES - GENERAL: PAINLEVEL_OUTOF10: 8

## 2021-09-13 ASSESSMENT — PAIN DESCRIPTION - FREQUENCY: FREQUENCY: CONTINUOUS

## 2021-09-13 ASSESSMENT — PAIN DESCRIPTION - PAIN TYPE: TYPE: ACUTE PAIN
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
## 2021-09-16 ASSESSMENT — ENCOUNTER SYMPTOMS
VOMITING: 0
CHEST TIGHTNESS: 0
ABDOMINAL PAIN: 0
EYE REDNESS: 0
BACK PAIN: 0
NAUSEA: 0
COUGH: 0
RHINORRHEA: 0

## 2021-09-16 NOTE — ED PROVIDER NOTES
Disp-60 tablet, R-2This prescription was filled on 8/23/2021. Any refills authorized will be placed on file. Normal      !! cloNIDine (CATAPRES) 0.1 MG tablet Take 1 tablet by mouth 2 times daily, Disp-60 tablet, R-3Take the medicine as needed for systolic blood pressure greater than 474 or diastolic greater than 674. Systolic is the top number diastolic is bottom numberNormal      potassium chloride (KLOR-CON M) 20 MEQ extended release tablet TAKE 1 TABLET BY MOUTH TWICE A DAY, Disp-180 tablet, R-3This prescription was filled on 7/6/2021. Any refills authorized will be placed on file. Normal      spironolactone (ALDACTONE) 25 MG tablet Take 4 tablets by mouth 2 times daily, Disp-180 tablet, R-2Normal      amLODIPine (NORVASC) 10 MG tablet Take 10 mg by mouth dailyHistorical Med      magnesium oxide (MAG-OX) 400 MG tablet Take 400 mg by mouth dailyHistorical Med      losartan (COZAAR) 100 MG tablet Take 100 mg by mouth dailyHistorical Med      !! cloNIDine (CATAPRES) 0.3 MG tablet Take 0.3 mg by mouth 4 times daily Historical Med      omeprazole (PRILOSEC) 20 MG delayed release capsule Take 20 mg by mouth dailyHistorical Med      metFORMIN (GLUCOPHAGE-XR) 500 MG extended release tablet Take 500 mg by mouth daily (with breakfast) Does not take everydayHistorical Med      vitamin D (ERGOCALCIFEROL) 45704 units CAPS capsule Take 50,000 Units by mouth once a weekHistorical Med      atorvastatin (LIPITOR) 20 MG tablet TAKE 1 TABLET BY MOUTH DAILY, Disp-30 tablet, R-8Normal      metoprolol (LOPRESSOR) 100 MG tablet Take 1 tablet by mouth 2 times daily, Disp-60 tablet, R-11       !! - Potential duplicate medications found. Please discuss with provider. ALLERGIES     is allergic to latex, bactrim [sulfamethoxazole-trimethoprim], penicillins, clindamycin/lincomycin, ciprofloxacin, doxycycline, and lisinopril. FAMILY HISTORY     She indicated that her mother is alive. She indicated that her father is alive.  She indicated that both of her sisters are alive. She indicated that the status of her maternal cousin is unknown.   family history includes Arthritis in her mother; Breast Cancer in her maternal cousin; Heart Disease in her mother; High Blood Pressure in her mother and sister. SOCIAL HISTORY       Social History     Socioeconomic History    Marital status: Single     Spouse name: Not on file    Number of children: 3    Years of education: Not on file    Highest education level: Not on file   Occupational History    Not on file   Tobacco Use    Smoking status: Never Smoker    Smokeless tobacco: Never Used   Vaping Use    Vaping Use: Never used   Substance and Sexual Activity    Alcohol use: No    Drug use: No    Sexual activity: Yes     Partners: Male   Other Topics Concern    Not on file   Social History Narrative    ** Merged History Encounter **          Social Determinants of Health     Financial Resource Strain:     Difficulty of Paying Living Expenses:    Food Insecurity:     Worried About Running Out of Food in the Last Year:     Ran Out of Food in the Last Year:    Transportation Needs:     Lack of Transportation (Medical):  Lack of Transportation (Non-Medical):    Physical Activity:     Days of Exercise per Week:     Minutes of Exercise per Session:    Stress:     Feeling of Stress :    Social Connections:     Frequency of Communication with Friends and Family:     Frequency of Social Gatherings with Friends and Family:     Attends Denominational Services:     Active Member of Clubs or Organizations:     Attends Club or Organization Meetings:     Marital Status:    Intimate Partner Violence:     Fear of Current or Ex-Partner:     Emotionally Abused:     Physically Abused:     Sexually Abused:        PHYSICAL EXAM     INITIAL VITALS:  oral temperature is 98.1 °F (36.7 °C). Her blood pressure is 171/98 (abnormal) and her pulse is 84.  Her respiration is 18 and oxygen saturation is 98%.    Physical Exam  Constitutional:       General: She is not in acute distress. Appearance: She is well-developed. She is not diaphoretic. HENT:      Head: Normocephalic and atraumatic. Nose: Nose normal.      Mouth/Throat:      Mouth: Mucous membranes are moist.      Pharynx: Oropharynx is clear. Eyes:      Conjunctiva/sclera: Conjunctivae normal.   Cardiovascular:      Pulses: Normal pulses. Pulmonary:      Effort: Pulmonary effort is normal.   Musculoskeletal:         General: Swelling, tenderness and signs of injury present. No deformity. Right forearm: Swelling and bony tenderness present. No deformity, lacerations or tenderness. Right wrist: Swelling, tenderness, bony tenderness and snuff box tenderness present. No deformity. Decreased range of motion. Normal pulse. Arms:       Cervical back: Normal range of motion. Skin:     General: Skin is warm and dry. Capillary Refill: Capillary refill takes less than 2 seconds. Neurological:      General: No focal deficit present. Mental Status: She is alert and oriented to person, place, and time. Psychiatric:         Mood and Affect: Mood normal.         Behavior: Behavior normal.         DIFFERENTIAL DIAGNOSIS:   Sprain, strain, fracture, dislocation      DIAGNOSTIC RESULTS     EKG: All EKG's are interpreted by the Emergency Department Physician who eithersigns or Co-signs this chart in the absence of a cardiologist.        RADIOLOGY: non-plainfilm images(s) such as CT, Ultrasound and MRI are read by the radiologist.  Plain radiographic images are visualized and preliminarily interpreted by the emergency physician unless otherwise stated below. XR HAND RIGHT (MIN 3 VIEWS)   Final Result         1. Mild degenerative change. 2. Soft tissue swelling over the dorsum of the hand. 3. No displaced fracture.    4. If the patient has persistent symptoms, repeat plain radiographs in 7-10 days time would be helpful for follow-up. **This report has been created using voice recognition software. It may contain minor errors which are inherent in voice recognition technology. **      Final report electronically signed by DR Ursula Coffey on 9/13/2021 4:44 PM      XR RADIUS ULNA RIGHT (2 VIEWS)   Final Result    No fracture. **This report has been created using voice recognition software. It may contain minor errors which are inherent in voice recognition technology. **      Final report electronically signed by DR Ursula Coffey on 9/13/2021 4:47 PM            LABS:   Labs Reviewed - No data to display    EMERGENCY DEPARTMENT COURSE:   Vitals:    Vitals:    09/13/21 1613   BP: (!) 171/98   Pulse: 84   Resp: 18   Temp: 98.1 °F (36.7 °C)   TempSrc: Oral   SpO2: 98%                                  MDM    Patient was seen in the ER for right forearm swelling and pain after an injury. Xrays do not show a conclusive fx. She does have swelling of the forearm and +snuffbox tenderness. Patient is placed in a thumb spica splint and instructed to ice and elevate. She is to follow up with [facility] as directed. Medications - No data to display      Patient was seen independently by myself. The patient's final impression and disposition and plan was determined by myself. Strict return precautions and follow up instructions were discussed with the patient prior to discharge, with which the patient agrees. Physical assessment findings, diagnostic testing(s) if applicable, and vital signs reviewed with patient/patient representative. Questions answered. Medications asdirected, including OTC medications for supportive care. Education provided on medications. Differential diagnosis(s) discussed with patient/patient representative. Home care/self care instructions reviewed withpatient/patient representative. Patient is to follow-up with family care provider in 2-3 days if no improvement.   Patient is to go to the emergency department if symptoms worsen. Patient/patient representative isaware of care plan, questions answered, verbalizes understanding and is in agreement. CRITICAL CARE:   None    CONSULTS:  None    PROCEDURES:  None    FINAL IMPRESSION     1. Closed nondisplaced fracture of scaphoid of right wrist, unspecified portion of scaphoid, initial encounter    2.  Contusion of right forearm, initial encounter          DISPOSITION/PLAN   DISPOSITION Decision To Discharge 09/13/2021 07:23:50 PM      PATIENT REFERREDTO:  [address] [place] [address]  [phone]  Go in 1 day  @ 12:45 pm, For follow up      DISCHARGE MEDICATIONS:  Discharge Medication List as of 9/13/2021  7:16 PM          (Please note that portions of this note were completed with a voice recognition program.  Efforts were made to edit the dictations but occasionally words are mis-transcribed.)         JUAN Becker - UJAN Vann CNP  09/16/21 0540

## 2021-09-20 RX ORDER — SPIRONOLACTONE 100 MG/1
100 TABLET, FILM COATED ORAL 2 TIMES DAILY
COMMUNITY
End: 2021-11-05

## 2021-09-20 RX ORDER — SPIRONOLACTONE 100 MG/1
100 TABLET, FILM COATED ORAL 2 TIMES DAILY
Qty: 180 TABLET | Refills: 1 | Status: ON HOLD | OUTPATIENT
Start: 2021-09-20 | End: 2022-04-28 | Stop reason: HOSPADM

## 2021-09-20 NOTE — TELEPHONE ENCOUNTER
Received refill request for spironolactone 25 mg four tablets bid. I changed the script to say 100 mg bid so it will be less pills the pt has to take. Left msg for pt.

## 2021-10-24 ENCOUNTER — HOSPITAL ENCOUNTER (EMERGENCY)
Age: 46
Discharge: HOME | End: 2021-10-24
Attending: EMERGENCY MEDICINE
Payer: COMMERCIAL

## 2021-10-24 ENCOUNTER — APPOINTMENT (OUTPATIENT)
Dept: GENERAL RADIOLOGY | Age: 46
End: 2021-10-24
Payer: COMMERCIAL

## 2021-10-24 VITALS
RESPIRATION RATE: 18 BRPM | OXYGEN SATURATION: 97 % | DIASTOLIC BLOOD PRESSURE: 100 MMHG | SYSTOLIC BLOOD PRESSURE: 192 MMHG | HEART RATE: 126 BPM | BODY MASS INDEX: 33.15 KG/M2 | HEIGHT: 65 IN | TEMPERATURE: 99 F | WEIGHT: 199 LBS

## 2021-10-24 DIAGNOSIS — S60.221A CONTUSION OF RIGHT HAND, INITIAL ENCOUNTER: Primary | ICD-10-CM

## 2021-10-24 PROCEDURE — 6360000002 HC RX W HCPCS: Performed by: STUDENT IN AN ORGANIZED HEALTH CARE EDUCATION/TRAINING PROGRAM

## 2021-10-24 PROCEDURE — 73110 X-RAY EXAM OF WRIST: CPT

## 2021-10-24 PROCEDURE — 99282 EMERGENCY DEPT VISIT SF MDM: CPT

## 2021-10-24 PROCEDURE — 96372 THER/PROPH/DIAG INJ SC/IM: CPT

## 2021-10-24 PROCEDURE — 73130 X-RAY EXAM OF HAND: CPT

## 2021-10-24 PROCEDURE — 29125 APPL SHORT ARM SPLINT STATIC: CPT

## 2021-10-24 RX ORDER — KETOROLAC TROMETHAMINE 30 MG/ML
30 INJECTION, SOLUTION INTRAMUSCULAR; INTRAVENOUS ONCE
Status: COMPLETED | OUTPATIENT
Start: 2021-10-24 | End: 2021-10-24

## 2021-10-24 RX ORDER — NAPROXEN 500 MG/1
500 TABLET ORAL 2 TIMES DAILY PRN
Qty: 20 TABLET | Refills: 0 | Status: SHIPPED | OUTPATIENT
Start: 2021-10-24 | End: 2021-11-05

## 2021-10-24 RX ADMIN — KETOROLAC TROMETHAMINE 30 MG: 30 INJECTION, SOLUTION INTRAMUSCULAR; INTRAVENOUS at 19:30

## 2021-10-24 ASSESSMENT — PAIN DESCRIPTION - ORIENTATION: ORIENTATION: RIGHT

## 2021-10-24 ASSESSMENT — ENCOUNTER SYMPTOMS
BACK PAIN: 0
STRIDOR: 0
SORE THROAT: 0
CHEST TIGHTNESS: 0
SHORTNESS OF BREATH: 0
COUGH: 0
WHEEZING: 0
RHINORRHEA: 0

## 2021-10-24 ASSESSMENT — PAIN DESCRIPTION - DESCRIPTORS: DESCRIPTORS: ACHING;THROBBING

## 2021-10-24 ASSESSMENT — PAIN DESCRIPTION - FREQUENCY: FREQUENCY: CONTINUOUS

## 2021-10-24 ASSESSMENT — PAIN SCALES - GENERAL: PAINLEVEL_OUTOF10: 7

## 2021-10-24 ASSESSMENT — PAIN DESCRIPTION - LOCATION: LOCATION: WRIST;HAND

## 2021-10-24 ASSESSMENT — PAIN DESCRIPTION - PAIN TYPE: TYPE: ACUTE PAIN

## 2021-10-24 NOTE — ED TRIAGE NOTES
Patient presents to ER with complaints of right hand and right wrist injury after punching a wall today.

## 2021-10-24 NOTE — ED PROVIDER NOTES
[place] ENCOUNTER          Pt Name: Debbi Davila  MRN: [id number]  [place] 1975  Date of evaluation: 10/24/2021  Treating Resident Physician: Keshav Pennington MD  Supervising Physician: Claudia Sample COMPLAINT       Chief Complaint   Patient presents with    Hand Injury     right    Wrist Injury     right     History obtained from the patient. HISTORY OF PRESENT ILLNESS    HPI  Debbi Davila is a 42 y.o. female who presents to the emergency department for evaluation of right hand injury. Patient states that she got angry, hit her right fist into a wall. Had immediate pain, has now had some swelling. Recently removed from cast for a scaphoid fracture of the same hand. States the pain is 7 out of 10, worse with movement or palpation, not associated weakness, numbness, tingling. The patient has no other acute complaints at this time. REVIEW OF SYSTEMS   Review of Systems   Constitutional: Negative. Negative for activity change, appetite change, chills, diaphoresis, fatigue, fever and unexpected weight change. HENT: Negative for congestion, rhinorrhea, sneezing and sore throat. Respiratory: Negative for cough, chest tightness, shortness of breath, wheezing and stridor. Cardiovascular: Negative for chest pain and leg swelling. Genitourinary: Negative. Musculoskeletal: Positive for joint swelling. Negative for arthralgias, back pain, gait problem, myalgias, neck pain and neck stiffness. Neurological: Negative for dizziness, tremors, syncope, weakness, light-headedness, numbness and headaches.           PAST MEDICAL AND SURGICAL HISTORY     Past Medical History:   Diagnosis Date    Diabetes mellitus ([facility] [address]     Hypertension     Unspecified cerebral artery occlusion with cerebral infarction 1995     Past Surgical History:   Procedure Laterality Date    BREAST SURGERY Left 02/10/2015    I & D Dr. Piedad Daiz CYST REMOVAL      DISSECTION GROIN N/A 9/19/2019    [facility]&D [facility]  April [facility]     total    HYSTEROSCOPY      [place] EXC SKIN BENIG 2.1-3CM TRUNK,ARM,LEG N/A 6/4/2018    EXCISION OF BACK MASS performed by Fly Choi MD at [address]   No current facility-administered medications for this encounter.     Current Outpatient Medications:     naproxen (NAPROSYN) 500 MG tablet, Take 1 tablet by mouth 2 times daily as needed for Pain, Disp: 20 tablet, Rfl: 0    spironolactone (ALDACTONE) 100 MG tablet, Take 1 tablet by mouth 2 times daily, Disp: 180 tablet, Rfl: 1    spironolactone (ALDACTONE) 100 MG tablet, Take 100 mg by mouth 2 times daily, Disp: , Rfl:     chlorthalidone (HYGROTON) 25 MG tablet, TAKE ONE TABLET BY MOUTH TWICE DAILY, Disp: 60 tablet, Rfl: 2    cloNIDine (CATAPRES) 0.1 MG tablet, Take 1 tablet by mouth 2 times daily, Disp: 60 tablet, Rfl: 3    potassium chloride (KLOR-CON M) 20 MEQ extended release tablet, TAKE 1 TABLET BY MOUTH TWICE A DAY, Disp: 180 tablet, Rfl: 3    amLODIPine (NORVASC) 10 MG tablet, Take 10 mg by mouth daily, Disp: , Rfl:     magnesium oxide (MAG-OX) 400 MG tablet, Take 400 mg by mouth daily, Disp: , Rfl:     losartan (COZAAR) 100 MG tablet, Take 100 mg by mouth daily, Disp: , Rfl:     cloNIDine (CATAPRES) 0.3 MG tablet, Take 0.3 mg by mouth 4 times daily , Disp: , Rfl:     omeprazole (PRILOSEC) 20 MG delayed release capsule, Take 20 mg by mouth daily, Disp: , Rfl:     metFORMIN (GLUCOPHAGE-XR) 500 MG extended release tablet, Take 500 mg by mouth daily (with breakfast) Does not take everyday, Disp: , Rfl:     vitamin D (ERGOCALCIFEROL) 49292 units CAPS capsule, Take 50,000 Units by mouth once a week, Disp: , Rfl:     atorvastatin (LIPITOR) 20 MG tablet, TAKE 1 TABLET BY MOUTH DAILY (Patient taking differently: Take 20 mg by mouth nightly ), Disp: 30 tablet, Rfl: 8    metoprolol (LOPRESSOR) 100 MG tablet, Take 1 tablet by mouth 2 times daily, Disp: 60 tablet, Rfl: 11      SOCIAL HISTORY     Social History     Social History Narrative    ** Merged History Encounter **          Social History     Tobacco Use    Smoking status: Never Smoker    Smokeless tobacco: Never Used   Vaping Use    Vaping Use: Never used   Substance Use Topics    Alcohol use: No    Drug use: No         ALLERGIES     Allergies   Allergen Reactions    Latex Hives    Bactrim [Sulfamethoxazole-Trimethoprim] Other (See Comments)     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 Sherine Joi Jalyn, DO    Penicillins Hives    Clindamycin/Lincomycin Rash    Ciprofloxacin Hives    Doxycycline Hives    Lisinopril      Attacked kidneys         FAMILY HISTORY     Family History   Problem Relation Age of Onset    Heart Disease Mother     High Blood Pressure Mother     Arthritis Mother     Breast Cancer Maternal Cousin         dx premenapausal    High Blood Pressure Sister          PREVIOUS RECORDS   Previous records reviewed: Medical, past surgical, medications, allergies        PHYSICAL EXAM     ED Triage Vitals [10/24/21 1809]   BP Temp Temp Source Pulse Resp SpO2 Height Weight   (!) 187/108 99 °F (37.2 °C) Oral 126 18 97 % 5' 5\" (1.651 m) 199 lb (90.3 kg)     Initial vital signs and nursing assessment reviewed and Hypertensive, tachycardic, denying any chest pain or shortness of breath. Body mass index is 33.12 kg/m². Pulsoximetry is normal per my interpretation. Additional Vital Signs:  Vitals:    10/24/21 1933   BP: (!) 192/100   Pulse:    Resp:    Temp:    SpO2:        Physical Exam  Constitutional:       Appearance: Normal appearance. HENT:      Head: Normocephalic. Right Ear: External ear normal.      Left Ear: External ear normal.      Nose: Nose normal. No congestion or rhinorrhea.       Mouth/Throat:      Mouth: Mucous membranes are moist.      Pharynx: Oropharynx is clear. Eyes:      Extraocular Movements: Extraocular movements intact. Conjunctiva/sclera: Conjunctivae normal.      Pupils: Pupils are equal, round, and reactive to light. Cardiovascular:      Rate and Rhythm: Regular rhythm. Tachycardia present. Pulses: Normal pulses. Heart sounds: Normal heart sounds. Pulmonary:      Effort: Pulmonary effort is normal. No respiratory distress. Breath sounds: Normal breath sounds. No stridor. No wheezing or rales. Chest:      Chest wall: No tenderness. Abdominal:      General: Abdomen is flat. Bowel sounds are normal.      Palpations: Abdomen is soft. Musculoskeletal:         General: Swelling, tenderness and signs of injury present. No deformity. Normal range of motion. Right lower leg: No edema. Left lower leg: No edema. Comments: Contusion and bruising of dorsum of right hand. Some area of swelling over distal second and third metacarpal region. Tenderness to palpation. Sensation, strength intact   Skin:     General: Skin is warm and dry. Capillary Refill: Capillary refill takes less than 2 seconds. Neurological:      General: No focal deficit present. Mental Status: She is alert and oriented to person, place, and time. Sensory: No sensory deficit. Motor: No weakness. MEDICAL DECISION MAKING   Initial Assessment:   3 56-year-old female, recent history of scaphoid fracture, presenting with right hand injury to same hand. Physical exam significant for scaphoid fracture of right hand, unable to determine if worse than previous. Plan:   Guzman Frames shows no acute fracture, does show widening suggestive of previous scaphoid fracture.  Placed in thumb spica   Discharged home with naproxen, OIO follow-up. ED RESULTS   Laboratory results:  Labs Reviewed - No data to display    Radiologic studies results:  XR HAND RIGHT (MIN 3 VIEWS)   Final Result   1. No acute bony abnormality. 2. Widening of the scapholunate interval is nonspecific but may suggest prior scapholunate ligament injury. 3. Mild degenerative changes      Please note that radiographic findings can lag clinical findings. A repeat examination may be needed if clinically warranted. **This report has been created using voice recognition software. It may contain minor errors which are inherent in voice recognition technology. **      Final report electronically signed by Dr Urbano Durant on 10/24/2021 7:09 PM      XR WRIST RIGHT (MIN 3 VIEWS)   Final Result   1. No acute bony abnormality. 2. Widening of the scapholunate interval is nonspecific but may suggest prior scapholunate ligament injury. **This report has been created using voice recognition software. It may contain minor errors which are inherent in voice recognition technology. **      Final report electronically signed by Dr Urbano Durant on 10/24/2021 7:06 PM          ED Medications administered this visit:   Medications   ketorolac (TORADOL) injection 30 mg (30 mg IntraMUSCular Given 10/24/21 1930)         ED COURSE         Strict return precautions and follow up instructions were discussed with the patient prior to discharge, with which the patient agrees. MEDICATION CHANGES     Discharge Medication List as of 10/24/2021  7:36 PM      START taking these medications    Details   naproxen (NAPROSYN) 500 MG tablet Take 1 tablet by mouth 2 times daily as needed for Pain, Disp-20 tablet, R-0Normal               FINAL DISPOSITION     Final diagnoses:   Contusion of right hand, initial encounter     Condition: condition: good  Dispo: Discharge to home      This transcription was electronically signed. Parts of this transcriptions may have been dictated by use of voice recognition software and electronically transcribed, and parts may have been transcribed with the assistance of an ED scribe.  The transcription may contain errors not detected in proofreading. Please refer to my supervising physician's documentation if my documentation differs.     Electronically Signed: Chapis Wilson MD, 10/24/21, 7:58 PM          Chapis Wilson MD  Resident  10/24/21 2000

## 2021-10-27 PROBLEM — I25.10 CORONARY ARTERY DISEASE INVOLVING NATIVE CORONARY ARTERY OF NATIVE HEART WITHOUT ANGINA PECTORIS: Status: ACTIVE | Noted: 2021-10-27

## 2021-11-05 ENCOUNTER — OFFICE VISIT (OUTPATIENT)
Dept: CARDIOLOGY CLINIC | Age: 46
End: 2021-11-05
Payer: COMMERCIAL

## 2021-11-05 VITALS
HEIGHT: 65 IN | WEIGHT: 199.4 LBS | SYSTOLIC BLOOD PRESSURE: 136 MMHG | DIASTOLIC BLOOD PRESSURE: 80 MMHG | BODY MASS INDEX: 33.22 KG/M2 | HEART RATE: 74 BPM

## 2021-11-05 DIAGNOSIS — I10 PRIMARY HYPERTENSION: Primary | ICD-10-CM

## 2021-11-05 DIAGNOSIS — N28.9 RENAL INSUFFICIENCY: ICD-10-CM

## 2021-11-05 PROCEDURE — G8417 CALC BMI ABV UP PARAM F/U: HCPCS | Performed by: NUCLEAR MEDICINE

## 2021-11-05 PROCEDURE — G8484 FLU IMMUNIZE NO ADMIN: HCPCS | Performed by: NUCLEAR MEDICINE

## 2021-11-05 PROCEDURE — 1036F TOBACCO NON-USER: CPT | Performed by: NUCLEAR MEDICINE

## 2021-11-05 PROCEDURE — 99213 OFFICE O/P EST LOW 20 MIN: CPT | Performed by: NUCLEAR MEDICINE

## 2021-11-05 PROCEDURE — G8427 DOCREV CUR MEDS BY ELIG CLIN: HCPCS | Performed by: NUCLEAR MEDICINE

## 2021-11-05 RX ORDER — TOPIRAMATE 25 MG/1
TABLET ORAL
COMMUNITY
Start: 2021-08-30 | End: 2022-04-26

## 2021-11-05 RX ORDER — SUMATRIPTAN 100 MG/1
TABLET, FILM COATED ORAL
COMMUNITY
Start: 2021-10-20 | End: 2022-10-18 | Stop reason: ALTCHOICE

## 2021-11-05 NOTE — PROGRESS NOTES
[id number] [facility]  [place] .  [address] [place] [id number]  Dept: [phone]  Dept Fax: [phone]  Loc: [phone]    Visit Date: 11/5/2021    Darío Pink is a 42 y.o. female who presents todayfor:  Chief Complaint   Patient presents with    6 Month Follow-Up    Hypertension     BP is stable   Better than before  Renal angiogram  No chest pain   Some here and there pains  Other wise no angina  Seems stable  No dizziness  No syncope      HPI:  HPI  Past Medical History:   Diagnosis Date    Coronary artery disease involving native coronary artery of native heart without angina pectoris 10/27/2021    Diabetes mellitus ([facility] [address]     Hypertension     Unspecified cerebral artery occlusion with cerebral infarction 1995      Past Surgical History:   Procedure Laterality Date    BREAST SURGERY Left 02/10/2015    I & D Dr. Apurva Felix N/A 9/19/2019    I&D OF PUBIC ABSCESS performed by Perla Washington MD at [address]  April 2013     total    HYSTEROSCOPY      [place] EXC SKIN BENIG 2.1-3CM TRUNK,ARM,LEG N/A 6/4/2018    EXCISION OF BACK MASS performed by Perla Washington MD at [facility] History   Problem Relation Age of Onset    Heart Disease Mother     High Blood Pressure Mother     Arthritis Mother     Breast Cancer Maternal Cousin         dx premenapausal    High Blood Pressure Sister      Social History     Tobacco Use    Smoking status: Never Smoker    Smokeless tobacco: Never Used   Substance Use Topics    Alcohol use: No      Current Outpatient Medications   Medication Sig Dispense Refill    SUMAtriptan (IMITREX) 100 MG tablet take 1 tablet by mouth at onset of headache. may repeat in 2 hours if needed      topiramate (TOPAMAX) 25 MG tablet Take by mouth      spironolactone (ALDACTONE) 100 MG tablet Take 1 tablet by mouth 2 times daily (Patient taking differently: Take 50 mg by mouth 2 times daily ) 180 tablet 1    chlorthalidone (HYGROTON) 25 MG tablet TAKE ONE TABLET BY MOUTH TWICE DAILY 60 tablet 2    cloNIDine (CATAPRES) 0.1 MG tablet Take 1 tablet by mouth 2 times daily 60 tablet 3    potassium chloride (KLOR-CON M) 20 MEQ extended release tablet TAKE 1 TABLET BY MOUTH TWICE A  tablet 3    magnesium oxide (MAG-OX) 400 MG tablet Take 400 mg by mouth daily      losartan (COZAAR) 100 MG tablet Take 100 mg by mouth daily      metFORMIN (GLUCOPHAGE-XR) 500 MG extended release tablet Take 500 mg by mouth daily (with breakfast) Does not take everyday      vitamin D (ERGOCALCIFEROL) 80775 units CAPS capsule Take 50,000 Units by mouth once a week      atorvastatin (LIPITOR) 20 MG tablet TAKE 1 TABLET BY MOUTH DAILY (Patient taking differently: Take 20 mg by mouth nightly ) 30 tablet 8     No current facility-administered medications for this visit. 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 Temdaniela Gunderson, DO    Penicillins Hives    Clindamycin/Lincomycin Rash    Ciprofloxacin Hives    Doxycycline Hives    Lisinopril      Attacked kidneys     Health Maintenance   Topic Date Due    Hepatitis C screen  Never done    HIV screen  Never done    A1C test (Diabetic or Prediabetic)  02/10/2016    Flu vaccine (1) Never done    Lipid screen  10/28/2021    Potassium monitoring  07/19/2022    Creatinine monitoring  07/19/2022    DTaP/Tdap/Td vaccine (2 - Td or Tdap) 08/06/2029    COVID-19 Vaccine  Completed    Hepatitis A vaccine  Aged Out    Hepatitis B vaccine  Aged Out    Hib vaccine  Aged Out    Meningococcal (ACWY) vaccine  Aged Out    Pneumococcal 0-64 years Vaccine  Aged Out       Subjective:  Review of Systems  General:   No fever, no chills, No fatigue or weight loss  Pulmonary:    No dyspnea, no wheezing  Cardiac:    Denies recent chest pain,   GI:     No nausea or vomiting, no abdominal pain  Neuro:    No dizziness or light headedness,   Musculoskeletal:  No recent active issues  Extremities:   No edema, no obvious claudication       Objective:  Physical Exam  /80   Pulse 74   Ht 5' 5\" (1.651 m)   Wt 199 lb 6.4 oz (90.4 kg)   LMP 01/20/2013   BMI 33.18 kg/m²   General:   Well developed, well nourished  Lungs:   Clear to auscultation  Heart:    Normal S1 S2, Slight murmur. no rubs, no gallops  Abdomen:   Soft, non tender, no organomegalies, positive bowel sounds  Extremities:   No edema, no cyanosis, good peripheral pulses  Neurological:   Awake, alert, oriented. No obvious focal deficits  Musculoskelatal:  No obvious deformities    Assessment:      Diagnosis Orders   1. Primary hypertension     2. Renal insufficiency     as above  Cardiac fair for no w      Plan:  No follow-ups on file. As above  Continue risk factor modification and medical management  Thank you for allowing me to participate in the care of your patient. Please don't hesitate to contact me regarding any further issues related to the patient care    Orders Placed:  No orders of the defined types were placed in this encounter. Medications Prescribed:  No orders of the defined types were placed in this encounter. Discussed use, benefit, and side effects of prescribed medications. All patient questions answered. Pt voicedunderstanding. Instructed to continue current medications, diet and exercise. Continue risk factor modification and medical management. Patient agreed with treatment plan. Follow up as directed.     Electronically signedby Dipti Pink MD on 11/5/2021 at 12:54 PM

## 2021-12-07 RX ORDER — CHLORTHALIDONE 25 MG/1
TABLET ORAL
Qty: 180 TABLET | Refills: 1 | Status: SHIPPED | OUTPATIENT
Start: 2021-12-07

## 2021-12-13 RX ORDER — CLONIDINE HYDROCHLORIDE 0.1 MG/1
TABLET ORAL
Qty: 60 TABLET | Refills: 3 | Status: ON HOLD | OUTPATIENT
Start: 2021-12-13 | End: 2022-04-28 | Stop reason: HOSPADM

## 2022-03-29 RX ORDER — SPIRONOLACTONE 100 MG/1
TABLET, FILM COATED ORAL
Qty: 180 TABLET | Refills: 1 | OUTPATIENT
Start: 2022-03-29

## 2022-04-26 ENCOUNTER — APPOINTMENT (OUTPATIENT)
Dept: GENERAL RADIOLOGY | Age: 47
DRG: 305 | End: 2022-04-26
Payer: COMMERCIAL

## 2022-04-26 ENCOUNTER — APPOINTMENT (OUTPATIENT)
Dept: CT IMAGING | Age: 47
DRG: 305 | End: 2022-04-26
Payer: COMMERCIAL

## 2022-04-26 ENCOUNTER — HOSPITAL ENCOUNTER (INPATIENT)
Age: 47
LOS: 2 days | Discharge: HOME | DRG: 305 | End: 2022-04-28
Attending: INTERNAL MEDICINE | Admitting: INTERNAL MEDICINE
Payer: COMMERCIAL

## 2022-04-26 DIAGNOSIS — R07.9 CHEST PAIN, UNSPECIFIED TYPE: Primary | ICD-10-CM

## 2022-04-26 DIAGNOSIS — G43.019 INTRACTABLE MIGRAINE WITHOUT AURA AND WITHOUT STATUS MIGRAINOSUS: ICD-10-CM

## 2022-04-26 DIAGNOSIS — I10 ELEVATED BLOOD PRESSURE READING WITH DIAGNOSIS OF HYPERTENSION: ICD-10-CM

## 2022-04-26 LAB
ANION GAP SERPL CALCULATED.3IONS-SCNC: 13 MEQ/L (ref 8–16)
BASOPHILS # BLD: 0.3 %
BASOPHILS ABSOLUTE: 0 THOU/MM3 (ref 0–0.1)
BUN BLDV-MCNC: 10 MG/DL (ref 7–22)
C-REACTIVE PROTEIN: 1.42 MG/DL (ref 0–1)
CALCIUM SERPL-MCNC: 9 MG/DL (ref 8.5–10.5)
CHLORIDE BLD-SCNC: 104 MEQ/L (ref 98–111)
CO2: 25 MEQ/L (ref 23–33)
CREAT SERPL-MCNC: 0.7 MG/DL (ref 0.4–1.2)
EKG ATRIAL RATE: 109 BPM
EKG P AXIS: 57 DEGREES
EKG P-R INTERVAL: 148 MS
EKG Q-T INTERVAL: 336 MS
EKG QRS DURATION: 70 MS
EKG QTC CALCULATION (BAZETT): 452 MS
EKG R AXIS: 21 DEGREES
EKG T AXIS: 24 DEGREES
EKG VENTRICULAR RATE: 109 BPM
EOSINOPHIL # BLD: 1.2 %
EOSINOPHILS ABSOLUTE: 0.1 THOU/MM3 (ref 0–0.4)
ERYTHROCYTE [DISTWIDTH] IN BLOOD BY AUTOMATED COUNT: 14.7 % (ref 11.5–14.5)
ERYTHROCYTE [DISTWIDTH] IN BLOOD BY AUTOMATED COUNT: 46.9 FL (ref 35–45)
GFR SERPL CREATININE-BSD FRML MDRD: > 90 ML/MIN/1.73M2
GLUCOSE BLD-MCNC: 114 MG/DL (ref 70–108)
GLUCOSE BLD-MCNC: 125 MG/DL (ref 70–108)
HCT VFR BLD CALC: 39.1 % (ref 37–47)
HEMOGLOBIN: 12.3 GM/DL (ref 12–16)
IMMATURE GRANS (ABS): 0.02 THOU/MM3 (ref 0–0.07)
IMMATURE GRANULOCYTES: 0.3 %
LYMPHOCYTES # BLD: 39.6 %
LYMPHOCYTES ABSOLUTE: 3.1 THOU/MM3 (ref 1–4.8)
MCH RBC QN AUTO: 27.2 PG (ref 26–33)
MCHC RBC AUTO-ENTMCNC: 31.5 GM/DL (ref 32.2–35.5)
MCV RBC AUTO: 86.5 FL (ref 81–99)
MONOCYTES # BLD: 6.4 %
MONOCYTES ABSOLUTE: 0.5 THOU/MM3 (ref 0.4–1.3)
NUCLEATED RED BLOOD CELLS: 0 /100 WBC
OSMOLALITY CALCULATION: 283.6 MOSMOL/KG (ref 275–300)
PLATELET # BLD: 326 THOU/MM3 (ref 130–400)
PMV BLD AUTO: 9.7 FL (ref 9.4–12.4)
POTASSIUM SERPL-SCNC: 3.6 MEQ/L (ref 3.5–5.2)
RBC # BLD: 4.52 MILL/MM3 (ref 4.2–5.4)
SEDIMENTATION RATE, ERYTHROCYTE: 12 MM/HR (ref 0–20)
SEG NEUTROPHILS: 52.2 %
SEGMENTED NEUTROPHILS ABSOLUTE COUNT: 4.1 THOU/MM3 (ref 1.8–7.7)
SODIUM BLD-SCNC: 142 MEQ/L (ref 135–145)
TROPONIN T: < 0.01 NG/ML
TROPONIN T: < 0.01 NG/ML
WBC # BLD: 7.8 THOU/MM3 (ref 4.8–10.8)

## 2022-04-26 PROCEDURE — 99285 EMERGENCY DEPT VISIT HI MDM: CPT

## 2022-04-26 PROCEDURE — 86140 C-REACTIVE PROTEIN: CPT

## 2022-04-26 PROCEDURE — 85025 COMPLETE CBC W/AUTO DIFF WBC: CPT

## 2022-04-26 PROCEDURE — 85651 RBC SED RATE NONAUTOMATED: CPT

## 2022-04-26 PROCEDURE — 2500000003 HC RX 250 WO HCPCS: Performed by: REGISTERED NURSE

## 2022-04-26 PROCEDURE — 71275 CT ANGIOGRAPHY CHEST: CPT

## 2022-04-26 PROCEDURE — 36415 COLL VENOUS BLD VENIPUNCTURE: CPT

## 2022-04-26 PROCEDURE — 6370000000 HC RX 637 (ALT 250 FOR IP): Performed by: REGISTERED NURSE

## 2022-04-26 PROCEDURE — 71046 X-RAY EXAM CHEST 2 VIEWS: CPT

## 2022-04-26 PROCEDURE — 84484 ASSAY OF TROPONIN QUANT: CPT

## 2022-04-26 PROCEDURE — 82948 REAGENT STRIP/BLOOD GLUCOSE: CPT

## 2022-04-26 PROCEDURE — 6360000002 HC RX W HCPCS: Performed by: REGISTERED NURSE

## 2022-04-26 PROCEDURE — 93005 ELECTROCARDIOGRAM TRACING: CPT | Performed by: INTERNAL MEDICINE

## 2022-04-26 PROCEDURE — 6360000004 HC RX CONTRAST MEDICATION: Performed by: INTERNAL MEDICINE

## 2022-04-26 PROCEDURE — 1200000000 HC SEMI PRIVATE

## 2022-04-26 PROCEDURE — 93010 ELECTROCARDIOGRAM REPORT: CPT | Performed by: INTERNAL MEDICINE

## 2022-04-26 PROCEDURE — 80048 BASIC METABOLIC PNL TOTAL CA: CPT

## 2022-04-26 PROCEDURE — 6370000000 HC RX 637 (ALT 250 FOR IP): Performed by: PHYSICIAN ASSISTANT

## 2022-04-26 PROCEDURE — 2580000003 HC RX 258: Performed by: REGISTERED NURSE

## 2022-04-26 RX ORDER — ENOXAPARIN SODIUM 100 MG/ML
1 INJECTION SUBCUTANEOUS 2 TIMES DAILY
Status: DISCONTINUED | OUTPATIENT
Start: 2022-04-26 | End: 2022-04-28 | Stop reason: HOSPADM

## 2022-04-26 RX ORDER — ATORVASTATIN CALCIUM 20 MG/1
20 TABLET, FILM COATED ORAL NIGHTLY
Status: DISCONTINUED | OUTPATIENT
Start: 2022-04-26 | End: 2022-04-28 | Stop reason: HOSPADM

## 2022-04-26 RX ORDER — DEXTROSE MONOHYDRATE 50 MG/ML
100 INJECTION, SOLUTION INTRAVENOUS PRN
Status: DISCONTINUED | OUTPATIENT
Start: 2022-04-26 | End: 2022-04-28 | Stop reason: HOSPADM

## 2022-04-26 RX ORDER — DEXTROSE MONOHYDRATE 25 G/50ML
12.5 INJECTION, SOLUTION INTRAVENOUS PRN
Status: DISCONTINUED | OUTPATIENT
Start: 2022-04-26 | End: 2022-04-26 | Stop reason: RX

## 2022-04-26 RX ORDER — OMEPRAZOLE 20 MG/1
20 CAPSULE, DELAYED RELEASE ORAL EVERY MORNING
Status: DISCONTINUED | OUTPATIENT
Start: 2022-04-27 | End: 2022-04-26 | Stop reason: CLARIF

## 2022-04-26 RX ORDER — SODIUM CHLORIDE 9 MG/ML
INJECTION, SOLUTION INTRAVENOUS CONTINUOUS
Status: DISCONTINUED | OUTPATIENT
Start: 2022-04-26 | End: 2022-04-27

## 2022-04-26 RX ORDER — LANOLIN ALCOHOL/MO/W.PET/CERES
400 CREAM (GRAM) TOPICAL DAILY
Status: DISCONTINUED | OUTPATIENT
Start: 2022-04-27 | End: 2022-04-28 | Stop reason: HOSPADM

## 2022-04-26 RX ORDER — METOPROLOL TARTRATE 50 MG/1
25 TABLET, FILM COATED ORAL 2 TIMES DAILY
Status: DISCONTINUED | OUTPATIENT
Start: 2022-04-26 | End: 2022-04-27

## 2022-04-26 RX ORDER — NITROGLYCERIN 0.4 MG/1
0.4 TABLET SUBLINGUAL EVERY 5 MIN PRN
Status: DISCONTINUED | OUTPATIENT
Start: 2022-04-26 | End: 2022-04-28 | Stop reason: HOSPADM

## 2022-04-26 RX ORDER — ATORVASTATIN CALCIUM 40 MG/1
40 TABLET, FILM COATED ORAL NIGHTLY
Status: DISCONTINUED | OUTPATIENT
Start: 2022-04-26 | End: 2022-04-26 | Stop reason: SDUPTHER

## 2022-04-26 RX ORDER — INSULIN LISPRO 100 [IU]/ML
0-3 INJECTION, SOLUTION INTRAVENOUS; SUBCUTANEOUS NIGHTLY
Status: DISCONTINUED | OUTPATIENT
Start: 2022-04-26 | End: 2022-04-28 | Stop reason: HOSPADM

## 2022-04-26 RX ORDER — ASPIRIN 81 MG/1
81 TABLET, CHEWABLE ORAL DAILY
Status: DISCONTINUED | OUTPATIENT
Start: 2022-04-27 | End: 2022-04-28 | Stop reason: HOSPADM

## 2022-04-26 RX ORDER — NICOTINE POLACRILEX 4 MG
15 LOZENGE BUCCAL PRN
Status: DISCONTINUED | OUTPATIENT
Start: 2022-04-26 | End: 2022-04-26 | Stop reason: RX

## 2022-04-26 RX ORDER — LABETALOL 20 MG/4 ML (5 MG/ML) INTRAVENOUS SYRINGE
10 ONCE
Status: COMPLETED | OUTPATIENT
Start: 2022-04-26 | End: 2022-04-26

## 2022-04-26 RX ORDER — SPIRONOLACTONE 25 MG/1
100 TABLET ORAL DAILY
Status: DISCONTINUED | OUTPATIENT
Start: 2022-04-27 | End: 2022-04-28 | Stop reason: HOSPADM

## 2022-04-26 RX ORDER — OMEPRAZOLE 20 MG/1
20 CAPSULE, DELAYED RELEASE ORAL DAILY PRN
COMMUNITY

## 2022-04-26 RX ORDER — CLONIDINE HYDROCHLORIDE 0.1 MG/1
0.1 TABLET ORAL DAILY
Status: DISCONTINUED | OUTPATIENT
Start: 2022-04-26 | End: 2022-04-27

## 2022-04-26 RX ORDER — ASPIRIN 81 MG/1
324 TABLET, CHEWABLE ORAL ONCE
Status: COMPLETED | OUTPATIENT
Start: 2022-04-26 | End: 2022-04-26

## 2022-04-26 RX ORDER — INSULIN LISPRO 100 [IU]/ML
0-6 INJECTION, SOLUTION INTRAVENOUS; SUBCUTANEOUS
Status: DISCONTINUED | OUTPATIENT
Start: 2022-04-26 | End: 2022-04-28 | Stop reason: HOSPADM

## 2022-04-26 RX ORDER — ERGOCALCIFEROL 1.25 MG/1
50000 CAPSULE ORAL WEEKLY
Status: DISCONTINUED | OUTPATIENT
Start: 2022-04-28 | End: 2022-04-28 | Stop reason: HOSPADM

## 2022-04-26 RX ORDER — ACETAMINOPHEN 325 MG/1
650 TABLET ORAL ONCE
Status: COMPLETED | OUTPATIENT
Start: 2022-04-26 | End: 2022-04-26

## 2022-04-26 RX ORDER — MORPHINE SULFATE 2 MG/ML
2 INJECTION, SOLUTION INTRAMUSCULAR; INTRAVENOUS EVERY 4 HOURS PRN
Status: DISCONTINUED | OUTPATIENT
Start: 2022-04-26 | End: 2022-04-28 | Stop reason: HOSPADM

## 2022-04-26 RX ORDER — LOSARTAN POTASSIUM 100 MG/1
100 TABLET ORAL DAILY
Status: DISCONTINUED | OUTPATIENT
Start: 2022-04-27 | End: 2022-04-28 | Stop reason: HOSPADM

## 2022-04-26 RX ORDER — CHLORTHALIDONE 25 MG/1
25 TABLET ORAL 2 TIMES DAILY
Status: DISCONTINUED | OUTPATIENT
Start: 2022-04-26 | End: 2022-04-28 | Stop reason: HOSPADM

## 2022-04-26 RX ORDER — PANTOPRAZOLE SODIUM 40 MG/1
40 TABLET, DELAYED RELEASE ORAL
Status: DISCONTINUED | OUTPATIENT
Start: 2022-04-27 | End: 2022-04-28 | Stop reason: HOSPADM

## 2022-04-26 RX ORDER — POTASSIUM CHLORIDE 20 MEQ/1
20 TABLET, EXTENDED RELEASE ORAL DAILY
Status: DISCONTINUED | OUTPATIENT
Start: 2022-04-27 | End: 2022-04-28 | Stop reason: HOSPADM

## 2022-04-26 RX ADMIN — ENOXAPARIN SODIUM 90 MG: 100 INJECTION SUBCUTANEOUS at 20:39

## 2022-04-26 RX ADMIN — SODIUM CHLORIDE: 9 INJECTION, SOLUTION INTRAVENOUS at 20:43

## 2022-04-26 RX ADMIN — CHLORTHALIDONE 25 MG: 25 TABLET ORAL at 20:40

## 2022-04-26 RX ADMIN — LABETALOL 20 MG/4 ML (5 MG/ML) INTRAVENOUS SYRINGE 10 MG: at 17:49

## 2022-04-26 RX ADMIN — LABETALOL HYDROCHLORIDE 0.5 MG/MIN: 5 INJECTION INTRAVENOUS at 20:57

## 2022-04-26 RX ADMIN — ASPIRIN 81 MG 324 MG: 81 TABLET ORAL at 15:50

## 2022-04-26 RX ADMIN — IOPAMIDOL 80 ML: 755 INJECTION, SOLUTION INTRAVENOUS at 16:58

## 2022-04-26 RX ADMIN — ACETAMINOPHEN 650 MG: 325 TABLET ORAL at 16:17

## 2022-04-26 RX ADMIN — NITROGLYCERIN 0.4 MG: 0.4 TABLET, ORALLY DISINTEGRATING SUBLINGUAL at 15:50

## 2022-04-26 RX ADMIN — CLONIDINE HYDROCHLORIDE 0.1 MG: 0.1 TABLET ORAL at 20:40

## 2022-04-26 RX ADMIN — ATORVASTATIN CALCIUM 20 MG: 20 TABLET, FILM COATED ORAL at 20:40

## 2022-04-26 ASSESSMENT — ENCOUNTER SYMPTOMS
BACK PAIN: 0
RHINORRHEA: 0
WHEEZING: 0
NAUSEA: 0
SINUS PRESSURE: 0
ANAL BLEEDING: 0
EYE PAIN: 0
CONSTIPATION: 0
SINUS PAIN: 0
COUGH: 0
DIARRHEA: 0
COLOR CHANGE: 0
SHORTNESS OF BREATH: 1
VOMITING: 0
BLOOD IN STOOL: 0
CHEST TIGHTNESS: 0
ABDOMINAL PAIN: 0
SORE THROAT: 0

## 2022-04-26 ASSESSMENT — PAIN DESCRIPTION - FREQUENCY: FREQUENCY: INTERMITTENT

## 2022-04-26 ASSESSMENT — PAIN DESCRIPTION - ONSET: ONSET: GRADUAL

## 2022-04-26 ASSESSMENT — PAIN DESCRIPTION - DESCRIPTORS: DESCRIPTORS: THROBBING

## 2022-04-26 ASSESSMENT — HEART SCORE: ECG: 0

## 2022-04-26 ASSESSMENT — PAIN SCALES - GENERAL: PAINLEVEL_OUTOF10: 6

## 2022-04-26 ASSESSMENT — PAIN DESCRIPTION - PAIN TYPE: TYPE: ACUTE PAIN

## 2022-04-26 ASSESSMENT — PAIN DESCRIPTION - LOCATION: LOCATION: HEAD

## 2022-04-26 ASSESSMENT — PAIN - FUNCTIONAL ASSESSMENT: PAIN_FUNCTIONAL_ASSESSMENT: ACTIVITIES ARE NOT PREVENTED

## 2022-04-26 NOTE — ED NOTES
Called 8A to notify that pt would be up soon, talked to Lydna Greenberg. Pt transported by cart, stable condition.       Sima Check  04/26/22 1700

## 2022-04-26 NOTE — ED NOTES
Pt resting in bed. Resp regular. Lights dimmmed. Family at side. Denies needs. Call light in reach.       Dipti Greco RN  04/26/22 2124

## 2022-04-26 NOTE — H&P
Internal Medicine Specialties  H&P  2022  4:49 PM    Patient:  Nica Fagan  YOB: 1975    MRN: [id number]   Acct:  [de-identified]     Primary Care Physician: JUAN Lang - CNP    Chief Complaint:  Chief Complaint   Patient presents with    Hypertension    Chest Pain       History of Present Illness: The patient is a 42 y.o. female with pmhx of HTN, DM2, CAD who presents with chest pain that began this afternoon at work when she was sitting at her desk. She describes the pain as sharp. The pain is worse with inspiration and with palpation. Initially the pain radiated to neck and left arm. It does not radiate anywhere right now. She received nitro with little relief in the chest pain but gave her a bad headache. She denies alcohol, tobacco, drug, contraceptive use. She denies any recent lifting of something heavy or exercising. She denies any recent travel. She does report occasional pain in BL legs when sitting for too long. No headache or blurry vision. Her BP was noted to be 202/113 on arrival to ED with little improvement with the nitro and then went back up again to 378 systolic. She did have a heart cath in 2016 that showed 30% stenosis LAD; no intervention done. She has been following with nephrology for her uncontrolled HTN and states that it has been controlled for the past 7 months on her current meds. Her trop is negative and EKG not acute. She is visibly uncomfortable. She does have a severe family history of heart disease; she had a brother who  of an MI in his 45s and other family members on her mother's side have cardiac disease.      Past Medical History:        Diagnosis Date    Coronary artery disease involving native coronary artery of native heart without angina pectoris 10/27/2021    Diabetes mellitus ([facility] [address]     Hypertension     Unspecified cerebral artery occlusion with cerebral infarction        Past Surgical History:        Procedure Laterality Date    BREAST SURGERY Left 02/10/2015    I & D Dr. Abilio Ford N/A 9/19/2019    I&D OF PUBIC ABSCESS performed by Sara Davis MD at [address]  April 2013     total    HYSTEROSCOPY      [place] EXC SKIN BENIG 2.1-3CM TRUNK,ARM,LEG N/A 6/4/2018    EXCISION OF BACK MASS performed by Sara Davis MD [address] Medications: Not in a hospital admission. Allergies:  Latex, Bactrim [sulfamethoxazole-trimethoprim], Penicillins, Clindamycin/lincomycin, Ciprofloxacin, Doxycycline, and Lisinopril    Social History:    reports that she has never smoked. She has never used smokeless tobacco. She reports that she does not drink alcohol and does not use drugs.         Family History:       Problem Relation Age of Onset    Heart Disease Mother     High Blood Pressure Mother     Arthritis Mother     Breast Cancer Maternal Cousin         dx premenapausal    High Blood Pressure Sister        Review of Systems:    General ROS: negative  Psychological ROS: negative  Hematological and Lymphatic ROS: negative  Endocrine ROS: negative  Vision:negative  ENT ROS: Denies  Respiratory ROS: mild SOB, no cough or wheezing  Cardiovascular ROS: mid sternal chest pain, worse with inspiration  Gastrointestinal ROS: no abdominal pain, change in bowel habits, or black or bloody stools  Genito-Urinary ROS: no dysuria, trouble voiding, or hematuria  Musculoskeletal ROS: BL shooting legs pains occasionally  Neurological ROS: no TIA or stroke symptoms  Dermatological ROS: negative  Weight:no change in weight  Appetite:ok      Physical Exam:    Vitals:  Patient Vitals for the past 24 hrs:   BP Temp Temp src Pulse Resp SpO2 Height Weight   04/26/22 1609 (!) 163/82 -- -- -- -- -- -- --   04/26/22 1551 (!) 187/99 -- -- 91 18 97 % -- --   04/26/22 1439 (!) 202/113 98.4 °F (36.9 °C) Oral 107 18 98 % 5' 5\" (1.651 m) 199 lb (90.3 kg)     Weight: Weight: 199 lb (90.3 kg)     24 hour intake/output:No intake or output data in the 24 hours ending 04/26/22 1649    General appearance - alert, ill appearing, and in mild distress  Eyes - pupils equal and reactive, extraocular eye movements intact  Ears - bilateral TM's and external ear canals normal  Nose - normal and patent, no erythema, discharge or polyps  Mouth - mucous membranes moist, pharynx normal without lesions  Neck - supple, no significant adenopathy  Lymphatics - no palpable lymphadenopathy, no hepatosplenomegaly  Chest - clear to auscultation, no wheezes, rales or rhonchi, symmetric air entry, tender to palpation over left chest region  Distant Breath Sounds: No  Heart - normal rate, regular rhythm, normal S1, S2, no murmurs, rubs, clicks or gallops  Abdomen - soft, nontender, nondistended, no masses or organomegaly  Obese: Yes  Neurological - alert, oriented, normal speech, no focal findings or movement disorder noted  Musculoskeletal - no joint tenderness, deformity or swelling  Extremities - peripheral pulses normal, mild peripheral edema  Skin - normal coloration and turgor, no rashes, no suspicious skin lesions noted    Review of Labs and Diagnostic Testing:    CBC:   Recent Labs     04/26/22  1441   WBC 7.8   HGB 12.3   HCT 39.1   MCV 86.5        BMP:   Recent Labs     04/26/22  1441      K 3.6      CO2 25   BUN 10   CREATININE 0.7   CALCIUM 9.0   GLUCOSE 125*     PT/INR: No results for input(s): PROTIME, INR in the last 72 hours. APTT: No results for input(s): APTT in the last 72 hours. Lipids: No results for input(s): ALKPHOS, ALT, AST, BILITOT, BILIDIR, LABALBU, AMYLASE, LIPASE in the last 72 hours. Troponin:   Recent Labs     04/26/22  1441   TROPONINT < 0.010        Imaging:  XR CHEST (2 VW)    Result Date: 4/26/2022  PROCEDURE: XR CHEST (2 VW) CLINICAL INFORMATION: Chest pain. COMPARISON: 4/2/2021 TECHNIQUE: PA and lateral views of the chest were obtained. 1. Normal chest. No acute findings.  2. The previously described nodular density in the right upper lobe on the prior study is not seen on today's study. **This report has been created using voice recognition software. It may contain minor errors which are inherent in voice recognition technology. ** Final report electronically signed by Dr. Jessy Bowman on 4/26/2022 3:30 PM        Assessment/Plan:    1. Chest pain  a. HEART score of 5  b. WELLS of 4.5--moderate risk   c. Check CTA chest to r/o PE  d. Cardiology consulted--sent message to Dr Shey benton Trend trops  f. Check ESR and CRP   g. Morphine for pain control--severe headache with nitro   h. Therapeutic Lovenox  i. Check lipid panel  j. Start metoprolol, statin, aspirin  2. Uncontrolled HTN  a. Give 1X dose of Labetalol IV now   b. Resume home BP meds  c. Cont to monitor   3. Non insulin dependent DM2  a. Hold Metformin as pt about to receive IV contrast  b. Insulin SS ordered     Assessment and plan of care discussed with supervising physician, Dr Hanna Bachelor. Electronically signed by JUAN Taylor CNP on 4/26/2022 at 4:49 PM         Copy: Primary Care Physician: JUAN Franco CNP      I have discussed the case, including pertinent history and exam findings with the NP. I have seen and examined the patient and the key elements of the encounter have been performed by me.  I agree with the assessment, plan and orders as documented by the NP.

## 2022-04-26 NOTE — ED PROVIDER NOTES
[facility] EMERGENCY DEPT      CHIEF COMPLAINT       Chief Complaint   Patient presents with    Hypertension    Chest Pain       Nurses Notes reviewed and I agree except as noted in the HPI. HISTORY OF PRESENT ILLNESS    Uzma Nicole is a 42 y.o. female who presents for chest pain. Pt states her pain began at 12:45pm today while sitting at her work desk. She states she began feeling sharp centralized chest pain with radiating dullness to her left jaw and left shoulder as well as some palpitations. Pt states she also began feeling weak and short of breath. Pt denies diaphoresis, nauesa, and vomiting. Pt also denies back pain or any increased swelling in her legs. Pt denies being sick recently. She states she has a history of hypertension as well as a \"blockage in her heart\" a few years back. She confirms a family history of multiple stents and pacemakers that have been placed in multiple family members. Patient denies possibility of pregnancy. Location/Symptom: central chest pain with radiating dullness  Timing/Onset: 2 hours ago  Context/Setting: Occurred while sitting at work  Quality: sharp and dull  Duration: intermittent  Modifying Factors: Worse with walking improved with relaxing  Severity: moderate      Echo 5/28/2021  Normal    Cardiac stress test 9/25/2019  Summary   Exercise EKG stress test is not suggestive for ischemia. poor exercise capacity   elevated BP with exertion    Cardiac catheterization 6/22/2016  Procedure Summary   Angiographically Patent Coronaries But ostial/prox LAD eccentric plaque   giving rise to 30% stenosis   edp 13 mmhg   ef 65    REVIEW OF SYSTEMS     Review of Systems   Constitutional: Negative for chills, diaphoresis, fatigue and fever. HENT: Negative for congestion, ear pain, rhinorrhea, sinus pressure, sinus pain and sore throat. Eyes: Negative for pain. Respiratory: Positive for shortness of breath. Negative for cough, chest tightness and wheezing. Cardiovascular: Positive for chest pain and palpitations. Negative for leg swelling. Gastrointestinal: Negative for abdominal pain, anal bleeding, blood in stool, constipation, diarrhea, nausea and vomiting. Genitourinary: Negative for dysuria, flank pain, hematuria and urgency. Musculoskeletal: Negative for arthralgias, back pain, joint swelling and myalgias. Skin: Negative for color change, rash and wound. Neurological: Positive for weakness. Negative for dizziness, syncope, light-headedness, numbness and headaches. Psychiatric/Behavioral: Negative for behavioral problems. PAST MEDICAL HISTORY    has a past medical history of Coronary artery disease involving native coronary artery of native heart without angina pectoris, Diabetes mellitus ([facility] [address] Hypertension, and Unspecified cerebral artery occlusion with cerebral infarction. SURGICAL HISTORY      has a past surgical history that includes cyst removal; hysteroscopy; Hysterectomy (April 2013); Breast surgery (Left, 02/10/2015); pr exc skin benig 2.1-3cm trunk,arm,leg (N/A, 6/4/2018); and DISSECTION GROIN (N/A, 9/19/2019). CURRENT MEDICATIONS       Previous Medications    ATORVASTATIN (LIPITOR) 20 MG TABLET    TAKE 1 TABLET BY MOUTH DAILY    CHLORTHALIDONE (HYGROTON) 25 MG TABLET    TAKE ONE TABLET BY MOUTH TWICE DAILY    CLONIDINE (CATAPRES) 0.1 MG TABLET    TAKE ONE TABLET BY MOUTH ONCE DAILY as needed FOR systolic FOR BLOOD PRESSURE >379 OR diastolic >52.  MAY take UP TO four times A DAY    LOSARTAN (COZAAR) 100 MG TABLET    Take 100 mg by mouth daily    MAGNESIUM OXIDE (MAG-OX) 400 MG TABLET    Take 400 mg by mouth daily    METFORMIN (GLUCOPHAGE-XR) 500 MG EXTENDED RELEASE TABLET    Take 500 mg by mouth daily (with breakfast) Does not take everyday    POTASSIUM CHLORIDE (KLOR-CON M) 20 MEQ EXTENDED RELEASE TABLET    TAKE 1 TABLET BY MOUTH TWICE A DAY    SPIRONOLACTONE (ALDACTONE) 100 MG TABLET    Take 1 tablet by mouth 2 times daily SUMATRIPTAN (IMITREX) 100 MG TABLET    take 1 tablet by mouth at onset of headache. may repeat in 2 hours if needed    TOPIRAMATE (TOPAMAX) 25 MG TABLET    Take by mouth    VITAMIN D (ERGOCALCIFEROL) 78732 UNITS CAPS CAPSULE    Take 50,000 Units by mouth once a week       ALLERGIES     is allergic to latex, bactrim [sulfamethoxazole-trimethoprim], penicillins, clindamycin/lincomycin, ciprofloxacin, doxycycline, and lisinopril. FAMILY HISTORY     She indicated that her mother is alive. She indicated that her father is alive. She indicated that both of her sisters are alive. She indicated that the status of her maternal cousin is unknown.   family history includes Arthritis in her mother; Breast Cancer in her maternal cousin; Heart Disease in her mother; High Blood Pressure in her mother and sister. SOCIAL HISTORY    reports that she has never smoked. She has never used smokeless tobacco. She reports that she does not drink alcohol and does not use drugs. PHYSICAL EXAM     INITIAL VITALS:  height is 5' 5\" (1.651 m) and weight is 199 lb (90.3 kg). Her oral temperature is 98.4 °F (36.9 °C). Her blood pressure is 163/82 (abnormal) and her pulse is 91. Her respiration is 18 and oxygen saturation is 97%. Physical Exam  Constitutional:       General: She is not in acute distress. Appearance: Normal appearance. She is well-developed. She is not ill-appearing or diaphoretic. HENT:      Head: Normocephalic and atraumatic. Right Ear: Hearing, tympanic membrane, ear canal and external ear normal.      Left Ear: Hearing, tympanic membrane, ear canal and external ear normal.      Nose: Nose normal. No congestion or rhinorrhea. Mouth/Throat:      Lips: Pink. Mouth: Mucous membranes are moist.      Pharynx: Oropharynx is clear. Eyes:      General: Lids are normal. No scleral icterus. Extraocular Movements: Extraocular movements intact.       Conjunctiva/sclera: Conjunctivae normal.      Pupils: Pupils are equal, round, and reactive to light. Neck:      Trachea: Trachea normal.   Cardiovascular:      Rate and Rhythm: Regular rhythm. Tachycardia present. Pulses: Normal pulses. Heart sounds: Normal heart sounds. No murmur heard. No friction rub. No gallop. Pulmonary:      Effort: Pulmonary effort is normal. No respiratory distress. Breath sounds: Normal breath sounds and air entry. No decreased breath sounds, wheezing, rhonchi or rales. Chest:      Chest wall: Tenderness present. Abdominal:      General: Bowel sounds are normal. There is no distension. Palpations: Abdomen is soft. Tenderness: There is no abdominal tenderness. There is no guarding or rebound. Musculoskeletal:         General: No swelling or tenderness. Normal range of motion. Cervical back: Normal range of motion and neck supple. No rigidity. Right lower leg: No edema. Left lower leg: No edema. Comments: Well perfused; movement normal as observed; no signs of DVT   Lymphadenopathy:      Cervical: No cervical adenopathy. Skin:     General: Skin is warm and dry. Capillary Refill: Capillary refill takes less than 2 seconds. Coloration: Skin is not jaundiced or pale. Findings: No erythema or rash. Neurological:      General: No focal deficit present. Mental Status: She is alert and oriented to person, place, and time. Sensory: Sensation is intact. Motor: Motor function is intact. Gait: Gait is intact. Psychiatric:         Mood and Affect: Mood normal.         Speech: Speech normal.         Behavior: Behavior is cooperative. Heart Score for chest pain patients  History:  Moderately Suspicious  ECG: Normal  Patient Age: < 39 years  *Risk factors for Atherosclerotic disease: Hypertension,Obesity,Positive family History,Coronary Artery Disease  Risk Factors: > 3 Risk factors or history of atherosclerotic disease*  Troponin: < 1X normal limit  Heart Score Total: 3    DIFFERENTIAL DIAGNOSIS:   Including but not limited to: ACS, costochondritis, anxiety, uncontrolled hypertension    DIAGNOSTIC RESULTS     EKG: All EKG's are interpreted by [facility] who either signs or Co-signs this chart in the absence of a cardiologist.  Ventricular rate 109 bpm  NE interval 148 ms  QRS duration 70 ms  QTc interval 452 ms  P-R-T axes 57, 21, 24  Sinus tachycardia. No STEMI    RADIOLOGY: non-plain film images(s) such as CT,Ultrasound and MRI are read by the radiologist.  Plain radiographic images are visualized and preliminarily interpreted by the emergency physician unless otherwise stated below. XR CHEST (2 VW)   Final Result   1. Normal chest. No acute findings. 2. The previously described nodular density in the right upper lobe on the prior study is not seen on today's study. **This report has been created using voice recognition software. It may contain minor errors which are inherent in voice recognition technology. **      Final report electronically signed by Dr. Hansa Perez on 4/26/2022 3:30 PM          LABS:   Labs Reviewed   CBC WITH AUTO DIFFERENTIAL - Abnormal; Notable for the following components:       Result Value    MCHC 31.5 (*)     RDW-CV 14.7 (*)     RDW-SD 46.9 (*)     All other components within normal limits   BASIC METABOLIC PANEL - Abnormal; Notable for the following components:    Glucose 125 (*)     All other components within normal limits   TROPONIN   ANION GAP   OSMOLALITY   GLOMERULAR FILTRATION RATE, ESTIMATED       EMERGENCY DEPARTMENT COURSE:   Vitals:    Vitals:    04/26/22 1439 04/26/22 1551 04/26/22 1609   BP: (!) 202/113 (!) 187/99 (!) 163/82   Pulse: 107 91    Resp: 18 18    Temp: 98.4 °F (36.9 °C)     TempSrc: Oral     SpO2: 98% 97%    Weight: 199 lb (90.3 kg)     Height: 5' 5\" (1.651 m)         Patient was seen in the emergency department during the global pandemic, when there was surge capacity and regional healthcare crisis. MDM:  The patient was seen by me in the emergency room for chest pain. Physical exam revealed a pleasant 79-year-old female with a tender anterior chest.    Vital signs reviewed and noted stable although hypertensive. Old records were reviewed. Appropriate laboratory and imaging studies were ordered and reviewed upon completion. Pertinent findings: No significant abnormal findings    Interventions: Nitro, aspirin, Tylenol    Reexamination: Patient remained stable with improved vital signs. Patient reported that the nitro made her pain worse. No further nitro given. Results were discussed with the patient and family as well as desire for admission and they were amenable. Lyndsey Loya NP was consulted and graciously accepted admission. The patient was admitted to the hospital in fair condition. CRITICAL CARE:   During my workup of this patient, it did become clear to me the critical nature of this patient's illness which did require my constant attention, and a critical care time 30 minutes was given    CONSULTS:  Lyndsey Loya NP (internal medicine hospitalist)    PROCEDURES:  None    FINAL IMPRESSION      1. Chest pain, unspecified type    2. Elevated blood pressure reading with diagnosis of hypertension          DISPOSITION/PLAN     1. Chest pain, unspecified type    2.  Elevated blood pressure reading with diagnosis of hypertension    Admission      (Please note that portions of this note were completed with a voice recognition program.  Efforts were made to edit the dictations but occasionally words are mis-transcribed.)    Yun Cabrales PA-C 04/26/22 4:17 PM    JANE Brooke PA-C  04/26/22 [id number]

## 2022-04-26 NOTE — ED NOTES
Pt to er. Pt c/o CP and hypertension. States pain started while sitting at her desk. States it got worse as time passed. Pt states her BP is normally in 537K systolic but higher today. Denies any dizziness or nausea. Assessment completed. Resp regular. Call light in reach.       Althea Faria RN  04/26/22 [id number]

## 2022-04-26 NOTE — PROGRESS NOTES
Pharmacy Medication History Note      List of current medications patient is taking is complete. Source of information: Patient    Changes made to medication list:  Medications removed (include reason, ex. therapy complete or physician discontinued): Topiramate - no longer taking    Medications added/doses adjusted:  Changed metformin from daily to PRN high blood sugar  Changed potassium 20 mEq from BID to daily  Changed spironolactone from 50 mg BID to 100 mg daily  Added metoprolol 25 mg BID  Added omeprazole 20 mg daily PRN     Other notes (ex. Recent course of antibiotics, Coumadin dosing):  Denies use of other OTC or herbal medications.       Allergies reviewed      Electronically signed by Katty Florence [facility] on 4/26/2022 at 4:41 PM

## 2022-04-27 ENCOUNTER — APPOINTMENT (OUTPATIENT)
Dept: NON INVASIVE DIAGNOSTICS | Age: 47
DRG: 305 | End: 2022-04-27
Payer: COMMERCIAL

## 2022-04-27 LAB
ANION GAP SERPL CALCULATED.3IONS-SCNC: 11 MEQ/L (ref 8–16)
BASOPHILS # BLD: 0.4 %
BASOPHILS ABSOLUTE: 0 THOU/MM3 (ref 0–0.1)
BUN BLDV-MCNC: 8 MG/DL (ref 7–22)
CALCIUM SERPL-MCNC: 9 MG/DL (ref 8.5–10.5)
CHLORIDE BLD-SCNC: 104 MEQ/L (ref 98–111)
CHOLESTEROL, TOTAL: 172 MG/DL (ref 100–199)
CO2: 25 MEQ/L (ref 23–33)
CREAT SERPL-MCNC: 0.6 MG/DL (ref 0.4–1.2)
EOSINOPHIL # BLD: 0.7 %
EOSINOPHILS ABSOLUTE: 0 THOU/MM3 (ref 0–0.4)
ERYTHROCYTE [DISTWIDTH] IN BLOOD BY AUTOMATED COUNT: 15 % (ref 11.5–14.5)
ERYTHROCYTE [DISTWIDTH] IN BLOOD BY AUTOMATED COUNT: 47.8 FL (ref 35–45)
GFR SERPL CREATININE-BSD FRML MDRD: > 90 ML/MIN/1.73M2
GLUCOSE BLD-MCNC: 104 MG/DL (ref 70–108)
GLUCOSE BLD-MCNC: 108 MG/DL (ref 70–108)
GLUCOSE BLD-MCNC: 111 MG/DL (ref 70–108)
GLUCOSE BLD-MCNC: 117 MG/DL (ref 70–108)
GLUCOSE BLD-MCNC: 139 MG/DL (ref 70–108)
HCT VFR BLD CALC: 37.5 % (ref 37–47)
HDLC SERPL-MCNC: 43 MG/DL
HEMOGLOBIN: 11.5 GM/DL (ref 12–16)
IMMATURE GRANS (ABS): 0.02 THOU/MM3 (ref 0–0.07)
IMMATURE GRANULOCYTES: 0.3 %
LDL CHOLESTEROL CALCULATED: 100 MG/DL
LV EF: 58 %
LVEF MODALITY: NORMAL
LYMPHOCYTES # BLD: 44.5 %
LYMPHOCYTES ABSOLUTE: 3 THOU/MM3 (ref 1–4.8)
MCH RBC QN AUTO: 27 PG (ref 26–33)
MCHC RBC AUTO-ENTMCNC: 30.7 GM/DL (ref 32.2–35.5)
MCV RBC AUTO: 88 FL (ref 81–99)
MONOCYTES # BLD: 6.3 %
MONOCYTES ABSOLUTE: 0.4 THOU/MM3 (ref 0.4–1.3)
NUCLEATED RED BLOOD CELLS: 0 /100 WBC
PLATELET # BLD: 306 THOU/MM3 (ref 130–400)
PMV BLD AUTO: 9.9 FL (ref 9.4–12.4)
POTASSIUM SERPL-SCNC: 3.7 MEQ/L (ref 3.5–5.2)
RBC # BLD: 4.26 MILL/MM3 (ref 4.2–5.4)
SEG NEUTROPHILS: 47.8 %
SEGMENTED NEUTROPHILS ABSOLUTE COUNT: 3.2 THOU/MM3 (ref 1.8–7.7)
SODIUM BLD-SCNC: 140 MEQ/L (ref 135–145)
TRIGL SERPL-MCNC: 144 MG/DL (ref 0–199)
TROPONIN T: < 0.01 NG/ML
WBC # BLD: 6.7 THOU/MM3 (ref 4.8–10.8)

## 2022-04-27 PROCEDURE — 6370000000 HC RX 637 (ALT 250 FOR IP): Performed by: REGISTERED NURSE

## 2022-04-27 PROCEDURE — 80048 BASIC METABOLIC PNL TOTAL CA: CPT

## 2022-04-27 PROCEDURE — 6370000000 HC RX 637 (ALT 250 FOR IP): Performed by: INTERNAL MEDICINE

## 2022-04-27 PROCEDURE — 1200000000 HC SEMI PRIVATE

## 2022-04-27 PROCEDURE — 99222 1ST HOSP IP/OBS MODERATE 55: CPT | Performed by: INTERNAL MEDICINE

## 2022-04-27 PROCEDURE — A9500 TC99M SESTAMIBI: HCPCS | Performed by: INTERNAL MEDICINE

## 2022-04-27 PROCEDURE — 93017 CV STRESS TEST TRACING ONLY: CPT | Performed by: INTERNAL MEDICINE

## 2022-04-27 PROCEDURE — 6360000002 HC RX W HCPCS: Performed by: REGISTERED NURSE

## 2022-04-27 PROCEDURE — 6360000002 HC RX W HCPCS

## 2022-04-27 PROCEDURE — 3430000000 HC RX DIAGNOSTIC RADIOPHARMACEUTICAL: Performed by: INTERNAL MEDICINE

## 2022-04-27 PROCEDURE — 82948 REAGENT STRIP/BLOOD GLUCOSE: CPT

## 2022-04-27 PROCEDURE — 93306 TTE W/DOPPLER COMPLETE: CPT

## 2022-04-27 PROCEDURE — 85025 COMPLETE CBC W/AUTO DIFF WBC: CPT

## 2022-04-27 PROCEDURE — 80061 LIPID PANEL: CPT

## 2022-04-27 PROCEDURE — 6360000002 HC RX W HCPCS: Performed by: INTERNAL MEDICINE

## 2022-04-27 PROCEDURE — 78452 HT MUSCLE IMAGE SPECT MULT: CPT

## 2022-04-27 PROCEDURE — 36415 COLL VENOUS BLD VENIPUNCTURE: CPT

## 2022-04-27 RX ORDER — ONDANSETRON 2 MG/ML
4 INJECTION INTRAMUSCULAR; INTRAVENOUS EVERY 6 HOURS PRN
Status: DISCONTINUED | OUTPATIENT
Start: 2022-04-27 | End: 2022-04-28 | Stop reason: HOSPADM

## 2022-04-27 RX ORDER — AMLODIPINE BESYLATE 5 MG/1
5 TABLET ORAL DAILY
Status: DISCONTINUED | OUTPATIENT
Start: 2022-04-27 | End: 2022-04-28 | Stop reason: HOSPADM

## 2022-04-27 RX ORDER — CARVEDILOL 25 MG/1
25 TABLET ORAL 2 TIMES DAILY WITH MEALS
Status: DISCONTINUED | OUTPATIENT
Start: 2022-04-27 | End: 2022-04-28 | Stop reason: HOSPADM

## 2022-04-27 RX ORDER — ACETAMINOPHEN 325 MG/1
650 TABLET ORAL EVERY 6 HOURS PRN
Status: DISCONTINUED | OUTPATIENT
Start: 2022-04-27 | End: 2022-04-28 | Stop reason: HOSPADM

## 2022-04-27 RX ADMIN — ENOXAPARIN SODIUM 90 MG: 100 INJECTION SUBCUTANEOUS at 09:53

## 2022-04-27 RX ADMIN — CHLORTHALIDONE 25 MG: 25 TABLET ORAL at 21:39

## 2022-04-27 RX ADMIN — ACETAMINOPHEN 650 MG: 325 TABLET ORAL at 10:03

## 2022-04-27 RX ADMIN — ENOXAPARIN SODIUM 90 MG: 100 INJECTION SUBCUTANEOUS at 21:39

## 2022-04-27 RX ADMIN — MORPHINE SULFATE 2 MG: 2 INJECTION, SOLUTION INTRAMUSCULAR; INTRAVENOUS at 04:24

## 2022-04-27 RX ADMIN — CARVEDILOL 25 MG: 25 TABLET, FILM COATED ORAL at 18:10

## 2022-04-27 RX ADMIN — ASPIRIN 81 MG 81 MG: 81 TABLET ORAL at 09:54

## 2022-04-27 RX ADMIN — Medication 400 MG: at 12:56

## 2022-04-27 RX ADMIN — ATORVASTATIN CALCIUM 20 MG: 20 TABLET, FILM COATED ORAL at 21:40

## 2022-04-27 RX ADMIN — POTASSIUM CHLORIDE 20 MEQ: 1500 TABLET, EXTENDED RELEASE ORAL at 09:53

## 2022-04-27 RX ADMIN — LOSARTAN POTASSIUM 100 MG: 100 TABLET, FILM COATED ORAL at 09:54

## 2022-04-27 RX ADMIN — CHLORTHALIDONE 25 MG: 25 TABLET ORAL at 09:54

## 2022-04-27 RX ADMIN — SPIRONOLACTONE 100 MG: 25 TABLET ORAL at 09:55

## 2022-04-27 RX ADMIN — Medication 29.5 MILLICURIE: at 14:19

## 2022-04-27 RX ADMIN — CLONIDINE HYDROCHLORIDE 0.1 MG: 0.1 TABLET ORAL at 09:56

## 2022-04-27 RX ADMIN — CARVEDILOL 25 MG: 25 TABLET, FILM COATED ORAL at 12:52

## 2022-04-27 RX ADMIN — Medication 8 MILLICURIE: at 13:26

## 2022-04-27 RX ADMIN — ONDANSETRON 4 MG: 2 INJECTION INTRAMUSCULAR; INTRAVENOUS at 11:24

## 2022-04-27 RX ADMIN — PANTOPRAZOLE SODIUM 40 MG: 40 TABLET, DELAYED RELEASE ORAL at 06:05

## 2022-04-27 ASSESSMENT — PAIN DESCRIPTION - LOCATION
LOCATION: HEAD
LOCATION: HEAD

## 2022-04-27 ASSESSMENT — PAIN DESCRIPTION - DESCRIPTORS
DESCRIPTORS: THROBBING
DESCRIPTORS: ACHING;DISCOMFORT

## 2022-04-27 ASSESSMENT — PAIN DESCRIPTION - ORIENTATION: ORIENTATION: MID

## 2022-04-27 ASSESSMENT — PAIN SCALES - GENERAL
PAINLEVEL_OUTOF10: 8
PAINLEVEL_OUTOF10: 7

## 2022-04-27 ASSESSMENT — PAIN - FUNCTIONAL ASSESSMENT: PAIN_FUNCTIONAL_ASSESSMENT: ACTIVITIES ARE NOT PREVENTED

## 2022-04-27 NOTE — PROGRESS NOTES
Progress note      [facility]             Patient:  Deanne Landin  YOB: 1975    MRN: [id number]   [place]:  [de-identified]   [id number]  Primary Care Physician: JUAN Heart - CNP    Admit Date: 4/26/2022           Subjective: Pt chest pain comes and goes but currently better. Has a headache she does have a hx Migraines and this headache is no worse than previous ones. Also with some nausea. She is on labetalol drip for uncontrolled high BP last night.         Objective:      Physical Exam:    Vitals:Patient Vitals for the past 24 hrs:   BP Temp Temp src Pulse Resp SpO2 Height Weight   04/27/22 1003 (!) 160/94 97.9 °F (36.6 °C) -- 80 18 -- -- --   04/27/22 0956 (!) 140/76 -- -- -- -- -- -- --   04/27/22 0600 134/82 -- -- -- -- -- -- --   04/27/22 0500 133/74 -- -- -- -- -- -- --   04/27/22 0400 (!) 156/77 -- -- -- -- -- -- --   04/27/22 0315 (!) 151/88 97.8 °F (36.6 °C) Oral -- 15 97 % -- --   04/27/22 0200 (!) 144/86 -- -- -- -- -- -- --   04/27/22 0100 136/77 -- -- -- -- -- -- --   04/26/22 2345 125/64 98 °F (36.7 °C) Oral 80 15 98 % -- --   04/26/22 2315 123/77 -- -- -- -- -- -- --   04/26/22 2245 126/79 -- -- -- -- -- -- --   04/26/22 2215 134/74 -- -- -- -- -- -- --   04/26/22 2145 137/87 -- -- -- -- -- -- --   04/26/22 2115 (!) 156/75 -- -- 77 -- -- -- --   04/26/22 2045 (!) 223/98 98.2 °F (36.8 °C) Oral 80 15 98 % -- --   04/26/22 1830 (!) 203/91 -- -- 80 -- -- -- --   04/26/22 1800 (!) 184/93 -- -- 83 -- -- -- --   04/26/22 1737 (!) 227/106 97.5 °F (36.4 °C) Oral 93 16 100 % -- 204 lb 3.2 oz (92.6 kg)   04/26/22 1609 (!) 163/82 -- -- -- -- -- -- --   04/26/22 1551 (!) 187/99 -- -- 91 18 97 % -- --   04/26/22 1439 (!) 202/113 98.4 °F (36.9 °C) Oral 107 18 98 % 5' 5\" (1.651 m) 199 lb (90.3 kg)     Weight: Weight: 204 lb 3.2 oz (92.6 kg)     24 hour intake/output:    Intake/Output Summary (Last 24 hours) at 4/27/2022 1032  Last data filed at 4/26/2022 1947  Gross per 24 hour   Intake 0 ml   Output --   Net 0 ml       General appearance - alert, well appearing, and in no distress  Eyes - pupils equal and reactive, extraocular eye movements intact  Mouth - mucous membranes moist, pharynx normal without lesions  Neck - supple, no significant adenopathy  Chest - clear to auscultation, no wheezes, rales or rhonchi, symmetric air entry  Heart - normal rate, regular rhythm, normal S1, S2, no murmurs, rubs, clicks or gallops  Abdomen -obese, soft, nontender, nondistended, no masses or organomegaly, pos bs. Neurological - alert, oriented, normal speech, no focal findings or movement disorder noted  Musculoskeletal - no joint tenderness, deformity or swelling  Extremities - peripheral pulses normal, no pedal edema, no clubbing or cyanosis  Skin - normal coloration and turgor, no rashes, no suspicious skin lesions noted    Review of Labs and Diagnostic Testing:    CBC:   Recent Labs     04/27/22  0612   WBC 6.7   HGB 11.5*   HCT 37.5   MCV 88.0        BMP:   Recent Labs     04/27/22  0612      K 3.7      CO2 25   BUN 8   CREATININE 0.6   CALCIUM 9.0   GLUCOSE 139*     PT/INR: No results for input(s): PROTIME, INR in the last 72 hours. APTT: No results for input(s): APTT in the last 72 hours. Lipids: No results for input(s): ALKPHOS, ALT, AST, BILITOT, BILIDIR, LABALBU, AMYLASE, LIPASE in the last 72 hours.   Troponin:   Recent Labs     04/26/22  2316   TROPONINT < 0.010        Imaging:  [unfilled]    EKG:      Diet: Diet NPO        Data:   Scheduled Meds: Scheduled Meds:   atorvastatin  20 mg Oral Nightly    chlorthalidone  25 mg Oral BID    cloNIDine  0.1 mg Oral Daily    losartan  100 mg Oral Daily    magnesium oxide  400 mg Oral Daily    potassium chloride  20 mEq Oral Daily    spironolactone  100 mg Oral Daily    [START ON 4/28/2022] vitamin D  50,000 Units Oral Weekly    enoxaparin  1 mg/kg SubCUTAneous BID    aspirin  81 mg Oral Daily    [Held by provider] metoprolol tartrate  25 mg Oral BID    insulin lispro  0-6 Units SubCUTAneous TID WC    insulin lispro  0-3 Units SubCUTAneous Nightly    pantoprazole  40 mg Oral QAM AC     Continuous Infusions:   sodium chloride 50 mL/hr at 04/26/22 2043    dextrose      labetalol (NORMODYNE) 1 mg/mL infusion 0.5 mg/min (04/26/22 2057)     PRN Meds:.acetaminophen, ondansetron, nitroGLYCERIN, morphine, glucagon (rDNA), dextrose, dextrose bolus (hypoglycemia) **OR** dextrose bolus (hypoglycemia), glucose  Continuous Infusions:   sodium chloride 50 mL/hr at 04/26/22 2043    dextrose      labetalol (NORMODYNE) 1 mg/mL infusion 0.5 mg/min (04/26/22 2057)         Assessment/Plan   1. Chest pain  a. Trops negative  b. CTA negative for PE or dissection  c. Cardiology following--for stress test today  d. Morphine for pain control--severe headache with nitro   e. Therapeutic Lovenox  f. Lipid panel WNL  g. Cont metoprolol, statin, aspirin  2. Uncontrolled HTN  a. On Labetalol drip; wean as able  b. Discussed with cardiology--will stop Lopressor and switch to Coreg, start Norvasc, Cont Cozaar and Chlorthalidone and Aldactone, stop Clonidine   c. Stop Labetalol drip 30 minutes after starting above medications   d. Cont to monitor   3. Non insulin dependent DM2  a. Hold Metformin as pt received IV contrast; d/c IVF  b. Insulin SS ordered       Assessment and plan of care discussed with supervising physician, Dr Jessica Kelley. Electronically signed by Trudee Seip, APRN - CNP on 4/27/2022 at 10:32 AM    I have discussed the case, including pertinent history and exam findings with the NP. I have seen and examined the patient and the key elements of the encounter have been performed by me. I agree with the assessment, plan and orders as documented by the NP.     Electronically signed by Cyndee Varela MD on 4/27/2022 at 2:57 PM

## 2022-04-27 NOTE — CARE COORDINATION
4/27/22, 7:09 AM EDT  DISCHARGE PLANNING EVALUATION:    Christopher Celis       Admitted: 4/26/2022/ [address] [place] [address] day: 1   Location: -18/018-A Reason for admit: Chest pain [R07.9]  Chest pain, unspecified type [R07.9]  Elevated blood pressure reading with diagnosis of hypertension [I10]   PMH:  has a past medical history of Coronary artery disease involving native coronary artery of native heart without angina pectoris, Diabetes mellitus ([address] Hypertension, and Unspecified cerebral artery occlusion with cerebral infarction. Procedure: No.  Barriers to Discharge: To ER with CP and Hypertension. Trops neg. IVF at 50/hr. Echo ordered. Upon admission /113. This am 140/76. Labetalol gtt. PCP: JUAN Hinojosa CNP  Readmission Risk Score: 6.5 ( )%    Patient Goals/Plan/Treatment Preferences: Met with Yulissa this morning. Her mother is present. She resides with her children and childrens father. She is self sufficient. She works and drives. No issues obtaining meds. She has a PCP. She uses no home services or DME. Transportation/Food Security/Housekeeping Addressed:  No issues identified.

## 2022-04-27 NOTE — CARE COORDINATION
04/27/22 1013   Service Assessment   Patient Orientation Alert and Oriented   Cognition Alert   History Provided By Patient   Primary [address] Family Members   Patient's Healthcare Decision Maker is:   Aubrie Ba)   PCP Verified by CM Yes   Prior Functional Level Independent in ADLs/IADLs   Social/Functional History   Active  Yes   Discharge Planning   Type of [address]  (Children and children's father.)   Current Services Prior To Admission None   Potential Assistance Purchasing Medications No   Type of Home Care Services None   Patient expects to be discharged to: [facility]

## 2022-04-27 NOTE — CONSULTS
[facility] [address] [place] [address]  Cardiology Consult      Patient:  Michelle Zazueta  YOB: 1975    MRN: [id number]   Acct: [de-identified]     Primary Care Physician: [facility], APRN - CNP    REASON FOR CONSULT:    chest pain. CHIEF COMPLAINT:    Chest pain     HISTORY OF PRESENT ILLNESS:    Michelle Zazueta is a [address] female patient with past medical history that includes:   Past Medical History:   Diagnosis Date    Coronary artery disease involving native coronary artery of native heart without angina pectoris 10/27/2021    Diabetes mellitus ([facility] [address]     Hypertension     Unspecified cerebral artery occlusion with cerebral infarction    Her family history is significant for CAD. Her brother  from MI in his 45s. LHC in 2016 was c/w nonobstructive CAD, including a 30% LAD stenosis. Stress test in 2019 was negative for ischemia. The patient was admitted to the hospital on 2022. She presented to [facility] ER with sudden onset, sharp, retrosternal chest pain with radiation to shoulder and jaw associated with SOB. She also had some palpitations, fatigue. Patient denies orthopnea, paroxysmal nocturnal dyspnea, palpitations, dizziness, syncope, recent weight gain or leg swelling. She reports being compliant with her medications. Upon arrival to ER, she was noted to blood pressure of 202/113. She was started on Labetalol gtt, BP improved. Chest CTA was negative for PE.      All labs, EKG's, diagnostic testing and images as well as cardiac cath, stress testing   were reviewed during this encounter    CARDIAC TESTING  Echo:   ECHO: Results for orders placed during the hospital encounter of 21    Echocardiogram 2D/ M-Mode/ Colorflow/ Do    Narrative  Transthoracic Echocardiography Report (TTE)    Demographics    Patient Name  Gloria Eli  Gender             Female  M    MR #          [id number]    Race               Black    Ethnicity    Account #     [de-identified]    Room Number    Accession     [id number]   Date of Study      05/28/2021  Number    Date of Birth 1975   Referring          Cari Chaparro MD  Physician          Birgit Alfonso CNP    Age           39 year(s)   Sonographer        STARR Barry, RDCS,  RDMS, RVT    Interpreting       Manjinder Davidson MD  Physician    Procedure    Type of Study    TTE procedure:ECHOCARDIOGRAM COMPLETE 2D W DOPPLER W COLOR. Procedure Date  Date: 05/28/2021 Start: 02:36 PM    Study Location: Echo Lab  Technical Quality: Adequate visualization    Indications:Hypertension. Additional Medical History:acute kidney injury, hypertension, MRSA,  gastroesophageal reflux disease    Patient Status: Routine    Height: 65 inches Weight: 206 pounds BSA: 2 m^2 BMI: 34.28 kg/m^2    BP: 176/104 mmHg    Allergies  - No Known Allergies. Conclusions    Summary  Ejection fraction is visually estimated at 60%. Overall left ventricular function is normal.    Signature    ----------------------------------------------------------------  Electronically signed by Manjinder Davidson MD (Interpreting  physician) on 05/28/2021 at 03:39 PM  ----------------------------------------------------------------    Findings    Mitral Valve  The mitral valve structure was normal with normal leaflet separation. DOPPLER: The transmitral velocity was within the normal range with no  evidence for mitral stenosis. There was no evidence of mitral  regurgitation. Aortic Valve  The aortic valve was trileaflet with normal thickness and cuspal  separation. DOPPLER: Transaortic velocity was within the normal range with  no evidence of aortic stenosis. There was no evidence of aortic  regurgitation. Tricuspid Valve  The tricuspid valve structure was normal with normal leaflet separation. DOPPLER: There was no evidence of tricuspid stenosis. There was no  evidence of tricuspid regurgitation. Pulmonic Valve  The pulmonic valve was not well visualized .   Trivial pulmonic regurgitation visualized. Left Atrium  Left atrial size was normal.    Left Ventricle  Ejection fraction is visually estimated at 60%. Overall left ventricular function is normal.    Right Atrium  Right atrial size was normal.    Right Ventricle  The right ventricular size was normal with normal systolic function and  wall thickness. Pericardial Effusion  The pericardium was normal in appearance with no evidence of a pericardial  effusion. Pleural Effusion  No evidence of pleural effusion. Aorta / Great Vessels  -Aortic root dimension within normal limits.  -The Pulmonary artery is within normal limits. -IVC size is within normal limits with normal respiratory phasic changes.     M-Mode/2D Measurements & Calculations    LV Diastolic   LV Systolic Dimension:    AV Cusp Separation: 1.7 cmLA  Dimension: 4.1 2.9 cm                    Dimension: 3.2 cmAO Root  cm             LV Volume Diastolic: 80.4 Dimension: 2.5 cmLA Area: 18.4  LV FS:29.3 %   ml                        cm^2  LV PW          LV Volume Systolic: 76.8  Diastolic: 1.3 ml  cm             LV EDV/LV EDV Index: 74.2  Septum         ml/37 m^2LV ESV/LV ESV    RV Diastolic Dimension: 1.9 cm  Diastolic: 1   Index: 13.0 ml/16 m^2  cm             EF Calculated: 56.6 %     LA/Aorta: 1.28    LA volume/Index: 57.5 ml /29m^2    Doppler Measurements & Calculations    MV Peak E-Wave: 90.7 cm/s AV Peak Velocity: 121 LVOT Peak Velocity: 84.4  MV Peak A-Wave: 78.6 cm/s cm/s                  cm/s  MV E/A Ratio: 1.15        AV Peak Gradient:     LVOT Peak Gradient: 3 mmHg  MV Peak Gradient: 3.29    5.86 mmHg  mmHg                                            TV Peak E-Wave: 67.4 cm/s  TV Peak A-Wave: 41.2 cm/s  MV Deceleration Time: 211  msec                                            TV Peak Gradient: 1.82  IVRT: 70 msec         mmHg  TR Velocity:254 cm/s  MV E' Septal Velocity:                          TR Gradient:25.81 mmHg  6.7 cm/s                  AV DVI (Vmax):0.7     PV Peak Velocity: 86.3  MV A' Septal Velocity:                          cm/s  10.8 cm/s                                       PV Peak Gradient: 2.98  MV E' Lateral Velocity:                         mmHg  6.1 cm/s  MV A' Lateral Velocity:  9.8 cm/s  E/E' septal: 13.54  E/E' lateral: 14.87    [email or web address]  [id number]    Stress test 2019      Summary   Exercise EKG stress test is not suggestive for ischemia. poor exercise capacity   elevated BP with exertion      Recommendation   Medical management. Signatures      ----------------------------------------------------------------   Electronically signed by Godfrey Massey MD (Performing   Physician) on 09/25/2019 at 15:39    OISW:[id number]   Procedure Summary   Angiographically Patent Coronaries But ostial/prox LAD eccentric plaque   giving rise to 30% stenosis   edp 13 mmhg   ef 65   Image reviewed with dr. Anju Bryant      Recommendations   Recommend aggressive risk factor modification and medical therapy. start lipitor   cont lopressor for marked palpitation pat complains about and bp control   F/U Dr. Mindi Eisenmenger in 2 weeks      Estimated Blood Loss:10 ml. Complications:No complications.       Signatures      ----------------------------------------------------------------   Electronically signed by Genoveva Loving MD (Performing   Physician) on 06/22/2016 at 11:28   ----------------------------------------------------------------      Past Medical History:    Past Medical History:   Diagnosis Date    Coronary artery disease involving native coronary artery of native heart without angina pectoris 10/27/2021    [facility]     Hypertension     Unspecified cerebral artery occlusion with cerebral infarction 1995       Past Surgical History:    Past Surgical History:   Procedure Laterality Date    BREAST SURGERY Left 02/10/2015    I & D Dr. Bebe Rivas N/A 9/19/2019    I&D OF PUBIC ABSCESS performed by Naomy Senior MD at [address]  April 2013     total    HYSTEROSCOPY      [place] EXC SKIN BENIG 2.1-3CM TRUNK,ARM,LEG N/A 6/4/2018    EXCISION OF BACK MASS performed by Na[facility] MD at [facility]       Medications Prior to Admission:    Medications Prior to Admission: metoprolol tartrate (LOPRESSOR) 25 MG tablet, Take 25 mg by mouth 2 times daily  omeprazole (PRILOSEC) 20 MG delayed release capsule, Take 20 mg by mouth daily as needed  cloNIDine (CATAPRES) 0.1 MG tablet, TAKE ONE TABLET BY MOUTH ONCE DAILY as needed FOR systolic FOR BLOOD PRESSURE >819 OR diastolic >32. MAY take UP TO four times A DAY  chlorthalidone (HYGROTON) 25 MG tablet, TAKE ONE TABLET BY MOUTH TWICE DAILY  SUMAtriptan (IMITREX) 100 MG tablet, take 1 tablet by mouth at onset of headache. may repeat in 2 hours if needed  spironolactone (ALDACTONE) 100 MG tablet, Take 1 tablet by mouth 2 times daily (Patient taking differently: Take 100 mg by mouth daily )  potassium chloride (KLOR-CON M) 20 MEQ extended release tablet, TAKE 1 TABLET BY MOUTH TWICE A DAY (Patient taking differently: Take 20 mEq by mouth daily )  magnesium oxide (MAG-OX) 400 MG tablet, Take 400 mg by mouth daily  losartan (COZAAR) 100 MG tablet, Take 100 mg by mouth daily  metFORMIN (GLUCOPHAGE-XR) 500 MG extended release tablet, Take 500 mg by mouth at bedtime Does not take everyday   vitamin D (ERGOCALCIFEROL) 40915 units CAPS capsule, Take 50,000 Units by mouth once a week  atorvastatin (LIPITOR) 20 MG tablet, TAKE 1 TABLET BY MOUTH DAILY (Patient taking differently: Take 20 mg by mouth nightly )    Allergies:    Latex, Bactrim [sulfamethoxazole-trimethoprim], Penicillins, Clindamycin/lincomycin, Ciprofloxacin, Doxycycline, and Lisinopril    Social History:    reports that she has never smoked.  She has never used smokeless tobacco. She reports that she does not drink alcohol and does not use drugs. Family History:   family history includes Arthritis in her mother; Breast Cancer in her maternal cousin; Heart Disease in her mother; High Blood Pressure in her mother and sister. REVIEW OF SYSTEMS:  Constitutional: negative for anorexia, chills and fevers,weight change  Skin: negative for new skin rash per patient  HEENT: negative for head trauma or new visual changes  Respiratory: negative for cough, hemoptysis, wheezing  Cardiovascular: negative for  orthopnea, palpitations and syncope. Gastrointestinal: negative for abdominal pain,nausea , vomiting, constipation, diarrhea.   Hematologic/lymphatic: negative for bruising,prolonged bleeding,blood clots  Musculoskeletal:negative for muscle weakness, myalgias,wasting  Neurological: negative for coordination problems, dizziness, gait problems and vertigo  Behavioral/Psych:negative for mood/sleep disturbance      PHYSICAL EXAM:   Vitals:  Patient Vitals for the past 24 hrs:   BP Temp Temp src Pulse Resp SpO2 Height Weight   04/27/22 0956 (!) 140/76 -- -- -- -- -- -- --   04/27/22 0600 134/82 -- -- -- -- -- -- --   04/27/22 0500 133/74 -- -- -- -- -- -- --   04/27/22 0400 (!) 156/77 -- -- -- -- -- -- --   04/27/22 0315 (!) 151/88 97.8 °F (36.6 °C) Oral -- 15 97 % -- --   04/27/22 0200 (!) 144/86 -- -- -- -- -- -- --   04/27/22 0100 136/77 -- -- -- -- -- -- --   04/26/22 2345 125/64 98 °F (36.7 °C) Oral 80 15 98 % -- --   04/26/22 2315 123/77 -- -- -- -- -- -- --   04/26/22 2245 126/79 -- -- -- -- -- -- --   04/26/22 2215 134/74 -- -- -- -- -- -- --   04/26/22 2145 137/87 -- -- -- -- -- -- --   04/26/22 2115 (!) 156/75 -- -- 77 -- -- -- --   04/26/22 2045 (!) 223/98 98.2 °F (36.8 °C) Oral 80 15 98 % -- --   04/26/22 1830 (!) 203/91 -- -- 80 -- -- -- --   04/26/22 1800 (!) 184/93 -- -- 83 -- -- -- --   04/26/22 1737 (!) 227/106 97.5 °F (36.4 °C) Oral 93 16 100 % -- 204 lb 3.2 oz (92.6 kg) 04/26/22 1609 (!) 163/82 -- -- -- -- -- -- --   04/26/22 1551 (!) 187/99 -- -- 91 18 97 % -- --   04/26/22 1439 (!) 202/113 98.4 °F (36.9 °C) Oral 107 18 98 % 5' 5\" (1.651 m) 199 lb (90.3 kg)       Last 3 weights: Wt Readings from Last 3 Encounters:   04/26/22 204 lb 3.2 oz (92.6 kg)   11/05/21 199 lb 6.4 oz (90.4 kg)   10/24/21 199 lb (90.3 kg)     24 hour intake/output:    Intake/Output Summary (Last 24 hours) at 4/27/2022 1014  Last data filed at 4/26/2022 1947  Gross per 24 hour   Intake 0 ml   Output --   Net 0 ml     BMI:Body mass index is 33.98 kg/m². General Appearance: alert and oriented to person, place and time, well developed and well- nourished, in no acute distress  Skin: warm and dry, no rash or erythema  Eyes: pupils equal, round, and reactive to light, extraocular eye movements intact, conjunctivae normal  Neck: supple and non-tender without mass, no thyromegaly or thyroid nodules, no cervical lymphadenopathy  Pulmonary/Chest: clear to auscultation bilaterally- no wheezes, rales or rhonchi, normal air movement, no respiratory distress  Cardiovascular: normal rate, regular rhythm, normal S1 and S2, no murmur. No rubs, clicks, or gallops, distal pulses intact, no carotid bruits, Negative JVD  Radial Pulses: intact 2+  Abdomen: soft, non-tender, non-distended, normal bowel sounds, no masses or organomegaly  Extremities: no cyanosis, clubbing . no Edema  Musculoskeletal: normal range of motion, no joint swelling, deformity or tenderness      RADIOLOGY   XR CHEST (2 VW)    Result Date: 4/26/2022  2 views of the chest Comparison:  CT,SR - CTA CHEST W WO CONTRAST - 04/13/2021 01:14 AM EDT Findings: Normal heart, lungs and mediastinum. No pneumonia.      Impression: Normal chest. This document has been electronically signed by: Godfrey Ta MD on 04/26/2022 05:22 PM    CTA CHEST W WO CONTRAST    Result Date: 4/26/2022  CTA of the chest after administration of IV contrast. Technique: Axial CT images from the lung apices through the adrenal glands after administration of IV contrast. Sagittal and coronal reconstruction images provided. Comparison:  CR,SR - XR CHEST STANDARD (2 VW) - 04/26/2022 03:29 PM EDT  CT,SR - CTA CHEST W WO CONTRAST - 04/13/2021 01:14 AM EDT Findings: No pulmonary embolus. Normal thoracic aorta. No aneurysm or dissection. No mediastinal or axillary adenopathy. Normal lungs. No pulmonary nodules. No areas of consolidation. No pneumothorax. Normal bones. Fatty infiltration of the liver. Impression: Normal CTA of the chest. This document has been electronically signed by: Garrett Flaherty MD on 04/26/2022 05:23 PM All CTs at this facility use dose modulation techniques and iterative reconstructions, and/or weight-based dosing when appropriate to reduce radiation to a low as reasonably achievable.       LABS:  Recent Labs     04/26/22  1441 04/26/22  1816 04/26/22  2316   TROPONINT < 0.010 < 0.010 < 0.010     CBC:   Lab Results   Component Value Date    WBC 6.7 04/27/2022    RBC 4.26 04/27/2022    HGB 11.5 04/27/2022    HCT 37.5 04/27/2022    MCV 88.0 04/27/2022    MCH 27.0 04/27/2022    MCHC 30.7 04/27/2022    RDW 14.7 02/25/2021     04/27/2022    MPV 9.9 04/27/2022     BMP:    Lab Results   Component Value Date     04/27/2022    K 3.7 04/27/2022    K 3.7 04/02/2021     04/27/2022    CO2 25 04/27/2022    BUN 8 04/27/2022    LABALBU 4.1 04/12/2021    CREATININE 0.6 04/27/2022    CALCIUM 9.0 04/27/2022    GFRAA >60 07/19/2021    LABGLOM >90 04/27/2022    GLUCOSE 139 04/27/2022     Hepatic Function Panel:    Lab Results   Component Value Date    ALKPHOS 78 04/12/2021    ALT 9 04/12/2021    AST 10 04/12/2021    PROT 7.1 04/12/2021    BILITOT <0.2 04/12/2021    BILIDIR <0.2 09/16/2019    LABALBU 4.1 04/12/2021     Magnesium:    Lab Results   Component Value Date    MG 2.3 07/19/2021     Warfarin PT/INR:  No components found for: PTPATWAR, PTINRWAR  HgBA1c:    Lab Results   Component Value Date    LABA1C 5.9 02/10/2015     FLP:    Lab Results   Component Value Date    TRIG 144 2022    HDL 43 2022    LDLCALC 100 2022     TSH:    Lab Results   Component Value Date    TSH 2.780 2021     BNP: No results found for: BNP      ASSESSMENT:  1. Chest pain   2. CAD, nonobstructive  3. Hypertensive urgency   4. Significant FH of CAD  5. DM  6. Obesity  7. Dyslipidemia     RECOMMENDATIONS:   Patient presented with chest pain, mixed features   Troponin * 3 sets remained normal, <0.01   EKG revealed sinus tachycardia, no acute ischemic changes    ACS was ruled out   Chest pain better, resolved    Her family history is significant for CAD. Her brother  from MI in his 45s   C in 2016 was c/w nonobstructive CAD, including a 30% LAD stenosis   Chest CTA was negative for PE   She will benefit from ischemic evaluation   Based on patient's risk factors and clinical presentation, stress test is indicated to investigate for possible underlying ischemic heart disease. Patient  agrees with that work up and its risks and benefits and potential need for additional testing if stress test is abnormal such as cardiac catheterization   Will need to investigate for underlying structural heart disease, will proceed with a transthoracic echocardiogram    Needs better BP control   Add Norvasc   Add coreg   Wean off labetalol   Monitor on telemetry    ASA   Lipitor     Above findings and plan of care were discussed with patient, questions were answered, agreeable to plan. Thank you for allowing me to participate in the care of this patient. Please let me know if I can be of any further assistance.       Thania Miller MD, Min Zimmer   10:14 AM  2022

## 2022-04-28 VITALS
TEMPERATURE: 98.3 F | HEIGHT: 65 IN | BODY MASS INDEX: 34.02 KG/M2 | SYSTOLIC BLOOD PRESSURE: 159 MMHG | OXYGEN SATURATION: 98 % | RESPIRATION RATE: 18 BRPM | DIASTOLIC BLOOD PRESSURE: 78 MMHG | WEIGHT: 204.2 LBS | HEART RATE: 63 BPM

## 2022-04-28 LAB
GLUCOSE BLD-MCNC: 111 MG/DL (ref 70–108)
GLUCOSE BLD-MCNC: 127 MG/DL (ref 70–108)

## 2022-04-28 PROCEDURE — 82948 REAGENT STRIP/BLOOD GLUCOSE: CPT

## 2022-04-28 PROCEDURE — 6370000000 HC RX 637 (ALT 250 FOR IP): Performed by: REGISTERED NURSE

## 2022-04-28 PROCEDURE — 6360000002 HC RX W HCPCS: Performed by: REGISTERED NURSE

## 2022-04-28 PROCEDURE — 6370000000 HC RX 637 (ALT 250 FOR IP): Performed by: INTERNAL MEDICINE

## 2022-04-28 RX ORDER — CARVEDILOL 25 MG/1
25 TABLET ORAL 2 TIMES DAILY WITH MEALS
Qty: 60 TABLET | Refills: 3 | Status: SHIPPED | OUTPATIENT
Start: 2022-04-28

## 2022-04-28 RX ORDER — SPIRONOLACTONE 100 MG/1
100 TABLET, FILM COATED ORAL DAILY
Qty: 30 TABLET | Refills: 3 | Status: SHIPPED | OUTPATIENT
Start: 2022-04-29 | End: 2022-10-04 | Stop reason: ALTCHOICE

## 2022-04-28 RX ORDER — ASPIRIN 81 MG/1
81 TABLET, CHEWABLE ORAL DAILY
Qty: 30 TABLET | Refills: 3 | Status: SHIPPED | OUTPATIENT
Start: 2022-04-29

## 2022-04-28 RX ORDER — BUTALBITAL, ACETAMINOPHEN AND CAFFEINE 50; 325; 40 MG/1; MG/1; MG/1
1 TABLET ORAL EVERY 4 HOURS PRN
Qty: 180 TABLET | Refills: 3 | Status: SHIPPED | OUTPATIENT
Start: 2022-04-28

## 2022-04-28 RX ORDER — BUTALBITAL, ACETAMINOPHEN AND CAFFEINE 50; 325; 40 MG/1; MG/1; MG/1
1 TABLET ORAL EVERY 4 HOURS PRN
Status: DISCONTINUED | OUTPATIENT
Start: 2022-04-28 | End: 2022-04-28 | Stop reason: HOSPADM

## 2022-04-28 RX ADMIN — Medication 400 MG: at 09:29

## 2022-04-28 RX ADMIN — LOSARTAN POTASSIUM 100 MG: 100 TABLET, FILM COATED ORAL at 09:29

## 2022-04-28 RX ADMIN — ASPIRIN 81 MG 81 MG: 81 TABLET ORAL at 09:28

## 2022-04-28 RX ADMIN — POTASSIUM CHLORIDE 20 MEQ: 1500 TABLET, EXTENDED RELEASE ORAL at 09:28

## 2022-04-28 RX ADMIN — SPIRONOLACTONE 100 MG: 25 TABLET ORAL at 09:28

## 2022-04-28 RX ADMIN — ENOXAPARIN SODIUM 90 MG: 100 INJECTION SUBCUTANEOUS at 09:27

## 2022-04-28 RX ADMIN — PANTOPRAZOLE SODIUM 40 MG: 40 TABLET, DELAYED RELEASE ORAL at 06:15

## 2022-04-28 RX ADMIN — ERGOCALCIFEROL 50000 UNITS: 1.25 CAPSULE ORAL at 09:28

## 2022-04-28 RX ADMIN — ACETAMINOPHEN 650 MG: 325 TABLET ORAL at 01:39

## 2022-04-28 RX ADMIN — BUTALBITAL, ACETAMINOPHEN, AND CAFFEINE 1 TABLET: 50; 325; 40 TABLET ORAL at 12:17

## 2022-04-28 RX ADMIN — CARVEDILOL 25 MG: 25 TABLET, FILM COATED ORAL at 09:29

## 2022-04-28 ASSESSMENT — PAIN SCALES - GENERAL
PAINLEVEL_OUTOF10: 3
PAINLEVEL_OUTOF10: 7
PAINLEVEL_OUTOF10: 3

## 2022-04-28 ASSESSMENT — PAIN DESCRIPTION - DESCRIPTORS
DESCRIPTORS: ACHING
DESCRIPTORS: THROBBING

## 2022-04-28 ASSESSMENT — PAIN DESCRIPTION - LOCATION
LOCATION: HEAD

## 2022-04-28 NOTE — CARE COORDINATION
4/28/22, 11:11 AM EDT    Patient goals/plan/ treatment preferences discussed by  and . Patient goals/plan/ treatment preferences reviewed with patient/ family. Patient/ family verbalize understanding of discharge plan and are in agreement with goal/plan/treatment preferences. Understanding was demonstrated using the teach back method. AVS provided by RN at time of discharge, which includes all necessary medical information pertaining to the patients current course of illness, treatment, post-discharge goals of care, and treatment preferences. 1112: Spoke with pt briefly.  Denies need upon discharge

## 2022-04-28 NOTE — DISCHARGE SUMMARY
Discharge Summary  4/28/2022  11:03 AM    Patient:  Arslan Head  YOB: 1975    MRN: [id number]   [place]: [de-identified]   [id number]   Primary Care Physician: JUAN Diallo CNP    Admit date:  4/26/2022    Discharge date:  4/28/2022    Discharge Diagnoses:    Chest pain, negative stress test  Uncontrolled HTN, improved   Non insulin dependent DM2  Migraines        Discharge Medications:         Medication List      START taking these medications    aspirin 81 MG chewable tablet  Take 1 tablet by mouth daily  Start taking on: April 29, 2022     butalbital-acetaminophen-caffeine -40 MG per tablet  Commonly known as: FIORICET, ESGIC  Take 1 tablet by mouth every 4 hours as needed for Headaches     carvedilol 25 MG tablet  Commonly known as: COREG  Take 1 tablet by mouth 2 times daily (with meals)        CHANGE how you take these medications    atorvastatin 20 MG tablet  Commonly known as: LIPITOR  TAKE 1 TABLET BY MOUTH DAILY  What changed: See the new instructions. potassium chloride 20 MEQ extended release tablet  Commonly known as: KLOR-CON M  TAKE 1 TABLET BY MOUTH TWICE A DAY  What changed: See the new instructions.         CONTINUE taking these medications    chlorthalidone 25 MG tablet  Commonly known as: HYGROTON  TAKE ONE TABLET BY MOUTH TWICE DAILY     losartan 100 MG tablet  Commonly known as: COZAAR     magnesium oxide 400 MG tablet  Commonly known as: MAG-OX     metFORMIN 500 MG extended release tablet  Commonly known as: GLUCOPHAGE-XR     omeprazole 20 MG delayed release capsule  Commonly known as: PRILOSEC     spironolactone 100 MG tablet  Commonly known as: ALDACTONE  Take 1 tablet by mouth daily  Start taking on: April 29, 2022     SUMAtriptan 100 MG tablet  Commonly known as: IMITREX     vitamin D 1.25 MG (59256 UT) Caps capsule  Commonly known as: ERGOCALCIFEROL        STOP taking these medications    cloNIDine 0.1 MG tablet  Commonly known as: CATAPRES     metoprolol tartrate 25 MG tablet  Commonly known as: LOPRESSOR           Where to Get Your Medications      These medications were sent to [address] [facility] [place], [address] [phone] - F [phone]  [address] [place] [address], [address] [place] [id number]    Phone: [phone]   · aspirin 81 MG chewable tablet  · butalbital-acetaminophen-caffeine -40 MG per tablet  · carvedilol 25 MG tablet  · spironolactone 100 MG tablet       Scheduled Meds: Scheduled Meds:   carvedilol  25 mg Oral BID WC    amLODIPine  5 mg Oral Daily    atorvastatin  20 mg Oral Nightly    chlorthalidone  25 mg Oral BID    losartan  100 mg Oral Daily    magnesium oxide  400 mg Oral Daily    potassium chloride  20 mEq Oral Daily    spironolactone  100 mg Oral Daily    vitamin D  50,000 Units Oral Weekly    enoxaparin  1 mg/kg SubCUTAneous BID    aspirin  81 mg Oral Daily    insulin lispro  0-6 Units SubCUTAneous TID WC    insulin lispro  0-3 Units SubCUTAneous Nightly    pantoprazole  40 mg Oral QAM AC     Continuous Infusions:   dextrose      labetalol (NORMODYNE) 1 mg/mL infusion 0.5 mg/min (04/26/22 2057)     PRN Meds:.butalbital-acetaminophen-caffeine, acetaminophen, ondansetron, nitroGLYCERIN, morphine, glucagon (rDNA), dextrose, dextrose bolus (hypoglycemia) **OR** dextrose bolus (hypoglycemia), glucose  Continuous Infusions:   dextrose      labetalol (NORMODYNE) 1 mg/mL infusion 0.5 mg/min (04/26/22 2057)       Intake/Output Summary (Last 24 hours) at 4/28/2022 1103  Last data filed at 4/28/2022 0340  Gross per 24 hour   Intake 760 ml   Output --   Net 760 ml       Diet:  ADULT DIET; Regular; 5 carb choices (75 gm/meal);  Low Sodium (2 gm)    Activity:  Activity as tolerated (Patient may move about with assist as indicated or with supervision.)    Follow-up:  in the next few weeks with [facility], JUAN - CNP,  in the next few weeks with neurology     Consultants:  Cardiology    Procedures: None    Diagnostic Test:  Echo and stress test  Objective:  Lab Results   Component Value Date    WBC 6.7 04/27/2022    RBC 4.26 04/27/2022    HGB 11.5 04/27/2022    HCT 37.5 04/27/2022    MCV 88.0 04/27/2022    MCH 27.0 04/27/2022    MCHC 30.7 04/27/2022    RDW 14.7 02/25/2021     04/27/2022    MPV 9.9 04/27/2022     Lab Results   Component Value Date     04/27/2022    K 3.7 04/27/2022    K 3.7 04/02/2021     04/27/2022    CO2 25 04/27/2022    BUN 8 04/27/2022    CREATININE 0.6 04/27/2022    GLUCOSE 139 04/27/2022    CALCIUM 9.0 04/27/2022     Lab Results   Component Value Date    CALCIUM 9.0 04/27/2022     No results found for: IONCA  Lab Results   Component Value Date    MG 2.3 07/19/2021     No results found for: PHOS  No results found for: BNP  Lab Results   Component Value Date    ALKPHOS 78 04/12/2021    ALT 9 04/12/2021    AST 10 04/12/2021    PROT 7.1 04/12/2021    BILITOT <0.2 04/12/2021    BILIDIR <0.2 09/16/2019    LABALBU 4.1 04/12/2021     No results found for: LACTA  No results found for: AMYLASE  Lab Results   Component Value Date    LIPASE 16.9 04/12/2021     Lab Results   Component Value Date    CHOL 172 04/27/2022    TRIG 144 04/27/2022    HDL 43 04/27/2022    LDLCALC 100 04/27/2022     Recent Labs     04/27/22  1708 04/27/22  2007 04/28/22  0759   POCGLU 104 108 127*     No results for input(s): CKTOTAL, CKMB, TROPONINI in the last 72 hours. Lab Results   Component Value Date    LABA1C 5.9 02/10/2015     Lab Results   Component Value Date    INR 0.93 03/31/2021     No results found for: PHART, PO2ART, EBN1KSF, HGY4VOO, [facility] Course: clinical course has improved    Initial H+P: The patient is a 42 y.o. female with pmhx of HTN, DM2, CAD who presents with chest pain that began this afternoon at work when she was sitting at her desk. She describes the pain as sharp. The pain is worse with inspiration and with palpation.  Initially the pain radiated to neck and left arm. It does not radiate anywhere right now. She received nitro with little relief in the chest pain but gave her a bad headache. She denies alcohol, tobacco, drug, contraceptive use. She denies any recent lifting of something heavy or exercising. She denies any recent travel. She does report occasional pain in BL legs when sitting for too long. No headache or blurry vision. Her BP was noted to be 202/113 on arrival to ED with little improvement with the nitro and then went back up again to 342 systolic. She did have a heart cath in 2016 that showed 30% stenosis LAD; no intervention done. She has been following with nephrology for her uncontrolled HTN and states that it has been controlled for the past 7 months on her current meds. Her trop is negative and EKG not acute. She is visibly uncomfortable. She does have a severe family history of heart disease; she had a brother who  of an MI in his 45s and other family members on her mother's side have cardiac disease. Pt was placed on labetalol drip due to persistent HTN despite home meds and IV Labetalol push. Pt was seen by cardiology; echo and stress test done which were both negative. Trop negative X3. Adjustments were made to home BP meds and her BP did improve. She did refuse Norvasc due to previous bad reaction. Lopressor changed to Coreg, Clonidine stopped. She did have a persistent headache which she does have a hx of migraines. She takes Sumatriptan at home. Pt started on Fiorecet and can continue Sumatriptan at home if ok with cardiology. She was supposed to follow up with neurology at [facility] but did not like the provider. Referral placed for follow up with Dr Kalyn Summers. She has no focal neurologic deficits and headache is unchanged from prior headaches.         Vitals: BP (!) 140/76   Pulse 72   Temp 98.2 °F (36.8 °C) (Oral)   Resp 18   Ht 5' 5\" (1.651 m)   Wt 204 lb 3.2 oz (92.6 kg)   LMP 2013   SpO2 99%   BMI 33.98 kg/m²   Physical Exam:  General appearance - alert, well appearing, and in no distress  Eyes - pupils equal and reactive, extraocular eye movements intact  Neck - supple, no significant adenopathy  Chest - clear to auscultation, no wheezes, rales or rhonchi, symmetric air entry  Heart - normal rate, regular rhythm, normal S1, S2, no murmurs, rubs, clicks or gallops  Abdomen - soft, nontender, nondistended, no masses or organomegaly pos bs:   Neurological - alert, oriented, normal speech, no focal findings or movement disorder noted  Extremities - peripheral pulses normal, no pedal edema,       Disposition: home    Condition: Stable    Over 35 minutes spent on encounter    Assessment and plan of care discussed with supervising physician, Dr Rich Aase. JUAN Vila - CNP, CNP     Copy: Primary Care Physician: JUAN Keyes CNP  Internal Medicine    I have discussed the case, including pertinent history and exam findings with the NP. I have seen and examined the patient and the key elements of the encounter have been performed by me. I agree with the assessment, plan and orders as documented by the NP.     Electronically signed by Earle Enrique MD

## 2022-04-28 NOTE — PROGRESS NOTES
Patient cleared to discharge. Discharge instructions discussed with patient. IV removed. Instructed patient to take her missing dose of chlorthalidone when she gets home. Patient refused escort and wanted to walk downstairs. Her significant other was waiting for her downstairs. Will continue to monitor.

## 2022-05-02 ENCOUNTER — HOSPITAL ENCOUNTER (EMERGENCY)
Age: 47
Discharge: HOME | End: 2022-05-02
Payer: COMMERCIAL

## 2022-05-02 VITALS
TEMPERATURE: 98.1 F | WEIGHT: 204 LBS | DIASTOLIC BLOOD PRESSURE: 81 MMHG | HEIGHT: 65 IN | OXYGEN SATURATION: 100 % | RESPIRATION RATE: 16 BRPM | BODY MASS INDEX: 33.99 KG/M2 | HEART RATE: 97 BPM | SYSTOLIC BLOOD PRESSURE: 173 MMHG

## 2022-05-02 DIAGNOSIS — T14.8XXA BRUISING: ICD-10-CM

## 2022-05-02 DIAGNOSIS — J06.9 VIRAL URI WITH COUGH: Primary | ICD-10-CM

## 2022-05-02 PROCEDURE — 99213 OFFICE O/P EST LOW 20 MIN: CPT

## 2022-05-02 PROCEDURE — 99202 OFFICE O/P NEW SF 15 MIN: CPT | Performed by: NURSE PRACTITIONER

## 2022-05-02 ASSESSMENT — ENCOUNTER SYMPTOMS
DIARRHEA: 0
COUGH: 1
WHEEZING: 0
NAUSEA: 0
EYE REDNESS: 0
EYE DISCHARGE: 0
CHEST TIGHTNESS: 0
SHORTNESS OF BREATH: 0
SORE THROAT: 0
VOMITING: 0
RHINORRHEA: 0
TROUBLE SWALLOWING: 0

## 2022-05-02 ASSESSMENT — PAIN - FUNCTIONAL ASSESSMENT: PAIN_FUNCTIONAL_ASSESSMENT: 0-10

## 2022-05-02 NOTE — ED NOTES
Pt presents to [facility] with c/o bruising on the RLQ that the pt noticed today. Pt denies pain or tenderness. Pt also states she developed a cough 2 days ago that causing discomfort upon inhaling.       Fredo Gay, JERRY  18/77/97 0517

## 2022-05-02 NOTE — ED PROVIDER NOTES
[address]       Chief Complaint   Patient presents with    Cough     x 2 days    Other     non painful bruise RLQ        Nurses Notes reviewed and I agree except as noted in the HPI. HISTORY OF PRESENT ILLNESS   Orval Marbin is a 42 y.o. female who presents for evaluation of cough and bruising to abdominal wall. Onset of cough 2 days ago, unchanged. Cough is intermittent, dry. Denies fever, wheezing, shortness of breath. Patient still having the same diffuse chest wall pain with coughing that she had at the hospital.  No chest pain at rest.    She also complains of bruising to the right lower quadrant. Patient had been receiving Lovenox injections in the hospital.  No bleeding. REVIEW OF SYSTEMS     Review of Systems   Constitutional: Negative for chills, diaphoresis, fatigue and fever. HENT: Negative for congestion, ear pain, rhinorrhea, sore throat and trouble swallowing. Eyes: Negative for discharge and redness. Respiratory: Positive for cough. Negative for chest tightness, shortness of breath and wheezing. Cardiovascular: Negative for chest pain. Gastrointestinal: Negative for diarrhea, nausea and vomiting. Genitourinary: Negative for decreased urine volume. Musculoskeletal: Positive for arthralgias (chest wall). Negative for neck pain and neck stiffness. Skin: Negative for rash. Neurological: Negative for headaches. Hematological: Negative for adenopathy. Bruises/bleeds easily. Psychiatric/Behavioral: Negative for sleep disturbance.        PAST MEDICAL HISTORY         Diagnosis Date    Coronary artery disease involving native coronary artery of native heart without angina pectoris 10/27/2021    Diabetes mellitus ([facility] [address]     Hypertension     Unspecified cerebral artery occlusion with cerebral infarction 1995       SURGICAL HISTORY     Patient  has a past surgical history that includes cyst removal; hysteroscopy; Hysterectomy (April 2013); Breast surgery (Left, 02/10/2015); pr exc skin benig 2.1-3cm trunk,arm,leg (N/A, 6/4/2018); and DISSECTION GROIN (N/A, 9/19/2019). CURRENT MEDICATIONS       Discharge Medication List as of 5/2/2022  3:00 PM      CONTINUE these medications which have NOT CHANGED    Details   aspirin 81 MG chewable tablet Take 1 tablet by mouth daily, Disp-30 tablet, R-3Normal      butalbital-acetaminophen-caffeine (FIORICET, ESGIC) -40 MG per tablet Take 1 tablet by mouth every 4 hours as needed for Headaches, Disp-180 tablet, R-3Normal      carvedilol (COREG) 25 MG tablet Take 1 tablet by mouth 2 times daily (with meals), Disp-60 tablet, R-3Normal      spironolactone (ALDACTONE) 100 MG tablet Take 1 tablet by mouth daily, Disp-30 tablet, R-3Normal      omeprazole (PRILOSEC) 20 MG delayed release capsule Take 20 mg by mouth daily as neededHistorical Med      chlorthalidone (HYGROTON) 25 MG tablet TAKE ONE TABLET BY MOUTH TWICE DAILY, Disp-180 tablet, R-1This prescription was filled on 11/17/2021. Any refills authorized will be placed on file. Normal      SUMAtriptan (IMITREX) 100 MG tablet take 1 tablet by mouth at onset of headache. may repeat in 2 hours if neededHistorical Med      potassium chloride (KLOR-CON M) 20 MEQ extended release tablet TAKE 1 TABLET BY MOUTH TWICE A DAY, Disp-180 tablet, R-3This prescription was filled on 7/6/2021. Any refills authorized will be placed on file. Normal      magnesium oxide (MAG-OX) 400 MG tablet Take 400 mg by mouth dailyHistorical Med      losartan (COZAAR) 100 MG tablet Take 100 mg by mouth dailyHistorical Med      metFORMIN (GLUCOPHAGE-XR) 500 MG extended release tablet Take 500 mg by mouth at bedtime Does not take everyday Historical Med      vitamin D (ERGOCALCIFEROL) 67767 units CAPS capsule Take 50,000 Units by mouth once a weekHistorical Med      atorvastatin (LIPITOR) 20 MG tablet TAKE 1 TABLET BY MOUTH DAILY, Disp-30 tablet, R-8Normal ALLERGIES     Patient is is allergic to latex, bactrim [sulfamethoxazole-trimethoprim], penicillins, clindamycin/lincomycin, ciprofloxacin, doxycycline, and lisinopril. FAMILY HISTORY     Patient'sfamily history includes Arthritis in her mother; Breast Cancer in her maternal cousin; Heart Disease in her mother; High Blood Pressure in her mother and sister. SOCIAL HISTORY     Patient  reports that she has never smoked. She has never used smokeless tobacco. She reports that she does not drink alcohol and does not use drugs. PHYSICAL EXAM     ED TRIAGE VITALS  BP: (!) 173/81, Temp: 98.1 °F (36.7 °C), Pulse: 97, Resp: 16, SpO2: 100 %  Physical Exam  Vitals and nursing note reviewed. Constitutional:       General: She is not in acute distress. Appearance: Normal appearance. She is well-developed. She is not ill-appearing, toxic-appearing or diaphoretic. HENT:      Head: Normocephalic and atraumatic. Right Ear: Hearing, tympanic membrane, ear canal and external ear normal. No mastoid tenderness. No hemotympanum. Tympanic membrane is not perforated, erythematous or bulging. Left Ear: Hearing, tympanic membrane, ear canal and external ear normal. No mastoid tenderness. No hemotympanum. Tympanic membrane is not perforated, erythematous or bulging. Nose: Nose normal.      Mouth/Throat:      Mouth: Mucous membranes are moist.      Pharynx: Oropharynx is clear. Uvula midline. Tonsils: No tonsillar abscesses. Eyes:      General: No scleral icterus. Conjunctiva/sclera: Conjunctivae normal.      Right eye: Right conjunctiva is not injected. No hemorrhage. Left eye: Left conjunctiva is not injected. No hemorrhage. Neck:      Thyroid: No thyromegaly. Trachea: Trachea normal.   Cardiovascular:      Rate and Rhythm: Normal rate and regular rhythm. No extrasystoles are present. Chest Wall: PMI is not displaced. Heart sounds: Normal heart sounds.  No murmur heard.  No friction rub. No gallop. Pulmonary:      Effort: Pulmonary effort is normal. No respiratory distress. Breath sounds: Normal breath sounds. Chest:   Breasts:      Right: No supraclavicular adenopathy. Left: No supraclavicular adenopathy. Musculoskeletal:      Cervical back: Normal range of motion and neck supple. Lymphadenopathy:      Head:      Right side of head: No submental, submandibular, tonsillar or occipital adenopathy. Left side of head: No submental, submandibular, tonsillar or occipital adenopathy. Cervical: No cervical adenopathy. Upper Body:      Right upper body: No supraclavicular adenopathy. Left upper body: No supraclavicular adenopathy. Skin:     General: Skin is warm and dry. Capillary Refill: Capillary refill takes less than 2 seconds. Coloration: Skin is not pale. Findings: Bruising (RLQ abdomen) present. No abscess or rash. Comments: Skin warm and dry to touch, no rashes noted on exposed surfaces. Neurological:      Mental Status: She is alert and oriented to person, place, and time. She is not disoriented. Psychiatric:         Mood and Affect: Mood normal.         Behavior: Behavior is cooperative. DIAGNOSTIC RESULTS   Labs: No results found for this visit on 05/02/22. IMAGING:  No orders to display     URGENT CARE COURSE:     Vitals:    05/02/22 1445   BP: (!) 173/81   Pulse: 97   Resp: 16   Temp: 98.1 °F (36.7 °C)   TempSrc: Temporal   SpO2: 100%   Weight: 204 lb (92.5 kg)   Height: 5' 5\" (1.651 m)       Medications - No data to display  PROCEDURES:  None  FINALIMPRESSION      1. Viral URI with cough    2. Bruising        DISPOSITION/PLAN   DISPOSITION Decision To Discharge 05/02/2022 02:59:44 PM  Nontoxic, no distress. No adventitious lung sounds. Exam consistent with viral URI with cough. Recommended Coricidin HBP over-the-counter. Bruising to abdomen secondary to Lovenox injection. No abscess. Follow-up as needed. If any distress go to ER. PATIENT REFERRED TO:  JUAN Palmer CNP  [address]  [phone]      Follow-up with PCP as needed. Coricidin HBP over-the-counter. Continue current medications. If symptoms worsen go to ER.     DISCHARGE MEDICATIONS:  Discharge Medication List as of 5/2/2022  3:00 PM        Discharge Medication List as of 5/2/2022  3:00 PM          JUAN Nayak CNP, APRN - CNP  05/02/22 1513

## 2022-05-03 NOTE — PROGRESS NOTES
Nerve Block    Date/Time: 1/24/2022 9:00 AM  Performed by: Vladislav Reyes MD  Authorized by: Vladislav Reyes MD     Block Type:supraclavicular  Laterality:  Right  Patient Location:  Pre-op  Indication: post-op pain management at surgeon's request and at surgeon's request    Surgeon:  Massiel  Preanesthetic Checklist Patient Identified (2 criteria), Block Plan Confirmed, Resuscitation Equipment Available, Supplemental O2 (if needed), Allergies Confirmed, Block Site Marked (if applicable), Monitors Applied, Aseptic Technique, Coagulation Status, Necessary Block Equipment Present, Timeout Performed, IV Access Functioning, Consent Verified, Drugs/Solutions Labeled and Sedation Given (if needed)    Patient Position:  Supine  Prep:  Chlorhexidine gluconate (CHG)  Max Sterile Barrier Technique:  Hand washing, cap/mask and sterile gloves  Monitoring:  Heart rate and continuous pulse oximetry  Injection Technique:  Single-shot  Procedures: ultrasound guided    Local Infiltration:  Lidocaine  Strength:  1%  Dose:  1.5 mL  Needle Type:  Echogenic  Needle Gauge:  22 G  Needle Length:  8 cm  Needle Localization:  Ultrasound guidance and nerve stimulator  Injection Assessment:  Negative aspiration for heme, no paresthesia on injection, incremental injection, local visualized surrounding nerve on ultrasound and no resistance to injection  Patient Condition:  Tolerated well, no immediate complications  Type of Motor Response: Other    Paresthesia Pain:  None  Heart Rate Change: No    Slowly Injected: No    Performed By:  Anesthesiologist  Anesthesiologist:  Vladislav Reyes MD  Start Time:  1/24/2022 8:50 AM   In plane approach to ultrasound guided right supraclavicular block with image saved. Twitch obtained from hand down to 0.45amp followed by incremental injection. Needle repositioned to superior part of plexus with continued incremental injection with LA seen surrounding the plexus. Procedure was well tolerated by  Physician Progress Note      PATIENT:               Marco A Burnett  CSN #:                  [id number]  :                       1975  ADMIT DATE:       2022 2:38 PM  [address] [place] DATE:        2022 1:14 PM  RESPONDING  PROVIDER #:        Waldo Torres          QUERY TEXT:    Attending,    Pt admitted with chest pain and noted to have hypertensive urgency. ACS was   ruled out per Cardiology. If possible, please respond below and document in   the progress notes and discharge summary the likely cause of the chest pain:    The medical record reflects the following:  Risk Factors: CAD, HTN urgency, DM 2  Clinical Indicators: admitted with chest pain and noted to have hypertensive   urgency. ACS was ruled out per Cardiology  Treatment: Cardiology consult, stress test, EKG, ECHO, troponins, tele, ASA,   statin, Coreg, Hygroton, clonidine, labetalol, Cozaar, Protonix, Tylenol, SL   nitro, morphine    Thank you! Elsie Bonilla, RN, BSN, RHIT, CCDS  Clinical   Options provided:  -- Chest pain due to hypertensive urgency  -- Chest pain due to CAD with angina  -- Chest pain due to, please specify. -- Other - I will add my own diagnosis  -- Disagree - Not applicable / Not valid  -- Disagree - Clinically unable to determine / Unknown  -- Refer to Clinical Documentation Reviewer    PROVIDER RESPONSE TEXT:    This patient has chest pain due to hypertensive urgency.     Query created by: Pancho Jj on 2022 12:13 PM      Electronically signed by:  Waldo Torres 5/3/2022 2:24 PM patient.

## 2022-05-09 ENCOUNTER — HOSPITAL ENCOUNTER (OUTPATIENT)
Age: 47
Discharge: HOME | End: 2022-05-09
Payer: COMMERCIAL

## 2022-05-09 LAB
ALBUMIN SERPL-MCNC: 4.2 G/DL (ref 3.5–5.1)
ALP BLD-CCNC: 89 U/L (ref 38–126)
ALT SERPL-CCNC: 11 U/L (ref 11–66)
ANION GAP SERPL CALCULATED.3IONS-SCNC: 15 MEQ/L (ref 8–16)
AST SERPL-CCNC: 10 U/L (ref 5–40)
BILIRUB SERPL-MCNC: 0.2 MG/DL (ref 0.3–1.2)
BUN BLDV-MCNC: 13 MG/DL (ref 7–22)
CALCIUM SERPL-MCNC: 9.2 MG/DL (ref 8.5–10.5)
CHLORIDE BLD-SCNC: 106 MEQ/L (ref 98–111)
CHOLESTEROL, TOTAL: 139 MG/DL (ref 100–199)
CO2: 19 MEQ/L (ref 23–33)
CREAT SERPL-MCNC: 0.7 MG/DL (ref 0.4–1.2)
GFR SERPL CREATININE-BSD FRML MDRD: > 90 ML/MIN/1.73M2
GLUCOSE BLD-MCNC: 148 MG/DL (ref 70–108)
HDLC SERPL-MCNC: 44 MG/DL
LDL CHOLESTEROL CALCULATED: 79 MG/DL
POTASSIUM SERPL-SCNC: 4.1 MEQ/L (ref 3.5–5.2)
SODIUM BLD-SCNC: 140 MEQ/L (ref 135–145)
TOTAL PROTEIN: 6.3 G/DL (ref 6.1–8)
TRIGL SERPL-MCNC: 82 MG/DL (ref 0–199)

## 2022-05-09 PROCEDURE — 80061 LIPID PANEL: CPT

## 2022-05-09 PROCEDURE — 36415 COLL VENOUS BLD VENIPUNCTURE: CPT

## 2022-05-09 PROCEDURE — 80053 COMPREHEN METABOLIC PANEL: CPT

## 2022-06-27 RX ORDER — CHLORTHALIDONE 25 MG/1
TABLET ORAL
Qty: 180 TABLET | Refills: 1 | OUTPATIENT
Start: 2022-06-27

## 2022-07-28 RX ORDER — POTASSIUM CHLORIDE 20 MEQ/1
TABLET, EXTENDED RELEASE ORAL
Qty: 180 TABLET | Refills: 3 | OUTPATIENT
Start: 2022-07-28

## 2022-09-22 ENCOUNTER — TELEPHONE (OUTPATIENT)
Dept: NEPHROLOGY | Age: 47
End: 2022-09-22

## 2022-09-22 NOTE — TELEPHONE ENCOUNTER
Also check blood pressure at home twice a day mornings and evenings for 5 days and bring the record when she comes

## 2022-09-22 NOTE — TELEPHONE ENCOUNTER
Maria Dolores Logan called to report pt BP readings. She was seen by Dr. Zarina Garnett office and BP was 188/107 and 45 mins later it was 181/110. Maria Dolores Logan is faxing office note with updated medications and will call patient to inform her to call and make an appt. To be seen. Last visit was 7.21.2021.

## 2022-10-04 ENCOUNTER — OFFICE VISIT (OUTPATIENT)
Dept: NEPHROLOGY | Age: 47
End: 2022-10-04
Payer: COMMERCIAL

## 2022-10-04 VITALS
BODY MASS INDEX: 33.61 KG/M2 | WEIGHT: 202 LBS | HEART RATE: 89 BPM | DIASTOLIC BLOOD PRESSURE: 90 MMHG | SYSTOLIC BLOOD PRESSURE: 166 MMHG | OXYGEN SATURATION: 95 %

## 2022-10-04 DIAGNOSIS — I10 PRIMARY HYPERTENSION: Primary | ICD-10-CM

## 2022-10-04 DIAGNOSIS — E11.21 DIABETIC NEPHROPATHY ASSOCIATED WITH TYPE 2 DIABETES MELLITUS (HCC): ICD-10-CM

## 2022-10-04 PROCEDURE — 99214 OFFICE O/P EST MOD 30 MIN: CPT | Performed by: INTERNAL MEDICINE

## 2022-10-04 PROCEDURE — G8484 FLU IMMUNIZE NO ADMIN: HCPCS | Performed by: INTERNAL MEDICINE

## 2022-10-04 PROCEDURE — G8417 CALC BMI ABV UP PARAM F/U: HCPCS | Performed by: INTERNAL MEDICINE

## 2022-10-04 PROCEDURE — 3046F HEMOGLOBIN A1C LEVEL >9.0%: CPT | Performed by: INTERNAL MEDICINE

## 2022-10-04 PROCEDURE — G8427 DOCREV CUR MEDS BY ELIG CLIN: HCPCS | Performed by: INTERNAL MEDICINE

## 2022-10-04 PROCEDURE — 1036F TOBACCO NON-USER: CPT | Performed by: INTERNAL MEDICINE

## 2022-10-04 PROCEDURE — 2022F DILAT RTA XM EVC RTNOPTHY: CPT | Performed by: INTERNAL MEDICINE

## 2022-10-04 RX ORDER — TOPIRAMATE 100 MG/1
TABLET, FILM COATED ORAL
COMMUNITY
Start: 2022-09-28

## 2022-10-04 RX ORDER — MINOXIDIL 2.5 MG/1
5 TABLET ORAL 2 TIMES DAILY
Qty: 30 TABLET | Refills: 3 | Status: SHIPPED | OUTPATIENT
Start: 2022-10-04 | End: 2023-02-01

## 2022-10-04 NOTE — PROGRESS NOTES
Pt states she quit drinking soda. She drinks a lot of water, but she does not feel like she is going as much. She does not have any burning w/urination or pressure. She does have low back pain on both sides.

## 2022-10-04 NOTE — PATIENT INSTRUCTIONS
After about 1 week of the new medication minoxidil, check your blood pressure twice a day morning and evening for 5 days and call the office with a report.

## 2022-10-04 NOTE — PROGRESS NOTES
Renal Progress Note    Assessment and Plan:      Diagnosis Orders   1. Primary hypertension        2. Diabetic nephropathy associated with type 2 diabetes mellitus ([facility] [place] [address]                  PLAN:  I discussed my thoughts at length with the patient. She understood. I discussed the importance of blood pressure control to prevent Multiple other problems in the future such as heart disease, stroke and kidney related diseases. Compliance issues also discussed with her  Home blood pressure record reviewed  Remains high  Discontinue spironolactone since  she does not like the side effects of hair growth  Try minoxidil 5 mg twice a day  Monitor blood pressure closely with the new medication  Call us in about 5 to 7 days after starting the new medicine with her blood pressure records  Printed instruction provided to her  Return visit in 4 weeks otherwise          Patient Active Problem List   Diagnosis    Left breast abscess    Fibrocystic breast changes    Cellulitis    Acute kidney injury ([facility] [place] [address]    Vomiting    HTN (hypertension)    S/P cardiac cath- 6/22/16-  All coronaries are patent but LAD. there is moderate eccentric plaque noted on the proximal and ostail LAD, giving ostail prox LAD 30% nstenosis, edp 13 mmhg, ef 65%- med R    Postoperative or surgical complication, initial encounter    MRSA cellulitis    S/P debridement    Gastroesophageal reflux disease    Coronary artery disease involving native coronary artery of native heart without angina pectoris    Chest pain           Subjective:   Chief complaint:  Chief Complaint   Patient presents with    Hypertension      HPI:This is a follow up visit for Ms. Ivanna Whittaker who is here today for return appointment. I see her for hypertension. She was last seen in July 2021. She has been doing well since then. She brought a record of her home blood pressure. They are still high at home. She denies any chest pain or shortness of breath. No nausea or vomiting.   No fever or chills. ROS:  Pertinent positives stated above in HPI. All other systems were reviewed and were negative. Medications:     Current Outpatient Medications   Medication Sig Dispense Refill    topiramate (TOPAMAX) 100 MG tablet TAKE 1 TABLET BY MOUTH ONCE DAILY AT BEDTIME. START AFTER COMPLETING TITRATION      aspirin 81 MG chewable tablet Take 1 tablet by mouth daily 30 tablet 3    butalbital-acetaminophen-caffeine (FIORICET, ESGIC) -40 MG per tablet Take 1 tablet by mouth every 4 hours as needed for Headaches 180 tablet 3    carvedilol (COREG) 25 MG tablet Take 1 tablet by mouth 2 times daily (with meals) 60 tablet 3    spironolactone (ALDACTONE) 100 MG tablet Take 1 tablet by mouth daily 30 tablet 3    omeprazole (PRILOSEC) 20 MG delayed release capsule Take 20 mg by mouth daily as needed      chlorthalidone (HYGROTON) 25 MG tablet TAKE ONE TABLET BY MOUTH TWICE DAILY 180 tablet 1    SUMAtriptan (IMITREX) 100 MG tablet take 1 tablet by mouth at onset of headache. may repeat in 2 hours if needed      potassium chloride (KLOR-CON M) 20 MEQ extended release tablet TAKE 1 TABLET BY MOUTH TWICE A DAY (Patient taking differently: Take 20 mEq by mouth daily) 180 tablet 3    magnesium oxide (MAG-OX) 400 MG tablet Take 400 mg by mouth daily      losartan (COZAAR) 100 MG tablet Take 100 mg by mouth daily      metFORMIN (GLUCOPHAGE-XR) 500 MG extended release tablet Take 500 mg by mouth at bedtime Does not take everyday      vitamin D (ERGOCALCIFEROL) 51398 units CAPS capsule Take 50,000 Units by mouth once a week      atorvastatin (LIPITOR) 20 MG tablet TAKE 1 TABLET BY MOUTH DAILY (Patient taking differently: Take 20 mg by mouth nightly) 30 tablet 8     No current facility-administered medications for this visit.        Lab Results:    CBC:   Lab Results   Component Value Date    WBC 6.7 04/27/2022    HGB 11.5 (L) 04/27/2022    HCT 37.5 04/27/2022    MCV 88.0 04/27/2022     04/27/2022     BMP:    Lab Results   Component Value Date     05/09/2022     04/27/2022     04/26/2022    K 4.1 05/09/2022    K 3.7 04/27/2022    K 3.6 04/26/2022     05/09/2022     04/27/2022     04/26/2022    CO2 19 (L) 05/09/2022    CO2 25 04/27/2022    CO2 25 04/26/2022    BUN 13 05/09/2022    BUN 8 04/27/2022    BUN 10 04/26/2022    CREATININE 0.7 05/09/2022    CREATININE 0.6 04/27/2022    CREATININE 0.7 04/26/2022    GLUCOSE 148 (H) 05/09/2022    GLUCOSE 139 (H) 04/27/2022    GLUCOSE 125 (H) 04/26/2022      Hepatic:   Lab Results   Component Value Date    AST 10 05/09/2022    AST 10 04/12/2021    AST 9 04/02/2021    ALT 11 05/09/2022    ALT 9 (L) 04/12/2021    ALT 7 (L) 04/02/2021    BILITOT 0.2 (L) 05/09/2022    BILITOT <0.2 (L) 04/12/2021    BILITOT 0.2 (L) 04/02/2021    ALKPHOS 89 05/09/2022    ALKPHOS 78 04/12/2021    ALKPHOS 98 04/02/2021     BNP: No results found for: BNP  Lipids:   Lab Results   Component Value Date    CHOL 139 05/09/2022    HDL 44 05/09/2022     INR:   Lab Results   Component Value Date    INR 0.93 03/31/2021    INR 1.15 (H) 09/16/2019    INR 1.00 04/02/2013     URINE:   Lab Results   Component Value Date/Time    NAUR 51 02/11/2015 05:50 PM    PROTUR 8.0 02/14/2015 08:30 PM     Lab Results   Component Value Date/Time    NITRU NEGATIVE 04/13/2021 12:30 AM    COLORU YELLOW 04/13/2021 12:30 AM    PHUR 6.5 04/13/2021 12:30 AM    WBCUA 5-9 04/13/2021 12:30 AM    RBCUA 3-5 04/13/2021 12:30 AM    YEAST NONE SEEN 04/13/2021 12:30 AM    BACTERIA FEW 04/13/2021 12:30 AM    SPECGRAV 1.030 01/30/2013 12:33 PM    LEUKOCYTESUR NEGATIVE 04/13/2021 12:30 AM    UROBILINOGEN 1.0 04/13/2021 12:30 AM    BILIRUBINUR NEGATIVE 04/13/2021 12:30 AM    BLOODU TRACE 04/13/2021 12:30 AM    GLUCOSEU NEGATIVE 04/13/2021 12:30 AM    KETUA NEGATIVE 04/13/2021 12:30 AM      Microalbumen/Creatinine ratio:  No components found for: RUCREAT    Objective:   Vitals: BP (!) 166/90 (Site: Left Upper Arm, Position: Sitting, Cuff Size: Large Adult)   Pulse 89   Wt 202 lb (91.6 kg)   LMP 01/20/2013   SpO2 95%   BMI 33.61 kg/m²      Constitutional:  Alert, awake, no apparent distress  Skin:normal with no rash or any significant lesions  HEENT:Pupils are reactive . Throat is clear. Oral mucosa is moist.  Neck:supple with no thyromegaly, JVD, lymphadenopathy or bruit  Cardiovascular: Regular sinus rhythm without murmur, rubs or gallops   Respiratory:  Clear to auscultation with no wheezes or rales  Abdomen: Good bowel sound, soft, non tender and no bruit  Ext: No LE edema  Musculoskeletal:Intact  Neuro:Alert, awake and oriented with no obvious focal deficit. Speech is normal.    Electronically signed by Sherlyn Guadalupe MD on 10/4/2022 at 3:25 PM   **This report has been created using voice recognition software. It maycontain minor  errors which are inherent in voice recognition technology. **

## 2022-10-12 ENCOUNTER — TELEPHONE (OUTPATIENT)
Dept: NEPHROLOGY | Age: 47
End: 2022-10-12

## 2022-10-12 NOTE — TELEPHONE ENCOUNTER
Patient called today. She is at work so she does not have actual readings. She said she is feeling just as bad as when it is high. She said it is averaging 128 to 130 now.

## 2022-10-18 RX ORDER — UBROGEPANT 100 MG/1
100 TABLET ORAL
COMMUNITY
Start: 2022-10-06

## 2022-10-18 NOTE — TELEPHONE ENCOUNTER
I spoke to [place]. She does not have her blood pressure readings with her. She said that when her blood pressure is 160 to 170 she feels better. She thinks this is because she is use to it being almost 200 all the time. She feels that 120-130 is to low for her. Can she cut the minoxidil back to 2.5 mg 1 tab bid and see how she feels? She is trying to get authorization for the MRI to be done for her head aches. She has recently started the Dianna for migraines.

## 2022-10-20 NOTE — TELEPHONE ENCOUNTER
Patient called back still not feeling good feels like she is dragging. BP today 180/120. She states I am not sure if it is from her headaches.

## 2022-10-31 ENCOUNTER — HOSPITAL ENCOUNTER (OUTPATIENT)
Age: 47
Discharge: HOME | End: 2022-10-31
Payer: COMMERCIAL

## 2022-10-31 DIAGNOSIS — I10 PRIMARY HYPERTENSION: ICD-10-CM

## 2022-10-31 LAB
ANION GAP SERPL CALCULATED.3IONS-SCNC: 11 MEQ/L (ref 8–16)
BUN BLDV-MCNC: 9 MG/DL (ref 7–22)
CALCIUM SERPL-MCNC: 9.6 MG/DL (ref 8.5–10.5)
CHLORIDE BLD-SCNC: 102 MEQ/L (ref 98–111)
CO2: 27 MEQ/L (ref 23–33)
CREAT SERPL-MCNC: 0.6 MG/DL (ref 0.4–1.2)
GFR SERPL CREATININE-BSD FRML MDRD: > 60 ML/MIN/1.73M2
GLUCOSE BLD-MCNC: 74 MG/DL (ref 70–108)
POTASSIUM SERPL-SCNC: 4 MEQ/L (ref 3.5–5.2)
SODIUM BLD-SCNC: 140 MEQ/L (ref 135–145)

## 2022-10-31 PROCEDURE — 36415 COLL VENOUS BLD VENIPUNCTURE: CPT

## 2022-10-31 PROCEDURE — 80048 BASIC METABOLIC PNL TOTAL CA: CPT

## 2022-11-23 ENCOUNTER — OFFICE VISIT (OUTPATIENT)
Dept: CARDIOLOGY CLINIC | Age: 47
End: 2022-11-23
Payer: COMMERCIAL

## 2022-11-23 VITALS
HEART RATE: 83 BPM | HEIGHT: 65 IN | WEIGHT: 204.2 LBS | DIASTOLIC BLOOD PRESSURE: 96 MMHG | BODY MASS INDEX: 34.02 KG/M2 | SYSTOLIC BLOOD PRESSURE: 174 MMHG

## 2022-11-23 DIAGNOSIS — E78.01 FAMILIAL HYPERCHOLESTEROLEMIA: ICD-10-CM

## 2022-11-23 DIAGNOSIS — I10 PRIMARY HYPERTENSION: Primary | ICD-10-CM

## 2022-11-23 PROCEDURE — 1036F TOBACCO NON-USER: CPT | Performed by: NUCLEAR MEDICINE

## 2022-11-23 PROCEDURE — 3078F DIAST BP <80 MM HG: CPT | Performed by: NUCLEAR MEDICINE

## 2022-11-23 PROCEDURE — 3074F SYST BP LT 130 MM HG: CPT | Performed by: NUCLEAR MEDICINE

## 2022-11-23 PROCEDURE — G8427 DOCREV CUR MEDS BY ELIG CLIN: HCPCS | Performed by: NUCLEAR MEDICINE

## 2022-11-23 PROCEDURE — 99213 OFFICE O/P EST LOW 20 MIN: CPT | Performed by: NUCLEAR MEDICINE

## 2022-11-23 PROCEDURE — G8484 FLU IMMUNIZE NO ADMIN: HCPCS | Performed by: NUCLEAR MEDICINE

## 2022-11-23 PROCEDURE — G8417 CALC BMI ABV UP PARAM F/U: HCPCS | Performed by: NUCLEAR MEDICINE

## 2022-11-23 RX ORDER — VALSARTAN 320 MG/1
320 TABLET ORAL DAILY
COMMUNITY
End: 2022-11-23 | Stop reason: SDUPTHER

## 2022-11-23 RX ORDER — VALSARTAN 320 MG/1
320 TABLET ORAL DAILY
Qty: 30 TABLET | Refills: 11 | Status: SHIPPED | OUTPATIENT
Start: 2022-11-23

## 2022-11-23 NOTE — PROGRESS NOTES
[id number] [facility] [address]  [place] [address]  Dept: [phone]  Dept Fax: [phone]  Loc: [phone]    Visit Date: 11/23/2022    Camelia Ca is a 41 y.o. female who presents todayfor:  Chief Complaint   Patient presents with    1 Year Follow Up    Hypertension    Hyperlipidemia     Running higher BP  No chest pain  No changes in breathing  On statins for hyperlipidemia  No dizziness  No syncope    HPI:  HPI  Past Medical History:   Diagnosis Date    Coronary artery disease involving native coronary artery of native heart without angina pectoris 10/27/2021    Diabetes mellitus ([facility] [address]     Hypertension     Unspecified cerebral artery occlusion with cerebral infarction 1995      Past Surgical History:   Procedure Laterality Date    BREAST SURGERY Left 02/10/2015    I & D Dr. Arneta Angelucci N/A 9/19/2019    I&D OF PUBIC ABSCESS performed by Chelsy Best MD at [address])  April 2013     total    HYSTEROSCOPY      GA EXC SKIN BENIG 2.1-3CM TRUNK,ARM,LEG N/A 6/4/2018    EXCISION OF BACK MASS performed by Chelsy Best MD at [address] [place] [address] History   Problem Relation Age of Onset    Heart Disease Mother     High Blood Pressure Mother     Arthritis Mother     Breast Cancer Maternal Cousin         dx premenapausal    High Blood Pressure Sister      Social History     Tobacco Use    Smoking status: Never    Smokeless tobacco: Never   Substance Use Topics    Alcohol use: No      Current Outpatient Medications   Medication Sig Dispense Refill    UBRELVY 100 MG TABS 100 mg      topiramate (TOPAMAX) 100 MG tablet TAKE 1 TABLET BY MOUTH ONCE DAILY AT BEDTIME.  START AFTER COMPLETING TITRATION      minoxidil (LONITEN) 2.5 MG tablet Take 2 tablets by mouth 2 times daily (Patient taking differently: Take 2.5 mg by mouth 2 times daily) 30 tablet 3    aspirin 81 MG chewable tablet Take 1 tablet by mouth daily 30 tablet 3    carvedilol (COREG) 25 MG tablet Take 1 tablet by mouth 2 times daily (with meals) 60 tablet 3    omeprazole (PRILOSEC) 20 MG delayed release capsule Take 20 mg by mouth daily as needed      chlorthalidone (HYGROTON) 25 MG tablet TAKE ONE TABLET BY MOUTH TWICE DAILY 180 tablet 1    potassium chloride (KLOR-CON M) 20 MEQ extended release tablet TAKE 1 TABLET BY MOUTH TWICE A DAY (Patient taking differently: Take 20 mEq by mouth daily) 180 tablet 3    magnesium oxide (MAG-OX) 400 MG tablet Take 400 mg by mouth daily      losartan (COZAAR) 100 MG tablet Take 100 mg by mouth daily      metFORMIN (GLUCOPHAGE-XR) 500 MG extended release tablet Take 500 mg by mouth at bedtime Does not take everyday      vitamin D (ERGOCALCIFEROL) 14058 units CAPS capsule Take 50,000 Units by mouth once a week      atorvastatin (LIPITOR) 20 MG tablet TAKE 1 TABLET BY MOUTH DAILY (Patient taking differently: Take 20 mg by mouth nightly) 30 tablet 8     No current facility-administered medications for this visit. Allergies   Allergen Reactions    Latex Hives    Bactrim [Sulfamethoxazole-Trimethoprim] Other (See Comments)     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 Tretha Doom  Jalyn, DO    Penicillins Hives    Clindamycin/Lincomycin Rash    Ciprofloxacin Hives    Doxycycline Hives    Lisinopril      Attacked kidneys     Health Maintenance   Topic Date Due    Depression Screen  Never done    HIV screen  Never done    Hepatitis C screen  Never done    A1C test (Diabetic or Prediabetic)  02/10/2016    Pneumococcal 0-64 years Vaccine (2 - PPSV23 if available, else PCV20) 08/06/2020    COVID-19 Vaccine (4 - Booster for Moderna series) 12/23/2021    Colorectal Cancer Screen  Never done    Lipids  05/09/2023    DTaP/Tdap/Td vaccine (2 - Td or Tdap) 08/06/2029    Flu vaccine  Completed    Hepatitis A vaccine  Aged Out    Hib vaccine  Aged Out Meningococcal (ACWY) vaccine  Aged Out       Subjective:  Review of Systems  General:   No fever, no chills, No fatigue or weight loss  Pulmonary:    some dyspnea, no wheezing  Cardiac:    Denies recent chest pain,   GI:     No nausea or vomiting, no abdominal pain  Neuro:    No dizziness or light headedness,   Musculoskeletal:  No recent active issues  Extremities:   No edema, no obvious claudication     Objective:  Physical Exam  BP (!) 174/96   Pulse 83   Ht 5' 5\" (1.651 m)   Wt 204 lb 3.2 oz (92.6 kg)   LMP 01/20/2013   BMI 33.98 kg/m²   General:   Well developed, well nourished  Lungs:   Clear to auscultation  Heart:    Normal S1 S2, Slight murmur. no rubs, no gallops  Abdomen:   Soft, non tender, no organomegalies, positive bowel sounds  Extremities:   No edema, no cyanosis, good peripheral pulses  Neurological:   Awake, alert, oriented. No obvious focal deficits  Musculoskelatal:  No obvious deformities    Assessment:      Diagnosis Orders   1. Primary hypertension        2. Familial hypercholesterolemia        As above  Cardiac fair for now   Higher BP    Plan:  No follow-ups on file. Change to valsartan 320   Continue risk factor modification and medical management  Thank you for allowing me to participate in the care of your patient. Please don't hesitate to contact me regarding any further issues related to the patient care    Orders Placed:  No orders of the defined types were placed in this encounter. Medications Prescribed:  No orders of the defined types were placed in this encounter. Discussed use, benefit, and side effects of prescribed medications. All patient questions answered. Pt voicedunderstanding. Instructed to continue current medications, diet and exercise. Continue risk factor modification and medical management. Patient agreed with treatment plan. Follow up as directed.     Electronically signedby Iván Herbert MD on 11/23/2022 at 1:17 PM

## 2022-12-29 RX ORDER — MINOXIDIL 2.5 MG/1
TABLET ORAL
Qty: 120 TABLET | Refills: 3 | Status: SHIPPED | OUTPATIENT
Start: 2022-12-29

## 2023-01-05 ENCOUNTER — OFFICE VISIT (OUTPATIENT)
Dept: NEPHROLOGY | Age: 48
End: 2023-01-05
Payer: COMMERCIAL

## 2023-01-05 VITALS
WEIGHT: 207 LBS | BODY MASS INDEX: 34.49 KG/M2 | HEIGHT: 65 IN | HEART RATE: 88 BPM | DIASTOLIC BLOOD PRESSURE: 92 MMHG | OXYGEN SATURATION: 99 % | SYSTOLIC BLOOD PRESSURE: 180 MMHG

## 2023-01-05 DIAGNOSIS — I10 PRIMARY HYPERTENSION: Primary | ICD-10-CM

## 2023-01-05 DIAGNOSIS — E11.21 DIABETIC NEPHROPATHY ASSOCIATED WITH TYPE 2 DIABETES MELLITUS (HCC): ICD-10-CM

## 2023-01-05 PROCEDURE — G8484 FLU IMMUNIZE NO ADMIN: HCPCS | Performed by: INTERNAL MEDICINE

## 2023-01-05 PROCEDURE — G8427 DOCREV CUR MEDS BY ELIG CLIN: HCPCS | Performed by: INTERNAL MEDICINE

## 2023-01-05 PROCEDURE — 3077F SYST BP >= 140 MM HG: CPT | Performed by: INTERNAL MEDICINE

## 2023-01-05 PROCEDURE — 3080F DIAST BP >= 90 MM HG: CPT | Performed by: INTERNAL MEDICINE

## 2023-01-05 PROCEDURE — 1036F TOBACCO NON-USER: CPT | Performed by: INTERNAL MEDICINE

## 2023-01-05 PROCEDURE — 99214 OFFICE O/P EST MOD 30 MIN: CPT | Performed by: INTERNAL MEDICINE

## 2023-01-05 PROCEDURE — 3046F HEMOGLOBIN A1C LEVEL >9.0%: CPT | Performed by: INTERNAL MEDICINE

## 2023-01-05 PROCEDURE — 2022F DILAT RTA XM EVC RTNOPTHY: CPT | Performed by: INTERNAL MEDICINE

## 2023-01-05 PROCEDURE — G8417 CALC BMI ABV UP PARAM F/U: HCPCS | Performed by: INTERNAL MEDICINE

## 2023-01-05 RX ORDER — RIMEGEPANT SULFATE 75 MG/75MG
75 TABLET, ORALLY DISINTEGRATING ORAL
COMMUNITY
Start: 2022-12-30

## 2023-01-05 RX ORDER — MINOXIDIL 2.5 MG/1
10 TABLET ORAL 2 TIMES DAILY
Qty: 120 TABLET | Refills: 3 | Status: SHIPPED | OUTPATIENT
Start: 2023-01-05

## 2023-01-05 NOTE — PROGRESS NOTES
Renal Progress Note    Assessment and Plan:      Diagnosis Orders   1. Primary hypertension        2. Diabetic nephropathy associated with type 2 diabetes mellitus ([facility] [address]                  PLAN:  I discussed my thoughts at length with the patient. She understood. I discussed the importance of blood pressure control with her especially with a strong family history of hypertension and cardiac disease from that. Low-salt diet  She will benefit from weight loss  Increase minoxidil from 5 mg twice daily to 10 mg twice a day  If does not help, will discuss replacing one of the blood pressure medicine with a different one for addition of  as needed clonidine. Return visit in 4 weeks  Advised her to monitor her blood pressure twice a day morning and evenings a week before the appointment and bring the record to the office. Patient Active Problem List   Diagnosis    Left breast abscess    Fibrocystic breast changes    Cellulitis    Acute kidney injury ([facility] [address]    Vomiting    HTN (hypertension)    S/P cardiac cath- 6/22/16-  All coronaries are patent but LAD. there is moderate eccentric plaque noted on the proximal and ostail LAD, giving ostail prox LAD 30% nstenosis, edp 13 mmhg, ef 65%- med R    Postoperative or surgical complication, initial encounter    MRSA cellulitis    S/P debridement    Gastroesophageal reflux disease    Coronary artery disease involving native coronary artery of native heart without angina pectoris    Chest pain           Subjective:   Chief complaint:  Chief Complaint   Patient presents with    Hypertension      HPI:This is a follow up visit for Ms. Bruno Flex who is here today for return appointment. I see her for hypertension. She was last seen about 3 months ago. Doing well with no complaint. Denies any chest pain or shortness of breath. Appetite is good. She monitors her blood pressure at home periodically. Blood pressure remains high.     ROS:  Pertinent positives stated above in HPI. All other systems were reviewed and were negative. Medications:     Current Outpatient Medications   Medication Sig Dispense Refill    NURTEC 75 MG TBDP 75 mg      minoxidil (LONITEN) 2.5 MG tablet take TWO tablets BY MOUTH TWICE DAILY 120 tablet 3    valsartan (DIOVAN) 320 MG tablet Take 1 tablet by mouth daily 30 tablet 11    UBRELVY 100 MG TABS 100 mg      topiramate (TOPAMAX) 100 MG tablet TAKE 1 TABLET BY MOUTH ONCE DAILY AT BEDTIME. START AFTER COMPLETING TITRATION      aspirin 81 MG chewable tablet Take 1 tablet by mouth daily 30 tablet 3    carvedilol (COREG) 25 MG tablet Take 1 tablet by mouth 2 times daily (with meals) 60 tablet 3    omeprazole (PRILOSEC) 20 MG delayed release capsule Take 20 mg by mouth daily as needed      chlorthalidone (HYGROTON) 25 MG tablet TAKE ONE TABLET BY MOUTH TWICE DAILY 180 tablet 1    potassium chloride (KLOR-CON M) 20 MEQ extended release tablet TAKE 1 TABLET BY MOUTH TWICE A DAY (Patient taking differently: Take 20 mEq by mouth daily) 180 tablet 3    magnesium oxide (MAG-OX) 400 MG tablet Take 400 mg by mouth daily      metFORMIN (GLUCOPHAGE-XR) 500 MG extended release tablet Take 500 mg by mouth at bedtime Does not take everyday      vitamin D (ERGOCALCIFEROL) 00922 units CAPS capsule Take 50,000 Units by mouth once a week      atorvastatin (LIPITOR) 20 MG tablet TAKE 1 TABLET BY MOUTH DAILY (Patient taking differently: Take 20 mg by mouth nightly) 30 tablet 8     No current facility-administered medications for this visit.        Lab Results:    CBC:   Lab Results   Component Value Date    WBC 6.7 04/27/2022    HGB 11.5 (L) 04/27/2022    HCT 37.5 04/27/2022    MCV 88.0 04/27/2022     04/27/2022     BMP:    Lab Results   Component Value Date     10/31/2022     05/09/2022     04/27/2022    K 4.0 10/31/2022    K 4.1 05/09/2022    K 3.7 04/27/2022     10/31/2022     05/09/2022     04/27/2022    CO2 27 10/31/2022    CO2 19 (L) 05/09/2022    CO2 25 04/27/2022    BUN 9 10/31/2022    BUN 13 05/09/2022    BUN 8 04/27/2022    CREATININE 0.6 10/31/2022    CREATININE 0.7 05/09/2022    CREATININE 0.6 04/27/2022    GLUCOSE 74 10/31/2022    GLUCOSE 148 (H) 05/09/2022    GLUCOSE 139 (H) 04/27/2022      Hepatic:   Lab Results   Component Value Date    AST 10 05/09/2022    AST 10 04/12/2021    AST 9 04/02/2021    ALT 11 05/09/2022    ALT 9 (L) 04/12/2021    ALT 7 (L) 04/02/2021    BILITOT 0.2 (L) 05/09/2022    BILITOT <0.2 (L) 04/12/2021    BILITOT 0.2 (L) 04/02/2021    ALKPHOS 89 05/09/2022    ALKPHOS 78 04/12/2021    ALKPHOS 98 04/02/2021     BNP: No results found for: BNP  Lipids:   Lab Results   Component Value Date    CHOL 139 05/09/2022    HDL 44 05/09/2022     INR:   Lab Results   Component Value Date    INR 0.93 03/31/2021    INR 1.15 (H) 09/16/2019    INR 1.00 04/02/2013     URINE:   Lab Results   Component Value Date/Time    NAUR 51 02/11/2015 05:50 PM    PROTUR 8.0 02/14/2015 08:30 PM     Lab Results   Component Value Date/Time    NITRU NEGATIVE 04/13/2021 12:30 AM    COLORU YELLOW 04/13/2021 12:30 AM    PHUR 6.5 04/13/2021 12:30 AM    WBCUA 5-9 04/13/2021 12:30 AM    RBCUA 3-5 04/13/2021 12:30 AM    YEAST NONE SEEN 04/13/2021 12:30 AM    BACTERIA FEW 04/13/2021 12:30 AM    SPECGRAV 1.030 01/30/2013 12:33 PM    LEUKOCYTESUR NEGATIVE 04/13/2021 12:30 AM    UROBILINOGEN 1.0 04/13/2021 12:30 AM    BILIRUBINUR NEGATIVE 04/13/2021 12:30 AM    BLOODU TRACE 04/13/2021 12:30 AM    GLUCOSEU NEGATIVE 04/13/2021 12:30 AM    KETUA NEGATIVE 04/13/2021 12:30 AM      Microalbumen/Creatinine ratio:  No components found for: RUCREAT    Objective:   Vitals: BP (!) 180/92 (Site: Left Upper Arm, Position: Sitting, Cuff Size: Large Adult)   Pulse 88   Ht 5' 5\" (1.651 m)   Wt 207 lb (93.9 kg)   LMP 01/20/2013   SpO2 99%   BMI 34.45 kg/m²      Constitutional:  Alert, awake, no apparent distress  Skin:normal with no rash or any significant lesions  HEENT:Pupils are reactive . Throat is clear. Oral mucosa is moist.  Neck:supple with no thyromegaly, JVD, lymphadenopathy or bruit   Cardiovascular: Regular sinus rhythm without murmur, rubs or gallops   Respiratory:  Clear to auscultation with no wheezes or rales  Abdomen: Good bowel sound, soft, non tender and no bruit  Ext: No LE edema  Musculoskeletal:Intact  Neuro:Alert, awake and oriented with no obvious focal deficit. Speech is normal.    Electronically signed by Aj Plummer MD on 1/5/2023 at 3:39 PM   **This report has been created using voice recognition software. It maycontain minor  errors which are inherent in voice recognition technology. **

## 2023-01-31 PROBLEM — M79.641 PAIN IN RIGHT HAND: Status: ACTIVE | Noted: 2023-01-31

## 2023-01-31 PROBLEM — S69.91XA INJURY OF RIGHT HAND: Status: ACTIVE | Noted: 2023-01-31

## 2023-01-31 PROBLEM — M17.9 OSTEOARTHRITIS OF KNEE: Status: ACTIVE | Noted: 2023-01-31

## 2023-02-08 ENCOUNTER — HOSPITAL ENCOUNTER (OUTPATIENT)
Dept: MRI IMAGING | Age: 48
Discharge: HOME | End: 2023-02-08
Payer: COMMERCIAL

## 2023-02-08 DIAGNOSIS — I10 ESSENTIAL HYPERTENSION, MALIGNANT: ICD-10-CM

## 2023-02-08 DIAGNOSIS — Z86.73 PERSONAL HISTORY OF TRANSIENT CEREBRAL ISCHEMIA: ICD-10-CM

## 2023-02-08 DIAGNOSIS — G43.709 TRANSFORMED MIGRAINE WITHOUT AURA: ICD-10-CM

## 2023-02-08 PROCEDURE — 70551 MRI BRAIN STEM W/O DYE: CPT

## 2023-02-25 ENCOUNTER — HOSPITAL ENCOUNTER (EMERGENCY)
Age: 48
Discharge: HOME | End: 2023-02-25
Attending: EMERGENCY MEDICINE
Payer: COMMERCIAL

## 2023-02-25 ENCOUNTER — APPOINTMENT (OUTPATIENT)
Dept: GENERAL RADIOLOGY | Age: 48
End: 2023-02-25
Payer: COMMERCIAL

## 2023-02-25 VITALS
DIASTOLIC BLOOD PRESSURE: 102 MMHG | OXYGEN SATURATION: 98 % | SYSTOLIC BLOOD PRESSURE: 191 MMHG | HEART RATE: 98 BPM | RESPIRATION RATE: 18 BRPM | WEIGHT: 204 LBS | BODY MASS INDEX: 33.95 KG/M2 | TEMPERATURE: 98.1 F
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
## 2023-02-25 LAB
ANION GAP SERPL CALC-SCNC: 13 MEQ/L (ref 8–16)
BASOPHILS ABSOLUTE: 0 THOU/MM3 (ref 0–0.1)
BASOPHILS NFR BLD AUTO: 0.4 %
BUN SERPL-MCNC: 10 MG/DL (ref 7–22)
CALCIUM SERPL-MCNC: 9.2 MG/DL (ref 8.5–10.5)
CHLORIDE SERPL-SCNC: 101 MEQ/L (ref 98–111)
CO2 SERPL-SCNC: 23 MEQ/L (ref 23–33)
CREAT SERPL-MCNC: 0.7 MG/DL (ref 0.4–1.2)
DEPRECATED RDW RBC AUTO: 47.9 FL (ref 35–45)
EOSINOPHIL NFR BLD AUTO: 0.8 %
EOSINOPHILS ABSOLUTE: 0.1 THOU/MM3 (ref 0–0.4)
ERYTHROCYTE [DISTWIDTH] IN BLOOD BY AUTOMATED COUNT: 14.9 % (ref 11.5–14.5)
GFR SERPL CREATININE-BSD FRML MDRD: > 60 ML/MIN/1.73M2
GLUCOSE SERPL-MCNC: 125 MG/DL (ref 70–108)
HCT VFR BLD AUTO: 39.9 % (ref 37–47)
HGB BLD-MCNC: 12.4 GM/DL (ref 12–16)
IMM GRANULOCYTES # BLD AUTO: 0.03 THOU/MM3 (ref 0–0.07)
IMM GRANULOCYTES NFR BLD AUTO: 0.3 %
LYMPHOCYTES ABSOLUTE: 3.3 THOU/MM3 (ref 1–4.8)
LYMPHOCYTES NFR BLD AUTO: 34.6 %
MCH RBC QN AUTO: 27.3 PG (ref 26–33)
MCHC RBC AUTO-ENTMCNC: 31.1 GM/DL (ref 32.2–35.5)
MCV RBC AUTO: 87.7 FL (ref 81–99)
MONOCYTES ABSOLUTE: 0.9 THOU/MM3 (ref 0.4–1.3)
MONOCYTES NFR BLD AUTO: 9.1 %
NEUTROPHILS NFR BLD AUTO: 54.8 %
NRBC BLD AUTO-RTO: 0 /100 WBC
OSMOLALITY SERPL CALC.SUM OF ELEC: 274.3 MOSMOL/KG (ref 275–300)
PLATELET # BLD AUTO: 375 THOU/MM3 (ref 130–400)
PMV BLD AUTO: 10 FL (ref 9.4–12.4)
POTASSIUM SERPL-SCNC: 3.4 MEQ/L (ref 3.5–5.2)
RBC # BLD AUTO: 4.55 MILL/MM3 (ref 4.2–5.4)
SEGMENTED NEUTROPHILS ABSOLUTE COUNT: 5.2 THOU/MM3 (ref 1.8–7.7)
SODIUM SERPL-SCNC: 137 MEQ/L (ref 135–145)
WBC # BLD AUTO: 9.5 THOU/MM3 (ref 4.8–10.8)

## 2023-02-25 PROCEDURE — 6360000002 HC RX W HCPCS: Performed by: EMERGENCY MEDICINE

## 2023-02-25 PROCEDURE — 96374 THER/PROPH/DIAG INJ IV PUSH: CPT

## 2023-02-25 PROCEDURE — 80048 BASIC METABOLIC PNL TOTAL CA: CPT

## 2023-02-25 PROCEDURE — 85025 COMPLETE CBC W/AUTO DIFF WBC: CPT

## 2023-02-25 PROCEDURE — 71045 X-RAY EXAM CHEST 1 VIEW: CPT

## 2023-02-25 PROCEDURE — 73130 X-RAY EXAM OF HAND: CPT

## 2023-02-25 PROCEDURE — 96375 TX/PRO/DX INJ NEW DRUG ADDON: CPT

## 2023-02-25 PROCEDURE — 99284 EMERGENCY DEPT VISIT MOD MDM: CPT | Performed by: STUDENT IN AN ORGANIZED HEALTH CARE EDUCATION/TRAINING PROGRAM

## 2023-02-25 PROCEDURE — 73100 X-RAY EXAM OF WRIST: CPT

## 2023-02-25 PROCEDURE — 36415 COLL VENOUS BLD VENIPUNCTURE: CPT

## 2023-02-25 RX ORDER — MORPHINE SULFATE 4 MG/ML
4 INJECTION, SOLUTION INTRAMUSCULAR; INTRAVENOUS
Status: COMPLETED | OUTPATIENT
Start: 2023-02-25 | End: 2023-02-25

## 2023-02-25 RX ORDER — KETOROLAC TROMETHAMINE 30 MG/ML
15 INJECTION, SOLUTION INTRAMUSCULAR; INTRAVENOUS ONCE
Status: COMPLETED | OUTPATIENT
Start: 2023-02-25 | End: 2023-02-25

## 2023-02-25 RX ADMIN — MORPHINE SULFATE 4 MG: 4 INJECTION, SOLUTION INTRAMUSCULAR; INTRAVENOUS at 19:19

## 2023-02-25 RX ADMIN — KETOROLAC TROMETHAMINE 15 MG: 30 INJECTION, SOLUTION INTRAMUSCULAR; INTRAVENOUS at 21:19

## 2023-02-25 ASSESSMENT — PAIN - FUNCTIONAL ASSESSMENT
PAIN_FUNCTIONAL_ASSESSMENT: 0-10

## 2023-02-25 ASSESSMENT — PAIN SCALES - GENERAL
PAINLEVEL_OUTOF10: 5
PAINLEVEL_OUTOF10: 8
PAINLEVEL_OUTOF10: 3
PAINLEVEL_OUTOF10: 8

## 2023-02-25 ASSESSMENT — PAIN DESCRIPTION - PAIN TYPE: TYPE: ACUTE PAIN
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
## 2023-02-26 NOTE — ED NOTES
Pt reassessed at this time. Vitals as documented. Call light in reach.       Miesha Willis RN  02/25/23 2015
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
## 2023-02-26 NOTE — ED NOTES
Upon first contact with patient this RN receives bedside shift report from [facility]. Pt rates pain 8/10 in left arm. Updated on POC. Verbalized understanding. Vitals documented.       Manfred Rodriguez RN  02/25/23 5230

## 2023-03-06 DIAGNOSIS — I70.1 RENAL ARTERY STENOSIS (HCC): ICD-10-CM

## 2023-03-06 DIAGNOSIS — E11.21 DIABETIC NEPHROPATHY ASSOCIATED WITH TYPE 2 DIABETES MELLITUS (HCC): Primary | ICD-10-CM

## 2023-03-06 DIAGNOSIS — I10 ESSENTIAL HYPERTENSION: ICD-10-CM

## 2023-03-14 ENCOUNTER — HOSPITAL ENCOUNTER (OUTPATIENT)
Age: 48
Setting detail: SPECIMEN
Discharge: HOME | End: 2023-03-14

## 2023-03-14 LAB
ABSOLUTE EOS #: 0.21 K/UL (ref 0–0.44)
ABSOLUTE IMMATURE GRANULOCYTE: <0.03 K/UL (ref 0–0.3)
ABSOLUTE LYMPH #: 2.32 K/UL (ref 1.1–3.7)
ABSOLUTE MONO #: 0.55 K/UL (ref 0.1–1.2)
ALBUMIN SERPL-MCNC: 4.2 G/DL (ref 3.5–5.2)
ALBUMIN/GLOBULIN RATIO: 1.3 (ref 1–2.5)
ALP SERPL-CCNC: 90 U/L (ref 35–104)
ALT SERPL-CCNC: 8 U/L (ref 5–33)
ANION GAP SERPL CALCULATED.3IONS-SCNC: 13 MMOL/L (ref 9–17)
AST SERPL-CCNC: 10 U/L
BACTERIA: ABNORMAL
BASOPHILS # BLD: 1 % (ref 0–2)
BASOPHILS ABSOLUTE: 0.04 K/UL (ref 0–0.2)
BILIRUB SERPL-MCNC: <0.1 MG/DL (ref 0.3–1.2)
BILIRUBIN URINE: NEGATIVE
BUN SERPL-MCNC: 6 MG/DL (ref 6–20)
CALCIUM SERPL-MCNC: 9.3 MG/DL (ref 8.6–10.4)
CASTS UA: ABNORMAL /LPF (ref 0–8)
CHLORIDE SERPL-SCNC: 102 MMOL/L (ref 98–107)
CHOLEST SERPL-MCNC: 157 MG/DL
CHOLESTEROL/HDL RATIO: 3.6
CO2 SERPL-SCNC: 26 MMOL/L (ref 20–31)
COLOR: YELLOW
CREAT SERPL-MCNC: 0.57 MG/DL (ref 0.5–0.9)
EOSINOPHILS RELATIVE PERCENT: 4 % (ref 1–4)
EPITHELIAL CELLS UA: ABNORMAL /HPF (ref 0–5)
GFR SERPL CREATININE-BSD FRML MDRD: >60 ML/MIN/1.73M2
GLUCOSE SERPL-MCNC: 103 MG/DL (ref 70–99)
GLUCOSE UR STRIP.AUTO-MCNC: NEGATIVE MG/DL
HCT VFR BLD AUTO: 40.7 % (ref 36.3–47.1)
HDLC SERPL-MCNC: 44 MG/DL
HGB BLD-MCNC: 12.3 G/DL (ref 11.9–15.1)
IMMATURE GRANULOCYTES: 0 %
IRON SATURATION: 11 % (ref 20–55)
IRON SERPL-MCNC: 34 UG/DL (ref 37–145)
KETONES UR STRIP.AUTO-MCNC: NEGATIVE MG/DL
LDLC SERPL CALC-MCNC: 93 MG/DL (ref 0–130)
LEUKOCYTE ESTERASE UR QL STRIP.AUTO: ABNORMAL
LYMPHOCYTES # BLD: 46 % (ref 24–43)
MCH RBC QN AUTO: 27.1 PG (ref 25.2–33.5)
MCHC RBC AUTO-ENTMCNC: 30.2 G/DL (ref 28.4–34.8)
MCV RBC AUTO: 89.6 FL (ref 82.6–102.9)
MONOCYTES # BLD: 11 % (ref 3–12)
NITRITE UR QL STRIP.AUTO: NEGATIVE
NRBC AUTOMATED: 0 PER 100 WBC
PDW BLD-RTO: 14.9 % (ref 11.8–14.4)
PLATELET # BLD AUTO: 405 K/UL (ref 138–453)
PMV BLD AUTO: 10.3 FL (ref 8.1–13.5)
POTASSIUM SERPL-SCNC: 4.1 MMOL/L (ref 3.7–5.3)
PROT SERPL-MCNC: 7.5 G/DL (ref 6.4–8.3)
PROT UR STRIP.AUTO-MCNC: 6.5 MG/DL (ref 5–8)
PROT UR STRIP.AUTO-MCNC: NEGATIVE MG/DL
RBC # BLD: 4.54 M/UL (ref 3.95–5.11)
RBC # BLD: ABNORMAL 10*6/UL
RBC CLUMPS #/AREA URNS AUTO: ABNORMAL /HPF (ref 0–4)
SEG NEUTROPHILS: 38 % (ref 36–65)
SEGMENTED NEUTROPHILS ABSOLUTE COUNT: 1.94 K/UL (ref 1.5–8.1)
SODIUM SERPL-SCNC: 141 MMOL/L (ref 135–144)
SPECIFIC GRAVITY UA: 1.01 (ref 1–1.03)
TIBC SERPL-MCNC: 296 UG/DL (ref 250–450)
TRIGL SERPL-MCNC: 98 MG/DL
TURBIDITY: ABNORMAL
UNSATURATED IRON BINDING CAPACITY: 262 UG/DL (ref 112–347)
URINE HGB: ABNORMAL
UROBILINOGEN, URINE: NORMAL
WBC # BLD AUTO: 5.1 K/UL (ref 3.5–11.3)
WBC UA: ABNORMAL /HPF (ref 0–5)

## 2023-03-15 ENCOUNTER — HOSPITAL ENCOUNTER (OUTPATIENT)
Age: 48
Discharge: HOME | End: 2023-03-15
Payer: COMMERCIAL

## 2023-03-15 DIAGNOSIS — I70.1 RENAL ARTERY STENOSIS (HCC): ICD-10-CM

## 2023-03-15 DIAGNOSIS — I10 ESSENTIAL HYPERTENSION: ICD-10-CM

## 2023-03-15 DIAGNOSIS — E11.21 DIABETIC NEPHROPATHY ASSOCIATED WITH TYPE 2 DIABETES MELLITUS (HCC): ICD-10-CM

## 2023-03-15 LAB
ANION GAP SERPL CALC-SCNC: 10 MEQ/L (ref 8–16)
BUN SERPL-MCNC: 10 MG/DL (ref 7–22)
CALCIUM SERPL-MCNC: 9.9 MG/DL (ref 8.5–10.5)
CHLORIDE SERPL-SCNC: 104 MEQ/L (ref 98–111)
CO2 SERPL-SCNC: 26 MEQ/L (ref 23–33)
CREAT SERPL-MCNC: 0.6 MG/DL (ref 0.4–1.2)
GFR SERPL CREATININE-BSD FRML MDRD: > 60 ML/MIN/1.73M2
GLUCOSE SERPL-MCNC: 105 MG/DL (ref 70–108)
POTASSIUM SERPL-SCNC: 4 MEQ/L (ref 3.5–5.2)
SODIUM SERPL-SCNC: 140 MEQ/L (ref 135–145)

## 2023-03-15 PROCEDURE — 80048 BASIC METABOLIC PNL TOTAL CA: CPT

## 2023-03-15 PROCEDURE — 36415 COLL VENOUS BLD VENIPUNCTURE: CPT

## 2023-03-16 ENCOUNTER — OFFICE VISIT (OUTPATIENT)
Dept: NEPHROLOGY | Age: 48
End: 2023-03-16
Payer: COMMERCIAL

## 2023-03-16 VITALS
HEART RATE: 108 BPM | DIASTOLIC BLOOD PRESSURE: 102 MMHG | HEIGHT: 65 IN | WEIGHT: 207 LBS | SYSTOLIC BLOOD PRESSURE: 184 MMHG | BODY MASS INDEX: 34.49 KG/M2 | OXYGEN SATURATION: 100 %

## 2023-03-16 DIAGNOSIS — E11.21 DIABETIC NEPHROPATHY ASSOCIATED WITH TYPE 2 DIABETES MELLITUS (HCC): ICD-10-CM

## 2023-03-16 DIAGNOSIS — I10 PRIMARY HYPERTENSION: Primary | ICD-10-CM

## 2023-03-16 PROCEDURE — G8484 FLU IMMUNIZE NO ADMIN: HCPCS | Performed by: INTERNAL MEDICINE

## 2023-03-16 PROCEDURE — G8417 CALC BMI ABV UP PARAM F/U: HCPCS | Performed by: INTERNAL MEDICINE

## 2023-03-16 PROCEDURE — 3080F DIAST BP >= 90 MM HG: CPT | Performed by: INTERNAL MEDICINE

## 2023-03-16 PROCEDURE — 3046F HEMOGLOBIN A1C LEVEL >9.0%: CPT | Performed by: INTERNAL MEDICINE

## 2023-03-16 PROCEDURE — G8428 CUR MEDS NOT DOCUMENT: HCPCS | Performed by: INTERNAL MEDICINE

## 2023-03-16 PROCEDURE — 1036F TOBACCO NON-USER: CPT | Performed by: INTERNAL MEDICINE

## 2023-03-16 PROCEDURE — 3077F SYST BP >= 140 MM HG: CPT | Performed by: INTERNAL MEDICINE

## 2023-03-16 PROCEDURE — 99213 OFFICE O/P EST LOW 20 MIN: CPT | Performed by: INTERNAL MEDICINE

## 2023-03-16 PROCEDURE — 2022F DILAT RTA XM EVC RTNOPTHY: CPT | Performed by: INTERNAL MEDICINE

## 2023-03-16 RX ORDER — MINOXIDIL 2.5 MG/1
10 TABLET ORAL DAILY
Qty: 60 TABLET | Refills: 3 | Status: SHIPPED | OUTPATIENT
Start: 2023-03-16 | End: 2024-03-15

## 2023-03-16 NOTE — PATIENT INSTRUCTIONS
Low Sodium Diet (2,000 Milligram/2 Gm)    The most common source of sodium is salt. People get most of the salt in their diet from canned, prepared, and packaged foods. Fast food and restaurant meals also are very high in sodium. Limit your sodium to less than 2,000 milligrams (mg) a day. This limit counts all the sodium in prepared and packaged foods and any salt you add to your food. And try to further reduce how much sodium you eat to less than 1,500 mg a day if you are 46 or older, are black, or have high blood pressure, diabetes, or chronic kidney disease. Buy low-sodium foods   Buy foods that are labeled \"unsalted\" (no salt added), \"sodium-free\" (less than 5 mg of sodium per serving), or \"low-sodium\" (less than 140 mg of sodium per serving). Foods labeled \"reduced-sodium\" and \"light sodium\" may still have too much sodium. Be sure to read the label to see how much sodium you are getting. Buy fresh vegetables, or frozen vegetables without added sauces. Buy low-sodium versions of canned vegetables, soups, and other canned goods. Prepare low-sodium meals   Cut back on the amount of salt you use in cooking. This will help you adjust to the taste. Do not add salt after cooking. One teaspoon of salt has about 2,300 mg of sodium. Take the salt shaker off the table. Flavor your food with garlic, lemon juice, onion, vinegar, herbs, and spices. Do not use soy sauce, lite soy sauce, steak sauce, onion salt, garlic salt, celery salt, mustard, or ketchup on your food. Use low-sodium salad dressings, sauces, and ketchup. Or make your own salad dressings and sauces without adding salt. Use less salt (or none) when recipes call for it. You can often use half the salt a recipe calls for without losing flavor. Other foods such as rice, pasta, and grains do not need added salt. Rinse canned vegetables, and cook them in fresh water. This removes some--but not all--of the salt.     Avoid high-sodium foods   Avoid eating:   Smoked, cured, salted, and canned meat, fish, and poultry.   Ham, tyson, hot dogs, and luncheon meats.   Regular, hard, and processed cheese such as American, Velveeta, Cheez Whiz  Regular peanut butter.   Crackers with salted tops, and other salted snack foods such as pretzels, chips, and salted popcorn.   Frozen prepared meals, unless labeled low-sodium.   Canned and dried soups, broths, and bouillon, unless labeled sodium-free or low-sodium.   Canned vegetables, unless labeled sodium-free or low-sodium.   Dinner kits such as hamburger and pasta meals  Frozen meal- entrees, dinners, vegetable with sauce  French fries, pizza, tacos, and other fast foods.   Instant cooking foods to which you add hot water and stir-Potatoes, cereals, noodles  Packaged starch foods-seasoned noodle or rice dish, stuffing mix, macaroni and cheese dinner  Pickles, olives, ketchup, and other condiments, especially soy sauce, unless labeled sodium-free or low-sodium.    Do not use salt substitute or \"lite\" salt because it contains potassium rather than sodium   Agapito Lite Salt   No Salt, Nu Salt.    These often are very high in potassium.     Mrs Dash is ok to use since it does not contain potassium    How to Read a Food Label to Limit Sodium: After Your Visit     Read ingredient lists on food labels   Read the list of ingredients on food labels to help you find how much sodium is in a food. The label lists the ingredients in a food in descending order (from the most to the least). If salt or sodium is high on the list, there may be a lot of sodium in the food.   Know that sodium has different names. Sodium is also called monosodium glutamate (MSG, common in Chinese food), sodium citrate, sodium alginate, sodium hydroxide, and sodium phosphate.  Read Nutrition Facts labels   On most foods, there is a Nutrition Facts label. This will tell you how much sodium is in one serving of food. Look at both the serving size and the  sodium amount. The serving size is located at the top of the label, usually right under the \"Nutrition Facts\" title. The amount of sodium is given in the list under the title. It is given in milligrams (mg). Check the serving size carefully. A single serving is often very small, and you may eat more than one serving. If this is the case, you will eat more sodium than listed on the label. For example, if the serving size for a canned soup is 1 cup and the sodium amount is 470 mg, if you have 2 cups you will eat 940 mg of sodium. The nutrition facts for fresh fruits and vegetables are not listed on the food. They may be listed somewhere in the store. These foods usually have no sodium or low sodium. The Nutrition Facts label also gives you the Percent Daily Value for sodium. This is how much of the recommended amount of sodium a serving contains. The daily value for sodium is less than 2,300 mg. So if the Percent Daily Value says 50%, this means one serving is giving you half of this, or 1,150 mg.
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
## 2023-03-17 LAB
MICROORGANISM SPEC CULT: ABNORMAL
SPECIMEN DESCRIPTION: ABNORMAL

## 2023-03-27 RX ORDER — MINOXIDIL 2.5 MG/1
TABLET ORAL
Qty: 720 TABLET | Refills: 1 | Status: SHIPPED | OUTPATIENT
Start: 2023-03-27

## 2023-04-18 ENCOUNTER — OFFICE VISIT (OUTPATIENT)
Dept: NEPHROLOGY | Age: 48
End: 2023-04-18
Payer: COMMERCIAL

## 2023-04-18 VITALS
DIASTOLIC BLOOD PRESSURE: 100 MMHG | SYSTOLIC BLOOD PRESSURE: 188 MMHG | HEART RATE: 81 BPM | HEIGHT: 65 IN | WEIGHT: 207 LBS | OXYGEN SATURATION: 100 % | BODY MASS INDEX: 34.49 KG/M2

## 2023-04-18 DIAGNOSIS — I10 PRIMARY HYPERTENSION: Primary | ICD-10-CM

## 2023-04-18 DIAGNOSIS — I15.9 HYPERTENSION, SECONDARY: ICD-10-CM

## 2023-04-18 PROCEDURE — 99214 OFFICE O/P EST MOD 30 MIN: CPT | Performed by: INTERNAL MEDICINE

## 2023-04-18 PROCEDURE — 3080F DIAST BP >= 90 MM HG: CPT | Performed by: INTERNAL MEDICINE

## 2023-04-18 PROCEDURE — G8427 DOCREV CUR MEDS BY ELIG CLIN: HCPCS | Performed by: INTERNAL MEDICINE

## 2023-04-18 PROCEDURE — 3077F SYST BP >= 140 MM HG: CPT | Performed by: INTERNAL MEDICINE

## 2023-04-18 PROCEDURE — 1036F TOBACCO NON-USER: CPT | Performed by: INTERNAL MEDICINE

## 2023-04-18 PROCEDURE — G8417 CALC BMI ABV UP PARAM F/U: HCPCS | Performed by: INTERNAL MEDICINE

## 2023-04-18 NOTE — PROGRESS NOTES
Renal Progress Note    Assessment and Plan:      Diagnosis Orders   1. Primary hypertension  Catecholamines, Plasma Fractionated    Renin    Aldosterone    TSH with Reflex      2. Hypertension, secondary                  PLAN:  I discussed my thoughts with the patient   She understood. I addressed her questions. I reviewed her blood pressure record. They indeed are very high. I am not concerned for possible secondary component. We will schedule her for renal angiogram  Plasma catecholamines level. Plasma renin. Plasma aldosterone . TSH and free T4  Return visit in 4 weeks. Patient Active Problem List   Diagnosis    Left breast abscess    Fibrocystic breast changes    Cellulitis    Acute kidney injury ([address]    Vomiting    HTN (hypertension)    S/P cardiac cath- 6/22/16-  All coronaries are patent but LAD. there is moderate eccentric plaque noted on the proximal and ostail LAD, giving ostail prox LAD 30% nstenosis, edp 13 mmhg, ef 65%- med R    Postoperative or surgical complication, initial encounter    MRSA cellulitis    S/P debridement    Gastroesophageal reflux disease    Coronary artery disease involving native coronary artery of native heart without angina pectoris    Chest pain    Injury of right hand    Osteoarthritis of knee    Pain in right hand           Subjective:   Chief complaint:  Chief Complaint   Patient presents with    Hypertension      HPI:This is a follow up visit for Ms Jane Richards her today for a follow up appointment . Sees her for hypertension and was last seen about 4 weeks ago. Doing well with no new issues of concern today. Has good appetite and denies too much salt consumption. He has chronic migraine headaches and had taken Fioricet and naprosyn both of which can exacerbate hypertension                ROS:  Pertinent positives stated above in HPI. All other systems were reviewed and were negative.   Medications:     Current Outpatient Medications   Medication Sig Dispense

## 2023-04-18 NOTE — PATIENT INSTRUCTIONS
Please increase hydralazine from  25 mg 3 times a day to 50 mg 3 times a day by taking two tablets of the 25 mg  Call us before you run out  We will call in 1 tablet for 50 mg three times a day still

## 2023-04-25 ENCOUNTER — HOSPITAL ENCOUNTER (OUTPATIENT)
Dept: NON INVASIVE DIAGNOSTICS | Age: 48
Discharge: HOME | End: 2023-04-25
Payer: COMMERCIAL

## 2023-04-25 ENCOUNTER — HOSPITAL ENCOUNTER (OUTPATIENT)
Dept: ULTRASOUND IMAGING | Age: 48
Discharge: HOME | End: 2023-04-25
Payer: COMMERCIAL

## 2023-04-25 DIAGNOSIS — R07.2 PRECORDIAL PAIN: ICD-10-CM

## 2023-04-25 DIAGNOSIS — R06.02 SHORTNESS OF BREATH: ICD-10-CM

## 2023-04-25 DIAGNOSIS — I10 PRIMARY HYPERTENSION: ICD-10-CM

## 2023-04-25 LAB
LV EF: 60 %
LVEF MODALITY: NORMAL

## 2023-04-25 PROCEDURE — 93306 TTE W/DOPPLER COMPLETE: CPT

## 2023-04-25 PROCEDURE — 93975 VASCULAR STUDY: CPT

## 2023-04-26 ENCOUNTER — TELEPHONE (OUTPATIENT)
Dept: NEPHROLOGY | Age: 48
End: 2023-04-26

## 2023-04-26 NOTE — TELEPHONE ENCOUNTER
Regina Velasco from [facility] phoned - dr Emmitt Collet had pt do a renal usn yesterday - results in chart - there is a order in chart from dr Anup Cain to have a renal angiogram scheduled - narcisa is questioning if dr Anup Cain wants to cancel the renal angiogram? [phone]

## 2023-05-02 ENCOUNTER — HOSPITAL ENCOUNTER (OUTPATIENT)
Dept: CT IMAGING | Age: 48
Discharge: HOME | End: 2023-05-02
Payer: COMMERCIAL

## 2023-05-02 DIAGNOSIS — N10 ACUTE PYELONEPHRITIS: ICD-10-CM

## 2023-05-02 DIAGNOSIS — R10.9 FLANK PAIN: ICD-10-CM

## 2023-05-02 PROCEDURE — 74176 CT ABD & PELVIS W/O CONTRAST: CPT

## 2023-05-09 ENCOUNTER — OFFICE VISIT (OUTPATIENT)
Dept: UROLOGY | Age: 48
End: 2023-05-09
Payer: COMMERCIAL

## 2023-05-09 ENCOUNTER — HOSPITAL ENCOUNTER (OUTPATIENT)
Age: 48
Discharge: HOME | End: 2023-05-09

## 2023-05-09 VITALS — BODY MASS INDEX: 34.49 KG/M2 | HEIGHT: 65 IN | WEIGHT: 207 LBS | RESPIRATION RATE: 16 BRPM

## 2023-05-09 DIAGNOSIS — R10.13 EPIGASTRIC PAIN: ICD-10-CM

## 2023-05-09 DIAGNOSIS — I10 PRIMARY HYPERTENSION: ICD-10-CM

## 2023-05-09 DIAGNOSIS — R10.9 FLANK PAIN: Primary | ICD-10-CM

## 2023-05-09 DIAGNOSIS — R31.29 MICROSCOPIC HEMATURIA: ICD-10-CM

## 2023-05-09 DIAGNOSIS — R11.0 POSTPRANDIAL NAUSEA: Primary | ICD-10-CM

## 2023-05-09 LAB
BACTERIA: ABNORMAL
BILIRUB UR QL STRIP: NEGATIVE
BILIRUBIN URINE: NEGATIVE
BLOOD URINE, POC: NORMAL
CASTS #/AREA URNS LPF: ABNORMAL /LPF
CASTS #/AREA URNS LPF: ABNORMAL /LPF
CHARACTER UR: ABNORMAL
CHARACTER, URINE: CLEAR
CHARCOAL URNS QL MICRO: ABNORMAL
COLOR UR: YELLOW
COLOR, URINE: YELLOW
CRYSTALS URNS QL MICRO: ABNORMAL
EPITHELIAL CELLS, UA: ABNORMAL /HPF
GLUCOSE UR QL STRIP.AUTO: NEGATIVE MG/DL
GLUCOSE URINE: NEGATIVE MG/DL
HGB UR QL STRIP.AUTO: NEGATIVE
KETONES UR QL STRIP.AUTO: NEGATIVE
KETONES, URINE: NEGATIVE
LEUKOCYTE CLUMPS, URINE: NEGATIVE
LEUKOCYTE ESTERASE UR QL STRIP.AUTO: NEGATIVE
NITRITE UR QL STRIP.AUTO: NEGATIVE
NITRITE, URINE: NEGATIVE
PH UR STRIP.AUTO: 7 [PH] (ref 5–9)
PH, URINE: 7 (ref 5–9)
POST VOID RESIDUAL (PVR): 226 ML
PROT UR STRIP.AUTO-MCNC: NEGATIVE MG/DL
PROTEIN, URINE: NEGATIVE MG/DL
RBC #/AREA URNS HPF: ABNORMAL /HPF
RENAL EPI CELLS #/AREA URNS HPF: ABNORMAL /[HPF]
SPECIFIC GRAVITY UA: 1.02 (ref 1–1.03)
SPECIFIC GRAVITY, URINE: 1.02 (ref 1–1.03)
UROBILINOGEN, URINE: 0.2 EU/DL (ref 0–1)
UROBILINOGEN, URINE: 0.2 EU/DL (ref 0–1)
WBC #/AREA URNS HPF: ABNORMAL /HPF
YEAST LIKE FUNGI URNS QL MICRO: ABNORMAL

## 2023-05-09 PROCEDURE — 81003 URINALYSIS AUTO W/O SCOPE: CPT | Performed by: NURSE PRACTITIONER

## 2023-05-09 PROCEDURE — 99214 OFFICE O/P EST MOD 30 MIN: CPT | Performed by: NURSE PRACTITIONER

## 2023-05-09 PROCEDURE — 1036F TOBACCO NON-USER: CPT | Performed by: NURSE PRACTITIONER

## 2023-05-09 PROCEDURE — G8427 DOCREV CUR MEDS BY ELIG CLIN: HCPCS | Performed by: NURSE PRACTITIONER

## 2023-05-09 PROCEDURE — 51798 US URINE CAPACITY MEASURE: CPT | Performed by: NURSE PRACTITIONER

## 2023-05-09 PROCEDURE — G8417 CALC BMI ABV UP PARAM F/U: HCPCS | Performed by: NURSE PRACTITIONER

## 2023-05-09 ASSESSMENT — ENCOUNTER SYMPTOMS
ABDOMINAL PAIN: 1
BACK PAIN: 1
VOMITING: 0
NAUSEA: 1

## 2023-05-09 NOTE — PROGRESS NOTES
[address] [address]  Dept: [phone]  Loc: [phone]    Visit Date: 5/9/2023        HPI:     Pipe Palma is a 41 y.o. female who presents today for:  Chief Complaint   Patient presents with    Results     CT  Not feeling any better, right side still hurts        HPI    Pt seen in follow up for flank pain and to review CT results. Pt recently seen in office by Dr. Radha Reina for intermittent R sided flank pain. Urine culture 3/14/23 with klebsiella. She was treated with a course of Macrobid and Trimethoprim x 10 days. CT completed prior to visit. Pt reports continued R flank pain. Reports she is having symptoms of nausea and increased pain in R back after eating. Notes pain in \"entire stomach\" the other morning upon awakening. Denies significant dysuria. Notes occasionally urine is \"dark\". Occasional frequency. Having nocturia of 4-5 x per night every night. Current Outpatient Medications   Medication Sig Dispense Refill    hydrALAZINE (APRESOLINE) 25 MG tablet Take 1 tablet by mouth 3 times daily 90 tablet 11    minoxidil (LONITEN) 2.5 MG tablet TAKE 4 TABLETS BY MOUTH TWICE DAILY 720 tablet 1    EMGALITY 120 MG/ML SOSY INJECT THE CONTENTS OF ONE PEN (120mg) UNDER THE SKIN ONCE A MONTH FOR MIGRAINE PREVENTION      NURTEC 75 MG TBDP 75 mg      valsartan (DIOVAN) 320 MG tablet Take 1 tablet by mouth daily 30 tablet 11    UBRELVY 100 MG TABS 100 mg      topiramate (TOPAMAX) 100 MG tablet TAKE 1 TABLET BY MOUTH ONCE DAILY AT BEDTIME.  START AFTER COMPLETING TITRATION      aspirin 81 MG chewable tablet Take 1 tablet by mouth daily 30 tablet 3    carvedilol (COREG) 25 MG tablet Take 1 tablet by mouth 2 times daily (with meals) 60 tablet 3    omeprazole (PRILOSEC) 20 MG delayed release capsule Take 1 capsule by mouth daily as needed      chlorthalidone (HYGROTON) 25 MG tablet TAKE ONE TABLET

## 2023-05-10 ENCOUNTER — TELEPHONE (OUTPATIENT)
Dept: NEPHROLOGY | Age: 48
End: 2023-05-10

## 2023-05-10 ENCOUNTER — HOSPITAL ENCOUNTER (OUTPATIENT)
Age: 48
Discharge: HOME | End: 2023-05-10
Payer: COMMERCIAL

## 2023-05-10 LAB — TSH SERPL DL<=0.005 MIU/L-ACNC: 1.27 UIU/ML (ref 0.4–4.2)

## 2023-05-10 PROCEDURE — 84443 ASSAY THYROID STIM HORMONE: CPT

## 2023-05-10 PROCEDURE — 82088 ASSAY OF ALDOSTERONE: CPT

## 2023-05-10 PROCEDURE — 36415 COLL VENOUS BLD VENIPUNCTURE: CPT

## 2023-05-10 PROCEDURE — 84244 ASSAY OF RENIN: CPT

## 2023-05-10 PROCEDURE — 82384 ASSAY THREE CATECHOLAMINES: CPT

## 2023-05-11 ENCOUNTER — TELEPHONE (OUTPATIENT)
Dept: UROLOGY | Age: 48
End: 2023-05-11

## 2023-05-11 LAB
BACTERIA UR CULT: ABNORMAL
ORGANISM: ABNORMAL

## 2023-05-11 RX ORDER — CEPHALEXIN 500 MG/1
500 CAPSULE ORAL 4 TIMES DAILY
Qty: 40 CAPSULE | Refills: 0 | Status: SHIPPED | OUTPATIENT
Start: 2023-05-11 | End: 2023-05-21

## 2023-05-13 LAB — ALDOST SERPL-MCNC: 14.4 NG/DL

## 2023-05-15 LAB — CATECHOLAMINES FRACT FREE PLASMA: NORMAL

## 2023-05-16 ENCOUNTER — OFFICE VISIT (OUTPATIENT)
Dept: NEPHROLOGY | Age: 48
End: 2023-05-16
Payer: COMMERCIAL

## 2023-05-16 VITALS
OXYGEN SATURATION: 97 % | SYSTOLIC BLOOD PRESSURE: 196 MMHG | HEIGHT: 65 IN | WEIGHT: 204 LBS | HEART RATE: 84 BPM | BODY MASS INDEX: 33.99 KG/M2 | DIASTOLIC BLOOD PRESSURE: 119 MMHG

## 2023-05-16 DIAGNOSIS — I10 PRIMARY HYPERTENSION: Primary | ICD-10-CM

## 2023-05-16 PROCEDURE — 99214 OFFICE O/P EST MOD 30 MIN: CPT | Performed by: INTERNAL MEDICINE

## 2023-05-16 PROCEDURE — 3080F DIAST BP >= 90 MM HG: CPT | Performed by: INTERNAL MEDICINE

## 2023-05-16 PROCEDURE — G8427 DOCREV CUR MEDS BY ELIG CLIN: HCPCS | Performed by: INTERNAL MEDICINE

## 2023-05-16 PROCEDURE — G8417 CALC BMI ABV UP PARAM F/U: HCPCS | Performed by: INTERNAL MEDICINE

## 2023-05-16 PROCEDURE — 3077F SYST BP >= 140 MM HG: CPT | Performed by: INTERNAL MEDICINE

## 2023-05-16 PROCEDURE — 1036F TOBACCO NON-USER: CPT | Performed by: INTERNAL MEDICINE

## 2023-05-16 RX ORDER — POTASSIUM CHLORIDE 20 MEQ/1
20 TABLET, EXTENDED RELEASE ORAL 2 TIMES DAILY
Qty: 180 TABLET | Refills: 3 | Status: SHIPPED | OUTPATIENT
Start: 2023-05-16

## 2023-05-16 NOTE — PROGRESS NOTES
Renal Progress Note    Assessment and Plan:      Diagnosis Orders   1. Primary hypertension                  PLAN:  I discussed my thoughts at length with the patient. She understood very well. I spent some time discussing her hypertension with her. She forgot to bring her blood pressure record from home but according to her they are still high. We will increase chlorthalidone from 25 mg a day to twice a day. We will increase hydralazine from 25 mg 3 times a day to 50 mg 3 times a day. Increase potassium supplement from 25 mg twice a day to 3 times a day. Previous cerebral angiogram report reviewed. Recent lab results reviewed. Thyroid studies normal.  Free catecholamine reports are normal.  Renal angiogram done in March 2021 was normal with no stenosis. I discussed all that with the patient. Low-salt diet  I encouraged her to exercise and lose some weight  Return visit in 4 weeks with a BMP and magnesium in light of the increase in the chlorthalidone dose. She is instructed to call us in about 5 days with home blood pressure record. Patient Active Problem List   Diagnosis    Left breast abscess    Fibrocystic breast changes    Cellulitis    Acute kidney injury ([address]    Vomiting    HTN (hypertension)    S/P cardiac cath- 6/22/16-  All coronaries are patent but LAD. there is moderate eccentric plaque noted on the proximal and ostail LAD, giving ostail prox LAD 30% nstenosis, edp 13 mmhg, ef 65%- med R    Postoperative or surgical complication, initial encounter    MRSA cellulitis    S/P debridement    Gastroesophageal reflux disease    Coronary artery disease involving native coronary artery of native heart without angina pectoris    Chest pain    Injury of right hand    Osteoarthritis of knee    Pain in right hand           Subjective:   Chief complaint:No chief complaint on file. HPI:This is a follow up visit for MsMitul Shabana Rm who is here today for return appointment.   I see her for

## 2023-05-16 NOTE — PATIENT INSTRUCTIONS
Please increase the chlorthalidone from 25 mg a day to twice a day. Please increase the potassium supplement from 20 mEq twice a day to 3 times a day. Please increase hydralazine from 25 mg 3 times a day to 50 mg 3 times a day.

## 2023-05-16 NOTE — PROGRESS NOTES
Patient said she is on her 3rd antibiotic for a kidney infection since end of March. Patient was told she has protein in her urine and blood. She is diabetic. She said there are some days she just feels so sick she just don't go anywhere on those days.

## 2023-05-23 LAB — RENIN PLAS-CCNC: 1.3 NG/ML/HR

## 2023-06-06 ENCOUNTER — OFFICE VISIT (OUTPATIENT)
Dept: UROLOGY | Age: 48
End: 2023-06-06
Payer: COMMERCIAL

## 2023-06-06 VITALS
SYSTOLIC BLOOD PRESSURE: 124 MMHG | BODY MASS INDEX: 34.75 KG/M2 | DIASTOLIC BLOOD PRESSURE: 72 MMHG | WEIGHT: 208.6 LBS | HEIGHT: 65 IN

## 2023-06-06 DIAGNOSIS — R33.9 INCOMPLETE BLADDER EMPTYING: ICD-10-CM

## 2023-06-06 DIAGNOSIS — R31.29 MICROSCOPIC HEMATURIA: ICD-10-CM

## 2023-06-06 DIAGNOSIS — R10.9 FLANK PAIN: Primary | ICD-10-CM

## 2023-06-06 LAB
BACTERIA: ABNORMAL
BILIRUB UR QL STRIP: NEGATIVE
BILIRUBIN URINE: NEGATIVE
BLOOD URINE, POC: ABNORMAL
CASTS #/AREA URNS LPF: ABNORMAL /LPF
CASTS #/AREA URNS LPF: ABNORMAL /LPF
CHARACTER UR: ABNORMAL
CHARACTER, URINE: ABNORMAL
CHARCOAL URNS QL MICRO: ABNORMAL
COLOR UR: YELLOW
COLOR, URINE: ABNORMAL
CRYSTALS URNS QL MICRO: ABNORMAL
EPITHELIAL CELLS, UA: ABNORMAL /HPF
GLUCOSE UR QL STRIP.AUTO: NEGATIVE MG/DL
GLUCOSE URINE: NEGATIVE MG/DL
HGB UR QL STRIP.AUTO: NEGATIVE
KETONES UR QL STRIP.AUTO: NEGATIVE
KETONES, URINE: NEGATIVE
LEUKOCYTE CLUMPS, URINE: NEGATIVE
LEUKOCYTE ESTERASE UR QL STRIP.AUTO: ABNORMAL
NITRITE UR QL STRIP.AUTO: NEGATIVE
NITRITE, URINE: NEGATIVE
PH UR STRIP.AUTO: 6.5 [PH] (ref 5–9)
PH, URINE: 6 (ref 5–9)
POST VOID RESIDUAL (PVR): 0 ML
PROT UR STRIP.AUTO-MCNC: ABNORMAL MG/DL
PROTEIN, URINE: NEGATIVE MG/DL
RBC #/AREA URNS HPF: ABNORMAL /HPF
RENAL EPI CELLS #/AREA URNS HPF: ABNORMAL /[HPF]
SPECIFIC GRAVITY UA: 1.02 (ref 1–1.03)
SPECIFIC GRAVITY, URINE: 1.02 (ref 1–1.03)
UROBILINOGEN, URINE: 0.2 EU/DL (ref 0–1)
UROBILINOGEN, URINE: 1 EU/DL (ref 0–1)
WBC #/AREA URNS HPF: ABNORMAL /HPF
YEAST LIKE FUNGI URNS QL MICRO: ABNORMAL

## 2023-06-06 PROCEDURE — 99213 OFFICE O/P EST LOW 20 MIN: CPT | Performed by: NURSE PRACTITIONER

## 2023-06-06 PROCEDURE — 81003 URINALYSIS AUTO W/O SCOPE: CPT | Performed by: NURSE PRACTITIONER

## 2023-06-06 PROCEDURE — G8417 CALC BMI ABV UP PARAM F/U: HCPCS | Performed by: NURSE PRACTITIONER

## 2023-06-06 PROCEDURE — 1036F TOBACCO NON-USER: CPT | Performed by: NURSE PRACTITIONER

## 2023-06-06 PROCEDURE — 3078F DIAST BP <80 MM HG: CPT | Performed by: NURSE PRACTITIONER

## 2023-06-06 PROCEDURE — 51798 US URINE CAPACITY MEASURE: CPT | Performed by: NURSE PRACTITIONER

## 2023-06-06 PROCEDURE — 3074F SYST BP LT 130 MM HG: CPT | Performed by: NURSE PRACTITIONER

## 2023-06-06 PROCEDURE — G8427 DOCREV CUR MEDS BY ELIG CLIN: HCPCS | Performed by: NURSE PRACTITIONER

## 2023-06-06 ASSESSMENT — ENCOUNTER SYMPTOMS
VOMITING: 0
NAUSEA: 0
BACK PAIN: 0
ABDOMINAL PAIN: 0

## 2023-06-06 NOTE — PROGRESS NOTES
Blood, UA POC Trace-intact NEGATIVE    pH, Urine 6.00 5.0 - 9.0    Protein, Urine Negative NEGATIVE mg/dl    Urobilinogen, Urine 0.20 0.0 - 1.0 eu/dl    Nitrite, Urine Negative NEGATIVE    Leukocyte Clumps, Urine Negative NEGATIVE    Color, Urine Dark yellow (A) [id number]-STRAW    Character, Urine Slightly Cloudy [id number]   poct post void residual   Result Value Ref Range    post void residual 0 ml     Patients recent PSA values are as follows  No results found for: PSA, PSADIA     Recent BUN/Creatinine:  Lab Results   Component Value Date/Time    BUN 7 04/10/2023 12:48 PM    CREATININE 0.6 04/10/2023 12:48 PM       Assessment:   Micro hematuria on urine dip  Incomplete emptying, improved  Nocturia  R back pain  Postprandial nausea  Lower abdominal spasms    Plan:     Discussed trial of alpha blocker but pt prefers to hold off a this time. No hydronephrosis on CT imaging. Recent Klebsiella UTI treated with appropriate antibiotic therapy. Urine dip today with blood--send for micro and call results. Continue workup and follow up w GI. Has Hida scan and GB US ordered. PPI increased per GI. Continue voiding strategies, double voiding bid, and crede maneuver.       F/u in 3 months with PVR

## 2023-06-08 ENCOUNTER — TELEPHONE (OUTPATIENT)
Dept: UROLOGY | Age: 48
End: 2023-06-08

## 2023-06-08 DIAGNOSIS — R31.29 MICROSCOPIC HEMATURIA: Primary | ICD-10-CM

## 2023-06-08 NOTE — TELEPHONE ENCOUNTER
Patient advised of the urine results. She voiced understanding and will repeat urine in 6 weeks. Order sent via mail.

## 2023-06-09 ENCOUNTER — HOSPITAL ENCOUNTER (OUTPATIENT)
Age: 48
Discharge: HOME | End: 2023-06-09
Payer: COMMERCIAL

## 2023-06-09 DIAGNOSIS — I10 PRIMARY HYPERTENSION: ICD-10-CM

## 2023-06-09 LAB
ANION GAP SERPL CALC-SCNC: 12 MEQ/L (ref 8–16)
BUN SERPL-MCNC: 8 MG/DL (ref 7–22)
CALCIUM SERPL-MCNC: 9.1 MG/DL (ref 8.5–10.5)
CHLORIDE SERPL-SCNC: 103 MEQ/L (ref 98–111)
CO2 SERPL-SCNC: 24 MEQ/L (ref 23–33)
CREAT SERPL-MCNC: 0.8 MG/DL (ref 0.4–1.2)
GFR SERPL CREATININE-BSD FRML MDRD: > 60 ML/MIN/1.73M2
GLUCOSE SERPL-MCNC: 123 MG/DL (ref 70–108)
MAGNESIUM SERPL-MCNC: 2 MG/DL (ref 1.6–2.4)
POTASSIUM SERPL-SCNC: 3.7 MEQ/L (ref 3.5–5.2)
SODIUM SERPL-SCNC: 139 MEQ/L (ref 135–145)

## 2023-06-09 PROCEDURE — 83735 ASSAY OF MAGNESIUM: CPT

## 2023-06-09 PROCEDURE — 36415 COLL VENOUS BLD VENIPUNCTURE: CPT

## 2023-06-09 PROCEDURE — 80048 BASIC METABOLIC PNL TOTAL CA: CPT

## 2023-06-23 ENCOUNTER — HOSPITAL ENCOUNTER (OUTPATIENT)
Dept: ULTRASOUND IMAGING | Age: 48
Discharge: HOME | End: 2023-06-23
Payer: COMMERCIAL

## 2023-06-23 ENCOUNTER — HOSPITAL ENCOUNTER (OUTPATIENT)
Dept: NUCLEAR MEDICINE | Age: 48
Discharge: HOME | End: 2023-06-23
Payer: COMMERCIAL

## 2023-06-23 DIAGNOSIS — R10.30 LOWER ABDOMINAL PAIN: ICD-10-CM

## 2023-06-23 DIAGNOSIS — R11.0 NAUSEA: ICD-10-CM

## 2023-06-23 PROCEDURE — 3430000000 HC RX DIAGNOSTIC RADIOPHARMACEUTICAL: Performed by: NURSE PRACTITIONER

## 2023-06-23 PROCEDURE — 78226 HEPATOBILIARY SYSTEM IMAGING: CPT

## 2023-06-23 PROCEDURE — A9537 TC99M MEBROFENIN: HCPCS | Performed by: NURSE PRACTITIONER

## 2023-06-23 PROCEDURE — 76705 ECHO EXAM OF ABDOMEN: CPT

## 2023-06-23 RX ADMIN — Medication 9.1 MILLICURIE: at 09:05

## 2023-07-17 ENCOUNTER — HOSPITAL ENCOUNTER (OUTPATIENT)
Age: 48
Discharge: HOME | End: 2023-07-17
Payer: COMMERCIAL

## 2023-07-17 ENCOUNTER — TELEPHONE (OUTPATIENT)
Dept: UROLOGY | Age: 48
End: 2023-07-17

## 2023-07-17 DIAGNOSIS — R31.29 MICROSCOPIC HEMATURIA: ICD-10-CM

## 2023-07-17 LAB
BACTERIA: ABNORMAL
BILIRUB UR QL STRIP: NEGATIVE
CASTS #/AREA URNS LPF: ABNORMAL /LPF
CASTS #/AREA URNS LPF: ABNORMAL /LPF
CHARACTER UR: CLEAR
CHARCOAL URNS QL MICRO: ABNORMAL
COLOR UR: YELLOW
CRYSTALS URNS QL MICRO: ABNORMAL
EPITHELIAL CELLS, UA: ABNORMAL /HPF
GLUCOSE UR QL STRIP.AUTO: NEGATIVE MG/DL
HGB UR QL STRIP.AUTO: NEGATIVE
KETONES UR QL STRIP.AUTO: NEGATIVE
LEUKOCYTE ESTERASE UR QL STRIP.AUTO: ABNORMAL
NITRITE UR QL STRIP.AUTO: NEGATIVE
PH UR STRIP.AUTO: 7.5 [PH] (ref 5–9)
PROT UR STRIP.AUTO-MCNC: NEGATIVE MG/DL
RBC #/AREA URNS HPF: ABNORMAL /HPF
RENAL EPI CELLS #/AREA URNS HPF: ABNORMAL /[HPF]
SPECIFIC GRAVITY UA: 1.01 (ref 1–1.03)
UROBILINOGEN, URINE: 1 EU/DL (ref 0–1)
WBC #/AREA URNS HPF: ABNORMAL /HPF
YEAST LIKE FUNGI URNS QL MICRO: ABNORMAL

## 2023-07-17 PROCEDURE — 81001 URINALYSIS AUTO W/SCOPE: CPT

## 2023-07-18 NOTE — TELEPHONE ENCOUNTER
Spoke with patient and let her know of her results. She voiced understanding with no other questions or concerns at this time.

## 2023-09-06 ENCOUNTER — OFFICE VISIT (OUTPATIENT)
Dept: UROLOGY | Age: 48
End: 2023-09-06
Payer: COMMERCIAL

## 2023-09-06 VITALS
SYSTOLIC BLOOD PRESSURE: 140 MMHG | HEIGHT: 65 IN | DIASTOLIC BLOOD PRESSURE: 80 MMHG | BODY MASS INDEX: 33.82 KG/M2 | WEIGHT: 203 LBS

## 2023-09-06 DIAGNOSIS — R31.29 MICROSCOPIC HEMATURIA: Primary | ICD-10-CM

## 2023-09-06 DIAGNOSIS — N39.46 MIXED INCONTINENCE: Primary | ICD-10-CM

## 2023-09-06 DIAGNOSIS — R33.9 INCOMPLETE BLADDER EMPTYING: ICD-10-CM

## 2023-09-06 DIAGNOSIS — N10 ACUTE PYELONEPHRITIS: ICD-10-CM

## 2023-09-06 DIAGNOSIS — R10.9 FLANK PAIN: ICD-10-CM

## 2023-09-06 LAB
BACTERIA: ABNORMAL
BILIRUB UR QL STRIP: NEGATIVE
BILIRUBIN URINE: NEGATIVE
BLOOD URINE, POC: ABNORMAL
CASTS #/AREA URNS LPF: ABNORMAL /LPF
CASTS #/AREA URNS LPF: ABNORMAL /LPF
CHARACTER UR: ABNORMAL
CHARACTER, URINE: ABNORMAL
CHARCOAL URNS QL MICRO: ABNORMAL
COLOR UR: YELLOW
COLOR, URINE: ABNORMAL
CRYSTALS URNS QL MICRO: ABNORMAL
EPITHELIAL CELLS, UA: ABNORMAL /HPF
GLUCOSE UR QL STRIP.AUTO: NEGATIVE MG/DL
GLUCOSE URINE: NEGATIVE MG/DL
HGB UR QL STRIP.AUTO: ABNORMAL
KETONES UR QL STRIP.AUTO: ABNORMAL
KETONES, URINE: NEGATIVE
LEUKOCYTE CLUMPS, URINE: NEGATIVE
LEUKOCYTE ESTERASE UR QL STRIP.AUTO: NEGATIVE
NITRITE UR QL STRIP.AUTO: NEGATIVE
NITRITE, URINE: NEGATIVE
PH UR STRIP.AUTO: 5.5 [PH] (ref 5–9)
PH, URINE: 6 (ref 5–9)
POST VOID RESIDUAL (PVR): 86 ML
PROT UR STRIP.AUTO-MCNC: ABNORMAL MG/DL
PROTEIN, URINE: 30 MG/DL
RBC #/AREA URNS HPF: ABNORMAL /HPF
RENAL EPI CELLS #/AREA URNS HPF: ABNORMAL /[HPF]
SPECIFIC GRAVITY UA: 1.02 (ref 1–1.03)
SPECIFIC GRAVITY, URINE: >= 1.03 (ref 1–1.03)
UROBILINOGEN, URINE: 0.2 EU/DL (ref 0–1)
UROBILINOGEN, URINE: 1 EU/DL (ref 0–1)
WBC #/AREA URNS HPF: ABNORMAL /HPF
YEAST LIKE FUNGI URNS QL MICRO: ABNORMAL

## 2023-09-06 PROCEDURE — 81003 URINALYSIS AUTO W/O SCOPE: CPT | Performed by: NURSE PRACTITIONER

## 2023-09-06 PROCEDURE — G8427 DOCREV CUR MEDS BY ELIG CLIN: HCPCS | Performed by: NURSE PRACTITIONER

## 2023-09-06 PROCEDURE — 51798 US URINE CAPACITY MEASURE: CPT | Performed by: NURSE PRACTITIONER

## 2023-09-06 PROCEDURE — 3079F DIAST BP 80-89 MM HG: CPT | Performed by: NURSE PRACTITIONER

## 2023-09-06 PROCEDURE — G8417 CALC BMI ABV UP PARAM F/U: HCPCS | Performed by: NURSE PRACTITIONER

## 2023-09-06 PROCEDURE — 1036F TOBACCO NON-USER: CPT | Performed by: NURSE PRACTITIONER

## 2023-09-06 PROCEDURE — 3077F SYST BP >= 140 MM HG: CPT | Performed by: NURSE PRACTITIONER

## 2023-09-06 PROCEDURE — 99214 OFFICE O/P EST MOD 30 MIN: CPT | Performed by: NURSE PRACTITIONER

## 2023-09-06 RX ORDER — ALFUZOSIN HYDROCHLORIDE 10 MG/1
10 TABLET, EXTENDED RELEASE ORAL DAILY
Qty: 30 TABLET | Refills: 2 | Status: SHIPPED | OUTPATIENT
Start: 2023-09-06 | End: 2024-09-05

## 2023-09-06 ASSESSMENT — ENCOUNTER SYMPTOMS
NAUSEA: 0
BACK PAIN: 0
ABDOMINAL PAIN: 0
VOMITING: 0

## 2023-09-06 NOTE — PROGRESS NOTES
[address]  [place] [address]  Dept: [phone]  Loc: [phone]    Visit Date: 9/6/2023        HPI:     Xiao Escalona is a 57 y.o. female who presents today for:  Chief Complaint   Patient presents with    Other      R back pain, micro hematuria, hx uti       HPI  Pt seen in follow up for flank pain, micro hematuria. Pt has a hx of R back pain that worsens over the course of the day, certain positions, eating. Postprandial nausea. Underwent workup by GI.  CT imaging completed 5/2/23 negative for hydronephrosis, noted possible edema to R kidney, contracted GB. Urine culture 5/9/23 with Klebsiella. Treated with a 10 day course of Keflex. PVR elevated at OV 5/9/23 and pt advised on double voiding, crede maneuver and strategies to aid in emptying. PVR today 86 mls. Leaking urine at night with urge to urinate. Also leaks with coughing, laughing, sneezing. Current Outpatient Medications   Medication Sig Dispense Refill    hydrALAZINE (APRESOLINE) 25 MG tablet Take 4 tablets by mouth 3 times daily 90 tablet 11    potassium chloride (KLOR-CON M) 20 MEQ extended release tablet Take 1 tablet by mouth 2 times daily 180 tablet 3    minoxidil (LONITEN) 2.5 MG tablet TAKE 4 TABLETS BY MOUTH TWICE DAILY 720 tablet 1    butalbital-acetaminophen-caffeine (FIORICET, ESGIC) -40 MG per tablet       EMGALITY 120 MG/ML SOSY INJECT THE CONTENTS OF ONE PEN (120mg) UNDER THE SKIN ONCE A MONTH FOR MIGRAINE PREVENTION      NURTEC 75 MG TBDP 75 mg      valsartan (DIOVAN) 320 MG tablet Take 1 tablet by mouth daily 30 tablet 11    UBRELVY 100 MG TABS 100 mg      topiramate (TOPAMAX) 100 MG tablet TAKE 1 TABLET BY MOUTH ONCE DAILY AT BEDTIME.  START AFTER COMPLETING TITRATION      aspirin 81 MG chewable tablet Take 1 tablet by mouth daily 30 tablet 3    carvedilol (COREG) 25 MG tablet Take 1 tablet by mouth 2 times daily (with

## 2023-09-07 ENCOUNTER — TELEPHONE (OUTPATIENT)
Dept: UROLOGY | Age: 48
End: 2023-09-07

## 2023-09-07 LAB
BACTERIA UR CULT: ABNORMAL
ORGANISM: ABNORMAL

## 2023-09-07 NOTE — TELEPHONE ENCOUNTER
Please let Olman Hamilton know her urinalysis had only 0-2 RBCs/hpf so technically negative for true microscopic blood.

## 2023-09-08 ENCOUNTER — TELEPHONE (OUTPATIENT)
Dept: UROLOGY | Age: 48
End: 2023-09-08

## 2023-09-08 RX ORDER — CEFDINIR 300 MG/1
300 CAPSULE ORAL 2 TIMES DAILY
Qty: 20 CAPSULE | Refills: 0 | Status: SHIPPED | OUTPATIENT
Start: 2023-09-08 | End: 2023-09-18

## 2023-09-08 NOTE — TELEPHONE ENCOUNTER
Patient advised of the urine results and omnicef was sent to [facility] She voiced understanding.

## 2023-09-21 ENCOUNTER — OFFICE VISIT (OUTPATIENT)
Dept: NEPHROLOGY | Age: 48
End: 2023-09-21
Payer: COMMERCIAL

## 2023-09-21 VITALS
DIASTOLIC BLOOD PRESSURE: 88 MMHG | HEART RATE: 96 BPM | HEIGHT: 65 IN | SYSTOLIC BLOOD PRESSURE: 175 MMHG | BODY MASS INDEX: 34.99 KG/M2 | WEIGHT: 210 LBS | OXYGEN SATURATION: 96 %

## 2023-09-21 DIAGNOSIS — I10 PRIMARY HYPERTENSION: Primary | ICD-10-CM

## 2023-09-21 DIAGNOSIS — G43.019 INTRACTABLE MIGRAINE WITHOUT AURA AND WITHOUT STATUS MIGRAINOSUS: ICD-10-CM

## 2023-09-21 PROCEDURE — G8417 CALC BMI ABV UP PARAM F/U: HCPCS | Performed by: INTERNAL MEDICINE

## 2023-09-21 PROCEDURE — 3077F SYST BP >= 140 MM HG: CPT | Performed by: INTERNAL MEDICINE

## 2023-09-21 PROCEDURE — 99213 OFFICE O/P EST LOW 20 MIN: CPT | Performed by: INTERNAL MEDICINE

## 2023-09-21 PROCEDURE — 1036F TOBACCO NON-USER: CPT | Performed by: INTERNAL MEDICINE

## 2023-09-21 PROCEDURE — G8427 DOCREV CUR MEDS BY ELIG CLIN: HCPCS | Performed by: INTERNAL MEDICINE

## 2023-09-21 PROCEDURE — 3079F DIAST BP 80-89 MM HG: CPT | Performed by: INTERNAL MEDICINE

## 2023-09-21 RX ORDER — TRAZODONE HYDROCHLORIDE 50 MG/1
TABLET ORAL
Status: ON HOLD | COMMUNITY
Start: 2023-08-22

## 2023-09-21 RX ORDER — PRAZOSIN HYDROCHLORIDE 1 MG/1
2 CAPSULE ORAL NIGHTLY
Qty: 90 CAPSULE | Refills: 3 | Status: ON HOLD | OUTPATIENT
Start: 2023-09-21

## 2023-09-21 RX ORDER — PRAZOSIN HYDROCHLORIDE 1 MG/1
CAPSULE ORAL
COMMUNITY
Start: 2023-08-22 | End: 2023-09-21

## 2023-09-21 NOTE — PROGRESS NOTES
Renal Progress Note    Assessment and Plan:      Diagnosis Orders   1. Primary hypertension        2. Intractable migraine without aura and without status migrainosus                  PLAN:  I discussed my thoughts with the patient. She understood  I addressed her questions. Blood pressure remains high. It is however improved from before. Medications reviewed  Increase prazosin from 1 mg at bedtime to 2 mg at bedtime. Return visit in 3 months with home blood pressure record. If home blood pressure recorded remains high   will add a diuretic at that time. Patient Active Problem List   Diagnosis    Left breast abscess    Fibrocystic breast changes    Cellulitis    Acute kidney injury ([address])    Vomiting    HTN (hypertension)    S/P cardiac cath- 6/22/16-  All coronaries are patent but LAD. there is moderate eccentric plaque noted on the proximal and ostail LAD, giving ostail prox LAD 30% nstenosis, edp 13 mmhg, ef 65%- med R    Postoperative or surgical complication, initial encounter    MRSA cellulitis    S/P debridement    Gastroesophageal reflux disease    Coronary artery disease involving native coronary artery of native heart without angina pectoris    Chest pain    Injury of right hand    Osteoarthritis of knee    Pain in right hand           Subjective:   Chief complaint:  Chief Complaint   Patient presents with    Follow-up     htn      HPI:This is a follow up visit for Ms. Jacob Boyd  Who is here today for return appointment. .  I see her for hypertension among other things. She was last seen in June 2023. Doing well since then with no specific complaint today. She appears to have  resistant hypertension. She previously was extensively evaluated for possible secondary causes and nothing was found. She denies chest pain. No shortness of breath. She has good appetite. She urinates well.   Home blood pressures mostly in the 150s sometimes 831 systolic according to the

## 2023-09-24 ENCOUNTER — APPOINTMENT (OUTPATIENT)
Dept: GENERAL RADIOLOGY | Age: 48
End: 2023-09-24
Payer: COMMERCIAL

## 2023-09-24 ENCOUNTER — HOSPITAL ENCOUNTER (OUTPATIENT)
Age: 48
Setting detail: OBSERVATION
Discharge: HOME | End: 2023-09-26
Attending: EMERGENCY MEDICINE
Payer: COMMERCIAL

## 2023-09-24 DIAGNOSIS — M65.9 FLEXOR TENOSYNOVITIS OF FINGER: Primary | ICD-10-CM

## 2023-09-24 DIAGNOSIS — M79.642 LEFT HAND PAIN: ICD-10-CM

## 2023-09-24 LAB
ALBUMIN SERPL BCG-MCNC: 4.1 G/DL (ref 3.5–5.1)
ALP SERPL-CCNC: 104 U/L (ref 38–126)
ALT SERPL W/O P-5'-P-CCNC: 9 U/L (ref 11–66)
ANION GAP SERPL CALC-SCNC: 11 MEQ/L (ref 8–16)
AST SERPL-CCNC: 9 U/L (ref 5–40)
BASOPHILS ABSOLUTE: 0 THOU/MM3 (ref 0–0.1)
BASOPHILS NFR BLD AUTO: 0.4 %
BILIRUB SERPL-MCNC: 0.4 MG/DL (ref 0.3–1.2)
BUN SERPL-MCNC: 8 MG/DL (ref 7–22)
CALCIUM SERPL-MCNC: 9.1 MG/DL (ref 8.5–10.5)
CHLORIDE SERPL-SCNC: 103 MEQ/L (ref 98–111)
CO2 SERPL-SCNC: 23 MEQ/L (ref 23–33)
CREAT SERPL-MCNC: 0.6 MG/DL (ref 0.4–1.2)
CRP SERPL-MCNC: 1.26 MG/DL (ref 0–1)
DEPRECATED RDW RBC AUTO: 47.2 FL (ref 35–45)
EOSINOPHIL NFR BLD AUTO: 1.1 %
EOSINOPHILS ABSOLUTE: 0.1 THOU/MM3 (ref 0–0.4)
ERYTHROCYTE [DISTWIDTH] IN BLOOD BY AUTOMATED COUNT: 14.8 % (ref 11.5–14.5)
ERYTHROCYTE [SEDIMENTATION RATE] IN BLOOD BY WESTERGREN METHOD: 13 MM/HR (ref 0–20)
GFR SERPL CREATININE-BSD FRML MDRD: > 60 ML/MIN/1.73M2
GLUCOSE BLD STRIP.AUTO-MCNC: 120 MG/DL (ref 70–108)
GLUCOSE BLD STRIP.AUTO-MCNC: 142 MG/DL (ref 70–108)
GLUCOSE SERPL-MCNC: 135 MG/DL (ref 70–108)
HCT VFR BLD AUTO: 38 % (ref 37–47)
HGB BLD-MCNC: 11.9 GM/DL (ref 12–16)
IMM GRANULOCYTES # BLD AUTO: 0.01 THOU/MM3 (ref 0–0.07)
IMM GRANULOCYTES NFR BLD AUTO: 0.1 %
LACTIC ACID, SEPSIS: 0.8 MMOL/L (ref 0.5–1.9)
LYMPHOCYTES ABSOLUTE: 2.1 THOU/MM3 (ref 1–4.8)
LYMPHOCYTES NFR BLD AUTO: 29.2 %
MCH RBC QN AUTO: 27.1 PG (ref 26–33)
MCHC RBC AUTO-ENTMCNC: 31.3 GM/DL (ref 32.2–35.5)
MCV RBC AUTO: 86.6 FL (ref 81–99)
MONOCYTES ABSOLUTE: 0.5 THOU/MM3 (ref 0.4–1.3)
MONOCYTES NFR BLD AUTO: 7.3 %
NEUTROPHILS NFR BLD AUTO: 61.9 %
NRBC BLD AUTO-RTO: 0 /100 WBC
OSMOLALITY SERPL CALC.SUM OF ELEC: 274.2 MOSMOL/KG (ref 275–300)
PLATELET # BLD AUTO: 353 THOU/MM3 (ref 130–400)
PMV BLD AUTO: 9.6 FL (ref 9.4–12.4)
POTASSIUM SERPL-SCNC: 3.5 MEQ/L (ref 3.5–5.2)
PROT SERPL-MCNC: 6.8 G/DL (ref 6.1–8)
RBC # BLD AUTO: 4.39 MILL/MM3 (ref 4.2–5.4)
SEGMENTED NEUTROPHILS ABSOLUTE COUNT: 4.5 THOU/MM3 (ref 1.8–7.7)
SODIUM SERPL-SCNC: 137 MEQ/L (ref 135–145)
WBC # BLD AUTO: 7.2 THOU/MM3 (ref 4.8–10.8)

## 2023-09-24 PROCEDURE — 96361 HYDRATE IV INFUSION ADD-ON: CPT

## 2023-09-24 PROCEDURE — 96365 THER/PROPH/DIAG IV INF INIT: CPT

## 2023-09-24 PROCEDURE — 82948 REAGENT STRIP/BLOOD GLUCOSE: CPT

## 2023-09-24 PROCEDURE — 73130 X-RAY EXAM OF HAND: CPT

## 2023-09-24 PROCEDURE — 96366 THER/PROPH/DIAG IV INF ADDON: CPT

## 2023-09-24 PROCEDURE — 6370000000 HC RX 637 (ALT 250 FOR IP): Performed by: NURSE PRACTITIONER

## 2023-09-24 PROCEDURE — 96375 TX/PRO/DX INJ NEW DRUG ADDON: CPT

## 2023-09-24 PROCEDURE — 96372 THER/PROPH/DIAG INJ SC/IM: CPT

## 2023-09-24 PROCEDURE — 85651 RBC SED RATE NONAUTOMATED: CPT

## 2023-09-24 PROCEDURE — 6360000002 HC RX W HCPCS

## 2023-09-24 PROCEDURE — G0378 HOSPITAL OBSERVATION PER HR: HCPCS

## 2023-09-24 PROCEDURE — 6360000002 HC RX W HCPCS: Performed by: NURSE PRACTITIONER

## 2023-09-24 PROCEDURE — 83605 ASSAY OF LACTIC ACID: CPT

## 2023-09-24 PROCEDURE — 80053 COMPREHEN METABOLIC PANEL: CPT

## 2023-09-24 PROCEDURE — 6360000002 HC RX W HCPCS: Performed by: EMERGENCY MEDICINE

## 2023-09-24 PROCEDURE — 96376 TX/PRO/DX INJ SAME DRUG ADON: CPT

## 2023-09-24 PROCEDURE — 86140 C-REACTIVE PROTEIN: CPT

## 2023-09-24 PROCEDURE — 87040 BLOOD CULTURE FOR BACTERIA: CPT

## 2023-09-24 PROCEDURE — 94760 N-INVAS EAR/PLS OXIMETRY 1: CPT

## 2023-09-24 PROCEDURE — 2580000003 HC RX 258: Performed by: EMERGENCY MEDICINE

## 2023-09-24 PROCEDURE — 85025 COMPLETE CBC W/AUTO DIFF WBC: CPT

## 2023-09-24 PROCEDURE — 36415 COLL VENOUS BLD VENIPUNCTURE: CPT

## 2023-09-24 PROCEDURE — 96367 TX/PROPH/DG ADDL SEQ IV INF: CPT

## 2023-09-24 PROCEDURE — 99285 EMERGENCY DEPT VISIT HI MDM: CPT

## 2023-09-24 PROCEDURE — 99223 1ST HOSP IP/OBS HIGH 75: CPT | Performed by: NURSE PRACTITIONER

## 2023-09-24 RX ORDER — SODIUM CHLORIDE 9 MG/ML
INJECTION, SOLUTION INTRAVENOUS PRN
Status: DISCONTINUED | OUTPATIENT
Start: 2023-09-24 | End: 2023-09-26 | Stop reason: HOSPADM

## 2023-09-24 RX ORDER — INSULIN LISPRO 100 [IU]/ML
0-4 INJECTION, SOLUTION INTRAVENOUS; SUBCUTANEOUS NIGHTLY
Status: DISCONTINUED | OUTPATIENT
Start: 2023-09-24 | End: 2023-09-26 | Stop reason: HOSPADM

## 2023-09-24 RX ORDER — MORPHINE SULFATE 2 MG/ML
2 INJECTION, SOLUTION INTRAMUSCULAR; INTRAVENOUS
Status: DISCONTINUED | OUTPATIENT
Start: 2023-09-24 | End: 2023-09-26 | Stop reason: HOSPADM

## 2023-09-24 RX ORDER — KETOROLAC TROMETHAMINE 30 MG/ML
15 INJECTION, SOLUTION INTRAMUSCULAR; INTRAVENOUS EVERY 6 HOURS PRN
Status: DISCONTINUED | OUTPATIENT
Start: 2023-09-24 | End: 2023-09-26 | Stop reason: HOSPADM

## 2023-09-24 RX ORDER — CARVEDILOL 25 MG/1
25 TABLET ORAL 2 TIMES DAILY WITH MEALS
Status: DISCONTINUED | OUTPATIENT
Start: 2023-09-24 | End: 2023-09-26 | Stop reason: HOSPADM

## 2023-09-24 RX ORDER — ONDANSETRON 2 MG/ML
4 INJECTION INTRAMUSCULAR; INTRAVENOUS ONCE
Status: COMPLETED | OUTPATIENT
Start: 2023-09-24 | End: 2023-09-24

## 2023-09-24 RX ORDER — HYDRALAZINE HYDROCHLORIDE 20 MG/ML
10 INJECTION INTRAMUSCULAR; INTRAVENOUS EVERY 6 HOURS PRN
Status: DISCONTINUED | OUTPATIENT
Start: 2023-09-24 | End: 2023-09-26 | Stop reason: HOSPADM

## 2023-09-24 RX ORDER — SODIUM CHLORIDE 0.9 % (FLUSH) 0.9 %
5-40 SYRINGE (ML) INJECTION PRN
Status: DISCONTINUED | OUTPATIENT
Start: 2023-09-24 | End: 2023-09-26 | Stop reason: HOSPADM

## 2023-09-24 RX ORDER — ASPIRIN 81 MG/1
81 TABLET, CHEWABLE ORAL DAILY
Status: DISCONTINUED | OUTPATIENT
Start: 2023-09-24 | End: 2023-09-26 | Stop reason: HOSPADM

## 2023-09-24 RX ORDER — ACETAMINOPHEN 325 MG/1
650 TABLET ORAL EVERY 6 HOURS PRN
Status: DISCONTINUED | OUTPATIENT
Start: 2023-09-24 | End: 2023-09-26 | Stop reason: HOSPADM

## 2023-09-24 RX ORDER — MINOXIDIL 2.5 MG/1
10 TABLET ORAL 2 TIMES DAILY
Status: DISCONTINUED | OUTPATIENT
Start: 2023-09-24 | End: 2023-09-26 | Stop reason: HOSPADM

## 2023-09-24 RX ORDER — ONDANSETRON 2 MG/ML
4 INJECTION INTRAMUSCULAR; INTRAVENOUS EVERY 6 HOURS PRN
Status: DISCONTINUED | OUTPATIENT
Start: 2023-09-24 | End: 2023-09-26 | Stop reason: HOSPADM

## 2023-09-24 RX ORDER — POLYETHYLENE GLYCOL 3350 17 G/17G
17 POWDER, FOR SOLUTION ORAL DAILY PRN
Status: DISCONTINUED | OUTPATIENT
Start: 2023-09-24 | End: 2023-09-26 | Stop reason: HOSPADM

## 2023-09-24 RX ORDER — PRAZOSIN HYDROCHLORIDE 1 MG/1
2 CAPSULE ORAL NIGHTLY
Status: DISCONTINUED | OUTPATIENT
Start: 2023-09-24 | End: 2023-09-26 | Stop reason: HOSPADM

## 2023-09-24 RX ORDER — VALSARTAN 320 MG/1
320 TABLET ORAL DAILY
Status: DISCONTINUED | OUTPATIENT
Start: 2023-09-24 | End: 2023-09-26 | Stop reason: HOSPADM

## 2023-09-24 RX ORDER — ACETAMINOPHEN 650 MG/1
650 SUPPOSITORY RECTAL EVERY 6 HOURS PRN
Status: DISCONTINUED | OUTPATIENT
Start: 2023-09-24 | End: 2023-09-26 | Stop reason: HOSPADM

## 2023-09-24 RX ORDER — ONDANSETRON 4 MG/1
4 TABLET, ORALLY DISINTEGRATING ORAL EVERY 8 HOURS PRN
Status: DISCONTINUED | OUTPATIENT
Start: 2023-09-24 | End: 2023-09-26 | Stop reason: HOSPADM

## 2023-09-24 RX ORDER — TAMSULOSIN HYDROCHLORIDE 0.4 MG/1
0.4 CAPSULE ORAL DAILY
Status: DISCONTINUED | OUTPATIENT
Start: 2023-09-24 | End: 2023-09-26 | Stop reason: HOSPADM

## 2023-09-24 RX ORDER — MORPHINE SULFATE 2 MG/ML
2 INJECTION, SOLUTION INTRAMUSCULAR; INTRAVENOUS ONCE
Status: COMPLETED | OUTPATIENT
Start: 2023-09-24 | End: 2023-09-24

## 2023-09-24 RX ORDER — 0.9 % SODIUM CHLORIDE 0.9 %
1000 INTRAVENOUS SOLUTION INTRAVENOUS ONCE
Status: COMPLETED | OUTPATIENT
Start: 2023-09-24 | End: 2023-09-24

## 2023-09-24 RX ORDER — METFORMIN HYDROCHLORIDE 500 MG/1
500 TABLET, EXTENDED RELEASE ORAL NIGHTLY
Status: DISCONTINUED | OUTPATIENT
Start: 2023-09-24 | End: 2023-09-26 | Stop reason: HOSPADM

## 2023-09-24 RX ORDER — ENOXAPARIN SODIUM 100 MG/ML
40 INJECTION SUBCUTANEOUS DAILY
Status: DISCONTINUED | OUTPATIENT
Start: 2023-09-24 | End: 2023-09-26 | Stop reason: HOSPADM

## 2023-09-24 RX ORDER — IBUPROFEN 600 MG/1
1 TABLET ORAL PRN
Status: DISCONTINUED | OUTPATIENT
Start: 2023-09-24 | End: 2023-09-26 | Stop reason: HOSPADM

## 2023-09-24 RX ORDER — DEXTROSE MONOHYDRATE 100 MG/ML
INJECTION, SOLUTION INTRAVENOUS CONTINUOUS PRN
Status: DISCONTINUED | OUTPATIENT
Start: 2023-09-24 | End: 2023-09-26 | Stop reason: HOSPADM

## 2023-09-24 RX ORDER — PANTOPRAZOLE SODIUM 40 MG/1
40 TABLET, DELAYED RELEASE ORAL
Status: DISCONTINUED | OUTPATIENT
Start: 2023-09-25 | End: 2023-09-26 | Stop reason: HOSPADM

## 2023-09-24 RX ORDER — ATORVASTATIN CALCIUM 20 MG/1
20 TABLET, FILM COATED ORAL NIGHTLY
Status: DISCONTINUED | OUTPATIENT
Start: 2023-09-24 | End: 2023-09-26 | Stop reason: HOSPADM

## 2023-09-24 RX ORDER — INSULIN LISPRO 100 [IU]/ML
0-8 INJECTION, SOLUTION INTRAVENOUS; SUBCUTANEOUS
Status: DISCONTINUED | OUTPATIENT
Start: 2023-09-24 | End: 2023-09-26 | Stop reason: HOSPADM

## 2023-09-24 RX ORDER — MORPHINE SULFATE 4 MG/ML
4 INJECTION, SOLUTION INTRAMUSCULAR; INTRAVENOUS
Status: DISCONTINUED | OUTPATIENT
Start: 2023-09-24 | End: 2023-09-26 | Stop reason: HOSPADM

## 2023-09-24 RX ORDER — MORPHINE SULFATE 4 MG/ML
INJECTION, SOLUTION INTRAMUSCULAR; INTRAVENOUS
Status: COMPLETED
Start: 2023-09-24 | End: 2023-09-24

## 2023-09-24 RX ORDER — TOPIRAMATE 100 MG/1
100 TABLET, FILM COATED ORAL NIGHTLY
Status: DISCONTINUED | OUTPATIENT
Start: 2023-09-24 | End: 2023-09-26 | Stop reason: HOSPADM

## 2023-09-24 RX ORDER — ONDANSETRON 2 MG/ML
INJECTION INTRAMUSCULAR; INTRAVENOUS
Status: COMPLETED
Start: 2023-09-24 | End: 2023-09-24

## 2023-09-24 RX ORDER — HYDRALAZINE HYDROCHLORIDE 50 MG/1
100 TABLET, FILM COATED ORAL 3 TIMES DAILY
Status: DISCONTINUED | OUTPATIENT
Start: 2023-09-24 | End: 2023-09-26 | Stop reason: HOSPADM

## 2023-09-24 RX ORDER — SODIUM CHLORIDE 0.9 % (FLUSH) 0.9 %
5-40 SYRINGE (ML) INJECTION EVERY 12 HOURS SCHEDULED
Status: DISCONTINUED | OUTPATIENT
Start: 2023-09-24 | End: 2023-09-26 | Stop reason: HOSPADM

## 2023-09-24 RX ADMIN — CEFTRIAXONE SODIUM 1000 MG: 1 INJECTION, POWDER, FOR SOLUTION INTRAMUSCULAR; INTRAVENOUS at 09:09

## 2023-09-24 RX ADMIN — Medication 1250 MG: at 17:43

## 2023-09-24 RX ADMIN — ONDANSETRON 4 MG: 2 INJECTION INTRAMUSCULAR; INTRAVENOUS at 09:55

## 2023-09-24 RX ADMIN — VANCOMYCIN HYDROCHLORIDE 1000 MG: 1 INJECTION, POWDER, LYOPHILIZED, FOR SOLUTION INTRAVENOUS at 09:46

## 2023-09-24 RX ADMIN — TAMSULOSIN HYDROCHLORIDE 0.4 MG: 0.4 CAPSULE ORAL at 17:46

## 2023-09-24 RX ADMIN — VALSARTAN 320 MG: 320 TABLET ORAL at 17:46

## 2023-09-24 RX ADMIN — ONDANSETRON 4 MG: 2 INJECTION INTRAMUSCULAR; INTRAVENOUS at 09:03

## 2023-09-24 RX ADMIN — PRAZOSIN HYDROCHLORIDE 2 MG: 1 CAPSULE ORAL at 20:46

## 2023-09-24 RX ADMIN — MORPHINE SULFATE 4 MG: 4 INJECTION, SOLUTION INTRAMUSCULAR; INTRAVENOUS at 10:39

## 2023-09-24 RX ADMIN — MORPHINE SULFATE 2 MG: 2 INJECTION, SOLUTION INTRAMUSCULAR; INTRAVENOUS at 09:04

## 2023-09-24 RX ADMIN — TOPIRAMATE 100 MG: 100 TABLET, FILM COATED ORAL at 20:46

## 2023-09-24 RX ADMIN — MINOXIDIL 10 MG: 2.5 TABLET ORAL at 20:46

## 2023-09-24 RX ADMIN — HYDRALAZINE HYDROCHLORIDE 100 MG: 50 TABLET, FILM COATED ORAL at 17:46

## 2023-09-24 RX ADMIN — ATORVASTATIN CALCIUM 20 MG: 20 TABLET, FILM COATED ORAL at 20:46

## 2023-09-24 RX ADMIN — ENOXAPARIN SODIUM 40 MG: 100 INJECTION SUBCUTANEOUS at 17:46

## 2023-09-24 RX ADMIN — SODIUM CHLORIDE 1000 ML: 9 INJECTION, SOLUTION INTRAVENOUS at 09:14

## 2023-09-24 RX ADMIN — MORPHINE SULFATE 4 MG: 4 INJECTION, SOLUTION INTRAMUSCULAR; INTRAVENOUS at 17:34

## 2023-09-24 RX ADMIN — CARVEDILOL 25 MG: 25 TABLET, FILM COATED ORAL at 17:46

## 2023-09-24 ASSESSMENT — PAIN DESCRIPTION - ORIENTATION
ORIENTATION: LEFT
ORIENTATION: LEFT

## 2023-09-24 ASSESSMENT — PAIN DESCRIPTION - DESCRIPTORS: DESCRIPTORS: TIGHTNESS;THROBBING

## 2023-09-24 ASSESSMENT — PAIN - FUNCTIONAL ASSESSMENT
PAIN_FUNCTIONAL_ASSESSMENT: 0-10

## 2023-09-24 ASSESSMENT — PAIN SCALES - GENERAL
PAINLEVEL_OUTOF10: 8
PAINLEVEL_OUTOF10: 6

## 2023-09-24 ASSESSMENT — PAIN DESCRIPTION - LOCATION
LOCATION: HAND

## 2023-09-24 NOTE — ED NOTES
Patient dry heaving at this time, states she hasn't eaten anything today. Patient provided cool, damp washcloth to back of neck for comfort. Patient denies shortness of breath or itching. Dr. Angelique Serrano informed, new order received.      Chandu Baird RN  09/24/23 1007

## 2023-09-24 NOTE — CONSULTS
plan.    Mily Carrillo MD  Orthopaedic Surgeon  [facility] [place] [facility]  9/27/2023  8:53 PM

## 2023-09-24 NOTE — ED TRIAGE NOTES
Patient to ED for complaint of left sided hand swelling and pain which started around 1800 yesterday. Patient reports that she has tried positioning and ice and they did not help. Patient is unable to take ibuprofen. Patient noted to be hypertensive in department, states that she sees Dr. Sergo Golden for this.

## 2023-09-24 NOTE — PROGRESS NOTES
Pt arrived to unit. Admission complete. Pt refused skin assessment. Pt alert and oriented. States pain is 8 on 0-10 scale in left hand. Pain medication offered and declined pt states she had pain medicine in ER wants nothing else for now. Pt resting in bed with call light in reach. Family at bedside. Safety maintained. Pt is able to make needs known. Will continue to monitor.

## 2023-09-24 NOTE — ED NOTES
Pt ambulated to bathroom by self, tolerated well. Pt updated on admission status. Pt resting in bed, respirations even and unlabored. No other needs expressed at this time. Call light within reach.  [address]  09/24/23 5789

## 2023-09-24 NOTE — ED NOTES
[facility] called, they are aware pt is coming up. Pt transferred to the floor at this time.       Marley Murguia RN  09/24/23 3713

## 2023-09-24 NOTE — ED PROVIDER NOTES
components within normal limits   C-REACTIVE PROTEIN - Abnormal; Notable for the following components:    CRP 1.26 (*)     All other components within normal limits   OSMOLALITY - Abnormal; Notable for the following components:    Osmolality Calc 274.2 (*)     All other components within normal limits   CULTURE, BLOOD 1   CULTURE, BLOOD 1   SEDIMENTATION RATE   LACTATE, SEPSIS   ANION GAP   GLOMERULAR FILTRATION RATE, ESTIMATED       (Any cultures that may have been sent were not resulted at the time of this patient visit)    ED Medications administered this visit:   Medications   cefTRIAXone (ROCEPHIN) 1,000 mg in sodium chloride 0.9 % 50 mL IVPB (mini-bag) (has no administration in time range)   morphine (PF) injection 2 mg (has no administration in time range)     Or   morphine injection 4 mg (has no administration in time range)   tamsulosin (FLOMAX) capsule 0.4 mg (has no administration in time range)   aspirin chewable tablet 81 mg ( Oral Automatically Held 9/27/23 0900)   atorvastatin (LIPITOR) tablet 20 mg (has no administration in time range)   carvedilol (COREG) tablet 25 mg (has no administration in time range)   hydrALAZINE (APRESOLINE) tablet 100 mg (has no administration in time range)   metFORMIN (GLUCOPHAGE-XR) extended release tablet 500 mg ( Oral Automatically Held 9/27/23 2100)   minoxidil (LONITEN) tablet 10 mg (has no administration in time range)   pantoprazole (PROTONIX) tablet 40 mg (has no administration in time range)   prazosin (MINIPRESS) capsule 2 mg (has no administration in time range)   topiramate (TOPAMAX) tablet 100 mg (has no administration in time range)   valsartan (DIOVAN) tablet 320 mg (has no administration in time range)   sodium chloride flush 0.9 % injection 5-40 mL (has no administration in time range)   sodium chloride flush 0.9 % injection 5-40 mL (has no administration in time range)   0.9 % sodium chloride infusion (has no administration in time range)   enoxaparin

## 2023-09-24 NOTE — ED NOTES
Report received from [facility]. Upon first contact, pt is resting in bed. Abx complete. Pt updated on admission status. No needs expressed at this time.  [address]  09/24/23 6464

## 2023-09-24 NOTE — H&P
History & Physical    Patient:  Roel Bingham  YOB: 1975  Date of Service: 9/24/2023  MRN: [id number]   Acct:  [de-identified]   Primary Care Physician: JUAN Sotomayor CNP    Chief Complaint: Acute left hand edema    Assessment / Plan:    Acute left hand edema, pain and decreased ROM. Concerns for flexor tenosynovitis of the left index finger. Xray of the left hand noted. Continue IV Rocephin and Vancomycin, pharmacy to dose. Ice area and elevate hand. Pain control with morphine pain panel. Consult to ortho for additional recommendations. Accelerated hypertension. Pain likely contributing. Restart home regimen. IV PRN Hydralazine for SBP >170. Mildly elevated inflammatory markers. CRP 1.26. Sed rate normal.   T2DM. Hold home regimen. Start Humalog group 2 scale. Carb control meals. Hypoglycemic protocol. Obesity. BMI 34.95. Hx Migraines. Micro hematuria, incomplete emptying and nocturia with mixed incontinence. Follows with urology. Home regimen has been resumed. History of Present Illness:   History obtained from chart review and the patient. The patient is a 57 y.o. female who presents with acute left hand edema and pain. Hx of left traumatic closed hand fracture in March, was treated with a cast by [facility]. She reports initially noticing some numbness and tingling in the last 3 digits. Pain and discomfort started in her left index finger. Reports shortly after she noticed a lump under her skin on th dorsum of her hand near the first metacarpal.  Symptoms started abruptly yesterday around 1800, prior to this she was in her normal state of health. She attempted elevation and ice at home did not help. Denies any recent injury or aggravating factors.        Past Medical History:        Diagnosis Date    Coronary artery disease involving native coronary artery of native heart without angina pectoris 10/27/2021    Diabetes mellitus ([address])     Hypertension     Osteoarthritis of knee

## 2023-09-25 LAB
ANION GAP SERPL CALC-SCNC: 12 MEQ/L (ref 8–16)
BASOPHILS ABSOLUTE: 0 THOU/MM3 (ref 0–0.1)
BASOPHILS NFR BLD AUTO: 0.4 %
BUN SERPL-MCNC: 11 MG/DL (ref 7–22)
CALCIUM SERPL-MCNC: 9.3 MG/DL (ref 8.5–10.5)
CHLORIDE SERPL-SCNC: 107 MEQ/L (ref 98–111)
CO2 SERPL-SCNC: 22 MEQ/L (ref 23–33)
CREAT SERPL-MCNC: 0.8 MG/DL (ref 0.4–1.2)
DEPRECATED RDW RBC AUTO: 47.5 FL (ref 35–45)
EOSINOPHIL NFR BLD AUTO: 0.9 %
EOSINOPHILS ABSOLUTE: 0.1 THOU/MM3 (ref 0–0.4)
ERYTHROCYTE [DISTWIDTH] IN BLOOD BY AUTOMATED COUNT: 14.8 % (ref 11.5–14.5)
GFR SERPL CREATININE-BSD FRML MDRD: > 60 ML/MIN/1.73M2
GLUCOSE BLD STRIP.AUTO-MCNC: 135 MG/DL (ref 70–108)
GLUCOSE BLD STRIP.AUTO-MCNC: 140 MG/DL (ref 70–108)
GLUCOSE BLD STRIP.AUTO-MCNC: 153 MG/DL (ref 70–108)
GLUCOSE BLD STRIP.AUTO-MCNC: 179 MG/DL (ref 70–108)
GLUCOSE SERPL-MCNC: 162 MG/DL (ref 70–108)
HCT VFR BLD AUTO: 35 % (ref 37–47)
HGB BLD-MCNC: 10.9 GM/DL (ref 12–16)
IMM GRANULOCYTES # BLD AUTO: 0.03 THOU/MM3 (ref 0–0.07)
IMM GRANULOCYTES NFR BLD AUTO: 0.4 %
LYMPHOCYTES ABSOLUTE: 1.6 THOU/MM3 (ref 1–4.8)
LYMPHOCYTES NFR BLD AUTO: 22.1 %
MCH RBC QN AUTO: 27.1 PG (ref 26–33)
MCHC RBC AUTO-ENTMCNC: 31.1 GM/DL (ref 32.2–35.5)
MCV RBC AUTO: 87.1 FL (ref 81–99)
MONOCYTES ABSOLUTE: 0.5 THOU/MM3 (ref 0.4–1.3)
MONOCYTES NFR BLD AUTO: 6.7 %
NEUTROPHILS NFR BLD AUTO: 69.5 %
NRBC BLD AUTO-RTO: 0 /100 WBC
PLATELET # BLD AUTO: 327 THOU/MM3 (ref 130–400)
PMV BLD AUTO: 10.3 FL (ref 9.4–12.4)
POTASSIUM SERPL-SCNC: 4.2 MEQ/L (ref 3.5–5.2)
RBC # BLD AUTO: 4.02 MILL/MM3 (ref 4.2–5.4)
SEGMENTED NEUTROPHILS ABSOLUTE COUNT: 5.1 THOU/MM3 (ref 1.8–7.7)
SODIUM SERPL-SCNC: 141 MEQ/L (ref 135–145)
WBC # BLD AUTO: 7.4 THOU/MM3 (ref 4.8–10.8)

## 2023-09-25 PROCEDURE — 82948 REAGENT STRIP/BLOOD GLUCOSE: CPT

## 2023-09-25 PROCEDURE — 96366 THER/PROPH/DIAG IV INF ADDON: CPT

## 2023-09-25 PROCEDURE — 96372 THER/PROPH/DIAG INJ SC/IM: CPT

## 2023-09-25 PROCEDURE — 6370000000 HC RX 637 (ALT 250 FOR IP): Performed by: NURSE PRACTITIONER

## 2023-09-25 PROCEDURE — G0378 HOSPITAL OBSERVATION PER HR: HCPCS

## 2023-09-25 PROCEDURE — 6360000002 HC RX W HCPCS: Performed by: NURSE PRACTITIONER

## 2023-09-25 PROCEDURE — 36415 COLL VENOUS BLD VENIPUNCTURE: CPT

## 2023-09-25 PROCEDURE — 99232 SBSQ HOSP IP/OBS MODERATE 35: CPT | Performed by: NURSE PRACTITIONER

## 2023-09-25 PROCEDURE — 2580000003 HC RX 258: Performed by: NURSE PRACTITIONER

## 2023-09-25 PROCEDURE — 85025 COMPLETE CBC W/AUTO DIFF WBC: CPT

## 2023-09-25 PROCEDURE — 80048 BASIC METABOLIC PNL TOTAL CA: CPT

## 2023-09-25 RX ADMIN — VALSARTAN 320 MG: 320 TABLET ORAL at 08:36

## 2023-09-25 RX ADMIN — TAMSULOSIN HYDROCHLORIDE 0.4 MG: 0.4 CAPSULE ORAL at 08:35

## 2023-09-25 RX ADMIN — HYDRALAZINE HYDROCHLORIDE 100 MG: 50 TABLET, FILM COATED ORAL at 08:35

## 2023-09-25 RX ADMIN — ATORVASTATIN CALCIUM 20 MG: 20 TABLET, FILM COATED ORAL at 21:10

## 2023-09-25 RX ADMIN — Medication 1250 MG: at 06:50

## 2023-09-25 RX ADMIN — PANTOPRAZOLE SODIUM 40 MG: 40 TABLET, DELAYED RELEASE ORAL at 06:48

## 2023-09-25 RX ADMIN — ENOXAPARIN SODIUM 40 MG: 100 INJECTION SUBCUTANEOUS at 08:35

## 2023-09-25 RX ADMIN — Medication 1250 MG: at 21:17

## 2023-09-25 RX ADMIN — MINOXIDIL 10 MG: 2.5 TABLET ORAL at 08:34

## 2023-09-25 RX ADMIN — CEFTRIAXONE SODIUM 1000 MG: 2 INJECTION, POWDER, FOR SOLUTION INTRAMUSCULAR; INTRAVENOUS at 10:06

## 2023-09-25 RX ADMIN — CARVEDILOL 25 MG: 25 TABLET, FILM COATED ORAL at 08:35

## 2023-09-25 ASSESSMENT — PAIN SCALES - GENERAL
PAINLEVEL_OUTOF10: 5
PAINLEVEL_OUTOF10: 5
PAINLEVEL_OUTOF10: 6

## 2023-09-25 ASSESSMENT — PAIN DESCRIPTION - ORIENTATION: ORIENTATION: LEFT

## 2023-09-25 ASSESSMENT — PAIN DESCRIPTION - DESCRIPTORS: DESCRIPTORS: DISCOMFORT;SHARP;SHOOTING

## 2023-09-25 ASSESSMENT — PAIN DESCRIPTION - ONSET: ONSET: GRADUAL

## 2023-09-25 ASSESSMENT — PAIN - FUNCTIONAL ASSESSMENT: PAIN_FUNCTIONAL_ASSESSMENT: PREVENTS OR INTERFERES SOME ACTIVE ACTIVITIES AND ADLS

## 2023-09-25 ASSESSMENT — PAIN DESCRIPTION - PAIN TYPE: TYPE: ACUTE PAIN

## 2023-09-25 ASSESSMENT — PAIN DESCRIPTION - FREQUENCY: FREQUENCY: CONTINUOUS

## 2023-09-25 ASSESSMENT — PAIN DESCRIPTION - LOCATION
LOCATION: HAND
LOCATION: FINGER (COMMENT WHICH ONE)

## 2023-09-25 NOTE — PLAN OF CARE
Repeat evaluation shows some improvement in clinical picture of patient  -tenderness isolated to distal phalanx of digit, resting in PIP flexion however does tolerate passive extension and passive flexion through MCP PIP DIP  -continue IV abx  -advance diet today  -NPO midnight for repeat evaluation in morning    Sherman Art PA-C

## 2023-09-25 NOTE — PROGRESS NOTES
Hospitalist Progress Note    Patient:  Melba Hood      Unit/Bed:8A-09/009-A    YOB: 1975    MRN: [id number]       Acct: [de-identified]     PCP: Edmundo Frankel, APRN - CNP    Date of Admission: 9/24/2023    Assessment/Plan:    Acute left hand edema, pain and decreased ROM, improved. Concerns for flexor tenosynovitis of the left index finger. Xray of the left hand noted. Continue IV Rocephin and Vancomycin. Continue Ice area and elevate hand. Pain control with morphine pain panel. Ortho seen. NPO after MN for possible procedure pending clinical course over night. Accelerated hypertension, resolved. Pain likely contributed. Hx of primary HTN. Continue home regimen. IV PRN Hydralazine for SBP >170. Mildly elevated inflammatory markers. CRP 1.26. Sed rate normal.   T2DM. Holding home regimen. Continue Humalog group 2 scale. Carb control meals. Hypoglycemic protocol. Obesity. BMI 34.95. Active Migraine . Hx Migraines. Home medications when  brings from home. Micro hematuria, incomplete emptying and nocturia with mixed incontinence. Follows with urology. Home regimen has been resumed. Normocytic anemia. Hbg stable at 10.9        Chief Complaint: Acute left hand edema    Hospital Course: The patient is a 57 y.o. female who presents with acute left hand edema and pain. Hx of left traumatic closed hand fracture in March, was treated with a cast by [facility]. She reports initially noticing some numbness and tingling in the last 3 digits. Pain and discomfort started in her left index finger. Reports shortly after she noticed a lump under her skin on th dorsum of her hand near the first metacarpal.  Symptoms started abruptly yesterday around 1800, prior to this she was in her normal state of health. She attempted elevation and ice at home did not help. Denies any recent injury or aggravating factors. Subjective: Continues with pain to left hand.  Pain is mainly in left index finger, thumb and

## 2023-09-25 NOTE — CARE COORDINATION
Case Management Assessment  Initial Evaluation    Date/Time of Evaluation: 9/25/2023 11:14 AM  Assessment Completed by: Agustin Rabago RN    If patient is discharged prior to next notation, then this note serves as note for discharge by case management. Patient Name: Keerthi Boateng                   YOB: 1975  Diagnosis: Left hand pain [M79.642]                   Date / Time: 9/24/2023  7:57 AM  Location: [facility]09/009     Patient Admission Status: Observation   Readmission Risk Low 0-14, Mod 15-19), High > 20: No data recorded  Current PCP: JUAN Flores CNP  PCP verified by CM? Yes    Chart Reviewed: Yes      History Provided by: Patient  Patient Orientation: Alert and Oriented    Patient Cognition: Alert    Hospitalization in the last 30 days (Readmission):  No    If yes, Readmission Assessment in  Navigator will be completed. Advance Directives:      Code Status: Full Code   Patient's Primary Decision Maker is: Legal Next of Kin      Discharge Planning:    Patient lives with: Spouse/Significant Other, Children Type of Home: House  Primary Care Giver: Self  Patient Support Systems include: Family Members   Current Financial resources: Other (Comment), Medicaid (commercial- primary)  Current community resources: None  Current services prior to admission: None            Current DME:              Type of Home Care services:  None    ADLS  Prior functional level: Independent in ADLs/IADLs  Current functional level: Independent in ADLs/IADLs    Family can provide assistance at DC: Yes  Would you like Case Management to discuss the discharge plan with any other family members/significant others, and if so, who?  No  Plans to Return to Present Housing: Yes  Other Identified Issues/Barriers to RETURNING to current housing: no  Potential Assistance needed at discharge: N/A            Potential DME:    Patient expects to discharge to: [address] [place] [address] for transportation at discharge:

## 2023-09-26 VITALS
HEART RATE: 97 BPM | TEMPERATURE: 98.3 F | WEIGHT: 210 LBS | RESPIRATION RATE: 18 BRPM | SYSTOLIC BLOOD PRESSURE: 135 MMHG | BODY MASS INDEX: 34.99 KG/M2 | HEIGHT: 65 IN | DIASTOLIC BLOOD PRESSURE: 73 MMHG | OXYGEN SATURATION: 99 %

## 2023-09-26 LAB
CRP SERPL-MCNC: 1.78 MG/DL (ref 0–1)
ERYTHROCYTE [SEDIMENTATION RATE] IN BLOOD BY WESTERGREN METHOD: 12 MM/HR (ref 0–20)
GLUCOSE BLD STRIP.AUTO-MCNC: 124 MG/DL (ref 70–108)
GLUCOSE BLD STRIP.AUTO-MCNC: 137 MG/DL (ref 70–108)
VANCOMYCIN SERPL-MCNC: 25.1 UG/ML (ref 0.1–39.9)

## 2023-09-26 PROCEDURE — 99239 HOSP IP/OBS DSCHRG MGMT >30: CPT | Performed by: NURSE PRACTITIONER

## 2023-09-26 PROCEDURE — 96366 THER/PROPH/DIAG IV INF ADDON: CPT

## 2023-09-26 PROCEDURE — 36415 COLL VENOUS BLD VENIPUNCTURE: CPT

## 2023-09-26 PROCEDURE — 6370000000 HC RX 637 (ALT 250 FOR IP): Performed by: NURSE PRACTITIONER

## 2023-09-26 PROCEDURE — 6360000002 HC RX W HCPCS: Performed by: NURSE PRACTITIONER

## 2023-09-26 PROCEDURE — 2580000003 HC RX 258: Performed by: NURSE PRACTITIONER

## 2023-09-26 PROCEDURE — G0378 HOSPITAL OBSERVATION PER HR: HCPCS

## 2023-09-26 PROCEDURE — 85651 RBC SED RATE NONAUTOMATED: CPT

## 2023-09-26 PROCEDURE — 80202 ASSAY OF VANCOMYCIN: CPT

## 2023-09-26 PROCEDURE — 82948 REAGENT STRIP/BLOOD GLUCOSE: CPT

## 2023-09-26 PROCEDURE — 86140 C-REACTIVE PROTEIN: CPT

## 2023-09-26 RX ORDER — TRAMADOL HYDROCHLORIDE 50 MG/1
50 TABLET ORAL EVERY 8 HOURS PRN
Qty: 15 TABLET | Refills: 0 | Status: SHIPPED | OUTPATIENT
Start: 2023-09-26 | End: 2023-10-01

## 2023-09-26 RX ORDER — CEFDINIR 300 MG/1
300 CAPSULE ORAL 2 TIMES DAILY
Qty: 10 CAPSULE | Refills: 0 | Status: SHIPPED | OUTPATIENT
Start: 2023-09-26 | End: 2023-10-01

## 2023-09-26 RX ADMIN — PANTOPRAZOLE SODIUM 40 MG: 40 TABLET, DELAYED RELEASE ORAL at 06:17

## 2023-09-26 RX ADMIN — ACETAMINOPHEN 650 MG: 325 TABLET ORAL at 01:25

## 2023-09-26 RX ADMIN — TAMSULOSIN HYDROCHLORIDE 0.4 MG: 0.4 CAPSULE ORAL at 08:42

## 2023-09-26 RX ADMIN — SODIUM CHLORIDE, PRESERVATIVE FREE 10 ML: 5 INJECTION INTRAVENOUS at 08:37

## 2023-09-26 RX ADMIN — Medication 1250 MG: at 08:38

## 2023-09-26 RX ADMIN — CEFTRIAXONE SODIUM 1000 MG: 2 INJECTION, POWDER, FOR SOLUTION INTRAMUSCULAR; INTRAVENOUS at 10:14

## 2023-09-26 ASSESSMENT — PAIN DESCRIPTION - DESCRIPTORS: DESCRIPTORS: ACHING

## 2023-09-26 ASSESSMENT — PAIN DESCRIPTION - ORIENTATION: ORIENTATION: LEFT

## 2023-09-26 ASSESSMENT — PAIN SCALES - GENERAL: PAINLEVEL_OUTOF10: 7

## 2023-09-26 ASSESSMENT — PAIN DESCRIPTION - LOCATION: LOCATION: FINGER (COMMENT WHICH ONE)

## 2023-09-26 NOTE — PLAN OF CARE
Evaluated at bedside this morning  -pain appears well controlled, modest improvement  -physical exam with stable flexion and extension active and passive, soreness here but not concerning for tenosynovitis  -advance diet  -no plans for operative intervention  -multimodal pain control, follow up one week Dr Keaton Patiño, PA-C

## 2023-09-26 NOTE — PLAN OF CARE
Problem: Discharge Planning  Goal: Discharge to home or other facility with appropriate resources  9/26/2023 1241 by Rene Colvin RN  Outcome: Completed  9/26/2023 1125 by Cathie Howell RN  Outcome: Adequate for Discharge  9/26/2023 0239 by Mara Morrow RN  Outcome: Progressing     Problem: Pain  Goal: Verbalizes/displays adequate comfort level or baseline comfort level  9/26/2023 1241 by Rene Colvin RN  Outcome: Completed  9/26/2023 1125 by Cathie Howell RN  Outcome: Adequate for Discharge  9/26/2023 0239 by Mara Morrow RN  Outcome: Progressing     Problem: Safety - Adult  Goal: Free from fall injury  9/26/2023 1241 by Rene Colvin RN  Outcome: Completed  9/26/2023 1125 by Cathie Howell RN  Outcome: Adequate for Discharge  9/26/2023 0239 by Mara Morrow RN  Outcome: Progressing

## 2023-09-26 NOTE — PLAN OF CARE
Problem: Discharge Planning  Goal: Discharge to home or other facility with appropriate resources  9/26/2023 1125 by Marni Brewer RN  Outcome: Adequate for Discharge  9/26/2023 0239 by Nola Caceres RN  Outcome: Progressing     Problem: Pain  Goal: Verbalizes/displays adequate comfort level or baseline comfort level  9/26/2023 1125 by Marni Brewer RN  Outcome: Adequate for Discharge  9/26/2023 0239 by Nola Caceres RN  Outcome: Progressing     Problem: Safety - Adult  Goal: Free from fall injury  9/26/2023 1125 by Marni Brewer RN  Outcome: Adequate for Discharge  9/26/2023 0239 by Nola Caceres, RN  Outcome: Progressing

## 2023-09-26 NOTE — PLAN OF CARE
Problem: Discharge Planning  Goal: Discharge to home or other facility with appropriate resources  Outcome: Progressing     Problem: Pain  Goal: Verbalizes/displays adequate comfort level or baseline comfort level  Outcome: Progressing     Problem: Safety - Adult  Goal: Free from fall injury  Outcome: Progressing   Care plan reviewed with patient and . Patient and  verbalize understanding of the plan of care and contribute to goal setting.

## 2023-09-26 NOTE — CARE COORDINATION
9/26/23, 11:40 AM EDT    Patient goals/plan/ treatment preferences discussed by  and . Patient goals/plan/ treatment preferences reviewed with patient/ family. Patient/ family verbalize understanding of discharge plan and are in agreement with goal/plan/treatment preferences. Understanding was demonstrated using the teach back method. AVS provided by RN at time of discharge, which includes all necessary medical information pertaining to the patients current course of illness, treatment, post-discharge goals of care, and treatment preferences.      Services At/After Discharge: None

## 2023-09-26 NOTE — DISCHARGE SUMMARY
TABLET BY MOUTH DAILY  What changed: when to take this            CONTINUE taking these medications      alfuzosin 10 MG extended release tablet  Commonly known as: Uroxatral  Take 1 tablet by mouth daily     aspirin 81 MG chewable tablet  Take 1 tablet by mouth daily     carvedilol 25 MG tablet  Commonly known as: COREG  Take 1 tablet by mouth 2 times daily (with meals)     Emgality 120 mg/mL Sosy injection  Generic drug: galcanezumab-gnlm     hydrALAZINE 25 MG tablet  Commonly known as: APRESOLINE  Take 4 tablets by mouth 3 times daily     magnesium oxide 400 MG tablet  Commonly known as: MAG-OX     metFORMIN 500 MG extended release tablet  Commonly known as: GLUCOPHAGE-XR     minoxidil 2.5 MG tablet  Commonly known as: LONITEN  TAKE 4 TABLETS BY MOUTH TWICE DAILY     omeprazole 20 MG delayed release capsule  Commonly known as: PRILOSEC     potassium chloride 20 MEQ extended release tablet  Commonly known as: KLOR-CON M  Take 1 tablet by mouth 2 times daily     prazosin 1 MG capsule  Commonly known as: MINIPRESS  Take 2 capsules by mouth nightly     traZODone 50 MG tablet  Commonly known as: DESYREL     Ubrelvy 100 MG Tabs  Generic drug: Ubrogepant     valsartan 320 MG tablet  Commonly known as: DIOVAN  Take 1 tablet by mouth daily     vitamin D 1.25 MG (40054 UT) Caps capsule  Commonly known as: ERGOCALCIFEROL            STOP taking these medications      butalbital-acetaminophen-caffeine -40 MG per tablet  Commonly known as: FIORICET, ESGIC     Nurtec 75 MG Tbdp  Generic drug: Rimegepant Sulfate     topiramate 100 MG tablet  Commonly known as: TOPAMAX               Where to Get Your Medications        These medications were sent to [place] [address], [address] [facility],[address]      Phone: [phone]   cefdinir 300 MG capsule  traMADol 50 MG tablet         Time Spent on discharge is more than 35

## 2023-09-29 LAB
BACTERIA BLD AEROBE CULT: NORMAL
BACTERIA BLD AEROBE CULT: NORMAL

## 2023-10-13 ENCOUNTER — HOSPITAL ENCOUNTER (EMERGENCY)
Age: 48
Discharge: HOME | End: 2023-10-13
Payer: COMMERCIAL

## 2023-10-13 ENCOUNTER — APPOINTMENT (OUTPATIENT)
Dept: GENERAL RADIOLOGY | Age: 48
End: 2023-10-13
Payer: COMMERCIAL

## 2023-10-13 VITALS
BODY MASS INDEX: 34.99 KG/M2 | HEIGHT: 65 IN | SYSTOLIC BLOOD PRESSURE: 205 MMHG | WEIGHT: 210 LBS | HEART RATE: 108 BPM | RESPIRATION RATE: 17 BRPM | OXYGEN SATURATION: 98 % | TEMPERATURE: 98.5 F | DIASTOLIC BLOOD PRESSURE: 113 MMHG

## 2023-10-13 DIAGNOSIS — R05.8 POST-VIRAL COUGH SYNDROME: Primary | ICD-10-CM

## 2023-10-13 LAB
FLUAV RNA RESP QL NAA+PROBE: NOT DETECTED
FLUBV RNA RESP QL NAA+PROBE: NOT DETECTED
SARS-COV-2 RNA RESP QL NAA+PROBE: NOT DETECTED

## 2023-10-13 PROCEDURE — 99284 EMERGENCY DEPT VISIT MOD MDM: CPT

## 2023-10-13 PROCEDURE — 6370000000 HC RX 637 (ALT 250 FOR IP): Performed by: NURSE PRACTITIONER

## 2023-10-13 PROCEDURE — 71045 X-RAY EXAM CHEST 1 VIEW: CPT

## 2023-10-13 PROCEDURE — 87636 SARSCOV2 & INF A&B AMP PRB: CPT

## 2023-10-13 RX ORDER — DEXTROMETHORPHAN HYDROBROMIDE AND PROMETHAZINE HYDROCHLORIDE 15; 6.25 MG/5ML; MG/5ML
5 SYRUP ORAL 4 TIMES DAILY PRN
Qty: 140 ML | Refills: 0 | Status: SHIPPED | OUTPATIENT
Start: 2023-10-13 | End: 2023-10-20

## 2023-10-13 RX ORDER — BENZONATATE 100 MG/1
100 CAPSULE ORAL ONCE
Status: COMPLETED | OUTPATIENT
Start: 2023-10-13 | End: 2023-10-13

## 2023-10-13 RX ORDER — FLUTICASONE PROPIONATE 50 MCG
2 SPRAY, SUSPENSION (ML) NASAL DAILY
Qty: 16 G | Refills: 0 | Status: SHIPPED | OUTPATIENT
Start: 2023-10-13

## 2023-10-13 RX ORDER — METHYLPREDNISOLONE 4 MG/1
TABLET ORAL
Qty: 1 KIT | Refills: 0 | Status: SHIPPED | OUTPATIENT
Start: 2023-10-13 | End: 2023-10-19

## 2023-10-13 RX ADMIN — BENZONATATE 100 MG: 100 CAPSULE ORAL at 20:04

## 2023-10-13 ASSESSMENT — PAIN - FUNCTIONAL ASSESSMENT: PAIN_FUNCTIONAL_ASSESSMENT: NONE - DENIES PAIN

## 2023-10-13 NOTE — ED TRIAGE NOTES
Pt comes to ED with c/o cough x 2 weeks. Pt states that she was seen for a tele health appointment earlier today and they reccommended she come to ER for Xrays.

## 2023-10-14 NOTE — ED PROVIDER NOTES
[facility] Emergency Department    CHIEF COMPLAINT       Chief Complaint   Patient presents with    Cough       Nurses Notes reviewed and I agree except as noted in the HPI. HISTORY OF PRESENT ILLNESS   Nani Power is a 57 y.o. female who presents to the ED for evaluation of cough. Patient notes cough is been ongoing for the last 2-1/2 weeks, reports this started after being admitted here for a hand infection. She notes that the time she was on Omnicef. She denies any fevers, but notes frequent dry cough. She notes she called her family doctor today, had a televisit, they advised her to come to the ER for an x-ray to rule out pneumonia. Patient has a past medical history of coronary artery disease, diabetes, hypertension. .  She notes she is been taking some over-the-counter medication for cough with no relief. HPI was provided by the patient. PAST MEDICAL HISTORY     Past Medical History:   Diagnosis Date    Coronary artery disease involving native coronary artery of native heart without angina pectoris 10/27/2021    Diabetes mellitus ([address])     Hypertension     Osteoarthritis of knee 1/31/2023    Unspecified cerebral artery occlusion with cerebral infarction 1995       SURGICALHISTORY      has a past surgical history that includes cyst removal; hysteroscopy; Hysterectomy (April 2013); Breast surgery (Left, 02/10/2015); pr exc b9 lesion mrgn xcp sk tg t/a/l 2.1-3.0 cm (N/A, 6/4/2018); and DISSECTION GROIN (N/A, 9/19/2019).     CURRENT MEDICATIONS       Discharge Medication List as of 10/13/2023  8:43 PM        CONTINUE these medications which have NOT CHANGED    Details   traZODone (DESYREL) 50 MG tablet TAKE 1 TO 2 TABLETS BY MOUTH ONCE DAILY AT BEDTIMEHistorical Med      prazosin (MINIPRESS) 1 MG capsule Take 2 capsules by mouth nightly, Disp-90 capsule, R-3Normal      alfuzosin (UROXATRAL) 10 MG extended release tablet Take 1 tablet by mouth daily, Disp-30 tablet, R-2Normal

## 2023-10-16 RX ORDER — MINOXIDIL 2.5 MG/1
TABLET ORAL
Qty: 720 TABLET | Refills: 1 | Status: SHIPPED | OUTPATIENT
Start: 2023-10-16

## 2023-10-23 RX ORDER — HYDRALAZINE HYDROCHLORIDE 25 MG/1
TABLET, FILM COATED ORAL
Qty: 630 TABLET | Refills: 3 | Status: SHIPPED | OUTPATIENT
Start: 2023-10-23

## 2023-10-25 RX ORDER — SPIRONOLACTONE 50 MG/1
50 TABLET, FILM COATED ORAL 2 TIMES DAILY
Qty: 180 TABLET | Refills: 2 | OUTPATIENT
Start: 2023-10-25

## 2023-10-25 NOTE — TELEPHONE ENCOUNTER
Left message for patient to call us back and let us know if she is on this. I do not see it on her current medication list. I found where it was d/c by Dr. Leo Dec 10/4/22. If she is taking it she needs to get it from the physician that is prescribing it.
none

## 2023-11-16 RX ORDER — VALSARTAN 320 MG/1
320 TABLET ORAL DAILY
Qty: 90 TABLET | Refills: 0 | Status: SHIPPED | OUTPATIENT
Start: 2023-11-16

## 2023-11-22 ENCOUNTER — PROCEDURE VISIT (OUTPATIENT)
Dept: NEUROLOGY | Age: 48
End: 2023-11-22
Payer: COMMERCIAL

## 2023-11-22 ENCOUNTER — OFFICE VISIT (OUTPATIENT)
Dept: CARDIOLOGY CLINIC | Age: 48
End: 2023-11-22
Payer: COMMERCIAL

## 2023-11-22 VITALS
HEART RATE: 95 BPM | SYSTOLIC BLOOD PRESSURE: 187 MMHG | BODY MASS INDEX: 33.52 KG/M2 | WEIGHT: 201.2 LBS | HEIGHT: 65 IN | DIASTOLIC BLOOD PRESSURE: 105 MMHG

## 2023-11-22 DIAGNOSIS — R20.0 BILATERAL HAND NUMBNESS: ICD-10-CM

## 2023-11-22 DIAGNOSIS — I10 PRIMARY HYPERTENSION: Primary | ICD-10-CM

## 2023-11-22 DIAGNOSIS — M54.12 CERVICAL RADICULOPATHY: Primary | ICD-10-CM

## 2023-11-22 PROCEDURE — 3080F DIAST BP >= 90 MM HG: CPT | Performed by: NUCLEAR MEDICINE

## 2023-11-22 PROCEDURE — 95886 MUSC TEST DONE W/N TEST COMP: CPT | Performed by: PSYCHIATRY & NEUROLOGY

## 2023-11-22 PROCEDURE — 1036F TOBACCO NON-USER: CPT | Performed by: NUCLEAR MEDICINE

## 2023-11-22 PROCEDURE — 99214 OFFICE O/P EST MOD 30 MIN: CPT | Performed by: NUCLEAR MEDICINE

## 2023-11-22 PROCEDURE — G8427 DOCREV CUR MEDS BY ELIG CLIN: HCPCS | Performed by: NUCLEAR MEDICINE

## 2023-11-22 PROCEDURE — G8417 CALC BMI ABV UP PARAM F/U: HCPCS | Performed by: NUCLEAR MEDICINE

## 2023-11-22 PROCEDURE — 95911 NRV CNDJ TEST 9-10 STUDIES: CPT | Performed by: PSYCHIATRY & NEUROLOGY

## 2023-11-22 PROCEDURE — 3077F SYST BP >= 140 MM HG: CPT | Performed by: NUCLEAR MEDICINE

## 2023-11-22 PROCEDURE — G8484 FLU IMMUNIZE NO ADMIN: HCPCS | Performed by: NUCLEAR MEDICINE

## 2023-11-22 RX ORDER — ATENOLOL 50 MG/1
50 TABLET ORAL DAILY
COMMUNITY
End: 2023-11-22 | Stop reason: SDUPTHER

## 2023-11-22 RX ORDER — SPIRONOLACTONE 25 MG/1
25 TABLET ORAL DAILY
Qty: 90 TABLET | Refills: 3 | Status: SHIPPED | OUTPATIENT
Start: 2023-11-22

## 2023-11-22 RX ORDER — SPIRONOLACTONE 25 MG/1
25 TABLET ORAL DAILY
COMMUNITY
End: 2023-11-22 | Stop reason: SDUPTHER

## 2023-11-22 RX ORDER — ATENOLOL 50 MG/1
50 TABLET ORAL DAILY
Qty: 90 TABLET | Refills: 3 | Status: SHIPPED | OUTPATIENT
Start: 2023-11-22

## 2023-11-22 NOTE — PROGRESS NOTES
[address]  [address]  [facility] [address]  Dept: [phone]  Dept Fax: [phone]  Loc: [phone]    Visit Date: 11/22/2023    Matheus Marques is a 57 y.o. female who presents todayfor:  Chief Complaint   Patient presents with    1 Year Follow Up    Headache    Chest Pain     Chest heaviness no pain     Hypertension     Labile BP  Had to stop meds for a while at times   Now running higher   Some compliance issues in the past   HPI:  HPI  Past Medical History:   Diagnosis Date    Coronary artery disease involving native coronary artery of native heart without angina pectoris 10/27/2021    Diabetes mellitus ([address] )     Hypertension     Osteoarthritis of knee 1/31/2023    Unspecified cerebral artery occlusion with cerebral infarction 1995      Past Surgical History:   Procedure Laterality Date    BREAST SURGERY Left 02/10/2015    I & D Dr. Norris Love N/A 9/19/2019    I&D OF PUBIC ABSCESS performed by Saud Kaur MD at [address] [facility] ([facility])  April 2013     total    HYSTEROSCOPY      MT EXC B9 LESION MRGN XCP SK TG T/A/L 2.1-3.0 CM N/A 6/4/2018    EXCISION OF BACK MASS performed by Saud Kaur MD at [address] History   Problem Relation Age of Onset    Heart Disease Mother     High Blood Pressure Mother     Arthritis Mother     Breast Cancer Maternal Cousin         dx premenapausal    High Blood Pressure Sister      Social History     Tobacco Use    Smoking status: Never     Passive exposure: Never    Smokeless tobacco: Never   Substance Use Topics    Alcohol use: No      Current Outpatient Medications   Medication Sig Dispense Refill    valsartan (DIOVAN) 320 MG tablet TAKE 1 TABLET BY MOUTH ONCE DAILY 90 tablet 0    hydrALAZINE (APRESOLINE) 25 MG tablet TAKE 4 TABLETS BY MOUTH THREE TIMES DAILY 630 tablet 3    minoxidil (LONITEN) 2.5 MG tablet TAKE 4

## 2023-12-26 ENCOUNTER — HOSPITAL ENCOUNTER (OUTPATIENT)
Age: 48
Discharge: HOME | End: 2023-12-26
Payer: COMMERCIAL

## 2023-12-26 LAB
ANION GAP SERPL CALC-SCNC: 10 MEQ/L (ref 8–16)
BUN SERPL-MCNC: 9 MG/DL (ref 7–22)
CALCIUM SERPL-MCNC: 9.4 MG/DL (ref 8.5–10.5)
CHLORIDE SERPL-SCNC: 105 MEQ/L (ref 98–111)
CO2 SERPL-SCNC: 25 MEQ/L (ref 23–33)
CREAT SERPL-MCNC: 0.7 MG/DL (ref 0.4–1.2)
GFR SERPL CREATININE-BSD FRML MDRD: > 60 ML/MIN/1.73M2
GLUCOSE SERPL-MCNC: 96 MG/DL (ref 70–108)
POTASSIUM SERPL-SCNC: 3.8 MEQ/L (ref 3.5–5.2)
SODIUM SERPL-SCNC: 140 MEQ/L (ref 135–145)

## 2023-12-26 PROCEDURE — 80048 BASIC METABOLIC PNL TOTAL CA: CPT

## 2023-12-26 PROCEDURE — 36415 COLL VENOUS BLD VENIPUNCTURE: CPT

## 2024-01-02 ENCOUNTER — OFFICE VISIT (OUTPATIENT)
Dept: NEPHROLOGY | Age: 49
End: 2024-01-02
Payer: COMMERCIAL

## 2024-01-02 VITALS
SYSTOLIC BLOOD PRESSURE: 204 MMHG | HEART RATE: 89 BPM | OXYGEN SATURATION: 100 % | BODY MASS INDEX: 33.99 KG/M2 | WEIGHT: 204 LBS | DIASTOLIC BLOOD PRESSURE: 104 MMHG | HEIGHT: 65 IN

## 2024-01-02 DIAGNOSIS — G43.019 INTRACTABLE MIGRAINE WITHOUT AURA AND WITHOUT STATUS MIGRAINOSUS: ICD-10-CM

## 2024-01-02 DIAGNOSIS — E11.22 TYPE 2 DIABETES MELLITUS WITH STAGE 1 CHRONIC KIDNEY DISEASE, WITH LONG-TERM CURRENT USE OF INSULIN (HCC): ICD-10-CM

## 2024-01-02 DIAGNOSIS — Z79.4 TYPE 2 DIABETES MELLITUS WITH STAGE 1 CHRONIC KIDNEY DISEASE, WITH LONG-TERM CURRENT USE OF INSULIN (HCC): ICD-10-CM

## 2024-01-02 DIAGNOSIS — I10 PRIMARY HYPERTENSION: Primary | ICD-10-CM

## 2024-01-02 DIAGNOSIS — N18.1 TYPE 2 DIABETES MELLITUS WITH STAGE 1 CHRONIC KIDNEY DISEASE, WITH LONG-TERM CURRENT USE OF INSULIN (HCC): ICD-10-CM

## 2024-01-02 PROCEDURE — 3046F HEMOGLOBIN A1C LEVEL >9.0%: CPT | Performed by: INTERNAL MEDICINE

## 2024-01-02 PROCEDURE — G8417 CALC BMI ABV UP PARAM F/U: HCPCS | Performed by: INTERNAL MEDICINE

## 2024-01-02 PROCEDURE — 2022F DILAT RTA XM EVC RTNOPTHY: CPT | Performed by: INTERNAL MEDICINE

## 2024-01-02 PROCEDURE — 3080F DIAST BP >= 90 MM HG: CPT | Performed by: INTERNAL MEDICINE

## 2024-01-02 PROCEDURE — 99213 OFFICE O/P EST LOW 20 MIN: CPT | Performed by: INTERNAL MEDICINE

## 2024-01-02 PROCEDURE — 3077F SYST BP >= 140 MM HG: CPT | Performed by: INTERNAL MEDICINE

## 2024-01-02 PROCEDURE — 1036F TOBACCO NON-USER: CPT | Performed by: INTERNAL MEDICINE

## 2024-01-02 PROCEDURE — G8484 FLU IMMUNIZE NO ADMIN: HCPCS | Performed by: INTERNAL MEDICINE

## 2024-01-02 PROCEDURE — G8427 DOCREV CUR MEDS BY ELIG CLIN: HCPCS | Performed by: INTERNAL MEDICINE

## 2024-01-02 RX ORDER — M-VIT,TX,IRON,MINS/CALC/FOLIC 27MG-0.4MG
1 TABLET ORAL DAILY
COMMUNITY

## 2024-01-02 RX ORDER — PRAZOSIN HYDROCHLORIDE 1 MG/1
1 CAPSULE ORAL NIGHTLY
COMMUNITY

## 2024-01-02 NOTE — PROGRESS NOTES
Her bp was going to low so Dr. De Leon took her off of everything and started over. Bp is high again and she is going to call him. So we will let Dr. De Leon manage her bp.  
I must have misunderstood Yulissa. She told Dr. Avila that Dr. De Leon is not managing her bp.  
09/06/2023 06:54 PM    LABCAST >15 HYALINE 09/06/2023 06:54 PM    LABCAST NONE SEEN 09/06/2023 06:54 PM    WBCUA 0-2 09/06/2023 06:54 PM    RBCUA 0-2 09/06/2023 06:54 PM    YEAST NONE SEEN 09/06/2023 06:54 PM    BACTERIA MANY 09/06/2023 06:54 PM    SPECGRAV 1.025 09/06/2023 06:54 PM    LEUKOCYTESUR NEGATIVE 09/06/2023 06:54 PM    UROBILINOGEN 1.0 09/06/2023 06:54 PM    BILIRUBINUR NEGATIVE 09/06/2023 06:54 PM    BLOODU TRACE 09/06/2023 06:54 PM    BLOODU Trace-intact 09/06/2023 03:46 PM    GLUCOSEU Negative 09/06/2023 03:46 PM    KETUA TRACE 09/06/2023 06:54 PM      Microalbumen/Creatinine ratio:  No components found for: \"RUCREAT\"    Objective:   Vitals: BP (!) 204/104 (Site: Right Upper Arm, Position: Sitting, Cuff Size: Large Adult)   Pulse 89   Ht 1.651 m (5' 5\")   Wt 92.5 kg (204 lb)   LMP 01/20/2013   SpO2 100%   BMI 33.95 kg/m²      Constitutional:  Alert, awake, no apparent distress  Skin:normal with no rash or any significant lesions  HEENT:Pupils are reactive .Throat is clear.  Oral mucosa is moist.  Neck:supple with no thyromegaly, JVD, lymphadenopathy or bruit **  Cardiovascular: Regular sinus rhythm without murmur, rubs or gallops   Respiratory:  Clear to auscultation with no wheezes or rales  Abdomen: Good bowel sound, soft, non tender and no bruit  Ext: No LE edema  Musculoskeletal:Intact  Neuro:Alert, awake and oriented with no obvious focal deficit.  Speech is normal.**    Electronically signed by Danish Avila MD on 1/2/2024 at 2:20 PM   **This report has been created using voice recognition software. It maycontain minor  errors which are inherent in voice recognition technology.**

## 2024-01-03 ENCOUNTER — APPOINTMENT (OUTPATIENT)
Dept: GENERAL RADIOLOGY | Age: 49
End: 2024-01-03
Payer: COMMERCIAL

## 2024-01-03 ENCOUNTER — HOSPITAL ENCOUNTER (EMERGENCY)
Age: 49
Discharge: HOME | End: 2024-01-03
Attending: EMERGENCY MEDICINE
Payer: COMMERCIAL

## 2024-01-03 VITALS — RESPIRATION RATE: 16 BRPM | HEART RATE: 90 BPM | OXYGEN SATURATION: 98 %
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
## 2024-01-03 DIAGNOSIS — S40.012A CONTUSION OF MULTIPLE SITES OF LEFT SHOULDER AND UPPER ARM, INITIAL ENCOUNTER: ICD-10-CM

## 2024-01-03 DIAGNOSIS — S40.022A CONTUSION OF MULTIPLE SITES OF LEFT SHOULDER AND UPPER ARM, INITIAL ENCOUNTER: ICD-10-CM

## 2024-01-03 PROCEDURE — 99283 EMERGENCY DEPT VISIT LOW MDM: CPT

## 2024-01-03 PROCEDURE — 73060 X-RAY EXAM OF HUMERUS: CPT

## 2024-01-03 PROCEDURE — 73090 X-RAY EXAM OF FOREARM: CPT

## 2024-01-03 RX ORDER — CYCLOBENZAPRINE HCL 10 MG
10 TABLET ORAL ONCE
Status: DISCONTINUED | OUTPATIENT
Start: 2024-01-03 | End: 2024-01-03 | Stop reason: HOSPADM

## 2024-01-03 RX ORDER — IBUPROFEN 400 MG/1
400 TABLET ORAL EVERY 6 HOURS PRN
Qty: 40 TABLET | Refills: 0 | Status: SHIPPED | OUTPATIENT
Start: 2024-01-03 | End: 2024-01-13

## 2024-01-03 RX ORDER — CYCLOBENZAPRINE HCL 10 MG
10 TABLET ORAL 3 TIMES DAILY PRN
Qty: 21 TABLET | Refills: 0 | Status: SHIPPED | OUTPATIENT
Start: 2024-01-03 | End: 2024-01-13

## 2024-01-03 RX ORDER — IBUPROFEN 200 MG
400 TABLET ORAL ONCE
Status: DISCONTINUED | OUTPATIENT
Start: 2024-01-03 | End: 2024-01-03 | Stop reason: HOSPADM

## 2024-01-03 ASSESSMENT — ENCOUNTER SYMPTOMS
SHORTNESS OF BREATH: 0
WHEEZING: 0
NAUSEA: 0
BACK PAIN: 0
BLOOD IN STOOL: 0
ABDOMINAL DISTENTION: 0
CONSTIPATION: 0
EYE ITCHING: 0
CHOKING: 0
COLOR CHANGE: 0
PHOTOPHOBIA: 0
RHINORRHEA: 0
VOMITING: 0
ABDOMINAL PAIN: 0
DIARRHEA: 0
EYE DISCHARGE: 0
CHEST TIGHTNESS: 0
SORE THROAT: 0
SINUS PRESSURE: 0
EYE REDNESS: 0
VOICE CHANGE: 0
EYE PAIN: 0
TROUBLE SWALLOWING: 0
COUGH: 0

## 2024-01-03 NOTE — ED PROVIDER NOTES
[facility]  eMERGENCY dEPARTMENT eNCOUnter          CHIEF COMPLAINT       Chief Complaint   Patient presents with    Shoulder Pain       Nurses Notes reviewed and I agree except as noted in the HPI.      HISTORY OF PRESENT ILLNESS    Yulissa Andersen is a 48 y.o. female who presents for left arm pain.  Apparently the patient tripped and stumbled down some stairs on New Year[place].  She has been using the arm.  But she is complaining about pain in the left shoulder left elbow and left wrist.  She is able to grasp with it.  She has sensation distally.  She is able to move her joints.  Patient states that the pain is mild to moderate in nature.  Worsens with use.  Patient has not used any Tylenol or Motrin at home.  She has not used any ice or hot packs.  Patient is otherwise resting comfortably on the cot no apparent distress no other physical complaints at this time able to move the arm through complete range of motion at all joints.    REVIEW OF SYSTEMS     Review of Systems   Constitutional:  Negative for activity change, appetite change, diaphoresis, fatigue and unexpected weight change.   HENT:  Negative for congestion, ear discharge, ear pain, hearing loss, rhinorrhea, sinus pressure, sore throat, trouble swallowing and voice change.    Eyes:  Negative for photophobia, pain, discharge, redness and itching.   Respiratory:  Negative for cough, choking, chest tightness, shortness of breath and wheezing.    Cardiovascular:  Negative for chest pain, palpitations and leg swelling.   Gastrointestinal:  Negative for abdominal distention, abdominal pain, blood in stool, constipation, diarrhea, nausea and vomiting.   Endocrine: Negative for polydipsia, polyphagia and polyuria.   Genitourinary:  Negative for decreased urine volume, difficulty urinating, dysuria, enuresis, frequency, hematuria and urgency.   Musculoskeletal:  Positive for arthralgias and myalgias. Negative for back pain, gait problem, joint

## 2024-01-03 NOTE — DISCHARGE INSTRUCTIONS
Patient had a fall with minor contusions of the left upper extremity.  Patient is instructed to use the anti-inflammatories and muscle relaxers as prescribed.  She is instructed to follow-up with a primary care physician and do so within the next 1 to 2 days.  She is instructed return to the nearest emergency room immediately for any new or worsening complaints.

## 2024-01-29 ENCOUNTER — OFFICE VISIT (OUTPATIENT)
Dept: CARDIOLOGY CLINIC | Age: 49
End: 2024-01-29
Payer: COMMERCIAL

## 2024-01-29 ENCOUNTER — TELEPHONE (OUTPATIENT)
Dept: CARDIOLOGY CLINIC | Age: 49
End: 2024-01-29

## 2024-01-29 VITALS
SYSTOLIC BLOOD PRESSURE: 190 MMHG | HEART RATE: 98 BPM | WEIGHT: 204.2 LBS | BODY MASS INDEX: 34.02 KG/M2 | DIASTOLIC BLOOD PRESSURE: 110 MMHG | HEIGHT: 65 IN

## 2024-01-29 DIAGNOSIS — I10 PRIMARY HYPERTENSION: ICD-10-CM

## 2024-01-29 DIAGNOSIS — R00.2 PALPITATIONS: Primary | ICD-10-CM

## 2024-01-29 DIAGNOSIS — I25.10 CORONARY ARTERY DISEASE INVOLVING NATIVE CORONARY ARTERY OF NATIVE HEART WITHOUT ANGINA PECTORIS: ICD-10-CM

## 2024-01-29 PROCEDURE — 93000 ELECTROCARDIOGRAM COMPLETE: CPT | Performed by: REGISTERED NURSE

## 2024-01-29 PROCEDURE — G8427 DOCREV CUR MEDS BY ELIG CLIN: HCPCS | Performed by: REGISTERED NURSE

## 2024-01-29 PROCEDURE — 1036F TOBACCO NON-USER: CPT | Performed by: REGISTERED NURSE

## 2024-01-29 PROCEDURE — 3080F DIAST BP >= 90 MM HG: CPT | Performed by: REGISTERED NURSE

## 2024-01-29 PROCEDURE — G8417 CALC BMI ABV UP PARAM F/U: HCPCS | Performed by: REGISTERED NURSE

## 2024-01-29 PROCEDURE — G8484 FLU IMMUNIZE NO ADMIN: HCPCS | Performed by: REGISTERED NURSE

## 2024-01-29 PROCEDURE — 99214 OFFICE O/P EST MOD 30 MIN: CPT | Performed by: REGISTERED NURSE

## 2024-01-29 PROCEDURE — 3077F SYST BP >= 140 MM HG: CPT | Performed by: REGISTERED NURSE

## 2024-01-29 RX ORDER — CARVEDILOL 6.25 MG/1
6.25 TABLET ORAL 2 TIMES DAILY
Qty: 60 TABLET | Refills: 2 | Status: SHIPPED | OUTPATIENT
Start: 2024-01-29

## 2024-01-29 RX ORDER — HYDRALAZINE HYDROCHLORIDE 25 MG/1
100 TABLET, FILM COATED ORAL 3 TIMES DAILY
COMMUNITY

## 2024-01-29 NOTE — TELEPHONE ENCOUNTER
LOV:11-22-23  Patient complaining of constant fluttering on the left side of her chest x3-4 weeks. Patient says the fluttering is waking her up at night and her last BP reading is 200/117(1/28/24) She says she immediately after the fluttering she has a pin stick of pain lasting 2-3 seconds and the fluttering continues.  Patient states she is having some SOB after using stairs. Patient says last week her face, shoulders, arm and chest were numb X 3-4 days. Please contact pt to advise

## 2024-01-29 NOTE — PROGRESS NOTES
[facility] [place] [facility]  [facility] CARDIOLOGY  [address].  [address]  [facility] [address]  Dept: [phone]  Dept Fax: [phone]  Loc: [phone]    Visit Date: 1/29/2024    Ms. Andersen is a 48 y.o. female  who presented for:  Chief Complaint   Patient presents with    Follow-up    Palpitations         Cardiologist:  Dr. Talavera    Last office visit: 11/22/2023        Today's Visit   HPI:  48 y.o. F with PMH poorly controlled severe HTN, remote CVA, migraines, who has had issues with labile BP; presents today for evaluation of palpitations and blood pressure. Has been having palpitations frequently over last several weeks; sometimes waking up at night- feeling several times a week. No chest pain, SOB, dizziness, or near syncope. Blood pressures have been running 170-200s at home. When SBP in 130s few months ago, she was dizzy/lightheaded- meds adjusted at that time. /140 in office on initial check- repeat 190/110. Blood pressure elevated at recent nephrology visit as well. Reports last week having migraine with some blurred vision/facial numbness that spontaneously resolved. Voices compliance with medications; watching salt in diet, no caffeine intake.       Subjective     Review of Systems   Constitutional: Negative for chills and fever  HENT: Negative for congestion, sinus pressure, sneezing and sore throat.    Eyes: Negative for pain, discharge, redness and itching. No visual changes/deficits.   Respiratory: Negative for shortness of breath, cough, or sputum production  Cardiac: Negative for chest pain, pressure. +having daily palpitations  Gastrointestinal: Negative for blood in stool, constipation, diarrhea   Endocrine: Negative for cold intolerance, heat intolerance, polydipsia.  Genitourinary: Negative for dysuria, enuresis, flank pain and hematuria.   Musculoskeletal: Negative for arthralgias, joint swelling and neck pain.   Neurological: Negative for

## 2024-01-29 NOTE — PROGRESS NOTES
Follow-up, palpitations worse at night.   She states she has some intermittent chest heaviness.   She denies shortness of breath.   She states she does have headaches, BP high and runs high at home as well.     EKG completed.

## 2024-01-29 NOTE — PATIENT INSTRUCTIONS
Begin the carvedilol. Call office with update in 1 week how your BP is doing.    Please go to ER to be evaluated if your blood pressure is over 200/100; or if you have vision changes, numbness/tingling of your face, difficulty breathing, or chest pain.     Continue other current medications as prescribed.    Continue the following:  Daily weights  Fluid restriction of 2 Liters per day  Limit sodium in diet to around 1499-3243 mg/day  Monitor BP  Activity as tolerated       Follow-up with your PCP as scheduled.    Follow-up with Dr. De Leon in 6 weeks  as scheduled or sooner if need.      You may receive a survey regarding the care you received during your visit.  Your input is valuable to us.  We encourage you to complete and return your survey.  We hope you will choose us in the future for your healthcare needs.

## 2024-02-09 ENCOUNTER — HOSPITAL ENCOUNTER (OUTPATIENT)
Age: 49
Discharge: HOME | End: 2024-02-09
Payer: COMMERCIAL

## 2024-02-09 DIAGNOSIS — R00.2 PALPITATIONS: ICD-10-CM

## 2024-02-09 PROCEDURE — 93270 REMOTE 30 DAY ECG REV/REPORT: CPT

## 2024-02-12 RX ORDER — PRAZOSIN HYDROCHLORIDE 1 MG/1
CAPSULE ORAL
Qty: 90 CAPSULE | Refills: 3 | OUTPATIENT
Start: 2024-02-12

## 2024-02-19 RX ORDER — VALSARTAN 320 MG/1
320 TABLET ORAL DAILY
Qty: 90 TABLET | Refills: 1 | Status: SHIPPED | OUTPATIENT
Start: 2024-02-19

## 2024-03-11 ENCOUNTER — OFFICE VISIT (OUTPATIENT)
Dept: CARDIOLOGY CLINIC | Age: 49
End: 2024-03-11
Payer: COMMERCIAL

## 2024-03-11 VITALS
WEIGHT: 207 LBS | SYSTOLIC BLOOD PRESSURE: 198 MMHG | HEART RATE: 100 BPM | BODY MASS INDEX: 34.49 KG/M2 | HEIGHT: 65 IN | DIASTOLIC BLOOD PRESSURE: 90 MMHG

## 2024-03-11 DIAGNOSIS — I10 UNCONTROLLED STAGE 2 HYPERTENSION: Primary | ICD-10-CM

## 2024-03-11 DIAGNOSIS — R00.2 PALPITATIONS: ICD-10-CM

## 2024-03-11 DIAGNOSIS — I10 PRIMARY HYPERTENSION: ICD-10-CM

## 2024-03-11 PROCEDURE — G8484 FLU IMMUNIZE NO ADMIN: HCPCS | Performed by: REGISTERED NURSE

## 2024-03-11 PROCEDURE — 99213 OFFICE O/P EST LOW 20 MIN: CPT | Performed by: REGISTERED NURSE

## 2024-03-11 PROCEDURE — 3077F SYST BP >= 140 MM HG: CPT | Performed by: REGISTERED NURSE

## 2024-03-11 PROCEDURE — 3080F DIAST BP >= 90 MM HG: CPT | Performed by: REGISTERED NURSE

## 2024-03-11 PROCEDURE — 1036F TOBACCO NON-USER: CPT | Performed by: REGISTERED NURSE

## 2024-03-11 PROCEDURE — G8417 CALC BMI ABV UP PARAM F/U: HCPCS | Performed by: REGISTERED NURSE

## 2024-03-11 PROCEDURE — G8427 DOCREV CUR MEDS BY ELIG CLIN: HCPCS | Performed by: REGISTERED NURSE

## 2024-03-11 RX ORDER — CARVEDILOL 6.25 MG/1
12.5 TABLET ORAL 2 TIMES DAILY
Qty: 120 TABLET | Refills: 2
Start: 2024-03-11 | End: 2024-03-14 | Stop reason: SDUPTHER

## 2024-03-11 RX ORDER — BLOOD PRESSURE TEST KIT
1 KIT MISCELLANEOUS ONCE
Qty: 1 KIT | Refills: 0 | Status: SHIPPED | OUTPATIENT
Start: 2024-03-11 | End: 2024-03-11

## 2024-03-11 NOTE — PROGRESS NOTES
Patient here for a 6 week follow up. Blood pressure is still running high 180's/115    EKG done 1/29/24    C/o fatigue, sob on exertion, palpitations, headaches and ÓSCAR but states its not terrible     Denies any chest pain or dizziness  
was within the normal range with   no evidence of aortic stenosis. There was no evidence of aortic   regurgitation.      Tricuspid Valve   The tricuspid valve structure was normal with normal leaflet separation.   DOPPLER: There was no evidence of tricuspid stenosis. There was no   evidence of tricuspid regurgitation.      Pulmonic Valve   The pulmonic valve was not well visualized .   Trivial pulmonic regurgitation visualized.      Left Atrium   Left atrial size was normal.      Left Ventricle   Ejection fraction is visually estimated at 60%.   Overall left ventricular function is normal.      Right Atrium   Right atrial size was normal.      Right Ventricle   The right ventricular size was normal with normal systolic function and   wall thickness.      Pericardial Effusion   The pericardium was normal in appearance with no evidence of a pericardial   effusion.      Pleural Effusion   No evidence of pleural effusion.      Aorta / Great Vessels   -Aortic root dimension within normal limits.   -The Pulmonary artery is within normal limits.   -IVC size is within normal limits with normal respiratory phasic changes.     M-Mode/2D Measurements & Calculations      LV Diastolic    LV Systolic Dimension: 2.7   LA Dimension: 3.7 cmAO Root   Dimension: 4.3  cm                           Dimension: 2.6 cmLA Area:   cm              LV Volume Diastolic: 83.1 ml 16.6 cm^2   LV FS:37.2 %    LV Volume Systolic: 27 ml   LV PW           LV EDV/LV EDV Index: 83.1   Diastolic: 1.1  ml/41 m^2LV ESV/LV ESV   cm              Index: 27 ml/13 m^2          RV Diastolic Dimension: 2.4   Septum          EF Calculated: 67.5 %        cm   Diastolic: 1.1   cm                                           LA/Aorta: 1.42                                                   LA volume/Index: 40 ml /20m^2     Doppler Measurements & Calculations      MV Peak E-Wave: 92.8 cm/s  AV Peak Velocity: 135 LVOT Peak Velocity: 101   MV Peak A-Wave: 88.6 cm/s  cm/s

## 2024-03-11 NOTE — PATIENT INSTRUCTIONS
Increase your coreg to 12.5 mg twice a day (take 2 tablets of your 6.25 mg tablets in the morning and the evening)    CALL WITH UPDATE ON THURS/FRI with update on your blood pressure.       Continue current medications as prescribed.    Continue the following:  Daily weights  Fluid restriction of 2 Liters per day  Limit sodium in diet to around 1316-8699 mg/day  Monitor BP  Activity as tolerated       Follow-up with your PCP as scheduled.    Follow-up with Dr. De Leon in 4 weeks   as scheduled or sooner if need.

## 2024-03-14 DIAGNOSIS — I10 PRIMARY HYPERTENSION: ICD-10-CM

## 2024-03-14 DIAGNOSIS — R00.2 PALPITATIONS: ICD-10-CM

## 2024-03-14 RX ORDER — CARVEDILOL 25 MG/1
25 TABLET ORAL 2 TIMES DAILY
Qty: 180 TABLET | Refills: 3 | Status: SHIPPED | OUTPATIENT
Start: 2024-03-14

## 2024-03-14 NOTE — TELEPHONE ENCOUNTER
Pt was told to call in BP readings       175/100  163/123  216/121  190/109  194/93  183/94  205/117    Pt c/o headaches     Pt was advised that if her BP is that high she needs to be seen in ER   Taking coreg 12.5 BID  Hydalazine 100 4 times a day  Aldactone 25 daily     NOT taking diovan, states Dr De Leon stopped it

## 2024-03-15 RX ORDER — ALFUZOSIN HYDROCHLORIDE 10 MG/1
10 TABLET, EXTENDED RELEASE ORAL DAILY
Qty: 30 TABLET | Refills: 2 | Status: SHIPPED | OUTPATIENT
Start: 2024-03-15

## 2024-03-15 NOTE — TELEPHONE ENCOUNTER
Yulissa Andersen called requesting a refill on the following medications:  Requested Prescriptions     Pending Prescriptions Disp Refills    alfuzosin (UROXATRAL) 10 MG extended release tablet [Pharmacy Med Name: alfuzosin ER 10 mg tablet,extended release 24 hr] 30 tablet 2     Sig: TAKE 1 TABLET BY MOUTH ONCE DAILY     Pharmacy verified:  [email or web address]      Date of last visit: 09/06/2023  Date of next visit (if applicable): 04/02/2024

## 2024-04-02 ENCOUNTER — OFFICE VISIT (OUTPATIENT)
Dept: UROLOGY | Age: 49
End: 2024-04-02
Payer: COMMERCIAL

## 2024-04-02 ENCOUNTER — HOSPITAL ENCOUNTER (OUTPATIENT)
Age: 49
Discharge: HOME | End: 2024-04-02
Payer: COMMERCIAL

## 2024-04-02 VITALS — BODY MASS INDEX: 33.99 KG/M2 | RESPIRATION RATE: 14 BRPM | WEIGHT: 204 LBS | HEIGHT: 65 IN

## 2024-04-02 DIAGNOSIS — N39.46 MIXED INCONTINENCE: Primary | ICD-10-CM

## 2024-04-02 DIAGNOSIS — R33.9 INCOMPLETE BLADDER EMPTYING: ICD-10-CM

## 2024-04-02 DIAGNOSIS — R10.9 FLANK PAIN: ICD-10-CM

## 2024-04-02 DIAGNOSIS — R33.9 INCOMPLETE EMPTYING OF BLADDER: ICD-10-CM

## 2024-04-02 DIAGNOSIS — R31.29 MICROSCOPIC HEMATURIA: Primary | ICD-10-CM

## 2024-04-02 LAB
ALBUMIN SERPL BCG-MCNC: 4.3 G/DL (ref 3.5–5.1)
ALP SERPL-CCNC: 100 U/L (ref 38–126)
ALT SERPL W/O P-5'-P-CCNC: 8 U/L (ref 11–66)
AST SERPL-CCNC: 9 U/L (ref 5–40)
BACTERIA: NORMAL
BILIRUB CONJ SERPL-MCNC: < 0.2 MG/DL (ref 0–0.3)
BILIRUB SERPL-MCNC: 0.3 MG/DL (ref 0.3–1.2)
BILIRUB UR QL STRIP: NEGATIVE
BILIRUBIN URINE: NEGATIVE
BLOOD URINE, POC: NORMAL
CASTS #/AREA URNS LPF: NORMAL /LPF
CASTS #/AREA URNS LPF: NORMAL /LPF
CHARACTER UR: CLEAR
CHARACTER, URINE: CLEAR
CHARCOAL URNS QL MICRO: NORMAL
CHOLEST SERPL-MCNC: 170 MG/DL (ref 100–199)
COLOR UR: YELLOW
COLOR, URINE: YELLOW
CRYSTALS URNS QL MICRO: NORMAL
EPITHELIAL CELLS, UA: NORMAL /HPF
GLUCOSE UR QL STRIP.AUTO: NEGATIVE MG/DL
GLUCOSE URINE: NEGATIVE MG/DL
HDLC SERPL-MCNC: 48 MG/DL
HGB UR QL STRIP.AUTO: NEGATIVE
KETONES UR QL STRIP.AUTO: NEGATIVE
KETONES, URINE: NEGATIVE
LDLC SERPL CALC-MCNC: 101 MG/DL
LEUKOCYTE CLUMPS, URINE: NEGATIVE
LEUKOCYTE ESTERASE UR QL STRIP.AUTO: NEGATIVE
NITRITE UR QL STRIP.AUTO: NEGATIVE
NITRITE, URINE: NEGATIVE
PH UR STRIP.AUTO: 7 [PH] (ref 5–9)
PH, URINE: 7 (ref 5–9)
POST VOID RESIDUAL (PVR): 7 ML
PROT SERPL-MCNC: 7.4 G/DL (ref 6.1–8)
PROT UR STRIP.AUTO-MCNC: NEGATIVE MG/DL
PROTEIN, URINE: NEGATIVE MG/DL
RBC #/AREA URNS HPF: NORMAL /HPF
RENAL EPI CELLS #/AREA URNS HPF: NORMAL /[HPF]
SPECIFIC GRAVITY UA: 1.02 (ref 1–1.03)
SPECIFIC GRAVITY, URINE: 1.02 (ref 1–1.03)
TRIGL SERPL-MCNC: 105 MG/DL (ref 0–199)
UROBILINOGEN, URINE: 0.2 EU/DL (ref 0–1)
UROBILINOGEN, URINE: 0.2 EU/DL (ref 0–1)
WBC #/AREA URNS HPF: NORMAL /HPF
YEAST LIKE FUNGI URNS QL MICRO: NORMAL

## 2024-04-02 PROCEDURE — 81003 URINALYSIS AUTO W/O SCOPE: CPT | Performed by: NURSE PRACTITIONER

## 2024-04-02 PROCEDURE — G8427 DOCREV CUR MEDS BY ELIG CLIN: HCPCS | Performed by: NURSE PRACTITIONER

## 2024-04-02 PROCEDURE — 80076 HEPATIC FUNCTION PANEL: CPT

## 2024-04-02 PROCEDURE — 51798 US URINE CAPACITY MEASURE: CPT | Performed by: NURSE PRACTITIONER

## 2024-04-02 PROCEDURE — 1036F TOBACCO NON-USER: CPT | Performed by: NURSE PRACTITIONER

## 2024-04-02 PROCEDURE — 36415 COLL VENOUS BLD VENIPUNCTURE: CPT

## 2024-04-02 PROCEDURE — 80061 LIPID PANEL: CPT

## 2024-04-02 PROCEDURE — 99214 OFFICE O/P EST MOD 30 MIN: CPT | Performed by: NURSE PRACTITIONER

## 2024-04-02 PROCEDURE — G8417 CALC BMI ABV UP PARAM F/U: HCPCS | Performed by: NURSE PRACTITIONER

## 2024-04-02 RX ORDER — ALFUZOSIN HYDROCHLORIDE 10 MG/1
10 TABLET, EXTENDED RELEASE ORAL DAILY
Qty: 90 TABLET | Refills: 3 | Status: SHIPPED | OUTPATIENT
Start: 2024-04-02

## 2024-04-02 ASSESSMENT — ENCOUNTER SYMPTOMS
ABDOMINAL PAIN: 0
NAUSEA: 0
BACK PAIN: 0

## 2024-04-02 NOTE — PROGRESS NOTES
mouth daily as needed      magnesium oxide (MAG-OX) 400 MG tablet Take 1 tablet by mouth daily      metFORMIN (GLUCOPHAGE-XR) 500 MG extended release tablet Take 1 tablet by mouth at bedtime Does not take everyday      vitamin D (ERGOCALCIFEROL) 29802 units CAPS capsule Take 1 capsule by mouth once a week      Blood Pressure Monitor KIT 1 each by Does not apply route once for 1 dose 1 kit 0    ibuprofen (IBU) 400 MG tablet Take 1 tablet by mouth every 6 hours as needed for Pain 40 tablet 0    spironolactone (ALDACTONE) 25 MG tablet Take 1 tablet by mouth daily (Patient not taking: Reported on 4/2/2024) 90 tablet 3     No current facility-administered medications for this visit.       Past Medical History  Yulissa  has a past medical history of Coronary artery disease involving native coronary artery of native heart without angina pectoris, Diabetes mellitus (HCC), Hypertension, Osteoarthritis of knee, and Unspecified cerebral artery occlusion with cerebral infarction.    Past Surgical History  The patient  has a past surgical history that includes cyst removal; hysteroscopy; Hysterectomy (April 2013); Breast surgery (Left, 02/10/2015); pr exc b9 lesion mrgn xcp sk tg t/a/l 2.1-3.0 cm (N/A, 6/4/2018); and DISSECTION GROIN (N/A, 9/19/2019).    Family History  This patient's family history includes Arthritis in her mother; Breast Cancer in her maternal cousin; Heart Disease in her mother; High Blood Pressure in her mother and sister.    Social History  Yulissa  reports that she has never smoked. She has never been exposed to tobacco smoke. She has never used smokeless tobacco. She reports that she does not drink alcohol and does not use drugs.      Subjective:      Review of Systems   Constitutional:  Negative for activity change, appetite change, chills, diaphoresis, fatigue, fever and unexpected weight change.   Gastrointestinal:  Negative for abdominal pain, nausea and vomiting.   Genitourinary:  Positive for

## 2024-04-05 ENCOUNTER — TELEPHONE (OUTPATIENT)
Dept: UROLOGY | Age: 49
End: 2024-04-05

## 2024-04-05 DIAGNOSIS — R31.29 MICROSCOPIC HEMATURIA: Primary | ICD-10-CM

## 2024-04-09 ENCOUNTER — TELEPHONE (OUTPATIENT)
Dept: UROLOGY | Age: 49
End: 2024-04-09

## 2024-04-09 NOTE — TELEPHONE ENCOUNTER
Patient advised of the urine results.    She would like the CT urogram scheduled at [facility]    Please scheduled ct urogram and cystoscopy.  
Patient scheduled for Ct on 4/13/24 with a follow up on 423/24  
Pt's urine micro with 3-5 RBCs/hpf so technically significant for microscopic blood. No obvious infection.  Recommend further evaluation with cystoscopy.  CT urogram prior to appt.    
What Type Of Note Output Would You Prefer (Optional)?: Bullet Format
How Severe Is Your Skin Lesion?: moderate
Has Your Skin Lesion Been Treated?: not been treated
Is This A New Presentation, Or A Follow-Up?: Skin Lesion

## 2024-04-13 ENCOUNTER — HOSPITAL ENCOUNTER (OUTPATIENT)
Dept: CT IMAGING | Age: 49
Discharge: HOME | End: 2024-04-13
Payer: COMMERCIAL

## 2024-04-13 DIAGNOSIS — R31.29 MICROSCOPIC HEMATURIA: ICD-10-CM

## 2024-04-13 LAB
CREAT BLD-MCNC: 0.7 MG/DL (ref 0.5–1.2)
GFR SERPL CREATININE-BSD FRML MDRD: > 90 ML/MIN/1.73M2

## 2024-04-13 PROCEDURE — 6360000004 HC RX CONTRAST MEDICATION: Performed by: NURSE PRACTITIONER

## 2024-04-13 PROCEDURE — 74178 CT ABD&PLV WO CNTR FLWD CNTR: CPT | Performed by: NURSE PRACTITIONER

## 2024-04-13 PROCEDURE — 82565 ASSAY OF CREATININE: CPT

## 2024-04-13 RX ADMIN — IOPAMIDOL 85 ML: 755 INJECTION, SOLUTION INTRAVENOUS at 11:36

## 2024-04-23 ENCOUNTER — HOSPITAL ENCOUNTER (OUTPATIENT)
Dept: UROLOGY | Age: 49
Discharge: HOME | End: 2024-04-23
Payer: COMMERCIAL

## 2024-04-23 VITALS
SYSTOLIC BLOOD PRESSURE: 199 MMHG | OXYGEN SATURATION: 99 % | HEIGHT: 65 IN | TEMPERATURE: 97.8 F | RESPIRATION RATE: 16 BRPM | DIASTOLIC BLOOD PRESSURE: 103 MMHG | BODY MASS INDEX: 34.51 KG/M2 | WEIGHT: 207.1 LBS | HEART RATE: 104 BPM

## 2024-04-23 LAB
BILIRUBIN URINE: NEGATIVE
BLOOD URINE, POC: ABNORMAL
CHARACTER, URINE: CLEAR
COLOR, URINE: YELLOW
GLUCOSE URINE: NEGATIVE MG/DL
KETONES, URINE: NEGATIVE
LEUKOCYTE CLUMPS, URINE: NEGATIVE
NITRITE, URINE: NEGATIVE
PH, URINE: 6 (ref 5–9)
PROTEIN, URINE: NEGATIVE MG/DL
SPECIFIC GRAVITY, URINE: 1.02 (ref 1–1.03)
UROBILINOGEN, URINE: 0.2 EU/DL (ref 0–1)

## 2024-04-23 PROCEDURE — 52000 CYSTOURETHROSCOPY: CPT

## 2024-04-23 NOTE — DISCHARGE INSTRUCTIONS
Discharge instructions: Cystoscopy  You May experience painful urination and see blood in the urine after your procedure.  This should resolve over time.      Pt ok to discharge home in good condition  No heavy lifting, >10 lbs for today  Pt should avoid strenuous activity for today  Pt should walk moderately at home  Pt ok to shower   Pt may resume diet as tolerated  Please call attending physician or hospital  with questions  Call or Present to ED if fever (> 101F), intractable nausea vomiting or pain.    Follow up as scheduled with GHASSAN Laird on 4/8/2025 at 8:15am

## 2024-04-23 NOTE — OP NOTE
Cystoscopy    Operative Note    Patient:  Yulissa Andersen  MRN: [id number]  YOB: 1975    Date: 04/23/24  Surgeon: VICENTA CLARK MD  Anesthesia: Urojet Local  Indications: hematuria  Position: Dorsal Lithotomy  EBL: 0 ml    Findings:   The patient was prepped and draped in the usual sterile fashion.  The cystoscope was advanced through the urethra and into the bladder.  The bladder was thoroughly inspected and the following was noted:    Vagina: normal appearing vagina with normal color and discharge, no lesions  Residual Urine: moderate.  Urine clear, with no obvious infection  Urethra: normal appearing urethra with no masses, tenderness or lesions    Bladder: no tumor noted .  no bladder diverticulum.  Mild trabeculation noted.  Ureters: Orifices with normal configuration and location.      The cystoscope was removed.  The patient tolerated the procedure well.  Ct urogram negative  Cont pelvic floor alpha blocker  Keep appointment with mariposa

## 2024-04-23 NOTE — PROGRESS NOTES
[facility]  This procedure has been fully reviewed with the patient and written informed consent has been obtained.  1441 Procedure started with  Cystoscopy  1442 Procedure completed; patient tolerated well.  Dr. Lee talked to patient in length about procedure findings.  Patient discharged.    PLAN     Follow up as scheduled with GHASSAN Laird on 4/8/2025 at 8:15am

## 2024-04-24 ENCOUNTER — OFFICE VISIT (OUTPATIENT)
Dept: CARDIOLOGY CLINIC | Age: 49
End: 2024-04-24
Payer: COMMERCIAL

## 2024-04-24 VITALS
HEIGHT: 65 IN | HEART RATE: 84 BPM | BODY MASS INDEX: 34.22 KG/M2 | WEIGHT: 205.4 LBS | DIASTOLIC BLOOD PRESSURE: 118 MMHG | SYSTOLIC BLOOD PRESSURE: 180 MMHG

## 2024-04-24 DIAGNOSIS — I10 PRIMARY HYPERTENSION: Primary | ICD-10-CM

## 2024-04-24 DIAGNOSIS — I10 UNCONTROLLED STAGE 2 HYPERTENSION: ICD-10-CM

## 2024-04-24 DIAGNOSIS — Z98.890 S/P CARDIAC CATH: ICD-10-CM

## 2024-04-24 PROCEDURE — 1036F TOBACCO NON-USER: CPT | Performed by: NURSE PRACTITIONER

## 2024-04-24 PROCEDURE — G8427 DOCREV CUR MEDS BY ELIG CLIN: HCPCS | Performed by: NURSE PRACTITIONER

## 2024-04-24 PROCEDURE — 3080F DIAST BP >= 90 MM HG: CPT | Performed by: NURSE PRACTITIONER

## 2024-04-24 PROCEDURE — 3077F SYST BP >= 140 MM HG: CPT | Performed by: NURSE PRACTITIONER

## 2024-04-24 PROCEDURE — G8417 CALC BMI ABV UP PARAM F/U: HCPCS | Performed by: NURSE PRACTITIONER

## 2024-04-24 PROCEDURE — 99214 OFFICE O/P EST MOD 30 MIN: CPT | Performed by: NURSE PRACTITIONER

## 2024-04-24 RX ORDER — AMLODIPINE BESYLATE 2.5 MG/1
2.5 TABLET ORAL DAILY
Qty: 30 TABLET | Refills: 1 | Status: SHIPPED | OUTPATIENT
Start: 2024-04-24

## 2024-04-24 RX ORDER — FUROSEMIDE 20 MG/1
10 TABLET ORAL DAILY PRN
Qty: 60 TABLET | Refills: 3 | Status: SHIPPED | OUTPATIENT
Start: 2024-04-24

## 2024-04-24 NOTE — PROGRESS NOTES
1 month follow-up.   She denies having any chest pain or dizziness.   She has shortness of breath but she states she has had that for years.   She does state she gets headaches.       
      Lab Results   Component Value Date/Time    TRIG 105 04/02/2024 08:50 AM    HDL 48 04/02/2024 08:50 AM    LDLCALC 101 04/02/2024 08:50 AM       Lab Results   Component Value Date/Time    TSH 1.270 05/10/2023 12:12 PM         Testing Reviewed:          ECHO: 4/25/2023   Summary   Ejection fraction is visually estimated at 60%.   Overall left ventricular function is normal.    Signature    ----------------------------------------------------------------   Electronically signed by Jolie Betts MD (Interpreting   physician) on 04/25/2023 at 06:34 PM    Stress Natividad: 4/27/2022  Summary   This Nuclear Medicine study was normal .      Recommendation   Clinical correlation is recommended.     Cath: 2016   Cardiac Arteries and Lesion Findings     LMCA: Normal (no stenosis %).     LAD:       Lesion on Prox LAD: Ostial.30% stenosis 9 mm length.The lesion was    tubular, eccentric and lightly calcified.The lesion showed with smooth    contour.     LCx: Normal (no stenosis %).     RCA: Normal (no stenosis %).     Ramus: Normal (no stenosis %).      Event Monitor: 2/2024        Assessment/Plan     Severe uncontrolled, labile HTN  No MELVI on US 2023  Metanephrines checked 2021   Compliant with meds  On aldactone, diovan, hydralazine  Symptomatic-dizziness/lightheaded when SBP in 130s   Not much improvement with Coreg from 12.5 to 25 mg BID  Headaches persist when BP high; some N/V   Palpitations- did not correlate with any arrhythmias on recent event monitor   CAD  S/p C 2016  No anginal sx  Preserved EF-60% per TTE in 2023  DM  Remote CVA  Migraine headaches      Patient Instructions   Your nurses today was CHANTEL Dyer   Your provider today was ALLEN Morales  Phone number: [phone]      Add the Norvasc 2.5 mg daily to help with blood pressure control - call with an update in 1 week or sooner if not feeling well with this change. [phone] Use # 3.    Use the Lasix at 10 mg (1/2 tablet) as prescribed for water

## 2024-04-24 NOTE — PATIENT INSTRUCTIONS
Your nurses today was CHANTEL Dyer   Your provider today was ALLEN Morales  Phone number: [phone]      Add the Norvasc 2.5 mg daily to help with blood pressure control - call with an update in 1 week or sooner if not feeling well with this change. [phone] Use # 3.    Use the Lasix at 10 mg (1/2 tablet) as prescribed for water retention. Keep track of the days that you use it - if needing more regularly will change how ordered and then will need to check your labwork.     Continue other current medications as prescribed.    Follow-up with your PCP as scheduled.    Follow-up with Dr. De Leon on 6/3/2024  as scheduled or sooner if need.       You may receive a survey regarding the care you received during your visit.  Your input is valuable to us.  We encourage you to complete and return your survey.  We hope you will choose us in the future for your healthcare needs.

## 2024-06-03 ENCOUNTER — OFFICE VISIT (OUTPATIENT)
Dept: CARDIOLOGY CLINIC | Age: 49
End: 2024-06-03
Payer: COMMERCIAL

## 2024-06-03 VITALS
WEIGHT: 202.6 LBS | HEIGHT: 65 IN | BODY MASS INDEX: 33.76 KG/M2 | SYSTOLIC BLOOD PRESSURE: 180 MMHG | DIASTOLIC BLOOD PRESSURE: 100 MMHG | HEART RATE: 88 BPM

## 2024-06-03 DIAGNOSIS — I10 PRIMARY HYPERTENSION: Primary | ICD-10-CM

## 2024-06-03 PROCEDURE — 3077F SYST BP >= 140 MM HG: CPT | Performed by: NUCLEAR MEDICINE

## 2024-06-03 PROCEDURE — G8427 DOCREV CUR MEDS BY ELIG CLIN: HCPCS | Performed by: NUCLEAR MEDICINE

## 2024-06-03 PROCEDURE — 99213 OFFICE O/P EST LOW 20 MIN: CPT | Performed by: NUCLEAR MEDICINE

## 2024-06-03 PROCEDURE — 1036F TOBACCO NON-USER: CPT | Performed by: NUCLEAR MEDICINE

## 2024-06-03 PROCEDURE — G8417 CALC BMI ABV UP PARAM F/U: HCPCS | Performed by: NUCLEAR MEDICINE

## 2024-06-03 PROCEDURE — 3080F DIAST BP >= 90 MM HG: CPT | Performed by: NUCLEAR MEDICINE

## 2024-06-03 RX ORDER — AMLODIPINE BESYLATE 5 MG/1
5 TABLET ORAL DAILY
COMMUNITY
End: 2024-06-03 | Stop reason: SDUPTHER

## 2024-06-03 RX ORDER — AMLODIPINE BESYLATE 5 MG/1
5 TABLET ORAL DAILY
Qty: 90 TABLET | Refills: 3 | Status: SHIPPED | OUTPATIENT
Start: 2024-06-03

## 2024-06-03 NOTE — PROGRESS NOTES
Patient here for check up.   
done    Diabetic retinal exam  Never done    Hepatitis C screen  Never done    A1C test (Diabetic or Prediabetic)  02/10/2016    Breast cancer screen  10/13/2017    Lipids  04/02/2025    GFR test (Diabetes, CKD 3-4, OR last GFR 15-59)  04/13/2025    Colorectal Cancer Screen  06/27/2026    DTaP/Tdap/Td vaccine (2 - Td or Tdap) 08/06/2029    Pneumococcal 0-64 years Vaccine (3 of 3 - PPSV23 or PCV20) 08/12/2042    Flu vaccine  Completed    COVID-19 Vaccine  Completed    Hepatitis A vaccine  Aged Out    Hib vaccine  Aged Out    HPV vaccine  Aged Out    Polio vaccine  Aged Out    Meningococcal (ACWY) vaccine  Aged Out       Subjective:  General:   No fever, no chills, some fatigue or weight loss  Pulmonary:    some dyspnea, no wheezing  Cardiac:    Denies recent chest pain,   GI:     No nausea or vomiting, no abdominal pain  Neuro:    No dizziness or light headedness,   Musculoskeletal:  No recent active issues  Extremities:   No edema, no obvious claudication       Objective:  General:   Well developed, well nourished  Lungs:   Clear to auscultation  Heart:    Normal S1 S2, Slight murmur. no rubs, no gallops  Abdomen:   Soft, non tender, no organomegalies, positive bowel sounds  Extremities:   No edema, no cyanosis, good peripheral pulses  Neurological:   Awake, alert, oriented. No obvious focal deficits  Musculoskelatal:  No obvious deformities   BP (!) 180/100   Pulse 88   Ht 1.651 m (5' 5\")   Wt 91.9 kg (202 lb 9.6 oz)   LMP 01/20/2013   BMI 33.71 kg/m²     Assessment:  Assessment & Plan    Diagnosis Orders   1. Primary hypertension        As above  Uncontrolled HTN       Plan:  No follow-ups on file.  As above  Change norvasc to 5 mg   Continue risk factor modification and medical management  Thank you for allowing me to participate in the care of your patient. Please don't hesitate to contact me regarding any further issues related to the patient care    Orders Placed:  No orders of the defined types were

## 2024-06-10 DIAGNOSIS — I10 PRIMARY HYPERTENSION: ICD-10-CM

## 2024-06-10 DIAGNOSIS — I10 UNCONTROLLED STAGE 2 HYPERTENSION: ICD-10-CM

## 2024-06-10 DIAGNOSIS — R33.9 INCOMPLETE BLADDER EMPTYING: Primary | ICD-10-CM

## 2024-06-10 RX ORDER — AMLODIPINE BESYLATE 5 MG/1
5 TABLET ORAL DAILY
Qty: 90 TABLET | Refills: 3 | Status: SHIPPED | OUTPATIENT
Start: 2024-06-10

## 2024-06-10 RX ORDER — ALFUZOSIN HYDROCHLORIDE 10 MG/1
10 TABLET, EXTENDED RELEASE ORAL DAILY
Qty: 90 TABLET | Refills: 3 | Status: SHIPPED | OUTPATIENT
Start: 2024-06-10

## 2024-06-10 NOTE — TELEPHONE ENCOUNTER
Yulissa Andersen called requesting a refill on the following medications:  Requested Prescriptions     Pending Prescriptions Disp Refills    alfuzosin (UROXATRAL) 10 MG extended release tablet [Pharmacy Med Name: alfuzosin ER 10 mg tablet,extended release 24 hr] 30 tablet 2     Sig: TAKE 1 TABLET BY MOUTH ONCE DAILY     Pharmacy verified:  .[email or web address]      Date of last visit:   Date of next visit (if applicable): [id number]

## 2024-08-12 RX ORDER — VALSARTAN 320 MG/1
320 TABLET ORAL DAILY
Qty: 90 TABLET | Refills: 3 | Status: SHIPPED | OUTPATIENT
Start: 2024-08-12

## 2024-09-16 ENCOUNTER — HOSPITAL ENCOUNTER (EMERGENCY)
Age: 49
Discharge: HOME | End: 2024-09-19
Payer: COMMERCIAL

## 2024-09-16 ENCOUNTER — APPOINTMENT (OUTPATIENT)
Dept: GENERAL RADIOLOGY | Age: 49
End: 2024-09-16
Payer: COMMERCIAL

## 2024-09-16 VITALS
HEART RATE: 87 BPM | TEMPERATURE: 98.5 F | SYSTOLIC BLOOD PRESSURE: 207 MMHG | DIASTOLIC BLOOD PRESSURE: 128 MMHG | RESPIRATION RATE: 18 BRPM | OXYGEN SATURATION: 99 %

## 2024-09-16 DIAGNOSIS — I16.0 HYPERTENSIVE URGENCY: Primary | ICD-10-CM

## 2024-09-16 DIAGNOSIS — M79.641 RIGHT HAND PAIN: ICD-10-CM

## 2024-09-16 DIAGNOSIS — S63.91XA HAND SPRAIN, RIGHT, INITIAL ENCOUNTER: ICD-10-CM

## 2024-09-16 PROCEDURE — 6370000000 HC RX 637 (ALT 250 FOR IP)

## 2024-09-16 PROCEDURE — 73130 X-RAY EXAM OF HAND: CPT

## 2024-09-16 PROCEDURE — 99215 OFFICE O/P EST HI 40 MIN: CPT

## 2024-09-16 RX ORDER — CLONIDINE HYDROCHLORIDE 0.1 MG/1
0.1 TABLET ORAL ONCE
Status: COMPLETED | OUTPATIENT
Start: 2024-09-16 | End: 2024-09-16

## 2024-09-16 RX ADMIN — CLONIDINE HYDROCHLORIDE 0.1 MG: 0.1 TABLET ORAL at 12:54

## 2024-09-16 ASSESSMENT — PAIN DESCRIPTION - ORIENTATION: ORIENTATION: RIGHT

## 2024-09-16 ASSESSMENT — ENCOUNTER SYMPTOMS
COUGH: 0
ABDOMINAL PAIN: 0

## 2024-09-16 ASSESSMENT — PAIN - FUNCTIONAL ASSESSMENT
PAIN_FUNCTIONAL_ASSESSMENT: ACTIVITIES ARE NOT PREVENTED
PAIN_FUNCTIONAL_ASSESSMENT: 0-10

## 2024-09-16 ASSESSMENT — PAIN DESCRIPTION - DESCRIPTORS: DESCRIPTORS: ACHING

## 2024-09-16 ASSESSMENT — PAIN SCALES - GENERAL: PAINLEVEL_OUTOF10: 7

## 2024-09-16 ASSESSMENT — PAIN DESCRIPTION - LOCATION: LOCATION: HAND

## 2024-11-07 ENCOUNTER — APPOINTMENT (OUTPATIENT)
Dept: MRI IMAGING | Age: 49
DRG: 062 | End: 2024-11-07
Payer: COMMERCIAL

## 2024-11-07 ENCOUNTER — APPOINTMENT (OUTPATIENT)
Age: 49
DRG: 062 | End: 2024-11-07
Attending: NURSE PRACTITIONER
Payer: COMMERCIAL

## 2024-11-07 ENCOUNTER — APPOINTMENT (OUTPATIENT)
Dept: GENERAL RADIOLOGY | Age: 49
DRG: 062 | End: 2024-11-07
Payer: COMMERCIAL

## 2024-11-07 ENCOUNTER — APPOINTMENT (OUTPATIENT)
Dept: CT IMAGING | Age: 49
DRG: 062 | End: 2024-11-07
Payer: COMMERCIAL

## 2024-11-07 ENCOUNTER — HOSPITAL ENCOUNTER (INPATIENT)
Age: 49
LOS: 5 days | Discharge: NURSING, REHAB OR LONG-TERM CARE | DRG: 062 | End: 2024-11-12
Attending: STUDENT IN AN ORGANIZED HEALTH CARE EDUCATION/TRAINING PROGRAM | Admitting: INTERNAL MEDICINE
Payer: COMMERCIAL

## 2024-11-07 DIAGNOSIS — M79.604 PAIN IN BOTH LOWER EXTREMITIES: ICD-10-CM

## 2024-11-07 DIAGNOSIS — R29.90 STROKE-LIKE SYMPTOMS: Primary | ICD-10-CM

## 2024-11-07 DIAGNOSIS — R51.9 NONINTRACTABLE HEADACHE, UNSPECIFIED CHRONICITY PATTERN, UNSPECIFIED HEADACHE TYPE: ICD-10-CM

## 2024-11-07 DIAGNOSIS — M79.605 PAIN IN BOTH LOWER EXTREMITIES: ICD-10-CM

## 2024-11-07 DIAGNOSIS — I67.4 HYPERTENSIVE ENCEPHALOPATHY: ICD-10-CM

## 2024-11-07 DIAGNOSIS — I63.9 CEREBROVASCULAR ACCIDENT (CVA), UNSPECIFIED MECHANISM (HCC): ICD-10-CM

## 2024-11-07 LAB
ALBUMIN SERPL BCG-MCNC: 3.9 G/DL (ref 3.5–5.1)
ALP SERPL-CCNC: 105 U/L (ref 38–126)
ALT SERPL W/O P-5'-P-CCNC: 9 U/L (ref 11–66)
ANION GAP SERPL CALC-SCNC: 10 MEQ/L (ref 8–16)
APTT PPP: 31 SECONDS (ref 22–38)
AST SERPL-CCNC: 11 U/L (ref 5–40)
BASE EXCESS BLDA CALC-SCNC: 1 MMOL/L (ref -2–3)
BASOPHILS ABSOLUTE: 0 THOU/MM3 (ref 0–0.1)
BASOPHILS NFR BLD AUTO: 0.6 %
BILIRUB SERPL-MCNC: 0.2 MG/DL (ref 0.3–1.2)
BUN SERPL-MCNC: 7 MG/DL (ref 7–22)
CA-I BLD ISE-SCNC: 1.01 MMOL/L (ref 1.12–1.32)
CALCIUM SERPL-MCNC: 9.3 MG/DL (ref 8.5–10.5)
CHLORIDE BLD-SCNC: 109 MEQ/L (ref 98–109)
CHLORIDE SERPL-SCNC: 104 MEQ/L (ref 98–111)
CHOLEST SERPL-MCNC: 182 MG/DL (ref 100–199)
CO2 SERPL-SCNC: 23 MEQ/L (ref 23–33)
COLLECTED BY:: ABNORMAL
CREAT SERPL-MCNC: 0.7 MG/DL (ref 0.4–1.2)
DEPRECATED MEAN GLUCOSE BLD GHB EST-ACNC: 123 MG/DL (ref 70–126)
DEPRECATED RDW RBC AUTO: 47.6 FL (ref 35–45)
DEVICE: ABNORMAL
ECHO AO ASC DIAM: 2.7 CM
ECHO AO ASCENDING AORTA INDEX: 1.36 CM/M2
ECHO AO SINUS VALSALVA DIAM: 2.6 CM
ECHO AO SINUS VALSALVA INDEX: 1.31 CM/M2
ECHO AO ST JNCT DIAM: 2.3 CM
ECHO AV CUSP MM: 1.9 CM
ECHO AV MEAN GRADIENT: 8 MMHG
ECHO AV MEAN VELOCITY: 1.3 M/S
ECHO AV PEAK GRADIENT: 13 MMHG
ECHO AV PEAK VELOCITY: 1.8 M/S
ECHO AV VELOCITY RATIO: 0.78
ECHO AV VTI: 29.2 CM
ECHO BSA: 2.05 M2
ECHO EST RA PRESSURE: 3 MMHG
ECHO LA AREA 2C: 13.3 CM2
ECHO LA AREA 4C: 12.3 CM2
ECHO LA DIAMETER INDEX: 1.81 CM/M2
ECHO LA DIAMETER: 3.6 CM
ECHO LA MAJOR AXIS: 4.8 CM
ECHO LA MINOR AXIS: 4.9 CM
ECHO LA VOL BP: 28 ML (ref 22–52)
ECHO LA VOL MOD A2C: 30 ML (ref 22–52)
ECHO LA VOL MOD A4C: 26 ML (ref 22–52)
ECHO LA VOL/BSA BIPLANE: 14 ML/M2 (ref 16–34)
ECHO LA VOLUME INDEX MOD A2C: 15 ML/M2 (ref 16–34)
ECHO LA VOLUME INDEX MOD A4C: 13 ML/M2 (ref 16–34)
ECHO LV E' LATERAL VELOCITY: 8.1 CM/S
ECHO LV E' SEPTAL VELOCITY: 7 CM/S
ECHO LV EJECTION FRACTION BIPLANE: 60 % (ref 55–100)
ECHO LV FRACTIONAL SHORTENING: 37 % (ref 28–44)
ECHO LV INTERNAL DIMENSION DIASTOLE INDEX: 2.06 CM/M2
ECHO LV INTERNAL DIMENSION DIASTOLIC: 4.1 CM (ref 3.9–5.3)
ECHO LV INTERNAL DIMENSION SYSTOLIC INDEX: 1.31 CM/M2
ECHO LV INTERNAL DIMENSION SYSTOLIC: 2.6 CM
ECHO LV ISOVOLUMETRIC RELAXATION TIME (IVRT): 85 MS
ECHO LV IVSD: 1.1 CM (ref 0.6–0.9)
ECHO LV MASS 2D: 151.3 G (ref 67–162)
ECHO LV MASS INDEX 2D: 76 G/M2 (ref 43–95)
ECHO LV POSTERIOR WALL DIASTOLIC: 1.1 CM (ref 0.6–0.9)
ECHO LV RELATIVE WALL THICKNESS RATIO: 0.54
ECHO LVOT AV VTI INDEX: 0.86
ECHO LVOT MEAN GRADIENT: 4 MMHG
ECHO LVOT PEAK GRADIENT: 8 MMHG
ECHO LVOT PEAK VELOCITY: 1.4 M/S
ECHO LVOT VTI: 25 CM
ECHO MV A VELOCITY: 0.95 M/S
ECHO MV E DECELERATION TIME (DT): 198 MS
ECHO MV E VELOCITY: 0.74 M/S
ECHO MV E/A RATIO: 0.78
ECHO MV E/E' LATERAL: 9.14
ECHO MV E/E' RATIO (AVERAGED): 9.85
ECHO MV E/E' SEPTAL: 10.57
ECHO PV MAX VELOCITY: 1.2 M/S
ECHO PV PEAK GRADIENT: 6 MMHG
ECHO RIGHT VENTRICULAR SYSTOLIC PRESSURE (RVSP): 27 MMHG
ECHO RV FREE WALL PEAK S': 21 CM/S
ECHO RV INTERNAL DIMENSION: 2.9 CM
ECHO RV TAPSE: 2.4 CM (ref 1.7–?)
ECHO TV E WAVE: 0.5 M/S
ECHO TV REGURGITANT MAX VELOCITY: 2.47 M/S
ECHO TV REGURGITANT PEAK GRADIENT: 24 MMHG
EKG ATRIAL RATE: 81 BPM
EKG P AXIS: 44 DEGREES
EKG P-R INTERVAL: 152 MS
EKG Q-T INTERVAL: 390 MS
EKG QRS DURATION: 80 MS
EKG QTC CALCULATION (BAZETT): 453 MS
EKG R AXIS: 11 DEGREES
EKG T AXIS: 22 DEGREES
EKG VENTRICULAR RATE: 81 BPM
EOSINOPHIL NFR BLD AUTO: 1.1 %
EOSINOPHILS ABSOLUTE: 0.1 THOU/MM3 (ref 0–0.4)
ERYTHROCYTE [DISTWIDTH] IN BLOOD BY AUTOMATED COUNT: 14.9 % (ref 11.5–14.5)
FIO2 ON VENT O2 ANALYZER: 21 %
GFR SERPL CREATININE-BSD FRML MDRD: > 90 ML/MIN/1.73M2
GLUCOSE BLD STRIP.AUTO-MCNC: 108 MG/DL (ref 70–108)
GLUCOSE BLD STRIP.AUTO-MCNC: 146 MG/DL (ref 70–108)
GLUCOSE BLD STRIP.AUTO-MCNC: 164 MG/DL (ref 70–108)
GLUCOSE BLD-MCNC: 118 MG/DL (ref 70–108)
GLUCOSE SERPL-MCNC: 113 MG/DL (ref 70–108)
HBA1C MFR BLD HPLC: 6.1 % (ref 4.4–6.4)
HCO3 BLDA-SCNC: 21 MMOL/L (ref 23–28)
HCT VFR BLD AUTO: 38.9 % (ref 37–47)
HDLC SERPL-MCNC: 49 MG/DL
HGB BLD-MCNC: 12.2 GM/DL (ref 12–16)
IMM GRANULOCYTES # BLD AUTO: 0.01 THOU/MM3 (ref 0–0.07)
IMM GRANULOCYTES NFR BLD AUTO: 0.2 %
INR PPP: 1.03 (ref 0.85–1.13)
LACTATE BLD-SCNC: 1.2 MMOL/L (ref 0.5–1.9)
LDLC SERPL CALC-MCNC: 118 MG/DL
LYMPHOCYTES ABSOLUTE: 2.4 THOU/MM3 (ref 1–4.8)
LYMPHOCYTES NFR BLD AUTO: 45.2 %
MAGNESIUM SERPL-MCNC: 2.2 MG/DL (ref 1.6–2.4)
MCH RBC QN AUTO: 27.1 PG (ref 26–33)
MCHC RBC AUTO-ENTMCNC: 31.4 GM/DL (ref 32.2–35.5)
MCV RBC AUTO: 86.4 FL (ref 81–99)
MONOCYTES ABSOLUTE: 0.4 THOU/MM3 (ref 0.4–1.3)
MONOCYTES NFR BLD AUTO: 6.5 %
NEUTROPHILS ABSOLUTE: 2.5 THOU/MM3 (ref 1.8–7.7)
NEUTROPHILS NFR BLD AUTO: 46.4 %
NRBC BLD AUTO-RTO: 0 /100 WBC
OSMOLALITY SERPL CALC.SUM OF ELEC: 272.6 MOSMOL/KG (ref 275–300)
PCO2 TEMP ADJ BLDMV: 22 MMHG (ref 41–51)
PH BLDMV: 7.59 [PH] (ref 7.31–7.41)
PLATELET # BLD AUTO: 340 THOU/MM3 (ref 130–400)
PMV BLD AUTO: 9.7 FL (ref 9.4–12.4)
PO2 BLDMV: 164 MMHG (ref 25–40)
POC CREATININE WHOLE BLOOD: 0.7 MG/DL (ref 0.5–1.2)
POTASSIUM BLD-SCNC: 3.5 MEQ/L (ref 3.5–4.9)
POTASSIUM SERPL-SCNC: 3.5 MEQ/L (ref 3.5–5.2)
PROCALCITONIN SERPL IA-MCNC: 0.09 NG/ML (ref 0.01–0.09)
PROT SERPL-MCNC: 7.2 G/DL (ref 6.1–8)
RBC # BLD AUTO: 4.5 MILL/MM3 (ref 4.2–5.4)
SAO2 % BLDMV: 100 %
SITE: ABNORMAL
SODIUM BLD-SCNC: 136 MEQ/L (ref 138–146)
SODIUM SERPL-SCNC: 137 MEQ/L (ref 135–145)
TRIGL SERPL-MCNC: 73 MG/DL (ref 0–199)
TROPONIN, HIGH SENSITIVITY: 26 NG/L (ref 0–12)
TROPONIN, HIGH SENSITIVITY: 8 NG/L (ref 0–12)
VENTILATION MODE VENT: ABNORMAL
WBC # BLD AUTO: 5.4 THOU/MM3 (ref 4.8–10.8)

## 2024-11-07 PROCEDURE — 80053 COMPREHEN METABOLIC PANEL: CPT

## 2024-11-07 PROCEDURE — 85730 THROMBOPLASTIN TIME PARTIAL: CPT

## 2024-11-07 PROCEDURE — 82947 ASSAY GLUCOSE BLOOD QUANT: CPT

## 2024-11-07 PROCEDURE — 82330 ASSAY OF CALCIUM: CPT

## 2024-11-07 PROCEDURE — 6360000002 HC RX W HCPCS: Performed by: STUDENT IN AN ORGANIZED HEALTH CARE EDUCATION/TRAINING PROGRAM

## 2024-11-07 PROCEDURE — 82803 BLOOD GASES ANY COMBINATION: CPT

## 2024-11-07 PROCEDURE — 2000000000 HC ICU R&B

## 2024-11-07 PROCEDURE — 82435 ASSAY OF BLOOD CHLORIDE: CPT

## 2024-11-07 PROCEDURE — 99291 CRITICAL CARE FIRST HOUR: CPT | Performed by: INTERNAL MEDICINE

## 2024-11-07 PROCEDURE — 36430 TRANSFUSION BLD/BLD COMPNT: CPT

## 2024-11-07 PROCEDURE — 70496 CT ANGIOGRAPHY HEAD: CPT

## 2024-11-07 PROCEDURE — 99285 EMERGENCY DEPT VISIT HI MDM: CPT

## 2024-11-07 PROCEDURE — 82565 ASSAY OF CREATININE: CPT

## 2024-11-07 PROCEDURE — 84295 ASSAY OF SERUM SODIUM: CPT

## 2024-11-07 PROCEDURE — 85025 COMPLETE CBC W/AUTO DIFF WBC: CPT

## 2024-11-07 PROCEDURE — 71045 X-RAY EXAM CHEST 1 VIEW: CPT

## 2024-11-07 PROCEDURE — 70450 CT HEAD/BRAIN W/O DYE: CPT

## 2024-11-07 PROCEDURE — 84484 ASSAY OF TROPONIN QUANT: CPT

## 2024-11-07 PROCEDURE — 82948 REAGENT STRIP/BLOOD GLUCOSE: CPT

## 2024-11-07 PROCEDURE — 6360000004 HC RX CONTRAST MEDICATION: Performed by: INTERNAL MEDICINE

## 2024-11-07 PROCEDURE — 84145 PROCALCITONIN (PCT): CPT

## 2024-11-07 PROCEDURE — 93306 TTE W/DOPPLER COMPLETE: CPT | Performed by: NUCLEAR MEDICINE

## 2024-11-07 PROCEDURE — 93010 ELECTROCARDIOGRAM REPORT: CPT | Performed by: NUCLEAR MEDICINE

## 2024-11-07 PROCEDURE — 6360000002 HC RX W HCPCS

## 2024-11-07 PROCEDURE — 4A03X5D MEASUREMENT OF ARTERIAL FLOW, INTRACRANIAL, EXTERNAL APPROACH: ICD-10-PCS | Performed by: STUDENT IN AN ORGANIZED HEALTH CARE EDUCATION/TRAINING PROGRAM

## 2024-11-07 PROCEDURE — 36415 COLL VENOUS BLD VENIPUNCTURE: CPT

## 2024-11-07 PROCEDURE — 83036 HEMOGLOBIN GLYCOSYLATED A1C: CPT

## 2024-11-07 PROCEDURE — 2580000003 HC RX 258

## 2024-11-07 PROCEDURE — 70551 MRI BRAIN STEM W/O DYE: CPT

## 2024-11-07 PROCEDURE — 80061 LIPID PANEL: CPT

## 2024-11-07 PROCEDURE — 6360000002 HC RX W HCPCS: Performed by: INTERNAL MEDICINE

## 2024-11-07 PROCEDURE — 85610 PROTHROMBIN TIME: CPT

## 2024-11-07 PROCEDURE — 70498 CT ANGIOGRAPHY NECK: CPT

## 2024-11-07 PROCEDURE — 83605 ASSAY OF LACTIC ACID: CPT

## 2024-11-07 PROCEDURE — 84132 ASSAY OF SERUM POTASSIUM: CPT

## 2024-11-07 PROCEDURE — 99291 CRITICAL CARE FIRST HOUR: CPT | Performed by: NURSE PRACTITIONER

## 2024-11-07 PROCEDURE — 83735 ASSAY OF MAGNESIUM: CPT

## 2024-11-07 PROCEDURE — 6370000000 HC RX 637 (ALT 250 FOR IP)

## 2024-11-07 PROCEDURE — 2580000003 HC RX 258: Performed by: STUDENT IN AN ORGANIZED HEALTH CARE EDUCATION/TRAINING PROGRAM

## 2024-11-07 PROCEDURE — 93306 TTE W/DOPPLER COMPLETE: CPT

## 2024-11-07 PROCEDURE — 2500000003 HC RX 250 WO HCPCS

## 2024-11-07 PROCEDURE — 93005 ELECTROCARDIOGRAM TRACING: CPT | Performed by: PHYSICIAN ASSISTANT

## 2024-11-07 PROCEDURE — 3E03317 INTRODUCTION OF OTHER THROMBOLYTIC INTO PERIPHERAL VEIN, PERCUTANEOUS APPROACH: ICD-10-PCS | Performed by: STUDENT IN AN ORGANIZED HEALTH CARE EDUCATION/TRAINING PROGRAM

## 2024-11-07 PROCEDURE — P9017 PLASMA 1 DONOR FRZ W/IN 8 HR: HCPCS

## 2024-11-07 RX ORDER — ACETAMINOPHEN 325 MG/1
650 TABLET ORAL EVERY 6 HOURS PRN
Status: DISCONTINUED | OUTPATIENT
Start: 2024-11-07 | End: 2024-11-08

## 2024-11-07 RX ORDER — HYDRALAZINE HYDROCHLORIDE 20 MG/ML
10 INJECTION INTRAMUSCULAR; INTRAVENOUS ONCE
Status: COMPLETED | OUTPATIENT
Start: 2024-11-07 | End: 2024-11-07

## 2024-11-07 RX ORDER — SODIUM CHLORIDE 0.9 % (FLUSH) 0.9 %
10 SYRINGE (ML) INJECTION ONCE
Status: COMPLETED | OUTPATIENT
Start: 2024-11-07 | End: 2024-11-07

## 2024-11-07 RX ORDER — BUMETANIDE 0.25 MG/ML
1 INJECTION, SOLUTION INTRAMUSCULAR; INTRAVENOUS ONCE
Status: COMPLETED | OUTPATIENT
Start: 2024-11-07 | End: 2024-11-08

## 2024-11-07 RX ORDER — MORPHINE SULFATE 2 MG/ML
1 INJECTION, SOLUTION INTRAMUSCULAR; INTRAVENOUS ONCE
Status: COMPLETED | OUTPATIENT
Start: 2024-11-07 | End: 2024-11-07

## 2024-11-07 RX ORDER — ACETAMINOPHEN 650 MG/1
650 SUPPOSITORY RECTAL EVERY 6 HOURS PRN
Status: DISCONTINUED | OUTPATIENT
Start: 2024-11-07 | End: 2024-11-08

## 2024-11-07 RX ORDER — SODIUM CHLORIDE 0.9 % (FLUSH) 0.9 %
5-40 SYRINGE (ML) INJECTION EVERY 12 HOURS SCHEDULED
Status: DISCONTINUED | OUTPATIENT
Start: 2024-11-07 | End: 2024-11-12 | Stop reason: HOSPADM

## 2024-11-07 RX ORDER — MAGNESIUM SULFATE IN WATER 40 MG/ML
2000 INJECTION, SOLUTION INTRAVENOUS PRN
Status: DISCONTINUED | OUTPATIENT
Start: 2024-11-07 | End: 2024-11-12 | Stop reason: HOSPADM

## 2024-11-07 RX ORDER — LABETALOL HYDROCHLORIDE 5 MG/ML
10 INJECTION, SOLUTION INTRAVENOUS ONCE
Status: COMPLETED | OUTPATIENT
Start: 2024-11-07 | End: 2024-11-07

## 2024-11-07 RX ORDER — ONDANSETRON 2 MG/ML
4 INJECTION INTRAMUSCULAR; INTRAVENOUS EVERY 6 HOURS PRN
Status: DISCONTINUED | OUTPATIENT
Start: 2024-11-07 | End: 2024-11-12 | Stop reason: HOSPADM

## 2024-11-07 RX ORDER — SODIUM CHLORIDE 9 MG/ML
INJECTION, SOLUTION INTRAVENOUS PRN
Status: DISCONTINUED | OUTPATIENT
Start: 2024-11-07 | End: 2024-11-07 | Stop reason: SDUPTHER

## 2024-11-07 RX ORDER — ALBUTEROL SULFATE 0.83 MG/ML
2.5 SOLUTION RESPIRATORY (INHALATION)
Status: DISCONTINUED | OUTPATIENT
Start: 2024-11-07 | End: 2024-11-12 | Stop reason: HOSPADM

## 2024-11-07 RX ORDER — GLUCAGON 1 MG/ML
1 KIT INJECTION PRN
Status: DISCONTINUED | OUTPATIENT
Start: 2024-11-07 | End: 2024-11-12 | Stop reason: HOSPADM

## 2024-11-07 RX ORDER — DIPHENHYDRAMINE HYDROCHLORIDE 50 MG/ML
25 INJECTION INTRAMUSCULAR; INTRAVENOUS ONCE
Status: COMPLETED | OUTPATIENT
Start: 2024-11-07 | End: 2024-11-07

## 2024-11-07 RX ORDER — DEXTROSE MONOHYDRATE 100 MG/ML
INJECTION, SOLUTION INTRAVENOUS CONTINUOUS PRN
Status: DISCONTINUED | OUTPATIENT
Start: 2024-11-07 | End: 2024-11-12 | Stop reason: HOSPADM

## 2024-11-07 RX ORDER — DEXAMETHASONE SODIUM PHOSPHATE 10 MG/ML
10 INJECTION, EMULSION INTRAMUSCULAR; INTRAVENOUS ONCE
Status: COMPLETED | OUTPATIENT
Start: 2024-11-07 | End: 2024-11-07

## 2024-11-07 RX ORDER — POLYETHYLENE GLYCOL 3350 17 G/17G
17 POWDER, FOR SOLUTION ORAL DAILY PRN
Status: DISCONTINUED | OUTPATIENT
Start: 2024-11-07 | End: 2024-11-11

## 2024-11-07 RX ORDER — MILRINONE LACTATE 0.2 MG/ML
INJECTION, SOLUTION INTRAVENOUS
Status: COMPLETED
Start: 2024-11-07 | End: 2024-11-07

## 2024-11-07 RX ORDER — SODIUM CHLORIDE 0.9 % (FLUSH) 0.9 %
5-40 SYRINGE (ML) INJECTION PRN
Status: DISCONTINUED | OUTPATIENT
Start: 2024-11-07 | End: 2024-11-12 | Stop reason: HOSPADM

## 2024-11-07 RX ORDER — DEXTROSE MONOHYDRATE 100 MG/ML
12.5 INJECTION, SOLUTION INTRAVENOUS
Status: DISCONTINUED | OUTPATIENT
Start: 2024-11-07 | End: 2024-11-07 | Stop reason: SDUPTHER

## 2024-11-07 RX ORDER — MILRINONE LACTATE 0.2 MG/ML
.0625-.75 INJECTION, SOLUTION INTRAVENOUS CONTINUOUS
Status: DISCONTINUED | OUTPATIENT
Start: 2024-11-07 | End: 2024-11-08

## 2024-11-07 RX ORDER — SODIUM CHLORIDE 9 MG/ML
INJECTION, SOLUTION INTRAVENOUS PRN
Status: DISCONTINUED | OUTPATIENT
Start: 2024-11-07 | End: 2024-11-11

## 2024-11-07 RX ORDER — SODIUM CHLORIDE 0.9 % (FLUSH) 0.9 %
5-40 SYRINGE (ML) INJECTION PRN
Status: DISCONTINUED | OUTPATIENT
Start: 2024-11-07 | End: 2024-11-07 | Stop reason: SDUPTHER

## 2024-11-07 RX ORDER — ENALAPRILAT 1.25 MG/ML
INJECTION INTRAVENOUS
Status: COMPLETED
Start: 2024-11-07 | End: 2024-11-07

## 2024-11-07 RX ORDER — ENOXAPARIN SODIUM 100 MG/ML
40 INJECTION SUBCUTANEOUS DAILY
Status: DISCONTINUED | OUTPATIENT
Start: 2024-11-08 | End: 2024-11-08

## 2024-11-07 RX ORDER — POTASSIUM CHLORIDE 29.8 MG/ML
20 INJECTION INTRAVENOUS PRN
Status: DISCONTINUED | OUTPATIENT
Start: 2024-11-07 | End: 2024-11-12 | Stop reason: HOSPADM

## 2024-11-07 RX ORDER — SODIUM CHLORIDE 0.9 % (FLUSH) 0.9 %
5-40 SYRINGE (ML) INJECTION EVERY 12 HOURS SCHEDULED
Status: DISCONTINUED | OUTPATIENT
Start: 2024-11-07 | End: 2024-11-07 | Stop reason: SDUPTHER

## 2024-11-07 RX ORDER — SODIUM CHLORIDE 9 MG/ML
INJECTION, SOLUTION INTRAVENOUS PRN
Status: DISCONTINUED | OUTPATIENT
Start: 2024-11-07 | End: 2024-11-12 | Stop reason: HOSPADM

## 2024-11-07 RX ORDER — ACETAMINOPHEN 650 MG/1
650 SUPPOSITORY RECTAL EVERY 4 HOURS PRN
Status: DISCONTINUED | OUTPATIENT
Start: 2024-11-07 | End: 2024-11-07 | Stop reason: SDUPTHER

## 2024-11-07 RX ORDER — ONDANSETRON 4 MG/1
4 TABLET, ORALLY DISINTEGRATING ORAL EVERY 8 HOURS PRN
Status: DISCONTINUED | OUTPATIENT
Start: 2024-11-07 | End: 2024-11-12 | Stop reason: HOSPADM

## 2024-11-07 RX ORDER — ONDANSETRON 2 MG/ML
4 INJECTION INTRAMUSCULAR; INTRAVENOUS ONCE
Status: COMPLETED | OUTPATIENT
Start: 2024-11-07 | End: 2024-11-07

## 2024-11-07 RX ORDER — INSULIN LISPRO 100 [IU]/ML
0-8 INJECTION, SOLUTION INTRAVENOUS; SUBCUTANEOUS
Status: DISCONTINUED | OUTPATIENT
Start: 2024-11-07 | End: 2024-11-12 | Stop reason: HOSPADM

## 2024-11-07 RX ORDER — IOPAMIDOL 755 MG/ML
80 INJECTION, SOLUTION INTRAVASCULAR
Status: COMPLETED | OUTPATIENT
Start: 2024-11-07 | End: 2024-11-07

## 2024-11-07 RX ORDER — POTASSIUM CHLORIDE 7.45 MG/ML
10 INJECTION INTRAVENOUS PRN
Status: DISCONTINUED | OUTPATIENT
Start: 2024-11-07 | End: 2024-11-07 | Stop reason: RX

## 2024-11-07 RX ORDER — EPINEPHRINE 1 MG/ML
0.3 INJECTION, SOLUTION INTRAMUSCULAR; SUBCUTANEOUS ONCE
Status: COMPLETED | OUTPATIENT
Start: 2024-11-07 | End: 2024-11-07

## 2024-11-07 RX ORDER — ENALAPRILAT 1.25 MG/ML
1.25 INJECTION INTRAVENOUS ONCE
Status: COMPLETED | OUTPATIENT
Start: 2024-11-07 | End: 2024-11-07

## 2024-11-07 RX ADMIN — HYDRALAZINE HYDROCHLORIDE 10 MG: 20 INJECTION, SOLUTION INTRAMUSCULAR; INTRAVENOUS at 10:36

## 2024-11-07 RX ADMIN — Medication 10 MG: at 10:21

## 2024-11-07 RX ADMIN — SODIUM CHLORIDE 5 MG/HR: 9 INJECTION, SOLUTION INTRAVENOUS at 10:08

## 2024-11-07 RX ADMIN — SODIUM CHLORIDE 12.5 MG/HR: 9 INJECTION, SOLUTION INTRAVENOUS at 20:08

## 2024-11-07 RX ADMIN — ENALAPRILAT 1.25 MG: 1.25 INJECTION INTRAVENOUS at 10:46

## 2024-11-07 RX ADMIN — IOPAMIDOL 80 ML: 755 INJECTION, SOLUTION INTRAVENOUS at 10:22

## 2024-11-07 RX ADMIN — DIPHENHYDRAMINE HYDROCHLORIDE 25 MG: 50 INJECTION INTRAMUSCULAR; INTRAVENOUS at 11:11

## 2024-11-07 RX ADMIN — SODIUM CHLORIDE, PRESERVATIVE FREE 10 ML: 5 INJECTION INTRAVENOUS at 12:47

## 2024-11-07 RX ADMIN — TENECTEPLASE 23 MG: KIT at 10:15

## 2024-11-07 RX ADMIN — MORPHINE SULFATE 1 MG: 2 INJECTION, SOLUTION INTRAMUSCULAR; INTRAVENOUS at 10:22

## 2024-11-07 RX ADMIN — SODIUM CHLORIDE 12.5 MG/HR: 9 INJECTION, SOLUTION INTRAVENOUS at 22:21

## 2024-11-07 RX ADMIN — MILRINONE LACTATE 0.12 MCG/KG/MIN: 0.2 INJECTION, SOLUTION INTRAVENOUS at 10:52

## 2024-11-07 RX ADMIN — SODIUM CHLORIDE 2.5 MG/HR: 9 INJECTION, SOLUTION INTRAVENOUS at 16:02

## 2024-11-07 RX ADMIN — DEXAMETHASONE SODIUM PHOSPHATE 10 MG: 10 INJECTION, EMULSION INTRAMUSCULAR; INTRAVENOUS at 11:12

## 2024-11-07 RX ADMIN — EPINEPHRINE 0.3 MG: 1 INJECTION INTRAMUSCULAR; INTRAVENOUS; SUBCUTANEOUS at 14:03

## 2024-11-07 RX ADMIN — SODIUM CHLORIDE, PRESERVATIVE FREE 10 ML: 5 INJECTION INTRAVENOUS at 20:47

## 2024-11-07 RX ADMIN — SODIUM CHLORIDE, PRESERVATIVE FREE 10 ML: 5 INJECTION INTRAVENOUS at 10:15

## 2024-11-07 RX ADMIN — ONDANSETRON 4 MG: 2 INJECTION INTRAMUSCULAR; INTRAVENOUS at 10:22

## 2024-11-07 RX ADMIN — SODIUM CHLORIDE 10 MG/HR: 9 INJECTION, SOLUTION INTRAVENOUS at 12:48

## 2024-11-07 RX ADMIN — ENALAPRILAT 1.25 MG: 2.5 INJECTION INTRAVENOUS at 10:46

## 2024-11-07 RX ADMIN — EPINEPHRINE 0.3 MG: 1 INJECTION INTRAMUSCULAR; INTRAVENOUS; SUBCUTANEOUS at 12:44

## 2024-11-07 RX ADMIN — ACETAMINOPHEN 650 MG: 650 SUPPOSITORY RECTAL at 14:12

## 2024-11-07 ASSESSMENT — PAIN SCALES - GENERAL: PAINLEVEL_OUTOF10: 0

## 2024-11-07 ASSESSMENT — PAIN DESCRIPTION - LOCATION: LOCATION: HEAD

## 2024-11-07 ASSESSMENT — PAIN - FUNCTIONAL ASSESSMENT: PAIN_FUNCTIONAL_ASSESSMENT: ACTIVITIES ARE NOT PREVENTED

## 2024-11-07 NOTE — ED TRIAGE NOTES
Code Stroke initiated at this time. Dr. Garcia at bedside. Pt to CT scan with this RN in stable condition.

## 2024-11-07 NOTE — FLOWSHEET NOTE
1103 patient noted to have hives, then patient started vomiting. Dr Mendes immediately  notified and order for decadron and benedryl received.  1145 Family in room at bedside, updated

## 2024-11-07 NOTE — ED PROVIDER NOTES
[facility] Emergency Department  Emergency Medicine Attending Physician Attestation    Patient Name: Yulissa Andersen  MRN: [id number]  YOB: 1975  Date of Evaluation: 11/7/2024    I performed a history and physical examination on the patient and discussed the management with the Physician Assistant. I reviewed and agree with the findings and plan as documented in the resident physician note.  This includes all diagnostic interpretations and treatment plans as written. I was present for the key portion of any procedures performed and the inclusive time noted in any critical care statement.  I have reviewed and interpreted all available lab, radiology and ekg results available at the moment. See resident's note for full documentation.       CODE STROKE   Activation By:  ED  Arrived By: EMS    NIHSS, POC glucose, rapid physical and neurologic exam, vitals including weight obtained in the ED before transfer to CT.     The CODE Stroke Team met the patient on arrival to CT. Including Dr. Weaver.      Last Known Well: 0700 started complaining of mild headache, worse at 0803    Initial NIHSS:  (from Screenings)  NIH Stroke Scale  Interval: Baseline  Level of Consciousness (1a): Not alert, requires repeated stimulation to attend  LOC Questions (1b): Answers neither question correctly  LOC Commands (1c): Performs one task correctly  Best Gaze (2): Normal  Visual (3): No visual loss  Facial Palsy (4): Normal symmetrical movement  Motor Arm, Left (5a): No effort against gravity, limb falls  Motor Arm, Right (5b): No effort against gravity, limb falls  Motor Leg, Left (6a): No effort against gravity, limb falls  Motor Leg, Right (6b): No effort against gravity, limb falls  Limb Ataxia (7): Absent  Sensory (8): Normal  Best Language (9): No aphasia  Dysarthria (10): Mild to moderate, slurs some words  Extinction and Inattention (11): No abnormality  Total: 18    POC Glucose: 108 mg/dl    CT Head 
(BENADRYL) injection 25 mg (25 mg IntraVENous Given 11/7/24 1111)   dexAMETHasone (PF) (DECADRON) injection 10 mg (10 mg IntraVENous Given 11/7/24 1112)   EPINEPHrine 1 MG/ML injection 0.3 mg (0.3 mg IntraMUSCular Given 11/7/24 1244)   EPINEPHrine 1 MG/ML injection 0.3 mg (0.3 mg IntraMUSCular Given 11/7/24 1403)         PROCEDURES: (None if blank)  Procedures:     CRITICAL CARE: (None if blank)      DISCHARGE PRESCRIPTIONS: (None if blank)  Current Discharge Medication List          FINAL IMPRESSION      1. Stroke-like symptoms    2. Nonintractable headache, unspecified chronicity pattern, unspecified headache type    3. Hypertensive encephalopathy    4. Cerebrovascular accident (CVA), unspecified mechanism (HCC)    5. Pain in both lower extremities          DISPOSITION/PLAN   DISPOSITION Admitted 11/07/2024 10:15:18 AM           OUTPATIENT FOLLOW UP THE PATIENT:  No follow-up provider specified.    MELISA Capps Robert A, PA  11/07/24 2039

## 2024-11-07 NOTE — ED TRIAGE NOTES
Presents to ED via [facility] with complaints of left arm pain and headache. EMS reports pt noted to have blood pressures in the 200s systolic. Pt received 2 nitro and 20mg labetalol in route. EMS reports upon their arrival pt was falling asleep easy and having trouble answering questions. EMS states pt drove to  who then called 911. Upon ED arrival pt slowing answering some questions. Og VINCENT called to bedside for assessment. Pt unable to state the year and only following some commands.

## 2024-11-07 NOTE — CONSENT
Informed Consent for Blood Component Transfusion Note    I have discussed with the patient the rationale for blood component transfusion; its benefits in treating or preventing fatigue, organ damage, or death; and its risk which includes mild transfusion reactions, rare risk of blood borne infection, or more serious but rare reactions. I have discussed the alternatives to transfusion, including the risk and consequences of not receiving transfusion. The patient, mother, daughter, and spouse had an opportunity to ask questions and had agreed to proceed with transfusion of blood components.    Electronically signed by Opal Keane MD on 11/7/24 at 2:38 PM EST

## 2024-11-08 ENCOUNTER — APPOINTMENT (OUTPATIENT)
Dept: INTERVENTIONAL RADIOLOGY/VASCULAR | Age: 49
DRG: 062 | End: 2024-11-08
Payer: COMMERCIAL

## 2024-11-08 ENCOUNTER — HOSPITAL ENCOUNTER (INPATIENT)
Age: 49
Discharge: HOME | DRG: 062 | End: 2024-11-10
Attending: PSYCHIATRY & NEUROLOGY
Payer: COMMERCIAL

## 2024-11-08 ENCOUNTER — APPOINTMENT (OUTPATIENT)
Dept: CT IMAGING | Age: 49
DRG: 062 | End: 2024-11-08
Payer: COMMERCIAL

## 2024-11-08 PROBLEM — R29.90 STROKE-LIKE SYMPTOMS: Status: ACTIVE | Noted: 2024-11-08

## 2024-11-08 LAB
ANION GAP SERPL CALC-SCNC: 12 MEQ/L (ref 8–16)
APTT PPP: 27.9 SECONDS (ref 22–38)
BUN SERPL-MCNC: 7 MG/DL (ref 7–22)
CALCIUM SERPL-MCNC: 9.5 MG/DL (ref 8.5–10.5)
CHLORIDE SERPL-SCNC: 108 MEQ/L (ref 98–111)
CO2 SERPL-SCNC: 19 MEQ/L (ref 23–33)
CREAT SERPL-MCNC: 0.5 MG/DL (ref 0.4–1.2)
DEPRECATED RDW RBC AUTO: 48.1 FL (ref 35–45)
DEPRECATED RDW RBC AUTO: 48.9 FL (ref 35–45)
ECHO BSA: 2.05 M2
ERYTHROCYTE [DISTWIDTH] IN BLOOD BY AUTOMATED COUNT: 15.5 % (ref 11.5–14.5)
ERYTHROCYTE [DISTWIDTH] IN BLOOD BY AUTOMATED COUNT: 15.7 % (ref 11.5–14.5)
GFR SERPL CREATININE-BSD FRML MDRD: > 90 ML/MIN/1.73M2
GLUCOSE BLD STRIP.AUTO-MCNC: 129 MG/DL (ref 70–108)
GLUCOSE BLD STRIP.AUTO-MCNC: 136 MG/DL (ref 70–108)
GLUCOSE BLD STRIP.AUTO-MCNC: 166 MG/DL (ref 70–108)
GLUCOSE SERPL-MCNC: 157 MG/DL (ref 70–108)
HCT VFR BLD AUTO: 39.5 % (ref 37–47)
HCT VFR BLD AUTO: 40 % (ref 37–47)
HEPARIN UNFRACTIONATED: 0.17 U/ML (ref 0.3–0.7)
HGB BLD-MCNC: 12.4 GM/DL (ref 12–16)
HGB BLD-MCNC: 13 GM/DL (ref 12–16)
INR PPP: 1.03 (ref 0.85–1.13)
MAGNESIUM SERPL-MCNC: 2.5 MG/DL (ref 1.6–2.4)
MCH RBC QN AUTO: 27.2 PG (ref 26–33)
MCH RBC QN AUTO: 27.5 PG (ref 26–33)
MCHC RBC AUTO-ENTMCNC: 31.4 GM/DL (ref 32.2–35.5)
MCHC RBC AUTO-ENTMCNC: 32.5 GM/DL (ref 32.2–35.5)
MCV RBC AUTO: 84.7 FL (ref 81–99)
MCV RBC AUTO: 86.6 FL (ref 81–99)
PLATELET # BLD AUTO: 344 THOU/MM3 (ref 130–400)
PLATELET # BLD AUTO: 421 THOU/MM3 (ref 130–400)
PMV BLD AUTO: 10.3 FL (ref 9.4–12.4)
PMV BLD AUTO: 9.6 FL (ref 9.4–12.4)
POTASSIUM SERPL-SCNC: 3.8 MEQ/L (ref 3.5–5.2)
RBC # BLD AUTO: 4.56 MILL/MM3 (ref 4.2–5.4)
RBC # BLD AUTO: 4.72 MILL/MM3 (ref 4.2–5.4)
SODIUM SERPL-SCNC: 139 MEQ/L (ref 135–145)
TROPONIN, HIGH SENSITIVITY: 20 NG/L (ref 0–12)
WBC # BLD AUTO: 11.6 THOU/MM3 (ref 4.8–10.8)
WBC # BLD AUTO: 9.8 THOU/MM3 (ref 4.8–10.8)

## 2024-11-08 PROCEDURE — 2580000003 HC RX 258: Performed by: NURSE PRACTITIONER

## 2024-11-08 PROCEDURE — 70450 CT HEAD/BRAIN W/O DYE: CPT

## 2024-11-08 PROCEDURE — 6360000002 HC RX W HCPCS: Performed by: NURSE PRACTITIONER

## 2024-11-08 PROCEDURE — 93970 EXTREMITY STUDY: CPT

## 2024-11-08 PROCEDURE — 85610 PROTHROMBIN TIME: CPT

## 2024-11-08 PROCEDURE — B31F1ZZ FLUOROSCOPY OF LEFT VERTEBRAL ARTERY USING LOW OSMOLAR CONTRAST: ICD-10-PCS | Performed by: PSYCHIATRY & NEUROLOGY

## 2024-11-08 PROCEDURE — 6370000000 HC RX 637 (ALT 250 FOR IP)

## 2024-11-08 PROCEDURE — 92523 SPEECH SOUND LANG COMPREHEN: CPT

## 2024-11-08 PROCEDURE — 94761 N-INVAS EAR/PLS OXIMETRY MLT: CPT

## 2024-11-08 PROCEDURE — 6360000002 HC RX W HCPCS

## 2024-11-08 PROCEDURE — 2580000003 HC RX 258

## 2024-11-08 PROCEDURE — 92610 EVALUATE SWALLOWING FUNCTION: CPT

## 2024-11-08 PROCEDURE — 99291 CRITICAL CARE FIRST HOUR: CPT | Performed by: INTERNAL MEDICINE

## 2024-11-08 PROCEDURE — 36226 PLACE CATH VERTEBRAL ART: CPT | Performed by: PSYCHIATRY & NEUROLOGY

## 2024-11-08 PROCEDURE — B3181ZZ FLUOROSCOPY OF BILATERAL INTERNAL CAROTID ARTERIES USING LOW OSMOLAR CONTRAST: ICD-10-PCS | Performed by: PSYCHIATRY & NEUROLOGY

## 2024-11-08 PROCEDURE — 82948 REAGENT STRIP/BLOOD GLUCOSE: CPT

## 2024-11-08 PROCEDURE — 36224 PLACE CATH CAROTD ART: CPT | Performed by: PSYCHIATRY & NEUROLOGY

## 2024-11-08 PROCEDURE — 36415 COLL VENOUS BLD VENIPUNCTURE: CPT

## 2024-11-08 PROCEDURE — 80048 BASIC METABOLIC PNL TOTAL CA: CPT

## 2024-11-08 PROCEDURE — 6360000002 HC RX W HCPCS: Performed by: INTERNAL MEDICINE

## 2024-11-08 PROCEDURE — 85730 THROMBOPLASTIN TIME PARTIAL: CPT

## 2024-11-08 PROCEDURE — 85520 HEPARIN ASSAY: CPT

## 2024-11-08 PROCEDURE — 6360000002 HC RX W HCPCS: Performed by: PSYCHIATRY & NEUROLOGY

## 2024-11-08 PROCEDURE — 76377 3D RENDER W/INTRP POSTPROCES: CPT | Performed by: PSYCHIATRY & NEUROLOGY

## 2024-11-08 PROCEDURE — 2000000000 HC ICU R&B

## 2024-11-08 PROCEDURE — 6360000004 HC RX CONTRAST MEDICATION: Performed by: PSYCHIATRY & NEUROLOGY

## 2024-11-08 PROCEDURE — 84484 ASSAY OF TROPONIN QUANT: CPT

## 2024-11-08 PROCEDURE — 85027 COMPLETE CBC AUTOMATED: CPT

## 2024-11-08 PROCEDURE — 83735 ASSAY OF MAGNESIUM: CPT

## 2024-11-08 RX ORDER — HEPARIN SODIUM 1000 [USP'U]/ML
40 INJECTION, SOLUTION INTRAVENOUS; SUBCUTANEOUS PRN
Status: DISCONTINUED | OUTPATIENT
Start: 2024-11-08 | End: 2024-11-11

## 2024-11-08 RX ORDER — ACETAMINOPHEN 325 MG/1
650 TABLET ORAL EVERY 6 HOURS PRN
Status: DISCONTINUED | OUTPATIENT
Start: 2024-11-08 | End: 2024-11-12 | Stop reason: HOSPADM

## 2024-11-08 RX ORDER — HEPARIN SODIUM 1000 [USP'U]/ML
INJECTION, SOLUTION INTRAVENOUS; SUBCUTANEOUS PRN
Status: COMPLETED | OUTPATIENT
Start: 2024-11-08 | End: 2024-11-08

## 2024-11-08 RX ORDER — HEPARIN SODIUM 1000 [USP'U]/ML
80 INJECTION, SOLUTION INTRAVENOUS; SUBCUTANEOUS PRN
Status: DISCONTINUED | OUTPATIENT
Start: 2024-11-08 | End: 2024-11-11

## 2024-11-08 RX ORDER — HEPARIN SODIUM 1000 [USP'U]/ML
80 INJECTION, SOLUTION INTRAVENOUS; SUBCUTANEOUS ONCE
Status: COMPLETED | OUTPATIENT
Start: 2024-11-08 | End: 2024-11-08

## 2024-11-08 RX ORDER — MILRINONE LACTATE 0.2 MG/ML
.0625-.75 INJECTION, SOLUTION INTRAVENOUS CONTINUOUS
Status: DISCONTINUED | OUTPATIENT
Start: 2024-11-08 | End: 2024-11-11 | Stop reason: HOSPADM

## 2024-11-08 RX ORDER — MILRINONE LACTATE 0.2 MG/ML
0.12 INJECTION, SOLUTION INTRAVENOUS CONTINUOUS
Status: DISCONTINUED | OUTPATIENT
Start: 2024-11-08 | End: 2024-11-08

## 2024-11-08 RX ORDER — IOPAMIDOL 612 MG/ML
INJECTION, SOLUTION INTRAVASCULAR PRN
Status: COMPLETED | OUTPATIENT
Start: 2024-11-08 | End: 2024-11-08

## 2024-11-08 RX ORDER — SODIUM CHLORIDE 0.9 % (FLUSH) 0.9 %
5-40 SYRINGE (ML) INJECTION PRN
OUTPATIENT
Start: 2024-11-08

## 2024-11-08 RX ORDER — ACETAMINOPHEN 650 MG/1
650 SUPPOSITORY RECTAL EVERY 6 HOURS PRN
Status: DISCONTINUED | OUTPATIENT
Start: 2024-11-08 | End: 2024-11-12 | Stop reason: HOSPADM

## 2024-11-08 RX ORDER — HEPARIN SODIUM 10000 [USP'U]/100ML
5-30 INJECTION, SOLUTION INTRAVENOUS CONTINUOUS
Status: DISCONTINUED | OUTPATIENT
Start: 2024-11-08 | End: 2024-11-11

## 2024-11-08 RX ORDER — SODIUM CHLORIDE 0.9 % (FLUSH) 0.9 %
5-40 SYRINGE (ML) INJECTION EVERY 12 HOURS SCHEDULED
OUTPATIENT
Start: 2024-11-08

## 2024-11-08 RX ORDER — BUMETANIDE 0.25 MG/ML
1 INJECTION, SOLUTION INTRAMUSCULAR; INTRAVENOUS ONCE
Status: DISCONTINUED | OUTPATIENT
Start: 2024-11-08 | End: 2024-11-10

## 2024-11-08 RX ORDER — SPIRONOLACTONE 25 MG/1
25 TABLET ORAL DAILY
Qty: 90 TABLET | Refills: 2 | Status: SHIPPED | OUTPATIENT
Start: 2024-11-08

## 2024-11-08 RX ORDER — SODIUM CHLORIDE 9 MG/ML
INJECTION, SOLUTION INTRAVENOUS PRN
OUTPATIENT
Start: 2024-11-08

## 2024-11-08 RX ADMIN — HEPARIN SODIUM 18 UNITS/KG/HR: 10000 INJECTION, SOLUTION INTRAVENOUS at 18:49

## 2024-11-08 RX ADMIN — MILRINONE LACTATE 0.13 MCG/KG/MIN: 0.2 INJECTION, SOLUTION INTRAVENOUS at 05:19

## 2024-11-08 RX ADMIN — SODIUM CHLORIDE, PRESERVATIVE FREE 10 ML: 5 INJECTION INTRAVENOUS at 21:15

## 2024-11-08 RX ADMIN — SODIUM CHLORIDE 7.5 MG/HR: 9 INJECTION, SOLUTION INTRAVENOUS at 04:41

## 2024-11-08 RX ADMIN — SODIUM CHLORIDE 10 MG/HR: 9 INJECTION, SOLUTION INTRAVENOUS at 11:45

## 2024-11-08 RX ADMIN — WATER 125 MG: 1 INJECTION INTRAMUSCULAR; INTRAVENOUS; SUBCUTANEOUS at 13:13

## 2024-11-08 RX ADMIN — ACETAMINOPHEN 650 MG: 325 TABLET ORAL at 11:41

## 2024-11-08 RX ADMIN — SODIUM CHLORIDE 10 MG/HR: 9 INJECTION, SOLUTION INTRAVENOUS at 00:54

## 2024-11-08 RX ADMIN — ACETAMINOPHEN 650 MG: 650 SUPPOSITORY RECTAL at 00:44

## 2024-11-08 RX ADMIN — BUMETANIDE 1 MG: 0.25 INJECTION INTRAMUSCULAR; INTRAVENOUS at 14:03

## 2024-11-08 RX ADMIN — SODIUM CHLORIDE 7.5 MG/HR: 9 INJECTION, SOLUTION INTRAVENOUS at 08:28

## 2024-11-08 RX ADMIN — HEPARIN SODIUM 3000 UNITS: 1000 INJECTION INTRAVENOUS; SUBCUTANEOUS at 15:13

## 2024-11-08 RX ADMIN — SODIUM CHLORIDE 10 MG/HR: 9 INJECTION, SOLUTION INTRAVENOUS at 14:02

## 2024-11-08 RX ADMIN — HEPARIN SODIUM 7500 UNITS: 1000 INJECTION INTRAVENOUS; SUBCUTANEOUS at 18:47

## 2024-11-08 RX ADMIN — IOPAMIDOL 60 ML: 612 INJECTION, SOLUTION INTRAVENOUS at 16:04

## 2024-11-08 RX ADMIN — MILRINONE LACTATE 0.12 MCG/KG/MIN: 0.2 INJECTION, SOLUTION INTRAVENOUS at 10:34

## 2024-11-08 RX ADMIN — ACETAMINOPHEN 650 MG: 325 TABLET ORAL at 21:12

## 2024-11-08 RX ADMIN — SODIUM CHLORIDE 3 MG/HR: 9 INJECTION, SOLUTION INTRAVENOUS at 17:43

## 2024-11-08 ASSESSMENT — PAIN DESCRIPTION - ORIENTATION
ORIENTATION: ANTERIOR
ORIENTATION: POSTERIOR

## 2024-11-08 ASSESSMENT — PAIN SCALES - GENERAL
PAINLEVEL_OUTOF10: 0
PAINLEVEL_OUTOF10: 5
PAINLEVEL_OUTOF10: 5
PAINLEVEL_OUTOF10: 2
PAINLEVEL_OUTOF10: 4
PAINLEVEL_OUTOF10: 2
PAINLEVEL_OUTOF10: 2

## 2024-11-08 ASSESSMENT — PAIN DESCRIPTION - LOCATION
LOCATION: HEAD

## 2024-11-08 ASSESSMENT — PAIN DESCRIPTION - DESCRIPTORS
DESCRIPTORS: ACHING
DESCRIPTORS: ACHING

## 2024-11-08 ASSESSMENT — PAIN DESCRIPTION - PAIN TYPE: TYPE: ACUTE PAIN

## 2024-11-08 ASSESSMENT — PAIN DESCRIPTION - FREQUENCY: FREQUENCY: CONTINUOUS

## 2024-11-08 ASSESSMENT — PAIN DESCRIPTION - ONSET: ONSET: ON-GOING

## 2024-11-08 NOTE — H&P
Neurology H    Date:11/8/2024       Room:Located within [facility]/011-A  Patient Name:Yulissa Andersen     YOB: 1975     Age:49 y.o.    Requesting Physician: Ayo Mendes MD     Reason for Consult:  Evaluate for RCVS      Chief Complaint:   Chief Complaint   Patient presents with    Hypertension    Altered Mental Status       Subjective     Yulissa Andersen is a 49 y.o. female with a history of essential hypertension, migraine headaches, type 2 diabetes, CAD, CVA (1995), GERD, vitamin D deficiency and obesity who presented to [facility] on 11/7/2024 from the urgent care with hypertensive emergency, migraine headache with subsequent altered mental status.  According to the patient's boyfriend he reports that she woke up with a severe migraine headache associated with nausea.  The patient drove herself to the urgent care where they called 911 due to hypertensive emergency.  She received labetalol and route to the hospital and her mental status started to deteriorate.  A stroke alert was activated at 0953.  Initial blood pressure 225/125, Glucose 108.  Initial NIH of 18 was given for aphasia, partial gaze palsy, bilateral blindness, all 4 extremities drift and hit the bed and left sided sensation deficit.  CT head negative.  CTA head and neck negative for LVO or critical stenosis.  Dr. Weaver was present for the stroke alert and recommended the patient received TNK.  There was a delay in the patient receiving TNK due to hypertension requiring multiple doses of labetalol hydralazine and  the initiation of a Cardene drip to get her blood pressure below 180.  TNK was administered at 1015.  No vascular neuro intervention was warranted at this time.  The patient was transferred to the ICU.    Review of Systems   Review of Systems   Eyes:  Positive for visual disturbance.   Neurological:  Positive for facial asymmetry, speech difficulty, weakness, numbness and headaches.     Medications   Scheduled Meds:    
neurosurgery's recommendations she was given TNK.  She was also given labetalol, hydralazine and started on a Cardene drip for hypertensive emergency.  She was then brought to the ICU for further workup and observation.    Past Medical History: DM, HTN, migraine.  Family History: Mother (heart disease, HTN, arthritis), sister HTN, maternal cousin breast cancer.  Social History: Patient denies any smoking or alcohol history.    ROS   Patient mentioned having some chest pain, chronic diarrhea denies SOB, back pain, tinnitus, urinary changes, lower limb swelling.        Scheduled Meds:   sodium chloride flush  5-40 mL IntraVENous 2 times per day    sodium chloride flush  5-40 mL IntraVENous 2 times per day    [START ON 11/8/2024] enoxaparin  40 mg SubCUTAneous Daily     Continuous Infusions:   niCARdipine 10 mg/hr (11/07/24 1252)    sodium chloride      milrinone 0.1274 mcg/kg/min (11/07/24 1252)    sodium chloride      sodium chloride      dextrose         PHYSICAL EXAMINATION:  T: 98.4.  P: 87 RR: 21. B/P: 100/90.  O2 Sat:  96%.  I/O: +400  Body mass index is 33.61 kg/m².   GCS:   14  General:   Acutely distressed patient lying in bed   HEENT:  normocephalic and atraumatic.  No scleral icterus. PERR, bilateral hemianopia   Neck: supple.  No Thyromegaly.  Lungs: clear to auscultation.  No retractions  Cardiac: RRR.  No JVD.  Abdomen: soft.  Nontender.  Extremities:  No clubbing, cyanosis, or edema x 4.    Vasculature: capillary refill < 3 seconds. Palpable dorsalis pedis pulses.  Skin:  warm and dry, urticaria on upper extremities.  Psych:  Alert and oriented x3.  Affect appropriate  Lymph:  No supraclavicular adenopathy.  Neurologic: Patient has generalized weakness with left-sided numbness. No seizures.      Data: (All radiographs, tracings, PFTs, and imaging are personally viewed and interpreted unless otherwise noted).   Sodium 137, potassium 3.5, chloride 104, bicarb 23, BUN 7, creatinine 0.7, magnesium 2.2,

## 2024-11-08 NOTE — CARE COORDINATION
Case Management Assessment Initial Evaluation    Date/Time of Evaluation: 2024 9:49 AM  Assessment Completed by: Raysa Stanley RN    If patient is discharged prior to next notation, then this note serves as note for discharge by case management.    Patient Name: Yulissa Andersen                   YOB: 1975  Diagnosis: Hypertensive encephalopathy [I67.4]  Acute ischemic stroke (HCC) [I63.9]  Stroke-like symptoms [R29.90]  Nonintractable headache, unspecified chronicity pattern, unspecified headache type [R51.9]                   Date / Time: 2024  9:37 AM  [facility]     Patient Admission Status: Inpatient   Readmission Risk Low 0-14, Mod 15-19), High > 20: Readmission Risk Score: 15.8    Current PCP: Amanda García APRN - CNP  Health Care Decision Makers: Family reports pt verbally had with nurse told she wanted friend Mara Watson to be HCPOA; no paperwork was completed. Pt has multiple children.     Additional Case Management Notes: Presented to PCP with c/o severe headache, generalized weakness, left numbness. She had woke up with headache and 2 episodes of vomiting with hematemesis. Dizzy at PCP office and hypertensive. Sent to ED. /111. NIH 4. Neurology consulted. TNK given. Admitted to ICU. Developed hives; epi, benadryl, and steroids given. Received 2 FFP yesterday. On regular diet. Found to have DVT LLE. Plan for angiography today.     NIH 18. Nonverbal. Follows commands. SLP/PT/OT. Intensivist and Neurology following. Telemetry, neuro checks/NIHSS, external urinary catheter, SCDs. Primacor @ 0.125 mcg/kg/min, cardene @ 10 mg/hr, SSI, Electrolyte replacement protocols. Received 1 mg IV bumex x1 and 125 mg IV solumedrol x1 today. Urine sent for culture. Mg 2.5, trop 8 - now 20.     Procedures:    TNK given   Echo with EF 55-60%    Imagin/7 CT Head: Negative noncontrast CT scan of the brain.    CTA Neck/Head: 1. Possible filling defect in

## 2024-11-08 NOTE — PROCEDURES
PROCEDURE NOTE  Date: 11/8/2024   Name: Yulissa Andersen  YOB: 1975    Procedures      PREOPERATIVE DIAGNOSIS: Malignant hypertension/cerebral aneurysm    POSTOPERATIVE DIAGNOSIS: Same    SURGEONS:  Bettye Weaver MD    ANESTHESIA: Lidocaine 1% for local anesthesia.     MEDICATIONS: Heparin 4000 units/liter was used for saline flushes    ESTIMATED BLOOD LOSS: 10 cc    COMPLICATIONS: None    HEMOSTASIS: 6 Faroese Angioseal and the arteriotomy site was covered with a sterile dressing    PROCEDURES PERFORMED:    1.  Four vessel diagnostic cerebral angiogram    CATHETERIZATION OF THE FOLLOWING VESSELS:    1. Right internal carotid artery  2. Right subclavian artery  3. Left internal carotid artery  4. Left vertebral artery    ANGIOGRAPHY AND INTERPRETATION OF THE FOLLOWING IMAGES:    1. Right internal carotid artery - cranial station - AP and lateral angiograms  2. Right subclavian artery cervical station PA angiogram  3. Left internal carotid artery - cranial station - AP, Oblique and lateral angiograms  4. Right internal carotid artery - cranial station -3D cine angiogram viewed and manipulated at separate workstation  5. Left vertebral artery - cranial station - AP and lateral angiogams  6. Limited right common femoral artery - AP angiogram    INDICATIONS: Ms. Andersen is a 49-year-old right-handed woman admitted to the hospital with hypertensive emergency and what appears to be cortical blindness.  CT angiogram suggestive of possible RCVS and an aneurysm of the left internal carotid artery terminus.  A catheter cerebral angiogram is being performed for further evaluation. The purpose, alternative modalities, risks, and benefits of the procedure were discussed with the patient family and an informed consent was signed.    DESCRIPTION OF THE PROCEDURE AND FINDINGS: The patient was brought to the angiography suite and placed supine on the table. Both groins were then prepped and draped in the usual sterile

## 2024-11-08 NOTE — BRIEF OP NOTE
Brief Postoperative Note      Patient: Yulissa Andersen  YOB: 1975  MRN: [id number]    Date of Procedure:     Cerebral angiogram    Post-Op Diagnosis: Same               Assistant:  None    Anesthesia: Local    Estimated Blood Loss (mL): Minimal    Complications: None          Drains:   External Urinary Catheter (Active)   Site Assessment Clean,dry & intact 11/08/24 0800   Placement Replaced 11/08/24 0800   Securement Method Other (Comment) 11/07/24 1400   Catheter Care Suction Canister/Tubing changed;Catheter/Wick replaced 11/08/24 0800   Perineal Care Yes 11/08/24 0800   Suction 40 mmgHg continuous 11/08/24 0800   Urine Color Yellow 11/08/24 0800   Urine Appearance Clear 11/08/24 0800   Output (mL) 750 mL 11/08/24 0453       Findings:  No evidence of vasospasm  Changes of FMD in bilateral ICA cervical segments  Left ICA terminus aneurysm with a bleb measuring 5.5 mm x 3.5 mm with a neck measuring 3.3 mm    Electronically signed by Bettye Weaver MD on 11/8/2024 at 3:52 PM

## 2024-11-09 ENCOUNTER — APPOINTMENT (OUTPATIENT)
Dept: GENERAL RADIOLOGY | Age: 49
DRG: 062 | End: 2024-11-09
Payer: COMMERCIAL

## 2024-11-09 LAB
ANION GAP SERPL CALC-SCNC: 12 MEQ/L (ref 8–16)
BUN SERPL-MCNC: 14 MG/DL (ref 7–22)
CALCIUM SERPL-MCNC: 9.6 MG/DL (ref 8.5–10.5)
CHLORIDE SERPL-SCNC: 103 MEQ/L (ref 98–111)
CO2 SERPL-SCNC: 23 MEQ/L (ref 23–33)
CREAT SERPL-MCNC: 0.5 MG/DL (ref 0.4–1.2)
DEPRECATED RDW RBC AUTO: 47.3 FL (ref 35–45)
ERYTHROCYTE [DISTWIDTH] IN BLOOD BY AUTOMATED COUNT: 15.5 % (ref 11.5–14.5)
GFR SERPL CREATININE-BSD FRML MDRD: > 90 ML/MIN/1.73M2
GLUCOSE BLD STRIP.AUTO-MCNC: 104 MG/DL (ref 70–108)
GLUCOSE SERPL-MCNC: 170 MG/DL (ref 70–108)
HCT VFR BLD AUTO: 37.9 % (ref 37–47)
HEPARIN UNFRACTIONATED: 0.72 U/ML (ref 0.3–0.7)
HEPARIN UNFRACTIONATED: 0.97 U/ML (ref 0.3–0.7)
HEPARIN UNFRACTIONATED: 1.4 U/ML (ref 0.3–0.7)
HEPARIN UNFRACTIONATED: > 2 U/ML (ref 0.3–0.7)
HGB BLD-MCNC: 12.1 GM/DL (ref 12–16)
MCH RBC QN AUTO: 26.9 PG (ref 26–33)
MCHC RBC AUTO-ENTMCNC: 31.9 GM/DL (ref 32.2–35.5)
MCV RBC AUTO: 84.2 FL (ref 81–99)
PLATELET # BLD AUTO: 400 THOU/MM3 (ref 130–400)
PMV BLD AUTO: 9.5 FL (ref 9.4–12.4)
POTASSIUM SERPL-SCNC: 3.4 MEQ/L (ref 3.5–5.2)
RBC # BLD AUTO: 4.5 MILL/MM3 (ref 4.2–5.4)
SODIUM SERPL-SCNC: 138 MEQ/L (ref 135–145)
WBC # BLD AUTO: 10.9 THOU/MM3 (ref 4.8–10.8)

## 2024-11-09 PROCEDURE — 2580000003 HC RX 258

## 2024-11-09 PROCEDURE — 97166 OT EVAL MOD COMPLEX 45 MIN: CPT

## 2024-11-09 PROCEDURE — 6370000000 HC RX 637 (ALT 250 FOR IP)

## 2024-11-09 PROCEDURE — 82948 REAGENT STRIP/BLOOD GLUCOSE: CPT

## 2024-11-09 PROCEDURE — 80048 BASIC METABOLIC PNL TOTAL CA: CPT

## 2024-11-09 PROCEDURE — 6360000002 HC RX W HCPCS

## 2024-11-09 PROCEDURE — 85027 COMPLETE CBC AUTOMATED: CPT

## 2024-11-09 PROCEDURE — 97162 PT EVAL MOD COMPLEX 30 MIN: CPT

## 2024-11-09 PROCEDURE — 2060000000 HC ICU INTERMEDIATE R&B

## 2024-11-09 PROCEDURE — 6370000000 HC RX 637 (ALT 250 FOR IP): Performed by: INTERNAL MEDICINE

## 2024-11-09 PROCEDURE — 99233 SBSQ HOSP IP/OBS HIGH 50: CPT | Performed by: INTERNAL MEDICINE

## 2024-11-09 PROCEDURE — 36415 COLL VENOUS BLD VENIPUNCTURE: CPT

## 2024-11-09 PROCEDURE — 97530 THERAPEUTIC ACTIVITIES: CPT

## 2024-11-09 PROCEDURE — 85520 HEPARIN ASSAY: CPT

## 2024-11-09 PROCEDURE — 6360000002 HC RX W HCPCS: Performed by: NURSE PRACTITIONER

## 2024-11-09 PROCEDURE — 2580000003 HC RX 258: Performed by: NURSE PRACTITIONER

## 2024-11-09 PROCEDURE — 71045 X-RAY EXAM CHEST 1 VIEW: CPT

## 2024-11-09 PROCEDURE — 95813 EEG EXTND MNTR 61-119 MIN: CPT

## 2024-11-09 PROCEDURE — 95813 EEG EXTND MNTR 61-119 MIN: CPT | Performed by: PSYCHIATRY & NEUROLOGY

## 2024-11-09 RX ORDER — FUROSEMIDE 20 MG/1
10 TABLET ORAL DAILY PRN
Status: DISCONTINUED | OUTPATIENT
Start: 2024-11-09 | End: 2024-11-11

## 2024-11-09 RX ORDER — HYDRALAZINE HYDROCHLORIDE 50 MG/1
100 TABLET, FILM COATED ORAL 3 TIMES DAILY
Status: DISCONTINUED | OUTPATIENT
Start: 2024-11-09 | End: 2024-11-12 | Stop reason: HOSPADM

## 2024-11-09 RX ORDER — POTASSIUM CHLORIDE 1500 MG/1
40 TABLET, EXTENDED RELEASE ORAL PRN
Status: DISCONTINUED | OUTPATIENT
Start: 2024-11-09 | End: 2024-11-12 | Stop reason: HOSPADM

## 2024-11-09 RX ORDER — CARVEDILOL 25 MG/1
25 TABLET ORAL 2 TIMES DAILY
Status: DISCONTINUED | OUTPATIENT
Start: 2024-11-09 | End: 2024-11-12 | Stop reason: HOSPADM

## 2024-11-09 RX ORDER — AMLODIPINE BESYLATE 5 MG/1
5 TABLET ORAL DAILY
Status: DISCONTINUED | OUTPATIENT
Start: 2024-11-09 | End: 2024-11-12 | Stop reason: HOSPADM

## 2024-11-09 RX ORDER — VALSARTAN 320 MG/1
320 TABLET ORAL DAILY
Status: DISCONTINUED | OUTPATIENT
Start: 2024-11-09 | End: 2024-11-12 | Stop reason: HOSPADM

## 2024-11-09 RX ORDER — SPIRONOLACTONE 25 MG/1
25 TABLET ORAL DAILY
Status: DISCONTINUED | OUTPATIENT
Start: 2024-11-09 | End: 2024-11-12 | Stop reason: HOSPADM

## 2024-11-09 RX ADMIN — HEPARIN SODIUM 15 UNITS/KG/HR: 10000 INJECTION, SOLUTION INTRAVENOUS at 11:35

## 2024-11-09 RX ADMIN — SODIUM CHLORIDE 4 MG/HR: 9 INJECTION, SOLUTION INTRAVENOUS at 09:08

## 2024-11-09 RX ADMIN — SODIUM CHLORIDE 7.5 MG/HR: 9 INJECTION, SOLUTION INTRAVENOUS at 00:35

## 2024-11-09 RX ADMIN — FUROSEMIDE 10 MG: 20 TABLET ORAL at 22:42

## 2024-11-09 RX ADMIN — CARVEDILOL 25 MG: 25 TABLET, FILM COATED ORAL at 22:42

## 2024-11-09 RX ADMIN — SODIUM CHLORIDE 7.5 MG/HR: 9 INJECTION, SOLUTION INTRAVENOUS at 04:14

## 2024-11-09 RX ADMIN — ACETAMINOPHEN 650 MG: 325 TABLET ORAL at 22:41

## 2024-11-09 RX ADMIN — SODIUM CHLORIDE, PRESERVATIVE FREE 10 ML: 5 INJECTION INTRAVENOUS at 21:00

## 2024-11-09 RX ADMIN — CARVEDILOL 25 MG: 25 TABLET, FILM COATED ORAL at 12:24

## 2024-11-09 RX ADMIN — POTASSIUM BICARBONATE 40 MEQ: 782 TABLET, EFFERVESCENT ORAL at 08:50

## 2024-11-09 RX ADMIN — AMLODIPINE BESYLATE 5 MG: 5 TABLET ORAL at 12:24

## 2024-11-09 RX ADMIN — ACETAMINOPHEN 650 MG: 325 TABLET ORAL at 12:27

## 2024-11-09 ASSESSMENT — PAIN DESCRIPTION - DESCRIPTORS
DESCRIPTORS: ACHING
DESCRIPTORS: ACHING

## 2024-11-09 ASSESSMENT — PAIN DESCRIPTION - FREQUENCY: FREQUENCY: INTERMITTENT

## 2024-11-09 ASSESSMENT — PAIN DESCRIPTION - ORIENTATION: ORIENTATION: ANTERIOR

## 2024-11-09 ASSESSMENT — PAIN SCALES - GENERAL
PAINLEVEL_OUTOF10: 3
PAINLEVEL_OUTOF10: 0
PAINLEVEL_OUTOF10: 3
PAINLEVEL_OUTOF10: 0

## 2024-11-09 ASSESSMENT — PAIN DESCRIPTION - LOCATION
LOCATION: HEAD
LOCATION: HEAD

## 2024-11-09 ASSESSMENT — PAIN DESCRIPTION - ONSET: ONSET: ON-GOING

## 2024-11-09 ASSESSMENT — PAIN - FUNCTIONAL ASSESSMENT: PAIN_FUNCTIONAL_ASSESSMENT: ACTIVITIES ARE NOT PREVENTED

## 2024-11-09 ASSESSMENT — PAIN DESCRIPTION - PAIN TYPE: TYPE: ACUTE PAIN

## 2024-11-09 NOTE — PROCEDURES
PROCEDURE NOTE  Date: 11/9/2024   Name: Yulissa Andersen  YOB: 1975    Procedures            Date: 11/9/2024  Referring physician: Dr. Vaughn Tate  Patient aged 49 y with seizures. EEG done to assess for epileptiform activity.    Introduction  This routine 85-minute EEG was recorded using the International 10-20 System on a [employer] workstation at 256 samples/s. Automated spike and seizure detection algorithms were applied.    Description  During the maximal alert state, a well-regulated, symmetric, and reactive 9 Hz posterior dominant rhythm was seen. No consistent focal slowing or interhemispheric asymmetry was noted. Stage I and stage II sleep were observed. There were no interictal epileptiform discharges or electrographic seizures.    Activations  Hyperventilation was not performed. Intermittent photic stimulation was performed and demonstrated no posterior driving response.    Impression  Normal awake and sleep EEG.       EKG lead did not show clear arrhythmia, if still in concern consider formal EKG or correlation with telemetry.       No epileptiform discharges were identified. Please note the absence of such activity on this record cannot conclusively rule out an epileptic disorder. If such is still clinically suspected, a repeat study with sleep deprivation and/or prolonged sampling may be helpful.    Theresa Donovan MD  Epilepsy Board Certified.  Neurology Board Certified.    Electronically Signed

## 2024-11-10 LAB
ANION GAP SERPL CALC-SCNC: 10 MEQ/L (ref 8–16)
BUN SERPL-MCNC: 20 MG/DL (ref 7–22)
CALCIUM SERPL-MCNC: 9.1 MG/DL (ref 8.5–10.5)
CHLORIDE SERPL-SCNC: 102 MEQ/L (ref 98–111)
CO2 SERPL-SCNC: 24 MEQ/L (ref 23–33)
CREAT SERPL-MCNC: 0.6 MG/DL (ref 0.4–1.2)
DEPRECATED RDW RBC AUTO: 49.1 FL (ref 35–45)
ERYTHROCYTE [DISTWIDTH] IN BLOOD BY AUTOMATED COUNT: 15.6 % (ref 11.5–14.5)
GFR SERPL CREATININE-BSD FRML MDRD: > 90 ML/MIN/1.73M2
GLUCOSE BLD STRIP.AUTO-MCNC: 109 MG/DL (ref 70–108)
GLUCOSE BLD STRIP.AUTO-MCNC: 138 MG/DL (ref 70–108)
GLUCOSE BLD STRIP.AUTO-MCNC: 92 MG/DL (ref 70–108)
GLUCOSE SERPL-MCNC: 112 MG/DL (ref 70–108)
HCT VFR BLD AUTO: 36.1 % (ref 37–47)
HEPARIN UNFRACTIONATED: 0.49 U/ML (ref 0.3–0.7)
HEPARIN UNFRACTIONATED: 0.61 U/ML (ref 0.3–0.7)
HGB BLD-MCNC: 11.3 GM/DL (ref 12–16)
MCH RBC QN AUTO: 27 PG (ref 26–33)
MCHC RBC AUTO-ENTMCNC: 31.3 GM/DL (ref 32.2–35.5)
MCV RBC AUTO: 86.4 FL (ref 81–99)
PLATELET # BLD AUTO: 332 THOU/MM3 (ref 130–400)
PMV BLD AUTO: 10.1 FL (ref 9.4–12.4)
POTASSIUM SERPL-SCNC: 3.6 MEQ/L (ref 3.5–5.2)
RBC # BLD AUTO: 4.18 MILL/MM3 (ref 4.2–5.4)
SODIUM SERPL-SCNC: 136 MEQ/L (ref 135–145)
WBC # BLD AUTO: 12 THOU/MM3 (ref 4.8–10.8)

## 2024-11-10 PROCEDURE — 2580000003 HC RX 258

## 2024-11-10 PROCEDURE — 82948 REAGENT STRIP/BLOOD GLUCOSE: CPT

## 2024-11-10 PROCEDURE — 85027 COMPLETE CBC AUTOMATED: CPT

## 2024-11-10 PROCEDURE — 6370000000 HC RX 637 (ALT 250 FOR IP)

## 2024-11-10 PROCEDURE — 2060000000 HC ICU INTERMEDIATE R&B

## 2024-11-10 PROCEDURE — 85520 HEPARIN ASSAY: CPT

## 2024-11-10 PROCEDURE — 36415 COLL VENOUS BLD VENIPUNCTURE: CPT

## 2024-11-10 PROCEDURE — 80048 BASIC METABOLIC PNL TOTAL CA: CPT

## 2024-11-10 PROCEDURE — 99233 SBSQ HOSP IP/OBS HIGH 50: CPT | Performed by: STUDENT IN AN ORGANIZED HEALTH CARE EDUCATION/TRAINING PROGRAM

## 2024-11-10 PROCEDURE — 6370000000 HC RX 637 (ALT 250 FOR IP): Performed by: STUDENT IN AN ORGANIZED HEALTH CARE EDUCATION/TRAINING PROGRAM

## 2024-11-10 PROCEDURE — 6360000002 HC RX W HCPCS: Performed by: STUDENT IN AN ORGANIZED HEALTH CARE EDUCATION/TRAINING PROGRAM

## 2024-11-10 PROCEDURE — 6360000002 HC RX W HCPCS

## 2024-11-10 RX ORDER — HYDRALAZINE HYDROCHLORIDE 20 MG/ML
5 INJECTION INTRAMUSCULAR; INTRAVENOUS EVERY 6 HOURS PRN
Status: DISCONTINUED | OUTPATIENT
Start: 2024-11-10 | End: 2024-11-10

## 2024-11-10 RX ORDER — HYDRALAZINE HYDROCHLORIDE 20 MG/ML
5 INJECTION INTRAMUSCULAR; INTRAVENOUS EVERY 6 HOURS PRN
Status: DISCONTINUED | OUTPATIENT
Start: 2024-11-10 | End: 2024-11-12 | Stop reason: HOSPADM

## 2024-11-10 RX ADMIN — CARVEDILOL 25 MG: 25 TABLET, FILM COATED ORAL at 19:43

## 2024-11-10 RX ADMIN — HYDROMORPHONE HYDROCHLORIDE 0.25 MG: 1 INJECTION, SOLUTION INTRAMUSCULAR; INTRAVENOUS; SUBCUTANEOUS at 17:04

## 2024-11-10 RX ADMIN — HYDRALAZINE HYDROCHLORIDE 100 MG: 50 TABLET ORAL at 14:09

## 2024-11-10 RX ADMIN — CARVEDILOL 25 MG: 25 TABLET, FILM COATED ORAL at 09:39

## 2024-11-10 RX ADMIN — HEPARIN SODIUM 9 UNITS/KG/HR: 10000 INJECTION, SOLUTION INTRAVENOUS at 15:33

## 2024-11-10 RX ADMIN — SODIUM CHLORIDE, PRESERVATIVE FREE 10 ML: 5 INJECTION INTRAVENOUS at 19:48

## 2024-11-10 RX ADMIN — AMLODIPINE BESYLATE 5 MG: 5 TABLET ORAL at 09:39

## 2024-11-10 RX ADMIN — HYDRALAZINE HYDROCHLORIDE 100 MG: 50 TABLET ORAL at 19:43

## 2024-11-10 RX ADMIN — ACETAMINOPHEN 650 MG: 325 TABLET ORAL at 08:50

## 2024-11-10 ASSESSMENT — PAIN SCALES - GENERAL
PAINLEVEL_OUTOF10: 9
PAINLEVEL_OUTOF10: 10
PAINLEVEL_OUTOF10: 8

## 2024-11-10 ASSESSMENT — PAIN DESCRIPTION - LOCATION
LOCATION: HEAD
LOCATION: HEAD

## 2024-11-10 ASSESSMENT — PAIN DESCRIPTION - DESCRIPTORS: DESCRIPTORS: SHARP

## 2024-11-10 ASSESSMENT — PAIN DESCRIPTION - ORIENTATION: ORIENTATION: RIGHT

## 2024-11-11 LAB
ANION GAP SERPL CALC-SCNC: 10 MEQ/L (ref 8–16)
BUN SERPL-MCNC: 15 MG/DL (ref 7–22)
CALCIUM SERPL-MCNC: 8.9 MG/DL (ref 8.5–10.5)
CHLORIDE SERPL-SCNC: 100 MEQ/L (ref 98–111)
CO2 SERPL-SCNC: 23 MEQ/L (ref 23–33)
CREAT SERPL-MCNC: 0.6 MG/DL (ref 0.4–1.2)
DEPRECATED RDW RBC AUTO: 47.8 FL (ref 35–45)
ERYTHROCYTE [DISTWIDTH] IN BLOOD BY AUTOMATED COUNT: 15.3 % (ref 11.5–14.5)
GFR SERPL CREATININE-BSD FRML MDRD: > 90 ML/MIN/1.73M2
GLUCOSE BLD STRIP.AUTO-MCNC: 106 MG/DL (ref 70–108)
GLUCOSE BLD STRIP.AUTO-MCNC: 112 MG/DL (ref 70–108)
GLUCOSE BLD STRIP.AUTO-MCNC: 115 MG/DL (ref 70–108)
GLUCOSE BLD STRIP.AUTO-MCNC: 136 MG/DL (ref 70–108)
GLUCOSE SERPL-MCNC: 109 MG/DL (ref 70–108)
HCT VFR BLD AUTO: 37.9 % (ref 37–47)
HEPARIN UNFRACTIONATED: 0.36 U/ML (ref 0.3–0.7)
HGB BLD-MCNC: 12 GM/DL (ref 12–16)
MCH RBC QN AUTO: 27.1 PG (ref 26–33)
MCHC RBC AUTO-ENTMCNC: 31.7 GM/DL (ref 32.2–35.5)
MCV RBC AUTO: 85.6 FL (ref 81–99)
PLATELET # BLD AUTO: 340 THOU/MM3 (ref 130–400)
PMV BLD AUTO: 9.6 FL (ref 9.4–12.4)
POTASSIUM SERPL-SCNC: 3.9 MEQ/L (ref 3.5–5.2)
RBC # BLD AUTO: 4.43 MILL/MM3 (ref 4.2–5.4)
SODIUM SERPL-SCNC: 133 MEQ/L (ref 135–145)
WBC # BLD AUTO: 10.7 THOU/MM3 (ref 4.8–10.8)

## 2024-11-11 PROCEDURE — 97530 THERAPEUTIC ACTIVITIES: CPT

## 2024-11-11 PROCEDURE — 6370000000 HC RX 637 (ALT 250 FOR IP): Performed by: STUDENT IN AN ORGANIZED HEALTH CARE EDUCATION/TRAINING PROGRAM

## 2024-11-11 PROCEDURE — 85027 COMPLETE CBC AUTOMATED: CPT

## 2024-11-11 PROCEDURE — 82948 REAGENT STRIP/BLOOD GLUCOSE: CPT

## 2024-11-11 PROCEDURE — 6370000000 HC RX 637 (ALT 250 FOR IP)

## 2024-11-11 PROCEDURE — 2060000000 HC ICU INTERMEDIATE R&B

## 2024-11-11 PROCEDURE — 97112 NEUROMUSCULAR REEDUCATION: CPT

## 2024-11-11 PROCEDURE — 2580000003 HC RX 258

## 2024-11-11 PROCEDURE — 6360000002 HC RX W HCPCS: Performed by: STUDENT IN AN ORGANIZED HEALTH CARE EDUCATION/TRAINING PROGRAM

## 2024-11-11 PROCEDURE — 97110 THERAPEUTIC EXERCISES: CPT

## 2024-11-11 PROCEDURE — 36415 COLL VENOUS BLD VENIPUNCTURE: CPT

## 2024-11-11 PROCEDURE — 99222 1ST HOSP IP/OBS MODERATE 55: CPT | Performed by: STUDENT IN AN ORGANIZED HEALTH CARE EDUCATION/TRAINING PROGRAM

## 2024-11-11 PROCEDURE — 85520 HEPARIN ASSAY: CPT

## 2024-11-11 PROCEDURE — 97535 SELF CARE MNGMENT TRAINING: CPT

## 2024-11-11 PROCEDURE — 80048 BASIC METABOLIC PNL TOTAL CA: CPT

## 2024-11-11 PROCEDURE — 6360000002 HC RX W HCPCS

## 2024-11-11 PROCEDURE — 99233 SBSQ HOSP IP/OBS HIGH 50: CPT | Performed by: STUDENT IN AN ORGANIZED HEALTH CARE EDUCATION/TRAINING PROGRAM

## 2024-11-11 RX ORDER — HYDROCHLOROTHIAZIDE 25 MG/1
25 TABLET ORAL DAILY
Status: DISCONTINUED | OUTPATIENT
Start: 2024-11-11 | End: 2024-11-12 | Stop reason: HOSPADM

## 2024-11-11 RX ORDER — SENNOSIDES A AND B 8.6 MG/1
1 TABLET, FILM COATED ORAL NIGHTLY
Status: DISCONTINUED | OUTPATIENT
Start: 2024-11-11 | End: 2024-11-12 | Stop reason: HOSPADM

## 2024-11-11 RX ORDER — POLYETHYLENE GLYCOL 3350 17 G/17G
17 POWDER, FOR SOLUTION ORAL 2 TIMES DAILY
Status: DISCONTINUED | OUTPATIENT
Start: 2024-11-11 | End: 2024-11-12 | Stop reason: HOSPADM

## 2024-11-11 RX ADMIN — POLYETHYLENE GLYCOL 3350 17 G: 17 POWDER, FOR SOLUTION ORAL at 10:43

## 2024-11-11 RX ADMIN — ACETAMINOPHEN 650 MG: 325 TABLET ORAL at 16:22

## 2024-11-11 RX ADMIN — ONDANSETRON 4 MG: 2 INJECTION INTRAMUSCULAR; INTRAVENOUS at 10:43

## 2024-11-11 RX ADMIN — SODIUM CHLORIDE, PRESERVATIVE FREE 10 ML: 5 INJECTION INTRAVENOUS at 07:59

## 2024-11-11 RX ADMIN — HYDROMORPHONE HYDROCHLORIDE 0.25 MG: 1 INJECTION, SOLUTION INTRAMUSCULAR; INTRAVENOUS; SUBCUTANEOUS at 08:00

## 2024-11-11 RX ADMIN — HYDRALAZINE HYDROCHLORIDE 100 MG: 50 TABLET ORAL at 09:09

## 2024-11-11 RX ADMIN — HYDRALAZINE HYDROCHLORIDE 100 MG: 50 TABLET ORAL at 16:22

## 2024-11-11 RX ADMIN — CARVEDILOL 25 MG: 25 TABLET, FILM COATED ORAL at 21:00

## 2024-11-11 RX ADMIN — APIXABAN 5 MG: 5 TABLET, FILM COATED ORAL at 12:46

## 2024-11-11 RX ADMIN — SENNOSIDES 8.6 MG: 8.6 TABLET, FILM COATED ORAL at 21:00

## 2024-11-11 RX ADMIN — HYDROCHLOROTHIAZIDE 25 MG: 25 TABLET ORAL at 11:05

## 2024-11-11 RX ADMIN — HYDRALAZINE HYDROCHLORIDE 5 MG: 20 INJECTION, SOLUTION INTRAMUSCULAR; INTRAVENOUS at 03:59

## 2024-11-11 RX ADMIN — APIXABAN 5 MG: 5 TABLET, FILM COATED ORAL at 21:00

## 2024-11-11 RX ADMIN — CARVEDILOL 25 MG: 25 TABLET, FILM COATED ORAL at 09:10

## 2024-11-11 RX ADMIN — VALSARTAN 320 MG: 320 TABLET ORAL at 09:10

## 2024-11-11 RX ADMIN — SODIUM CHLORIDE, PRESERVATIVE FREE 10 ML: 5 INJECTION INTRAVENOUS at 21:00

## 2024-11-11 RX ADMIN — AMLODIPINE BESYLATE 5 MG: 5 TABLET ORAL at 09:09

## 2024-11-11 RX ADMIN — SODIUM CHLORIDE, PRESERVATIVE FREE 10 ML: 5 INJECTION INTRAVENOUS at 10:43

## 2024-11-11 RX ADMIN — HYDRALAZINE HYDROCHLORIDE 100 MG: 50 TABLET ORAL at 21:00

## 2024-11-11 ASSESSMENT — PAIN DESCRIPTION - FREQUENCY: FREQUENCY: INTERMITTENT

## 2024-11-11 ASSESSMENT — PAIN DESCRIPTION - PAIN TYPE: TYPE: ACUTE PAIN

## 2024-11-11 ASSESSMENT — PAIN DESCRIPTION - ONSET
ONSET: GRADUAL

## 2024-11-11 ASSESSMENT — PAIN SCALES - GENERAL
PAINLEVEL_OUTOF10: 4
PAINLEVEL_OUTOF10: 6
PAINLEVEL_OUTOF10: 10
PAINLEVEL_OUTOF10: 3
PAINLEVEL_OUTOF10: 4
PAINLEVEL_OUTOF10: 6
PAINLEVEL_OUTOF10: 1
PAINLEVEL_OUTOF10: 10
PAINLEVEL_OUTOF10: 2
PAINLEVEL_OUTOF10: 6
PAINLEVEL_OUTOF10: 5

## 2024-11-11 ASSESSMENT — PAIN DESCRIPTION - DESCRIPTORS
DESCRIPTORS: ACHING

## 2024-11-11 ASSESSMENT — PAIN DESCRIPTION - LOCATION
LOCATION: HEAD

## 2024-11-11 ASSESSMENT — PAIN - FUNCTIONAL ASSESSMENT
PAIN_FUNCTIONAL_ASSESSMENT: ACTIVITIES ARE NOT PREVENTED

## 2024-11-11 ASSESSMENT — PAIN DESCRIPTION - ORIENTATION
ORIENTATION: ANTERIOR

## 2024-11-11 NOTE — CONSULTS
tablet 650 mg, 650 mg, Oral, Q6H PRN **OR** acetaminophen (TYLENOL) suppository 650 mg, 650 mg, Rectal, Q6H PRN  ondansetron (ZOFRAN-ODT) disintegrating tablet 4 mg, 4 mg, Oral, Q8H PRN **OR** ondansetron (ZOFRAN) injection 4 mg, 4 mg, IntraVENous, Q6H PRN  sodium chloride flush 0.9 % injection 5-40 mL, 5-40 mL, IntraVENous, 2 times per day  sodium chloride flush 0.9 % injection 5-40 mL, 5-40 mL, IntraVENous, PRN  0.9 % sodium chloride infusion, , IntraVENous, PRN  potassium chloride 20 mEq/50 mL IVPB (Central Line), 20 mEq, IntraVENous, PRN **OR** [DISCONTINUED] potassium chloride 10 mEq/100 mL IVPB (Peripheral Line), 10 mEq, IntraVENous, PRN  magnesium sulfate 2000 mg in 50 mL IVPB premix, 2,000 mg, IntraVENous, PRN  albuterol (PROVENTIL) (2.5 MG/3ML) 0.083% nebulizer solution 2.5 mg, 2.5 mg, Nebulization, Q2H PRN  insulin lispro (HUMALOG,ADMELOG) injection vial 0-8 Units, 0-8 Units, SubCUTAneous, 4x Daily AC & HS  glucose chewable tablet 16 g, 4 tablet, Oral, PRN  dextrose bolus 10% 125 mL, 125 mL, IntraVENous, PRN **OR** dextrose bolus 10% 250 mL, 250 mL, IntraVENous, PRN  glucagon injection 1 mg, 1 mg, SubCUTAneous, PRN  dextrose 10 % infusion, , IntraVENous, Continuous PRN    Social History:  Social History     Socioeconomic History    Marital status: Single     Spouse name: Not on file    Number of children: 4    Years of education: Not on file    Highest education level: Not on file   Occupational History    Not on file   Tobacco Use    Smoking status: Never     Passive exposure: Never    Smokeless tobacco: Never   Vaping Use    Vaping status: Never Used   Substance and Sexual Activity    Alcohol use: No    Drug use: No    Sexual activity: Yes     Partners: Male   Other Topics Concern    Not on file   Social History Narrative    ** Merged History Encounter **          Social Determinants of Health     Financial Resource Strain: Not on file   Food Insecurity: No Food Insecurity (11/8/2024)    Hunger Vital 
PM EST

## 2024-11-12 ENCOUNTER — TELEPHONE (OUTPATIENT)
Dept: NEUROLOGY | Age: 49
End: 2024-11-12

## 2024-11-12 ENCOUNTER — HOSPITAL ENCOUNTER (INPATIENT)
Age: 49
LOS: 11 days | Discharge: HOME | End: 2024-11-23
Attending: STUDENT IN AN ORGANIZED HEALTH CARE EDUCATION/TRAINING PROGRAM
Payer: COMMERCIAL

## 2024-11-12 VITALS
BODY MASS INDEX: 35.22 KG/M2 | OXYGEN SATURATION: 98 % | HEIGHT: 65 IN | RESPIRATION RATE: 18 BRPM | SYSTOLIC BLOOD PRESSURE: 130 MMHG | DIASTOLIC BLOOD PRESSURE: 65 MMHG | TEMPERATURE: 98.7 F | WEIGHT: 211.42 LBS | HEART RATE: 76 BPM

## 2024-11-12 DIAGNOSIS — I63.9 CEREBROVASCULAR ACCIDENT (CVA) DETERMINED BY CLINICAL ASSESSMENT (HCC): Primary | ICD-10-CM

## 2024-11-12 LAB
ANION GAP SERPL CALC-SCNC: 10 MEQ/L (ref 8–16)
BUN SERPL-MCNC: 20 MG/DL (ref 7–22)
CALCIUM SERPL-MCNC: 9.3 MG/DL (ref 8.5–10.5)
CHLORIDE SERPL-SCNC: 101 MEQ/L (ref 98–111)
CO2 SERPL-SCNC: 23 MEQ/L (ref 23–33)
CREAT SERPL-MCNC: 0.8 MG/DL (ref 0.4–1.2)
DEPRECATED RDW RBC AUTO: 48.2 FL (ref 35–45)
ERYTHROCYTE [DISTWIDTH] IN BLOOD BY AUTOMATED COUNT: 15.3 % (ref 11.5–14.5)
GFR SERPL CREATININE-BSD FRML MDRD: > 90 ML/MIN/1.73M2
GLUCOSE BLD STRIP.AUTO-MCNC: 112 MG/DL (ref 70–108)
GLUCOSE BLD STRIP.AUTO-MCNC: 115 MG/DL (ref 70–108)
GLUCOSE BLD STRIP.AUTO-MCNC: 120 MG/DL (ref 70–108)
GLUCOSE SERPL-MCNC: 135 MG/DL (ref 70–108)
HCT VFR BLD AUTO: 38.9 % (ref 37–47)
HGB BLD-MCNC: 12.3 GM/DL (ref 12–16)
MCH RBC QN AUTO: 27.3 PG (ref 26–33)
MCHC RBC AUTO-ENTMCNC: 31.6 GM/DL (ref 32.2–35.5)
MCV RBC AUTO: 86.3 FL (ref 81–99)
PLATELET # BLD AUTO: 362 THOU/MM3 (ref 130–400)
PMV BLD AUTO: 9.7 FL (ref 9.4–12.4)
POTASSIUM SERPL-SCNC: 4 MEQ/L (ref 3.5–5.2)
RBC # BLD AUTO: 4.51 MILL/MM3 (ref 4.2–5.4)
SODIUM SERPL-SCNC: 134 MEQ/L (ref 135–145)
WBC # BLD AUTO: 10.6 THOU/MM3 (ref 4.8–10.8)

## 2024-11-12 PROCEDURE — 36415 COLL VENOUS BLD VENIPUNCTURE: CPT

## 2024-11-12 PROCEDURE — 2580000003 HC RX 258: Performed by: STUDENT IN AN ORGANIZED HEALTH CARE EDUCATION/TRAINING PROGRAM

## 2024-11-12 PROCEDURE — 1180000000 HC REHAB R&B

## 2024-11-12 PROCEDURE — 6370000000 HC RX 637 (ALT 250 FOR IP): Performed by: STUDENT IN AN ORGANIZED HEALTH CARE EDUCATION/TRAINING PROGRAM

## 2024-11-12 PROCEDURE — 80048 BASIC METABOLIC PNL TOTAL CA: CPT

## 2024-11-12 PROCEDURE — 6370000000 HC RX 637 (ALT 250 FOR IP)

## 2024-11-12 PROCEDURE — 99222 1ST HOSP IP/OBS MODERATE 55: CPT | Performed by: STUDENT IN AN ORGANIZED HEALTH CARE EDUCATION/TRAINING PROGRAM

## 2024-11-12 PROCEDURE — 82948 REAGENT STRIP/BLOOD GLUCOSE: CPT

## 2024-11-12 PROCEDURE — 99239 HOSP IP/OBS DSCHRG MGMT >30: CPT | Performed by: STUDENT IN AN ORGANIZED HEALTH CARE EDUCATION/TRAINING PROGRAM

## 2024-11-12 PROCEDURE — 2580000003 HC RX 258

## 2024-11-12 PROCEDURE — 85027 COMPLETE CBC AUTOMATED: CPT

## 2024-11-12 RX ORDER — VALSARTAN 320 MG/1
320 TABLET ORAL DAILY
Status: CANCELLED | OUTPATIENT
Start: 2024-11-13

## 2024-11-12 RX ORDER — CARVEDILOL 25 MG/1
25 TABLET ORAL 2 TIMES DAILY
Status: CANCELLED | OUTPATIENT
Start: 2024-11-12

## 2024-11-12 RX ORDER — ATORVASTATIN CALCIUM 40 MG/1
40 TABLET, FILM COATED ORAL NIGHTLY
Status: DISCONTINUED | OUTPATIENT
Start: 2024-11-12 | End: 2024-11-12 | Stop reason: HOSPADM

## 2024-11-12 RX ORDER — SODIUM CHLORIDE 9 MG/ML
INJECTION, SOLUTION INTRAVENOUS PRN
Status: CANCELLED | OUTPATIENT
Start: 2024-11-12

## 2024-11-12 RX ORDER — POTASSIUM CHLORIDE 29.8 MG/ML
20 INJECTION INTRAVENOUS PRN
Status: DISCONTINUED | OUTPATIENT
Start: 2024-11-12 | End: 2024-11-23 | Stop reason: HOSPADM

## 2024-11-12 RX ORDER — MAGNESIUM SULFATE IN WATER 40 MG/ML
2000 INJECTION, SOLUTION INTRAVENOUS PRN
Status: DISCONTINUED | OUTPATIENT
Start: 2024-11-12 | End: 2024-11-23 | Stop reason: HOSPADM

## 2024-11-12 RX ORDER — ACETAMINOPHEN 325 MG/1
650 TABLET ORAL EVERY 4 HOURS PRN
Status: CANCELLED | OUTPATIENT
Start: 2024-11-12

## 2024-11-12 RX ORDER — CARVEDILOL 25 MG/1
25 TABLET ORAL 2 TIMES DAILY
Status: DISCONTINUED | OUTPATIENT
Start: 2024-11-12 | End: 2024-11-23 | Stop reason: HOSPADM

## 2024-11-12 RX ORDER — DEXTROSE MONOHYDRATE 100 MG/ML
INJECTION, SOLUTION INTRAVENOUS CONTINUOUS PRN
Status: DISCONTINUED | OUTPATIENT
Start: 2024-11-12 | End: 2024-11-12

## 2024-11-12 RX ORDER — VALSARTAN 320 MG/1
320 TABLET ORAL DAILY
Status: DISCONTINUED | OUTPATIENT
Start: 2024-11-13 | End: 2024-11-23 | Stop reason: HOSPADM

## 2024-11-12 RX ORDER — ACETAMINOPHEN 325 MG/1
650 TABLET ORAL EVERY 4 HOURS PRN
Status: DISCONTINUED | OUTPATIENT
Start: 2024-11-12 | End: 2024-11-23 | Stop reason: HOSPADM

## 2024-11-12 RX ORDER — SODIUM CHLORIDE 0.9 % (FLUSH) 0.9 %
5-40 SYRINGE (ML) INJECTION EVERY 12 HOURS SCHEDULED
Status: DISCONTINUED | OUTPATIENT
Start: 2024-11-12 | End: 2024-11-16

## 2024-11-12 RX ORDER — SODIUM CHLORIDE 0.9 % (FLUSH) 0.9 %
5-40 SYRINGE (ML) INJECTION PRN
Status: DISCONTINUED | OUTPATIENT
Start: 2024-11-12 | End: 2024-11-23 | Stop reason: HOSPADM

## 2024-11-12 RX ORDER — DEXTROSE MONOHYDRATE 100 MG/ML
INJECTION, SOLUTION INTRAVENOUS CONTINUOUS PRN
Status: CANCELLED | OUTPATIENT
Start: 2024-11-12

## 2024-11-12 RX ORDER — ALBUTEROL SULFATE 0.83 MG/ML
2.5 SOLUTION RESPIRATORY (INHALATION)
Status: CANCELLED | OUTPATIENT
Start: 2024-11-12

## 2024-11-12 RX ORDER — INSULIN LISPRO 100 [IU]/ML
0-8 INJECTION, SOLUTION INTRAVENOUS; SUBCUTANEOUS
Status: DISCONTINUED | OUTPATIENT
Start: 2024-11-12 | End: 2024-11-12

## 2024-11-12 RX ORDER — HYDROCHLOROTHIAZIDE 25 MG/1
25 TABLET ORAL DAILY
Status: CANCELLED | OUTPATIENT
Start: 2024-11-13

## 2024-11-12 RX ORDER — DOCUSATE SODIUM 100 MG/1
100 CAPSULE, LIQUID FILLED ORAL 2 TIMES DAILY
Status: DISCONTINUED | OUTPATIENT
Start: 2024-11-12 | End: 2024-11-23 | Stop reason: HOSPADM

## 2024-11-12 RX ORDER — ALBUTEROL SULFATE 0.83 MG/ML
2.5 SOLUTION RESPIRATORY (INHALATION)
Status: DISCONTINUED | OUTPATIENT
Start: 2024-11-12 | End: 2024-11-23 | Stop reason: HOSPADM

## 2024-11-12 RX ORDER — SENNOSIDES A AND B 8.6 MG/1
1 TABLET, FILM COATED ORAL NIGHTLY
Status: CANCELLED | OUTPATIENT
Start: 2024-11-12

## 2024-11-12 RX ORDER — MAGNESIUM SULFATE IN WATER 40 MG/ML
2000 INJECTION, SOLUTION INTRAVENOUS PRN
Status: CANCELLED | OUTPATIENT
Start: 2024-11-12

## 2024-11-12 RX ORDER — POTASSIUM CHLORIDE 1500 MG/1
40 TABLET, EXTENDED RELEASE ORAL PRN
Status: CANCELLED | OUTPATIENT
Start: 2024-11-12

## 2024-11-12 RX ORDER — GLUCAGON 1 MG/ML
1 KIT INJECTION PRN
Status: CANCELLED | OUTPATIENT
Start: 2024-11-12

## 2024-11-12 RX ORDER — ONDANSETRON 4 MG/1
4 TABLET, ORALLY DISINTEGRATING ORAL EVERY 8 HOURS PRN
Status: CANCELLED | OUTPATIENT
Start: 2024-11-12

## 2024-11-12 RX ORDER — SENNOSIDES A AND B 8.6 MG/1
1 TABLET, FILM COATED ORAL NIGHTLY
Status: DISCONTINUED | OUTPATIENT
Start: 2024-11-12 | End: 2024-11-22

## 2024-11-12 RX ORDER — HYDROCHLOROTHIAZIDE 25 MG/1
25 TABLET ORAL DAILY
Status: DISCONTINUED | OUTPATIENT
Start: 2024-11-13 | End: 2024-11-23 | Stop reason: HOSPADM

## 2024-11-12 RX ORDER — POTASSIUM CHLORIDE 29.8 MG/ML
20 INJECTION INTRAVENOUS PRN
Status: CANCELLED | OUTPATIENT
Start: 2024-11-12

## 2024-11-12 RX ORDER — GLUCAGON 1 MG/ML
1 KIT INJECTION PRN
Status: DISCONTINUED | OUTPATIENT
Start: 2024-11-12 | End: 2024-11-12

## 2024-11-12 RX ORDER — SODIUM CHLORIDE 0.9 % (FLUSH) 0.9 %
5-40 SYRINGE (ML) INJECTION PRN
Status: CANCELLED | OUTPATIENT
Start: 2024-11-12

## 2024-11-12 RX ORDER — ASPIRIN 81 MG/1
81 TABLET, CHEWABLE ORAL DAILY
Status: DISCONTINUED | OUTPATIENT
Start: 2024-11-12 | End: 2024-11-23 | Stop reason: HOSPADM

## 2024-11-12 RX ORDER — INSULIN LISPRO 100 [IU]/ML
0-8 INJECTION, SOLUTION INTRAVENOUS; SUBCUTANEOUS
Status: CANCELLED | OUTPATIENT
Start: 2024-11-12

## 2024-11-12 RX ORDER — SODIUM CHLORIDE 0.9 % (FLUSH) 0.9 %
5-40 SYRINGE (ML) INJECTION EVERY 12 HOURS SCHEDULED
Status: CANCELLED | OUTPATIENT
Start: 2024-11-12

## 2024-11-12 RX ORDER — SODIUM CHLORIDE 9 MG/ML
INJECTION, SOLUTION INTRAVENOUS PRN
Status: DISCONTINUED | OUTPATIENT
Start: 2024-11-12 | End: 2024-11-23 | Stop reason: HOSPADM

## 2024-11-12 RX ORDER — AMLODIPINE BESYLATE 5 MG/1
5 TABLET ORAL DAILY
Status: DISCONTINUED | OUTPATIENT
Start: 2024-11-13 | End: 2024-11-23 | Stop reason: HOSPADM

## 2024-11-12 RX ORDER — POTASSIUM CHLORIDE 1500 MG/1
40 TABLET, EXTENDED RELEASE ORAL PRN
Status: DISCONTINUED | OUTPATIENT
Start: 2024-11-12 | End: 2024-11-23 | Stop reason: HOSPADM

## 2024-11-12 RX ORDER — ONDANSETRON 4 MG/1
4 TABLET, ORALLY DISINTEGRATING ORAL EVERY 8 HOURS PRN
Status: DISCONTINUED | OUTPATIENT
Start: 2024-11-12 | End: 2024-11-23 | Stop reason: HOSPADM

## 2024-11-12 RX ORDER — HYDRALAZINE HYDROCHLORIDE 50 MG/1
100 TABLET, FILM COATED ORAL 3 TIMES DAILY
Status: DISCONTINUED | OUTPATIENT
Start: 2024-11-12 | End: 2024-11-23 | Stop reason: HOSPADM

## 2024-11-12 RX ORDER — ATORVASTATIN CALCIUM 40 MG/1
40 TABLET, FILM COATED ORAL NIGHTLY
Status: CANCELLED | OUTPATIENT
Start: 2024-11-12

## 2024-11-12 RX ORDER — HYDRALAZINE HYDROCHLORIDE 50 MG/1
100 TABLET, FILM COATED ORAL 3 TIMES DAILY
Status: CANCELLED | OUTPATIENT
Start: 2024-11-12

## 2024-11-12 RX ORDER — AMLODIPINE BESYLATE 5 MG/1
5 TABLET ORAL DAILY
Status: CANCELLED | OUTPATIENT
Start: 2024-11-13

## 2024-11-12 RX ORDER — POLYETHYLENE GLYCOL 3350 17 G/17G
17 POWDER, FOR SOLUTION ORAL 2 TIMES DAILY
Status: DISCONTINUED | OUTPATIENT
Start: 2024-11-12 | End: 2024-11-22

## 2024-11-12 RX ORDER — POLYETHYLENE GLYCOL 3350 17 G/17G
17 POWDER, FOR SOLUTION ORAL 2 TIMES DAILY
Status: CANCELLED | OUTPATIENT
Start: 2024-11-12

## 2024-11-12 RX ORDER — ATORVASTATIN CALCIUM 40 MG/1
40 TABLET, FILM COATED ORAL NIGHTLY
Status: DISCONTINUED | OUTPATIENT
Start: 2024-11-12 | End: 2024-11-23 | Stop reason: HOSPADM

## 2024-11-12 RX ADMIN — ACETAMINOPHEN 650 MG: 325 TABLET ORAL at 03:45

## 2024-11-12 RX ADMIN — SODIUM CHLORIDE, PRESERVATIVE FREE 10 ML: 5 INJECTION INTRAVENOUS at 20:03

## 2024-11-12 RX ADMIN — ATORVASTATIN CALCIUM 40 MG: 40 TABLET, FILM COATED ORAL at 19:56

## 2024-11-12 RX ADMIN — HYDRALAZINE HYDROCHLORIDE 100 MG: 50 TABLET ORAL at 15:04

## 2024-11-12 RX ADMIN — CARVEDILOL 25 MG: 25 TABLET, FILM COATED ORAL at 08:01

## 2024-11-12 RX ADMIN — SODIUM CHLORIDE, PRESERVATIVE FREE 10 ML: 5 INJECTION INTRAVENOUS at 08:01

## 2024-11-12 RX ADMIN — HYDROCHLOROTHIAZIDE 25 MG: 25 TABLET ORAL at 08:01

## 2024-11-12 RX ADMIN — POLYETHYLENE GLYCOL 3350 17 G: 17 POWDER, FOR SOLUTION ORAL at 08:01

## 2024-11-12 RX ADMIN — VALSARTAN 320 MG: 320 TABLET ORAL at 08:01

## 2024-11-12 RX ADMIN — ACETAMINOPHEN 650 MG: 325 TABLET ORAL at 15:54

## 2024-11-12 RX ADMIN — SENNOSIDES 8.6 MG: 8.6 TABLET ORAL at 20:28

## 2024-11-12 RX ADMIN — AMLODIPINE BESYLATE 5 MG: 5 TABLET ORAL at 08:00

## 2024-11-12 RX ADMIN — HYDRALAZINE HYDROCHLORIDE 100 MG: 50 TABLET ORAL at 19:56

## 2024-11-12 RX ADMIN — HYDRALAZINE HYDROCHLORIDE 100 MG: 50 TABLET ORAL at 08:00

## 2024-11-12 RX ADMIN — DOCUSATE SODIUM 100 MG: 100 CAPSULE, LIQUID FILLED ORAL at 19:56

## 2024-11-12 RX ADMIN — ASPIRIN 81 MG 81 MG: 81 TABLET ORAL at 15:54

## 2024-11-12 RX ADMIN — CARVEDILOL 25 MG: 25 TABLET, FILM COATED ORAL at 19:56

## 2024-11-12 RX ADMIN — APIXABAN 5 MG: 5 TABLET, FILM COATED ORAL at 08:30

## 2024-11-12 ASSESSMENT — PAIN SCALES - GENERAL
PAINLEVEL_OUTOF10: 2
PAINLEVEL_OUTOF10: 3
PAINLEVEL_OUTOF10: 0
PAINLEVEL_OUTOF10: 3
PAINLEVEL_OUTOF10: 2

## 2024-11-12 ASSESSMENT — PAIN SCALES - WONG BAKER: WONGBAKER_NUMERICALRESPONSE: HURTS A LITTLE BIT

## 2024-11-12 ASSESSMENT — PAIN DESCRIPTION - LOCATION
LOCATION: SHOULDER
LOCATION: HEAD

## 2024-11-12 ASSESSMENT — PAIN DESCRIPTION - DESCRIPTORS: DESCRIPTORS: ACHING

## 2024-11-12 ASSESSMENT — PAIN DESCRIPTION - FREQUENCY: FREQUENCY: INTERMITTENT

## 2024-11-12 ASSESSMENT — PAIN - FUNCTIONAL ASSESSMENT: PAIN_FUNCTIONAL_ASSESSMENT: ACTIVITIES ARE NOT PREVENTED

## 2024-11-12 ASSESSMENT — PAIN DESCRIPTION - ORIENTATION: ORIENTATION: LEFT

## 2024-11-12 NOTE — H&P
Physical Medicine & Rehabilitation   History and Physical    Chief Complaint and Reason for Rehabilitation Admission: Clinically suspected CVA    History of Present Illness:  Yulissa Andersen  is a 49 y.o. female with PMH significant for CAD, DM, HTN, OA, GERD, migraines, and CVA (1995) who is being admitted to the inpatient rehabilitation unit on 11/12/2024.  History obtained via: ED documentation, acute care documentation, and patient     Patient initially presented to [facility] ED on 11/7/2024 with complaints of headache and AMS.  In the ED, patient's boyfriend reported that she woke up with a severe migraine and associated nausea.  Patient reportedly drove herself to PCP where they transferred her to ED due to hypertensive emergency.  She reportedly received labetalol in room.  She was noted to have deterioration of her mental status.  Stroke alert was activated.  Initial NIH was 18 (reported aphasia, partial gaze palsy, bilateral blindness, all 4 extremities drift and hit the bed and left sided sensation deficit).  CT head was reportedly negative.  CT of the head and neck was reportedly negative for LVO or critical stenosis patient determined to be a good candidate for TNK which was given after initiation of Cardene drip for BP management.  After TNK, patient was admitted to ICU for further evaluation management.     Patient stay complicated by angioedema thought to be secondary to Vasotec.  Patient given Decadron, Benadryl, and FFP x2.  MRI of brain reportedly negative.  CTA reportedly with a possible filling defect in the distal right middle cerebral artery, 3.3 mm aneurysm arising from the terminal segment of the left internal carotid artery, no evidence of vasospasm.  Echo was obtained reportedly with EF 55-60%, no PFO.  EEG was obtained and was reportedly normal.  Patient was transferred out of the ICU on 11/9/2024. Patient thought to have a clinically suspected CVA.     On day of consultation, patient is seen  9.4 - 12.4 fL   Basic Metabolic Panel    Collection Time: 11/12/24  3:52 AM   Result Value Ref Range    Sodium 134 (L) 135 - 145 meq/L    Potassium 4.0 3.5 - 5.2 meq/L    Chloride 101 98 - 111 meq/L    CO2 23 23 - 33 meq/L    Glucose 135 (H) 70 - 108 mg/dL    BUN 20 7 - 22 mg/dL    Creatinine 0.8 0.4 - 1.2 mg/dL    Calcium 9.3 8.5 - 10.5 mg/dL   Anion Gap    Collection Time: 11/12/24  3:52 AM   Result Value Ref Range    Anion Gap 10.0 8.0 - 16.0 meq/L   Glomerular Filtration Rate, Estimated    Collection Time: 11/12/24  3:52 AM   Result Value Ref Range    Est, Glom Filt Rate > 90 >60 ml/min/1.73m2   POCT glucose    Collection Time: 11/12/24  7:44 AM   Result Value Ref Range    POC Glucose 115 (H) 70 - 108 mg/dl       Impression:  Strokelike symptoms- clinically suspected CVA  Acute DVT  Left internal carotid artery aneurysm  Type 2 diabetes  HTN  Migraines  Angioedema  Obesity, class II (BMI 35.18)  Impaired mobility and ADLs  Expressive and receptive language impairment     Plan:   Admit to the inpatient rehabilitation unit.  The patient demonstrates good potential to participate in an inpatient rehabilitation program involving at least 3 hours per day, 5 days per week of intensive rehabilitation.  Rehabilitation services will include PT, OT, and SLP/RT in order to improve functional status prior to discharge.  Family education and training will be completed.  Equipment evaluations and recommendations will be completed as appropriate.       Rehabilitation nursing will be involved for bowel, bladder, skin, and pain management.  Nursing will also provide education and training to patient and family.    Dr. Stuart consulted for medical management   Clinically suspected CVA: Continue atorvastatin 40 mg QHS. Discussed with discharging hospsitalist who agreed with initiating ASA 81 mg for secondary CVA prophylaxis. Neurology most recent note stated no antiplt or antithrombotics for 24 hours post TNK  Left internal

## 2024-11-12 NOTE — PROGRESS NOTES
Admitted to the Inpatient Rehabilitation Unit via wheelchair.  Patient was then oriented to room and unit.  Education provided on the rehabilitation routine: three hours of therapy five days per week.      Explained patients right to have family, representative or physician notified of their admission.  Patient has Requested for physician to be notified.  Patient has Requested for family/representative to be notified.    Admitting medication orders compared with acute stay medications; home medication list reviewed with patient/family.  Medication issues identified No    If yes, physician notified Dr Leal.    Vaccination Status  Have you ever received a COVID-19 vaccine? Yes  date of last vaccine: Unknown, Brand: Moderna      Transportation:   Has transportation kept you from medical appointments, meetings, work, or from getting things needed for daily living? (Check all that apply)  No.      Health Literacy:   How often do you need to have someone help you when you read instructions, pamphlets, or other written material from your doctor or pharmacy?  0. - Never    Social Isolation:  How often do you feel lonely or isolated from those around you?  0. Never    Patient Mood Interview (PHQ-2 to 9) (from [employer]©)   Say to Patient: \"Over the last 2 weeks, have you been bothered by any of the following problems?\"   If symptom is present, enter yes in column 1 (Symptom Presence)  If yes in column 1, then ask the patient: “About how often have you been bothered by this?” Indicate response in column 2, Symptom Frequency.   Symptom Presence  No    Yes   9.    No response  Symptom Frequency  Never or 1 day  2-6 days (several days)  7-11 days (half or more of the days)  12-14 days (nearly every day)    Symptom Presence Symptom Frequency   Little interest or pleasure in doing things 0. No 0. Never or 1 day   Feeling down, depressed, or hopeless 0. No 0. Never or 1 day   If either A or B above has symptom frequency coded 2  or 3, CONTINUE asking questions below.      If not, END the interview  and right click on next table to delete.               Pain:  Pain Effect on Sleep    Ask patient: \"Over the past 5 days, how much of the time has pain made it hard for you to sleep at night?\" 1.  Rarely or not at all     If no pain is reported, Stop here. If pain is reported, continue with the additional questions.   Pain Interference with Therapy Activities   Ask patient: \"Over the past 5 days, how often have you limited your participation in rehabilitation therapy sessions due to pain?\" 0. Does not apply - I have not received rehabilitation therapy in the past 5 days   Pain Interference with Day to Day Activities   Ask patient: \"Over the past 5 days, how often have you limited your day to day activities (excluding rehabilitation therapy sessions) because of pain?\" 1.  Rarely or not at all         Bladder and Bowel Function Assessment:  Prior history of bladder problems: no problems with bladder  Number of pads used per day:  0  Frequency of night time voiding: once  Fluid intake volume and pattern: 3-4 bottles of water per day  Last BM: 11/08/2024  Bowel problems (prior or current) No      Incontinence      Frequent diarrhea      No BM in 3 days this stay or history of constipation      Hemorrhoids      Diverticulitis      Bowel Surgery     Two nurse skin assessment performed by Aletha EDWARDS  and Shelley BERNAL RN .        Weight: 91.7 kg      Care plan was created with patient's input and goals were agreed upon.  Admission folder provided with education regarding patient’s diagnoses, fall prevention, and skin care.  \"Data Collection Information Summary for Patients in Inpatient Rehabilitation Facilities\" and \"Privacy Act Statement - Health Care Records\" provided.  Please refer to the admission navigator for further information.

## 2024-11-12 NOTE — CARE COORDINATION

## 2024-11-12 NOTE — TELEPHONE ENCOUNTER
----- Message from JUAN Gary CNP sent at 11/8/2024  5:45 PM EST -----  Regarding: Follow up appt for aneurysm   Hi Dr. Owen Garcia would like to see her for a follow up appointment in the clinic in December with him to talk to her about her aneurysm.       Thanks Azucena

## 2024-11-12 NOTE — PLAN OF CARE
Problem: Discharge Planning  Goal: Discharge to home or other facility with appropriate resources  Outcome: Progressing  Flowsheets (Taken 11/12/2024 1456)  Discharge to home or other facility with appropriate resources: Identify barriers to discharge with patient and caregiver     Problem: Skin/Tissue Integrity  Goal: Absence of new skin breakdown  Description: 1.  Monitor for areas of redness and/or skin breakdown  2.  Assess vascular access sites hourly  3.  Every 4-6 hours minimum:  Change oxygen saturation probe site  4.  Every 4-6 hours:  If on nasal continuous positive airway pressure, respiratory therapy assess nares and determine need for appliance change or resting period.  Outcome: Progressing     Problem: ABCDS Injury Assessment  Goal: Absence of physical injury  Outcome: Progressing  Flowsheets (Taken 11/12/2024 1456)  Absence of Physical Injury: Implement safety measures based on patient assessment     Problem: Safety - Adult  Goal: Free from fall injury  Outcome: Progressing  Flowsheets (Taken 11/12/2024 1456)  Free From Fall Injury: Instruct family/caregiver on patient safety     Problem: Pain  Goal: Verbalizes/displays adequate comfort level or baseline comfort level  Outcome: Progressing  Flowsheets (Taken 11/12/2024 1300)  Verbalizes/displays adequate comfort level or baseline comfort level:   Assess pain using appropriate pain scale   Encourage patient to monitor pain and request assistance

## 2024-11-12 NOTE — PROGRESS NOTES
Hospitalist Progress Note      Patient:  Yulissa Andersen    Unit/Bed:4A-19/019-A  YOB: 1975  MRN: [id number]   Acct: [id number]   PCP: Amanda García APRN - CNP  Date of Admission: 11/7/2024    ASSESSMENT AND PLAN    Hypertensive encephalopathy versus strokelike symptoms: S/p TNK at 11/7/2024 at 1015.  Patient presented with headache and episodes of emesis/hematemesis.  In ED, CT head showed possible filling defects in MCA therefore was given TNK.  Also she was started on labetalol, hydralazine, and Cardene drip for hypertensive emergency.  She was admitted to ICU post TNK.  She had some aphasia and vision deficit, bilateral hemianopia.  Repeat CT head showed no acute findings.  MRI brain on 11/8 showed no acute infarct.  Patient underwent four-vessel diagnostic cerebral angiogram on 11/8 with Dr. Moore.  Showed no evidence of vasospasm.  Did display changes of fibromuscular dysplasia in bilateral ICA cervical segments, as well as left ICA terminus aneurysm with a blood measuring 5.5 mm x 3.5 mm.  Neurology signed off on 11/9.  Slowly reintroducing home antihypertensive medications.  Maintain -140.  Patient reports having headache today.  PT/OT/speech following.  Inpatient rehab consult tomorrow.  DVT, acute: Venous Doppler of lower extremity showed acute DVT in left posterior tibial and peroneal veins. Continue heparin gtt. Plan to transition to oral DOAC tomorrow.   Left internal carotid artery aneurysm: CTA showed 3.3 mm aneurysm arising from terminal segment of left internal carotid artery.  Diagnostic cerebral angiogram on 11/8/2024 showed changes of FMD in bilateral ICA cervical segments.  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  Plan for follow-up with interventional neurology, Dr. Weaver in office on 12/2024  NIDDM 2, chronic: Metformin per chart review and 
    [facility]     Neurodiagnostic Laboratory Technician Worksheet      EEG Date: 2024    Name: Yulissa Andersen  : 1975  Age: 49 y.o.  Sex: female  MRN: [id number]  CSN: [id number]    Room/Location: [facility]  Ordering Provider: magnolia Walker    EEG Number: [id number]    Time In: 38  Time Out: ongoing, please give extra 15 mins for more resting EEG since pt eating  Total Treatment Time: 1hr extended eeg    Clinical History: altered mental status,  hypertension, migraine headaches  Ct    IMPRESSION:     1. Stable CT scan of the brain, no interval change since previous study dated  2024.  Hand Dominance: Right   Sedation: No   H.V. Completed: No, not done   Photic: No   Sleep: No   Drowsy: Yes   Sleep Deprived: No   Seizures Observed: No   Mentality: alert     Technician: Ana Laura Carmen 2024                 
  CRITICAL CARE H & P      Patient:  Yulissa Andersen    Unit/Bed:4D-11/011-A  YOB: 1975  MRN: [id number]   PCP: Amanda García APRN - CNP  Date of Admission: 11/7/2024  Chief Complaint:-Headache    Assessment and Plan:    Hypertensive emergency vs ischemic stroke: Patient seen today and is still having a headache she was given another suppository of Tylenol 650 mg.  Her left-sided numbness has decreased but is still present.  She still has aphasia and vision deficit (bilateral hemianopia).  Repeat CT earlier today showed no acute findings.  Patient had an MRI head yesterday that showed no acute infarct.   NIDDM 2, chronic: Metformin per chart review and Emgality.  Non-insulin meds held.  Hypoglycemia protocol in place.  POCT glucose 3X daily.  HTN, chronic:  Home meds include Cardiol 25 mg, Lasix 20 mg, and valsartan and 320 mg.  All held and patient's on a Cardene drip with parameters want -180 as neurology recommended.  Will continue to monitor BP  Migraine, chronic: Patient given morphine in the ED because worsening of headache, so she was given  Angioedema, resolved: Likely 2/2 Vasotec patient given Decadron, Benadryl followed by 0.3 mg FFP 2X.  Patient was still having tongue swelling, difficulty swallowing, and urticaria so she was consented for FFP transfusion.  Will continue to monitor and treat as needed    INITIAL H AND P AND ICU COURSE:  49-year-old female known case of hypertension, NIDDM, and migraine came into the ED with severe headache, generalized weakness, left-sided numbness, and high BP (225/111).  According to patient's fiancé patient woke up this morning with the headache and had 2 episodes of vomiting 1 with hematemesis.  Patient went to her primary care to follow-up became dizzy there and was transferred to [facility].  In the ED CT head was done showed possible filling defect in middle cerebral artery, by neurosurgery's recommendations she was given TNK.  She was also given 
  CRITICAL CARE H & P      Patient:  Yulissa Andersen    Unit/Bed:4D-11/011-A  YOB: 1975  MRN: [id number]   PCP: Amanda García APRN - CNP  Date of Admission: 11/7/2024  Chief Complaint: Headache    Assessment and Plan:    Hypertensive emergency vs ischemic stroke: Continues to complain of headache - given another suppository of Tylenol 650 mg.  Her left-sided numbness has decreased but is still present.  She still has aphasia and vision deficit (bilateral hemianopia).  Repeat CT earlier today showed no acute findings. MRI brain (11/8) showed no acute infarct. Underwent four-vessel diagnostic cerebral angiogram on 11/8 with Dr. Weaver - showed no evidence of vasospasm; did display changes of fibromuscular dysplasia and B/L ICA cervical segments, as well as left ICA terminus aneurysm with a bleb measuring 5.5mm x 3.5mm. Neurology continues to follow - recommending new sBP goal of 110-140.  DVT, acute: L-PT and peroneal DVTs on BLE vascular duplex.  Continue heparin gtt  NIDDM 2, chronic: Metformin per chart review and Emgality.  Non-insulin meds held.  Hypoglycemia protocol in place.  POCT glucose 3x daily.  HTN, chronic:  Home meds include Cardiol 25 mg, Lasix 20 mg, and valsartan and 320 mg.  Will start to resume home meds and continue Cardene drip with parameters of sBP goal 110-140 as neurology recommended.  Will continue to monitor BP  Migraine, chronic: Patient given morphine in the ED because worsening of headache, continued headache - improvement with Tylenol  Angioedema, resolved: Likely 2/2 Vasotec patient given Decadron, Benadryl followed by 0.3 mg FFP 2X.  Patient was still having tongue swelling, difficulty swallowing, and urticaria so she was consented for FFP transfusion.  Will continue to monitor and treat as needed    INITIAL H AND P AND ICU COURSE:  49-year-old female known case of hypertension, NIDDM, and migraine came into the ED with severe headache, generalized weakness, and 
1220 Pt communicated her tongue is swollen. Noted lt side of tongue swollen. Notified  , he was in to examine pt. Orders received.  1230 noted some swelling of lt side of face along with tongue.  
1445 Verify consent, H&P, and/or immediate pre-procedure note completed. Staff involved in the case: BW, MS, DM, MH, TK, Dr Weaver.   1449 Patient received in cath lab for procedure family taken to waiting room. Neuro assessment; nonverbal but able to follow commands.  (Neuro assessments suspended while in procedure, vital signs, and pulse oximeter every 5 minutes documented in flow sheets  1455 Pre-procedure peripheral pulse: right pedal 2+, right posterior tibial 2+, left pedal 2+, left posterior tibial 2+. Patient prepped for procedure  1504 Procedure started with Dr. Weaver.Timeout performed and the following information was verified: correct patient, correct procedure, correct procedure site, correct position, correct laterality (if applicable), procedure site marked and visible (if applicable), and consents verified.  Allergies reviewed.  Pertinent diagnostic and radiologic results present and relevant images available (if applicable).  All special equipment or special requirements available as applicable. Prophylactic antibiotics given as applicable.  Fire risk safety assessment completed, shared with team and appropriate interventions implemented.  1506 Lidocaine 8ml to right groin.   1508 Access obtained to right femoral artery; 5fr sheath placed.   1516 Angiogram of the right internal carotid artery.   1526 Angiogram of the left internal carotid artery.   1535 3D spin complete.  1538 Angiogram of the left vertebral artery.   1545 Dr Weaver reviewing images.   1548 Angiogram of the right femoral artery access site.   1550 Sheath removed intact. Angioseal 6fr deployed to right femoral artery access.  Procedure completed; patient tolerated well.  1554 Femoral site area soft to touch with no bleeding noted. Hemostasis achieved.  1556 Post-procedure peripheral pulse: right pedal 2+, right posterior tibial 2+, left pedal 2+, left posterior tibial 2+.  1558 Femoral dressing remains dry and intact with area soft. 
[facility]  PHYSICAL THERAPY MISSED TREATMENT NOTE  STRZ ICU 4D    Date: 2024  Patient Name: Yulissa Andersen        MRN: [id number]   : 1975  (49 y.o.)  Gender: female                REASON FOR MISSED TREATMENT:  Hold treatment per nursing request.      First attempt this morning pt on hold per RN. Second attempt pt has new DVT. Will try back as able   
Dr. Leal completed peer to peer, precert is approved for [facility] admission.    Patient to discharge/readmit to [address] [place] [address] [id number].  SANDRA Kelly updated.    
[facility]  INPATIENT REHABILITATION UNIT  PRECERTIFICATION TEMPLATE    Date of Admission: 2024  9:37 AM    Patient Name: Yulissa Andersen      MRN: [id number]    : 1975  (49 y.o.)  Gender: female     Payor Source: Payor: BCBS / Plan: [place]BS - [place] PPO / Product Type: *No Product type* /   Secondary Payor Source:  [facility]    Isolation Status: No active isolations    Precert initiated for Inpatient Rehab Admission at [facility].    Reference Number: [id number]   Route of Precertification Transaction: Insurance company portal.    Name:  YULISSA ANDERSEN  Member ID:  [id number]  YOB: 1975  Health Plan:  [employer]  Member State:  [place]  New PAC IM Commercial Authorization Request Details  1  Authorization  2  Diagnosis (ICD-10)  3  Servicing Facility  4  Ordering Provider  5  Clinical Information  6  Documents  7  Preview  8  Complete  The authorization request [id number] has been submitted. The authorization is pending for clinical review.  The authorization request has been submitted. Click a button below to view the authorization request or return to Home Page.  Return to Home Page  View Authorization Request      
Name:  DELMY SCHMIDT  Member ID:  [id number]  YOB: 1975  Health Plan:  [employer]  Member State:  [place]  Member Details  Authorization Details  Authorization Number:  [id number]  General Information  Diagnosis (ICD-10)  Special Instructions  Authorization Status:  Active  Type of Authorization:  IRF  Clinical Category:  Neurologic disorder - NOS  Date/Time Request Received:  11/11/2024 03:45:08 PM  Admit Date:  11/11/2024  Admit Date Confirmed?  []Urgency:  Routine  Effective From:  11/11/2024  Effective Through:  11/17/2024  Request Source:  External  Staffing Status:  Staffed  Requestor Details:  [email or web address]  Requestor Phone Number:  [phone]  Acute Referral Source:  [facility]  Next Review Date:  11/15/2024  Created Date:  11/11/2024 03:45 PM  Client Auth Number:  --  Created By:  altagracia watkins  [facility] Discharge Date:  --  Discharge Disposition:  --  Discharge Rationale:  --  Discharge Date:  --  Discharge Date Verified:  []Anticipated Discharge Date:  --  Care Conference Attendance:  []Care Conference Date/Time:  --  OON Override - Servicing Facility:  []OON Override - Attending Provider:  []Late Call Penalty:  []CM Referral:  []Non-Admit:  []HLOS - Potential Catastrophic:  []Date/Time Notified of Member Expiration:  --    
Neurology Progress Note    Date:11/8/2024       Room:[facility]11/011-A  Patient Name:Yulissa Andersen     YOB: 1975     Age:49 y.o.    CC:   Chief Complaint   Patient presents with   • Hypertension   • Altered Mental Status        Subjective        Yulissa Andersen is a 49 y.o. female with a history of essential hypertension, migraine headaches, type 2 diabetes, CAD, CVA (1995), GERD, vitamin D deficiency and obesity who presented to [facility] on 11/7/2024 from the urgent care with hypertensive emergency, migraine headache with subsequent altered mental status.  According to the patient's boyfriend he reports that she woke up with a severe migraine headache associated with nausea.  The patient drove herself to the urgent care where they called 911 due to hypertensive emergency.  She received labetalol and route to the hospital and her mental status started to deteriorate.  A stroke alert was activated at 0953.  Initial blood pressure 225/125, Glucose 108.  Initial NIH of 18 was given for aphasia, partial gaze palsy, bilateral blindness, all 4 extremities drift and hit the bed and left sided sensation deficit.  CT head negative.  CTA head and neck negative for LVO or critical stenosis.  Dr. Weaver was present for the stroke alert and recommended the patient received TNK.  There was a delay in the patient receiving TNK due to hypertension requiring multiple doses of labetalol hydralazine and  the initiation of a Cardene drip to get her blood pressure below 180.  TNK was administered at 1015.  No vascular neuro intervention was warranted at this time.  The patient was transferred to the ICU.       Interval History 11/8/2024:  BP remains elevated on Cardene and Milrinone gtt. She remains nonverbal but is able to follow commands and express concerns reporting that she is having a posterior headache of significance. She is still with areas of blindness but does have some small areas of vision. She is 
Neurology Progress Note    Date:11/9/2024       Room:[facility]11/011-A  Patient Name:Yulissa Andersen     YOB: 1975     Age:49 y.o.    CC:   Chief Complaint   Patient presents with    Hypertension    Altered Mental Status        Subjective        Yulissa Andersen is a 49 y.o. female with a history of essential hypertension, migraine headaches, type 2 diabetes, CAD, CVA (1995), GERD, vitamin D deficiency and obesity who presented to [facility] on 11/7/2024 from the urgent care with hypertensive emergency, migraine headache with subsequent altered mental status.  According to the patient's boyfriend he reports that she woke up with a severe migraine headache associated with nausea.  The patient drove herself to the urgent care where they called 911 due to hypertensive emergency.  She received labetalol and route to the hospital and her mental status started to deteriorate.  A stroke alert was activated at 0953.  Initial blood pressure 225/125, Glucose 108.  Initial NIH of 18 was given for aphasia, partial gaze palsy, bilateral blindness, all 4 extremities drift and hit the bed and left sided sensation deficit.  CT head negative.  CTA head and neck negative for LVO or critical stenosis.  Dr. Weaver was present for the stroke alert and recommended the patient received TNK.  There was a delay in the patient receiving TNK due to hypertension requiring multiple doses of labetalol hydralazine and  the initiation of a Cardene drip to get her blood pressure below 180.  TNK was administered at 1015.  No vascular neuro intervention was warranted at this time.  The patient was transferred to the ICU.       Interval History 11/8/2024:  BP remains elevated on Cardene and Milrinone gtt. She remains nonverbal but is able to follow commands and express concerns reporting that she is having a posterior headache of significance. She is still with areas of blindness but does have some small areas of vision. She is 
Patient discharged in stable condition to [facility]. All personal belongings gathered and sent with patient.  
Patient transported to  with stable vitals signs and no acute distress at this time.  Patrick hudson had previously called report.  
Received telephone voicemail for peer to peer offer for a possible adverse response.  Call  deadline 11/12 at 11 a.m. CST.  No provider type specified by instructions for peer to peer to complete.  Plan to have PM&R provider do peer to peer telephone call.  SANDRA Kelly updated via telephone conversation.   
Report called to 8K. Patient family updated on patient discharge/transfer to [facility] 8K rm 12.  
Spiritual Health History and Assessment/Progress Note  [facility]    (P) Initial Encounter,  ,  ,      Name: Yulissa Andersen MRN: [id number]    Age: 49 y.o.     Sex: female   Language: English   Jainism: None   Acute ischemic stroke (HCC)     Date: 11/11/2024            Total Time Calculated: (P) 10 min              Spiritual Assessment began in [facility] [place] NEUROSCIENCES 4A        Referral/Consult From: (P) Rounding   Encounter Overview/Reason: (P) Initial Encounter  Service Provided For: (P) Patient, Patient and family together. Patient's mother was with her. Yulissa's BP was very high. 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   She wants to go back to work.    Marilin, Belief, Meaning:   Patient identifies as spiritual  Family/Friends identify as spiritual      Importance and Influence:  Patient has spiritual/personal beliefs that influence decisions regarding their health  Family/Friends have spiritual/personal beliefs that influence decisions regarding the patient's health    Community:  Patient is connected with a spiritual community  Family/Friends are connected with a spiritual community:    Assessment and Plan of Care:     Patient Interventions include: Facilitated expression of thoughts and feelings  Family/Friends Interventions include: Facilitated expression of thoughts and feelings    Patient Plan of Care: Spiritual Care available upon further referral  Family/Friends Plan of Care: Spiritual Care available upon further referral    Electronically signed by Chaplain Mateo on 11/11/2024 at 12:01 PM   
[facility]  OCCUPATIONAL THERAPY MISSED TREATMENT NOTE  STRZ ICU 4D  4D-11/011-A      Date: 2024  Patient Name: Yulissa Andersen        CSN: [id number]   : 1975  (49 y.o.)  Gender: female                REASON FOR MISSED TREATMENT: Hold Treatment per Nursing. Patient on bedrest at first attempt until 1015 due to TNK administration. Upon second attempt at 1400 per nurse patient has new DVT found in LLE and is awaiting to go for a   angiography this afternoon. Will check back next availability.              
Unspecified cerebral artery occlusion with cerebral infarction        Pain: No pain reported.    Subjective:  Patient seen with RN Patrick's approval. Upon arrival, patient sleeping in bed with boyfriend present at bedside. Patient cooperative throughout. Of note, patient with signs of emotional lability.    SOCIAL HISTORY: Unable to gather information with patient's limited verbalization. Will continue to address in subsequent therapy sessions.        SPEECH / VOICE:  Speech: WFL *Limited to one word utterances  Voice: Aphonic with weak intensity     LANGUAGE:   The [place] Aphasia Screening Test was completed.  The patient scored 50/100.    Expressive Index:  Namin10  Automatic Speech: 010  Repetition: 6/10  Writin10  Verbal Fluency: 010  Expressive Subscale:     Receptive Index:s  Yes/No Accuracy:   Object Recognition: 10/10  Following Instructions: 4/10  Reading Instructions: 8/10  Receptive Subscale: 50     COGNITION:  Did not formally assess this date. Will determine need for cognitive assessment as linguistic skills emerge.        SWALLOWING:    Respiratory Status: Room Air      Behavioral Observation: Alert and Confused    CRANIAL NERVE ASSESSMENT   CN V (Trigeminal) Closes and Opens Mandible WFL    Rotary Jaw Movement WFL      CN VII (Facial) Cheeks Hold Food out of Sulci WFL    Opens, Closes/Seals, Protrudes, Retracts Lips WFL    General Appearance WFL    Sensation WFL      CN X (Vagus - Pharyngeal) Raises Back of Tongue WFL      CN XI (Accessory) Lifts Soft Palate WFL      CN XII (Hypoglossal) Elevates Tongue Up and Back WFL    Protrusion   WFL    Lateralizes Tongue WFL    Sensation WFL      Other Observations Dentition WFL    Vocal Quality Weak intensity     Cough Weak     PATIENT WAS EVALUATED USING:  Ice Chips, Thin Liquids, Puree, and Coarse Solids    ORAL PHASE:  WFL    PHARYNGEAL PHASE:  WFL:  Pharyngeal phase appears WFL but cannot rule out pharyngeal phase deficits 
demoes max difficulty with answering when words outside of \"yes/no\" required.     PAIN: Pt c/o muscular pain in L shoulder when attempting movements . Anticipate may be due to poor positioning in bed and limited mobility yesterday.     Vitals: Nurse checked vitals prior to session    COGNITION: Slow Processing, Decreased Safety Awareness, and Expressive Aphasia    **Pt continues to reported decreased vision/absence of vision in L eye peripheral fields. Reinforced importance of compensation with head turns to ensure fully seeing entire environment.    ADL:   Grooming: Minimal Assistance, with verbal cues , and with increased time for completion.  ; brushing teeth while seated in bedside chair. Pt required cues to incorporate LUE into task, and was able to grasp/hold toothpaste and mouthwash in L hand to be able to unscrew with R hand.  Pt required minimal assistance with stabilization/maintain  when bringing cup/emesis basin to mouth using LUE.   **Had planned to complete remainder of ADL tasks this session, although pt requesting to focus on LUE neuro re-ed to attempt increasing ROM/activation and decrease pain.   **Education provided re: recommendation for using BSC when needing to void/have BM, and BSC was located/placed in room.   **Discussed importance of continuing to incorporate LUE into tasks (even being that she is R hand dominant). Reinforced the more she uses LUE to assist with task completion, the more coordinated/stronger it will get.   **Recommendations given to be sitting up in chair for all meals.     IADL:   Not Tested    BALANCE:  Sitting Balance:  Contact Guard Assistance. Initially, able to progress to SBA. Pt initially demoes decreased awareness of LUE positioning, with cuing was able to bring LUE forward to place in more supportive position.   Standing Balance: Minimal Assistance. With RW for support; Pt required minimal cues for attempting to place equal weight through BLEs (as otherwise 
or complete with cross over method .       Current functional status for bed, chair, wheelchair transfers: Minimal assistance transfers.     Current functional status for toilet transfers: Minimal assistance transfers.     Current functional status for locomotion: Minimal Assistance, X 1, with verbal cues , with increased time for completion  Distance: 4 ft x2  Surface: Level Tile  Device: Rolling Walker  Gait Deviations: Slow Joanne, Decreased Step Length Bilaterally, Decreased Gait Speed, Decreased Heel Strike Bilaterally, and Increased reliance on assistive device   Completed weight shifting x 5 prior to taking steps  Pt able to ambulate 2 ft forward and backward to and from chair x 2    Current functional status for bladder management: Moderate assistance    Current functional status for bowel management:Moderate assistance    Current functional status for comprehension: Moderate assistance    Current functional status for expression: Moderate assistance    Current functional status for social interaction: Moderate assistance    Current functional status for problem solving: Moderate assistance    Current functional status for memory: Moderate assistance    Expected level of Improvement in Self-Care:  Modified independence    Expected level of Improvement in Sphincter Control:  Modified independence    Expected level of Improvement in Transfers: Modified independence    Expected level of Improvement in Locomotion:  Modified independence    Expected level of Improvement in Communication and Social Cognition: Minimal contact assistance    Expected length of time to achieve that level of improvement: 2 weeks    Current rehab issues: ADL dysfunction, bladder management, bowel management, carry over of therapy techniques, discharge planning, disease and co-morbidity management, gait/mobility dysfunction, medication management, nutrition and hydration management, Ongoing assessment of safety, Pain management, Patient 
Inpatient T-Scale Score : 40.78          Modified [place]:  Premorbid Functional Status: 0 - No symptoms at all. Fully independent.  Current Functional Status:  +4 - Moderately severe disability; unable to walk and attend to bodily needs without assistance.   Patient could not live alone and could not walk from one room to another without physical help from another person, but they can sit up in bed without any help.  Education provided regarding stroke rehabilitation management.    ASSESSMENT:  Activity Tolerance:  Patient tolerance of treatment:Good.    Treatment Initiated: Treatment and education initiated within context of evaluation.  Evaluation time included review of current medical information, gathering information related to past medical, social and functional history, completion of standardized testing, formal and informal observation of tasks, assessment of data and development of plan of care and goals.  Treatment time included skilled education and facilitation of tasks to increase safety and independence with functional mobility for improved independence and quality of life.    Assessment:  Body Structures, Functions, Activity Limitations Requiring Skilled Therapeutic Intervention: Decreased functional mobility , Decreased strength, Decreased endurance, Decreased balance, Decreased posture  Assessment: Yulissa Andersen is a 49 y.o. female that presents with hypertensive encephalopathy. Pt demonstrates a decrease in baseline by way of bed mobility, transfers and ambulation secondary to decreased activity tolerance, strength, fatigue, and balance deficits. Pt will benefit from skilled PT services throughout admission and beyond hospital discharge for improvements in functional mobility and in order to decrease fall risk and return pt to PLOF.   Therapy Prognosis: Good    Requires PT Follow-Up: Yes    Patient Education:      .    Patient Education  Education Given To: Patient  Education Provided: Role of 
functional mobility.  Pt required handheld support with left side  Functional Outcome Measures:  AMPAC (6 CLICK) BASIC MOBILITY  AM-PAC Inpatient Mobility Raw Score : 16  AM-PAC Inpatient T-Scale Score : 40.78        Modified [place] Scale:  +4 - Moderately severe disability; unable to walk and attend to bodily needs without assistance.   Patient could not live alone and could not walk from one room to another without physical help from another person, but they can sit up in bed without any help.  Education provided regarding stroke rehabilitation management.    ASSESSMENT:  Assessment: Patient progressing toward established goals.  Activity Tolerance:  Patient tolerance of  treatment:Good.  Plan: Current Treatment Recommendations: Strengthening, Balance training, Functional mobility training, Transfer training, Endurance training, Gait training, Stair training, Neuromuscular re-education, Home exercise program, Safety education & training, Therapeutic activities  General Plan:  (6x C)    Education:  Learners: Patient  Patient Education: Plan of Care, Home Exercise Program, Transfers    Goals:  Patient Goals : none stated  Short Term Goals  Time Frame for Short Term Goals: by discharge  Short Term Goal 1: bed mobility with HOB flat, no rails, mod I for increased functional ind  Short Term Goal 2: sit <>stand from various surfaces with LRD mod I for safe transfers  Short Term Goal 3: ambulate 100' with LRD Mod I for safe household distances  Short Term Goal 4: navigate 2 steps with LRD mod I for safe enter/exit of home  Long Term Goals  Time Frame for Long Term Goals : NA d/t short ELOS    Following session, patient left in safe position with all fall risk precautions in place.  Huber [place]edo, licensed Therapist was present and directly responsible for all treatments provided by, MICHELLE Amanda.         
therefore was given TNK as per NSG recs. Also given labetalol, hydralazine and started on a Cardene drip for HTN-emergency.  Patient was then admitted to the ICU on 11/7/2024 status post TNK.  On 11/8/2024 MRI was performed which showed no acute findings.  Patient had bilateral vascular duplex of lower extremities and was found to have a acute DVT of left peroneal vein.  Started on heparin gtt.  Patient had angiogram done on 11/9/2024 which showed fibromuscular dysplasia of bilateral ICA cervical segments as well as a aneurysm with blood of the left ICA measuring 5.5 x 3.5 mm.  Patient has been weaned off the Cardene drip on 11/10/2024.  Transition to oral medications.  On 11/11 IPR came and evaluated the patient.    Medications:    Infusion Medications    heparin (PORCINE) Infusion 9 Units/kg/hr (11/10/24 1533)    sodium chloride      sodium chloride      dextrose      Scheduled Medications    carvedilol  25 mg Oral BID    amLODIPine  5 mg Oral Daily    hydrALAZINE  100 mg Oral TID    [Held by provider] spironolactone  25 mg Oral Daily    valsartan  320 mg Oral Daily    sodium chloride flush  5-40 mL IntraVENous 2 times per day    insulin lispro  0-8 Units SubCUTAneous 4x Daily AC & HS    PRN Meds: HYDROmorphone, hydrALAZINE, potassium chloride **OR** potassium alternative oral replacement, furosemide, acetaminophen **OR** acetaminophen, heparin (porcine), heparin (porcine), sodium chloride, ondansetron **OR** ondansetron, sodium chloride flush, sodium chloride, potassium chloride **OR** [DISCONTINUED] potassium chloride, magnesium sulfate, polyethylene glycol, albuterol, glucose, dextrose bolus **OR** dextrose bolus, glucagon (rDNA), dextrose    Exam:  BP (!) 144/81   Pulse 74   Temp 98.3 °F (36.8 °C) (Tympanic)   Resp 18   Ht 1.651 m (5' 5\")   Wt 95.9 kg (211 lb 6.7 oz)   LMP 01/20/2013   SpO2 99%   BMI 35.18 kg/m²   General: No distress, appears stated age. On room air, no respiratory distress.   Eyes:  
place.

## 2024-11-12 NOTE — DISCHARGE SUMMARY
MCH 27.1 26.0 - 33.0 pg    MCHC 31.7 (L) 32.2 - 35.5 gm/dl    RDW-CV 15.3 (H) 11.5 - 14.5 %    RDW-SD 47.8 (H) 35.0 - 45.0 fL    Platelets 340 130 - 400 thou/mm3    MPV 9.6 9.4 - 12.4 fL   Anti-Xa, Unfractionated Heparin    Collection Time: 11/11/24  4:03 AM   Result Value Ref Range    Heparin Unfractionated 0.36 0.30 - 0.70 U/ml   Basic Metabolic Panel    Collection Time: 11/11/24  4:03 AM   Result Value Ref Range    Sodium 133 (L) 135 - 145 meq/L    Potassium 3.9 3.5 - 5.2 meq/L    Chloride 100 98 - 111 meq/L    CO2 23 23 - 33 meq/L    Glucose 109 (H) 70 - 108 mg/dL    BUN 15 7 - 22 mg/dL    Creatinine 0.6 0.4 - 1.2 mg/dL    Calcium 8.9 8.5 - 10.5 mg/dL   Anion Gap    Collection Time: 11/11/24  4:03 AM   Result Value Ref Range    Anion Gap 10.0 8.0 - 16.0 meq/L   Glomerular Filtration Rate, Estimated    Collection Time: 11/11/24  4:03 AM   Result Value Ref Range    Est, Glom Filt Rate > 90 >60 ml/min/1.73m2   POCT glucose    Collection Time: 11/11/24  7:44 AM   Result Value Ref Range    POC Glucose 106 70 - 108 mg/dl   POCT glucose    Collection Time: 11/11/24 11:05 AM   Result Value Ref Range    POC Glucose 112 (H) 70 - 108 mg/dl   POCT glucose    Collection Time: 11/11/24  5:29 PM   Result Value Ref Range    POC Glucose 115 (H) 70 - 108 mg/dl   POCT glucose    Collection Time: 11/11/24  7:38 PM   Result Value Ref Range    POC Glucose 136 (H) 70 - 108 mg/dl   CBC    Collection Time: 11/12/24  3:52 AM   Result Value Ref Range    WBC 10.6 4.8 - 10.8 thou/mm3    RBC 4.51 4.20 - 5.40 mill/mm3    Hemoglobin 12.3 12.0 - 16.0 gm/dl    Hematocrit 38.9 37.0 - 47.0 %    MCV 86.3 81.0 - 99.0 fL    MCH 27.3 26.0 - 33.0 pg    MCHC 31.6 (L) 32.2 - 35.5 gm/dl    RDW-CV 15.3 (H) 11.5 - 14.5 %    RDW-SD 48.2 (H) 35.0 - 45.0 fL    Platelets 362 130 - 400 thou/mm3    MPV 9.7 9.4 - 12.4 fL   Basic Metabolic Panel    Collection Time: 11/12/24  3:52 AM   Result Value Ref Range    Sodium 134 (L) 135 - 145 meq/L    Potassium 4.0 3.5

## 2024-11-12 NOTE — PLAN OF CARE
Name: Yulissa Andersen  YOB: 1975  MRN: [id number]  Date: 11/09/24     Plan of Care    Patient no longer requires ICU level of care. Stable for transfer.  Handoff given to Dr. Petit (hospitalist). Plan to transfer to [facility].    Electronically signed by Jorge Luis See MD on 11/9/2024 at 5:41 PM    
  Problem: Chronic Conditions and Co-morbidities  Goal: Patient's chronic conditions and co-morbidity symptoms are monitored and maintained or improved  11/9/2024 0916 by Patrick Tabares RN  Outcome: Progressing     Problem: Discharge Planning  Goal: Discharge to home or other facility with appropriate resources  11/9/2024 0916 by Patrick Tabares RN  Outcome: Progressing     Problem: Pain  Goal: Verbalizes/displays adequate comfort level or baseline comfort level  11/9/2024 0916 by aPtrick Tabares RN  Outcome: Progressing     Problem: Safety - Adult  Goal: Free from fall injury  11/9/2024 0916 by Patrick Tabares RN  Outcome: Progressing     Problem: Neurosensory - Adult  Goal: Achieves stable or improved neurological status  11/9/2024 0916 by Patrick Tabares RN  Outcome: Progressing     Problem: Neurosensory - Adult  Goal: Absence of seizures  11/9/2024 0916 by Patrick Tabares RN  Outcome: Progressing     Problem: Neurosensory - Adult  Goal: Remains free of injury related to seizures activity  11/9/2024 0916 by Patrick Tabares RN  Outcome: Progressing     Problem: Neurosensory - Adult  Goal: Achieves maximal functionality and self care  11/9/2024 0916 by Patrick Tabares RN  Outcome: Progressing     Problem: Cardiovascular - Adult  Goal: Maintains optimal cardiac output and hemodynamic stability  11/9/2024 0916 by Patrick Tabares RN  Outcome: Progressing     Problem: Cardiovascular - Adult  Goal: Absence of cardiac dysrhythmias or at baseline  11/9/2024 0916 by Patrick Tabares RN  Outcome: Progressing     Problem: Skin/Tissue Integrity - Adult  Goal: Skin integrity remains intact  11/9/2024 0916 by Patrick Tabares RN  Outcome: Progressing     Problem: Skin/Tissue Integrity - Adult  Goal: Oral mucous membranes remain intact  11/9/2024 0916 by Patrick Tabares RN  Outcome: Progressing     Problem: Musculoskeletal - Adult  Goal: Return mobility to safest level of function  11/9/2024 0916 by 
  Problem: Chronic Conditions and Co-morbidities  Goal: Patient's chronic conditions and co-morbidity symptoms are monitored and maintained or improved  Outcome: Progressing  Care Plan - Patient's Chronic Conditions and Co-Morbidity Symptoms are Monitored and Maintained or Improved:   Monitor and assess patient's chronic conditions and comorbid symptoms for stability, deterioration, or improvement   Collaborate with multidisciplinary team to address chronic and comorbid conditions and prevent exacerbation or deterioration   Update acute care plan with appropriate goals if chronic or comorbid symptoms are exacerbated and prevent overall improvement and discharge     Problem: Discharge Planning  Goal: Discharge to home or other facility with appropriate resources  Outcome: Progressing  Discharge to home or other facility with appropriate resources:   Identify barriers to discharge with patient and caregiver   Identify discharge learning needs (meds, wound care, etc)   Refer to discharge planning if patient needs post-hospital services based on physician order or complex needs related to functional status, cognitive ability or social support system   Arrange for needed discharge resources and transportation as appropriate   Arrange for interpreters to assist at discharge as needed     Problem: Pain  Goal: Verbalizes/displays adequate comfort level or baseline comfort level  Outcome: Progressing  Verbalizes/displays adequate comfort level or baseline comfort level:   Encourage patient to monitor pain and request assistance   Administer analgesics based on type and severity of pain and evaluate response   Consider cultural and social influences on pain and pain management   Assess pain using appropriate pain scale   Implement non-pharmacological measures as appropriate and evaluate response   Notify Licensed Independent Practitioner if interventions unsuccessful or patient reports new pain     Problem: Safety - 
  Problem: Chronic Conditions and Co-morbidities  Goal: Patient's chronic conditions and co-morbidity symptoms are monitored and maintained or improved  Outcome: Progressing  Flowsheets (Taken 11/11/2024 0832)  Care Plan - Patient's Chronic Conditions and Co-Morbidity Symptoms are Monitored and Maintained or Improved:   Monitor and assess patient's chronic conditions and comorbid symptoms for stability, deterioration, or improvement   Collaborate with multidisciplinary team to address chronic and comorbid conditions and prevent exacerbation or deterioration   Update acute care plan with appropriate goals if chronic or comorbid symptoms are exacerbated and prevent overall improvement and discharge     Problem: Discharge Planning  Goal: Discharge to home or other facility with appropriate resources  Outcome: Progressing  Flowsheets (Taken 11/11/2024 0832)  Discharge to home or other facility with appropriate resources:   Identify barriers to discharge with patient and caregiver   Identify discharge learning needs (meds, wound care, etc)   Refer to discharge planning if patient needs post-hospital services based on physician order or complex needs related to functional status, cognitive ability or social support system   Arrange for needed discharge resources and transportation as appropriate   Arrange for interpreters to assist at discharge as needed     Problem: Pain  Goal: Verbalizes/displays adequate comfort level or baseline comfort level  Outcome: Progressing  Flowsheets (Taken 11/11/2024 0832)  Verbalizes/displays adequate comfort level or baseline comfort level:   Encourage patient to monitor pain and request assistance   Administer analgesics based on type and severity of pain and evaluate response   Consider cultural and social influences on pain and pain management   Assess pain using appropriate pain scale   Implement non-pharmacological measures as appropriate and evaluate response   Notify Licensed 
  Problem: Neurosensory - Adult  Goal: Achieves maximal functionality and self care  11/8/2024 0253 by Ashley Naranjo, RN  Outcome: Not Progressing  Flowsheets (Taken 11/8/2024 0253)  Achieves maximal functionality and self care:   Monitor swallowing and airway patency with patient fatigue and changes in neurological status   Encourage and assist patient to increase activity and self care with guidance from physical therapy/occupational therapy   Encourage visually impaired, hearing impaired and aphasic patients to use assistive/communication devices     Problem: Chronic Conditions and Co-morbidities  Goal: Patient's chronic conditions and co-morbidity symptoms are monitored and maintained or improved  Outcome: Progressing  Flowsheets (Taken 11/8/2024 0253)  Care Plan - Patient's Chronic Conditions and Co-Morbidity Symptoms are Monitored and Maintained or Improved:   Monitor and assess patient's chronic conditions and comorbid symptoms for stability, deterioration, or improvement   Collaborate with multidisciplinary team to address chronic and comorbid conditions and prevent exacerbation or deterioration   Update acute care plan with appropriate goals if chronic or comorbid symptoms are exacerbated and prevent overall improvement and discharge     Problem: Discharge Planning  Goal: Discharge to home or other facility with appropriate resources  Outcome: Progressing  Flowsheets (Taken 11/8/2024 0253)  Discharge to home or other facility with appropriate resources: Identify barriers to discharge with patient and caregiver     Problem: Pain  Goal: Verbalizes/displays adequate comfort level or baseline comfort level  Outcome: Progressing  Flowsheets (Taken 11/8/2024 0253)  Verbalizes/displays adequate comfort level or baseline comfort level:   Encourage patient to monitor pain and request assistance   Assess pain using appropriate pain scale   Administer analgesics based on type and severity of pain and evaluate 
  Problem: Neurosensory - Adult  Goal: Achieves maximal functionality and self care  Outcome: Not Progressing     Problem: Musculoskeletal - Adult  Goal: Return mobility to safest level of function  Outcome: Not Progressing     Problem: Chronic Conditions and Co-morbidities  Goal: Patient's chronic conditions and co-morbidity symptoms are monitored and maintained or improved  Outcome: Progressing     Problem: Discharge Planning  Goal: Discharge to home or other facility with appropriate resources  Outcome: Progressing     Problem: Pain  Goal: Verbalizes/displays adequate comfort level or baseline comfort level  Outcome: Progressing     Problem: Safety - Adult  Goal: Free from fall injury  Outcome: Progressing     Problem: Neurosensory - Adult  Goal: Achieves stable or improved neurological status  Outcome: Progressing  Goal: Absence of seizures  Outcome: Progressing  Goal: Remains free of injury related to seizures activity  Outcome: Progressing     Problem: Cardiovascular - Adult  Goal: Maintains optimal cardiac output and hemodynamic stability  Outcome: Progressing  Goal: Absence of cardiac dysrhythmias or at baseline  Outcome: Progressing     Problem: Skin/Tissue Integrity - Adult  Goal: Skin integrity remains intact  Outcome: Progressing  Goal: Oral mucous membranes remain intact  Outcome: Progressing     Problem: Gastrointestinal - Adult  Goal: Minimal or absence of nausea and vomiting  Outcome: Progressing     Problem: Genitourinary - Adult  Goal: Absence of urinary retention  Outcome: Progressing     Problem: Infection - Adult  Goal: Absence of infection at discharge  Outcome: Progressing     Problem: Metabolic/Fluid and Electrolytes - Adult  Goal: Electrolytes maintained within normal limits  Outcome: Progressing     Problem: Neurosensory - Adult  Goal: Achieves maximal functionality and self care  Outcome: Not Progressing     Problem: Musculoskeletal - Adult  Goal: Return mobility to safest level of 
  Problem: Neurosensory - Adult  Goal: Achieves stable or improved neurological status  Outcome: Not Progressing  Goal: Achieves maximal functionality and self care  Outcome: Not Progressing     Problem: Gastrointestinal - Adult  Goal: Minimal or absence of nausea and vomiting  Outcome: Not Progressing     Problem: Neurosensory - Adult  Goal: Absence of seizures  Outcome: Progressing  Goal: Remains free of injury related to seizures activity  Outcome: Progressing     Problem: Cardiovascular - Adult  Goal: Maintains optimal cardiac output and hemodynamic stability  Outcome: Progressing  Goal: Absence of cardiac dysrhythmias or at baseline  Outcome: Progressing     Problem: Skin/Tissue Integrity - Adult  Goal: Skin integrity remains intact  Outcome: Progressing  Goal: Oral mucous membranes remain intact  Outcome: Progressing     Problem: Musculoskeletal - Adult  Goal: Return mobility to safest level of function  Outcome: Progressing     Problem: Genitourinary - Adult  Goal: Absence of urinary retention  Outcome: Progressing     Problem: Infection - Adult  Goal: Absence of infection at discharge  Outcome: Progressing     Problem: Metabolic/Fluid and Electrolytes - Adult  Goal: Electrolytes maintained within normal limits  Outcome: Progressing     Problem: Skin/Tissue Integrity - Adult  Goal: Incisions, wounds, or drain sites healing without S/S of infection  Outcome: Completed     Problem: Chronic Conditions and Co-morbidities  Goal: Patient's chronic conditions and co-morbidity symptoms are monitored and maintained or improved  Recent Flowsheet Documentation  Taken 11/7/2024 1100 by Jolie Calderon RN  Care Plan - Patient's Chronic Conditions and Co-Morbidity Symptoms are Monitored and Maintained or Improved:   Monitor and assess patient's chronic conditions and comorbid symptoms for stability, deterioration, or improvement   Collaborate with multidisciplinary team to address chronic and comorbid conditions and 
Yulissa Andersen is a 49-year-old female with a past medical history of migraines, hypertension, and diabetes who presented to [facility] with complaints of severe headache, generalized weakness, and left-sided numbness in the setting of elevated blood pressure.  Per patient's fiancé, patient woke up morning of admission with headache and had 2 episodes of emesis and 1 episode of hematemesis.  She went to PCP for follow-up and had worsening dizziness therefore was transferred to [facility] for further evaluation.  In the ED, CT head showed possible filling defect in MCA therefore was given TNK per neurology recommendations.  She was also started on labetalol, hydralazine and Cardene drip for hypertensive emergency and admitted to ICU.  MRI of brain was negative.  Patient also found to have left internal carotid artery aneurysm.  Neurology has signed off at this time.  I examined patient and she reports her headache is improving.  Patient stable to be transferred out of ICU and will be going to 4A.  Hospitalist will take over.  
understanding of the plan of care and contributed to goal setting.         
Monitor for areas of redness and/or skin breakdown  2.  Assess vascular access sites hourly  3.  Every 4-6 hours minimum:  Change oxygen saturation probe site  4.  Every 4-6 hours:  If on nasal continuous positive airway pressure, respiratory therapy assess nares and determine need for appliance change or resting period.  11/8/2024 0916 by Patrick Tabares, RN  Outcome: Progressing  11/8/2024 0253 by Ashley Naranjo, RN  Outcome: Progressing  Note: Monitoring patient skin integrity for skin breakdown, turning and repositioning q2h per protocol. Patient demonstrates turning and repositioning with staff assistance.

## 2024-11-13 LAB
25(OH)D3 SERPL-MCNC: 9 NG/ML (ref 30–100)
ALBUMIN SERPL BCG-MCNC: 3.7 G/DL (ref 3.5–5.1)
ALP SERPL-CCNC: 89 U/L (ref 38–126)
ALT SERPL W/O P-5'-P-CCNC: 26 U/L (ref 11–66)
ANION GAP SERPL CALC-SCNC: 10 MEQ/L (ref 8–16)
AST SERPL-CCNC: 14 U/L (ref 5–40)
BASOPHILS ABSOLUTE: 0 THOU/MM3 (ref 0–0.1)
BASOPHILS NFR BLD AUTO: 0.2 %
BILIRUB SERPL-MCNC: 0.3 MG/DL (ref 0.3–1.2)
BUN SERPL-MCNC: 19 MG/DL (ref 7–22)
CALCIUM SERPL-MCNC: 9.2 MG/DL (ref 8.5–10.5)
CHLORIDE SERPL-SCNC: 100 MEQ/L (ref 98–111)
CO2 SERPL-SCNC: 24 MEQ/L (ref 23–33)
CREAT SERPL-MCNC: 0.7 MG/DL (ref 0.4–1.2)
DEPRECATED RDW RBC AUTO: 46.1 FL (ref 35–45)
EOSINOPHIL NFR BLD AUTO: 1.1 %
EOSINOPHILS ABSOLUTE: 0.1 THOU/MM3 (ref 0–0.4)
ERYTHROCYTE [DISTWIDTH] IN BLOOD BY AUTOMATED COUNT: 15.2 % (ref 11.5–14.5)
GFR SERPL CREATININE-BSD FRML MDRD: > 90 ML/MIN/1.73M2
GLUCOSE SERPL-MCNC: 121 MG/DL (ref 70–108)
HCT VFR BLD AUTO: 37.4 % (ref 37–47)
HGB BLD-MCNC: 12.1 GM/DL (ref 12–16)
IMM GRANULOCYTES # BLD AUTO: 0.07 THOU/MM3 (ref 0–0.07)
IMM GRANULOCYTES NFR BLD AUTO: 0.6 %
LYMPHOCYTES ABSOLUTE: 3.6 THOU/MM3 (ref 1–4.8)
LYMPHOCYTES NFR BLD AUTO: 31.8 %
MCH RBC QN AUTO: 27.1 PG (ref 26–33)
MCHC RBC AUTO-ENTMCNC: 32.4 GM/DL (ref 32.2–35.5)
MCV RBC AUTO: 83.9 FL (ref 81–99)
MONOCYTES ABSOLUTE: 0.8 THOU/MM3 (ref 0.4–1.3)
MONOCYTES NFR BLD AUTO: 6.6 %
NEUTROPHILS ABSOLUTE: 6.8 THOU/MM3 (ref 1.8–7.7)
NEUTROPHILS NFR BLD AUTO: 59.7 %
NRBC BLD AUTO-RTO: 0 /100 WBC
PLATELET # BLD AUTO: 375 THOU/MM3 (ref 130–400)
PMV BLD AUTO: 9.5 FL (ref 9.4–12.4)
POTASSIUM SERPL-SCNC: 4.3 MEQ/L (ref 3.5–5.2)
PREALB SERPL-MCNC: 24.2 MG/DL (ref 20–40)
PROT SERPL-MCNC: 6.7 G/DL (ref 6.1–8)
RBC # BLD AUTO: 4.46 MILL/MM3 (ref 4.2–5.4)
SODIUM SERPL-SCNC: 134 MEQ/L (ref 135–145)
WBC # BLD AUTO: 11.4 THOU/MM3 (ref 4.8–10.8)

## 2024-11-13 PROCEDURE — 97530 THERAPEUTIC ACTIVITIES: CPT

## 2024-11-13 PROCEDURE — 97535 SELF CARE MNGMENT TRAINING: CPT

## 2024-11-13 PROCEDURE — 6370000000 HC RX 637 (ALT 250 FOR IP): Performed by: STUDENT IN AN ORGANIZED HEALTH CARE EDUCATION/TRAINING PROGRAM

## 2024-11-13 PROCEDURE — 97162 PT EVAL MOD COMPLEX 30 MIN: CPT

## 2024-11-13 PROCEDURE — 99232 SBSQ HOSP IP/OBS MODERATE 35: CPT | Performed by: STUDENT IN AN ORGANIZED HEALTH CARE EDUCATION/TRAINING PROGRAM

## 2024-11-13 PROCEDURE — 97542 WHEELCHAIR MNGMENT TRAINING: CPT

## 2024-11-13 PROCEDURE — 97167 OT EVAL HIGH COMPLEX 60 MIN: CPT

## 2024-11-13 PROCEDURE — 97116 GAIT TRAINING THERAPY: CPT

## 2024-11-13 PROCEDURE — 84134 ASSAY OF PREALBUMIN: CPT

## 2024-11-13 PROCEDURE — 82306 VITAMIN D 25 HYDROXY: CPT

## 2024-11-13 PROCEDURE — 92507 TX SP LANG VOICE COMM INDIV: CPT

## 2024-11-13 PROCEDURE — 36415 COLL VENOUS BLD VENIPUNCTURE: CPT

## 2024-11-13 PROCEDURE — 1180000000 HC REHAB R&B

## 2024-11-13 PROCEDURE — 2580000003 HC RX 258: Performed by: STUDENT IN AN ORGANIZED HEALTH CARE EDUCATION/TRAINING PROGRAM

## 2024-11-13 PROCEDURE — 6370000000 HC RX 637 (ALT 250 FOR IP)

## 2024-11-13 PROCEDURE — 80053 COMPREHEN METABOLIC PANEL: CPT

## 2024-11-13 PROCEDURE — 85025 COMPLETE CBC W/AUTO DIFF WBC: CPT

## 2024-11-13 PROCEDURE — 92610 EVALUATE SWALLOWING FUNCTION: CPT

## 2024-11-13 PROCEDURE — 92523 SPEECH SOUND LANG COMPREHEN: CPT

## 2024-11-13 PROCEDURE — 97110 THERAPEUTIC EXERCISES: CPT

## 2024-11-13 PROCEDURE — 6370000000 HC RX 637 (ALT 250 FOR IP): Performed by: FAMILY MEDICINE

## 2024-11-13 RX ORDER — VITAMIN B COMPLEX
5000 TABLET ORAL DAILY
Status: DISCONTINUED | OUTPATIENT
Start: 2024-11-13 | End: 2024-11-23 | Stop reason: HOSPADM

## 2024-11-13 RX ORDER — LIDOCAINE 4 G/G
1 PATCH TOPICAL DAILY
Status: DISCONTINUED | OUTPATIENT
Start: 2024-11-13 | End: 2024-11-23 | Stop reason: HOSPADM

## 2024-11-13 RX ADMIN — APIXABAN 10 MG: 5 TABLET, FILM COATED ORAL at 09:37

## 2024-11-13 RX ADMIN — CARVEDILOL 25 MG: 25 TABLET, FILM COATED ORAL at 09:37

## 2024-11-13 RX ADMIN — AMLODIPINE BESYLATE 5 MG: 5 TABLET ORAL at 09:37

## 2024-11-13 RX ADMIN — HYDRALAZINE HYDROCHLORIDE 100 MG: 50 TABLET ORAL at 20:05

## 2024-11-13 RX ADMIN — ATORVASTATIN CALCIUM 40 MG: 40 TABLET, FILM COATED ORAL at 20:04

## 2024-11-13 RX ADMIN — DOCUSATE SODIUM 100 MG: 100 CAPSULE, LIQUID FILLED ORAL at 09:37

## 2024-11-13 RX ADMIN — Medication 5000 UNITS: at 14:33

## 2024-11-13 RX ADMIN — SODIUM CHLORIDE, PRESERVATIVE FREE 10 ML: 5 INJECTION INTRAVENOUS at 09:40

## 2024-11-13 RX ADMIN — HYDROCHLOROTHIAZIDE 25 MG: 25 TABLET ORAL at 09:37

## 2024-11-13 RX ADMIN — VALSARTAN 320 MG: 320 TABLET ORAL at 09:37

## 2024-11-13 RX ADMIN — ACETAMINOPHEN 650 MG: 325 TABLET ORAL at 10:52

## 2024-11-13 RX ADMIN — HYDRALAZINE HYDROCHLORIDE 100 MG: 50 TABLET ORAL at 09:37

## 2024-11-13 RX ADMIN — POLYETHYLENE GLYCOL 3350 17 G: 17 POWDER, FOR SOLUTION ORAL at 09:37

## 2024-11-13 RX ADMIN — ASPIRIN 81 MG 81 MG: 81 TABLET ORAL at 09:37

## 2024-11-13 RX ADMIN — CARVEDILOL 25 MG: 25 TABLET, FILM COATED ORAL at 20:04

## 2024-11-13 RX ADMIN — APIXABAN 10 MG: 5 TABLET, FILM COATED ORAL at 21:36

## 2024-11-13 RX ADMIN — ACETAMINOPHEN 650 MG: 325 TABLET ORAL at 19:56

## 2024-11-13 ASSESSMENT — PAIN SCALES - GENERAL
PAINLEVEL_OUTOF10: 5
PAINLEVEL_OUTOF10: 0

## 2024-11-13 NOTE — PROGRESS NOTES
[facility]  INPATIENT OCCUPATIONAL THERAPY  [facility]  EVALUATION      Discharge Recommendations: Patient would benefit from continued therapy after discharge, Continue to assess pending progress, Home with Home health OT  Equipment Recommendations: Yes Pt does not own any equiptment at this time; recommend tub/shower bench; monitor for LHAE and BSC.      Time In: 0730  Time Out: 0930  Timed Code Treatment Minutes: 105 Minutes  Minutes: 120          Date: 2024  Patient Name: Yulissa Andersen,   Gender: female      MRN: [id number]  : 1975  (49 y.o.)  Referring Practitioner: Noa Leal DO  Diagnosis: Cerebrovascular accident (CVA) determined by clinical assessment (HCC)  Additional Pertinent Hx: Yulissa Andersen  is a 49 y.o. female with PMH significant for CAD, DM, HTN, OA, GERD, migraines, and CVA () who is being admitted to the inpatient rehabilitation unit on 2024. Patient initially presented to [facility] ED on 2024 with complaints of headache and AMS. In the ED, patient's boyfriend reported that she woke up with a severe migraine and associated nausea. Patient reportedly drove herself to PCP where they transferred her to ED due to hypertensive emergency.  She was noted to have deterioration of her mental status. Stroke alert was activated.  Initial NIH was 18 (reported aphasia, partial gaze palsy, bilateral blindness, all 4 extremities drift and hit the bed and left sided sensation deficit). CT of the head and neck was reportedly negative for LVO or critical stenosis patient determined to be a good candidate for TNK which was given after initiation of Cardene drip for BP management.  After TNK, patient was admitted to ICU for further evaluation management. Patient stay complicated by angioedema thought to be secondary to Vasotec. MRI of brain reportedly negative. CTA reportedly with a possible filling defect in the distal right middle cerebral  shopping.         Following session, patient left in safe position with all fall risk precautions in place.                Yes

## 2024-11-13 NOTE — PROGRESS NOTES
[facility]  SPEECH THERAPY  [facility]  Speech - Language - Cognitive Evaluation + Clinical Swallow Evaluation + Speech/Language tx    Discharge Recommendations: Continue to Assess Pending Progress  DIET ORDER RECOMMENDATIONS AFTER EVALUATION: Regular diet and thin liquids  STRATEGIES: Full Upright Position, Limit Distractions, and Monitor for Fatigue     SLP Individual Minutes  Time In: 1010  Time Out: 1030     Time In: 1305  Time Out: 1335  Minutes: 50  Timed Code Treatment Minutes: 0 Minutes     Speech, Language, Cognitive Evaluation: 33 minutes  Clinical Swallow Evaluation: 9 minutes  Speech/Language tx: 8 minutes    Date: 2024  Patient Name: Yulissa Andersen      CSN: [id number]   : 1975  (49 y.o.)  Gender: female   Referring Physician:  Noa Leal DO  Diagnosis: Cerebrovascular accident (CVA) determined by clinical assessment ([facility])  Precautions: fall precautions  History of Present Illness/Injury: Patient admitted to [facility] with above diagnosis: see physician H&P for further information. Per chart review, \"Yulissa Andersen  is a 49 y.o. female with PMH significant for CAD, DM, HTN, OA, GERD, migraines, and CVA () who is being admitted to the inpatient rehabilitation unit on 2024.  History obtained via: ED documentation, acute care documentation, and patient      Patient initially presented to [facility] ED on 2024 with complaints of headache and AMS.  In the ED, patient's boyfriend reported that she woke up with a severe migraine and associated nausea.  Patient reportedly drove herself to PCP where they transferred her to ED due to hypertensive emergency.  She reportedly received labetalol in room.  She was noted to have deterioration of her mental status.  Stroke alert was activated.  Initial NIH was 18 (reported aphasia, partial gaze palsy, bilateral blindness, all 4 extremities drift and hit the bed and left sided sensation deficit).  CT head was

## 2024-11-13 NOTE — CARE COORDINATION
Vitamin D level is 9. Dr Stuart updated and vitamin d 5,000 units daily ordered. Pt c/o left shoulder pain and lidocaine patch ordered and applied.

## 2024-11-13 NOTE — PROGRESS NOTES
Physical Medicine & Rehabilitation   Progress Note    Chief Complaint:   Clinically suspected CVA     Subjective:  Patient seen and examined. No acute events overnight. She reports some ongoing left shoulder pain which is limiting. She does not recall any trauma. She continues to have trouble with expressive language and delayed responses. No documented BM. Has tolerated OT therapy eval this morning. WBC is slightly elevated- negative for dysuria, negative for cough or sore throat.     Rehabilitation:  PT 11/11/2024:  Transfers:  Sit to Stand: Minimal Assistance, X 1, with increased time for completion, cues for hand placement, to/from chair with arms  Stand to Sit:Minimal Assistance, X 1, with increased time for completion, cues for hand placement, to/from chair with arms     Ambulation:  Minimal Assistance, X 1, with verbal cues , with increased time for completion  Distance: 4 ft x2  Surface: Level Tile  Device: Rolling Walker  Gait Deviations: Slow Joanne, Decreased Step Length Bilaterally, Decreased Gait Speed, Decreased Heel Strike Bilaterally, and Increased reliance on assistive device   Completed weight shifting x 5 prior to taking steps  Pt able to ambulate 2 ft forward and backward to and from chair x 2  Stairs:  Not Tested     Balance:  Static Sitting Balance:  Stand By Assistance, X 1  Static Standing Balance: Minimal Assistance, X 1     Exercise:  Patient was guided in 1 set(s) 10 reps of exercises to both lower extremities: ,Seated marches, Seated hamstring curls, Seated heel/toe raises, and Long arc quads.  Exercises were completed for increased independence with functional mobility.  Pt required handheld support with left side        OT 11/11/2024:  ADL:   Grooming: Minimal Assistance, with verbal cues , and with increased time for completion.  ; brushing teeth while seated in bedside chair. Pt required cues to incorporate LUE into task, and was able to grasp/hold toothpaste and mouthwash in L

## 2024-11-13 NOTE — PLAN OF CARE
Individualized Plan of Care  [facility] Inpatient Rehabilitation Unit    Rehabilitation physician: Dr. Leal    Admit Date: 11/12/2024     Rehabilitation Diagnosis: CVA (cerebral vascular accident) ([facility]) [I63.9]  Cerebrovascular accident (CVA) determined by clinical assessment ([facility]) [I63.9]      Rehabilitation impairments: self care, mobility, motor dysfunction, bowel/bladder management, pain management, safety, cognitive function, and communication    Factors facilitating achievement of predicted outcomes: Family support, Cooperative, and Pleasant  Barriers to the achievement of predicted outcomes: Pain, Communication deficit, Decreased endurance, Upper extremity weakness, and Lower extremity weakness    Patient Goals: Improve independence with mobility, Improvement of mobility at a wheelchair level, Increase overall strength and endurance, Increase balance, Increase endurance, Increase independence with activities of daily living, Improve cognition, Increase self-awareness, Increase safety awareness, Increase community integration, Increase socialization, Functional communication with caregivers, Integrate appropriate pain management plan, Assure adequate nutritional option for discharge, Continence of bowel and bladder, and Provide appropriate patient and family education      NURSING:  Nursing goals for Yulissa Andersen while on the rehabilitation unit will include:  Continence of bowel and bladder, Adequate number of bowel movements, Urinate with no urinary retention >300ml in bladder, Maintain O2 SATs at an acceptable level during stay, Effective pain management while on the rehabilitation unit, Establish adequate pain control plan for discharge, Absence of skin breakdown while on the rehabilitation unit, Improved skin integrity via assessments including wound measurements, Avoidance of any hospital acquired infections, No signs/symptoms of infection at the wound site, Freedom from injury during

## 2024-11-13 NOTE — PROGRESS NOTES
This Pre Admission Screen is a refiled document from the acute stay. The Pre Admission was completed and signed on 2024 at 10:33 by Dr. Noa Leal.      Noa Leal DO  Physician  Physical Medicine and Rehabilitation     Progress Notes     Signed     Date of Service: 2024 10:33 AM   Related encounter: ED to Hosp-Admission (Discharged) from 2024 in [facility]sciences 4A     Signed       Expand All Collapse All    [facility]  Acute Inpatient Rehab Preadmission Assessment     Patient Name: Yulissa Andersen        Ethnicity:Not of , /a, or Romansh origin  Race:White  MRN:   [id number]    : 1975  (49 y.o.)  Gender: female      Admitted from:[facility]  Initial Assessment     Date of admission to the hospital: 2024  9:37 AM  Date patient eligible for admission:2024     Primary Diagnosis: Clinically suspected CVA      Did patient have surgery?  no     Physicians: Jae Gale MD, Dr. Leal, Dr. Stuart, Dr. Weaver     Risk for clinical complications/co-morbidities:   Past Medical History        Past Medical History:   Diagnosis Date    Coronary artery disease involving native coronary artery of native heart without angina pectoris 10/27/2021    Diabetes mellitus (HCC)      Hypertension      Incomplete bladder emptying      Microscopic hematuria      Osteoarthritis of knee 2023    Unspecified cerebral artery occlusion with cerebral infarction             Financial Information  Primary insurance:  [facility]     Secondary Insurance:   None     Has the patient had two or more falls in the past year or any fall with injury in the past year?   no     Did the patient have major surgery during the 100 days prior to admission?  no     Precautions: Restrictions/Precautions: Fall Risk, General Precautions       Isolation Precautions: None                  Physiatrist:  Dr. Leal     Patients Occupation: Employed full time  Reviewed Lab and

## 2024-11-13 NOTE — PROGRESS NOTES
[facility]  INPATIENT REHABILITATION  TEAM CONFERENCE NOTE    Conference Date: 2024  Admit Date:  2024  1:04 PM  Patient Name: Yulissa Andersen    MRN: [id number]    : 1975  (49 y.o.)  Rehabilitation Admitting Diagnosis:  CVA (cerebral vascular accident) (HCC) [I63.9]  Cerebrovascular accident (CVA) determined by clinical assessment ([facility]) [I63.9]  Referring Practitioner: Noa Leal DO      CASE MANAGEMENT  Current issues/needs regarding patient and family discharge status: Prior to admission, patient was living with her partner, Rene, and their children who are 14 and 16 years old. Patient reports being independent at home. Patient was completing her ADLs, housekeeping, meal prep, errands, finances and driving. Patient was working full-time as a  at [employer]. Patient's support includes Brenda (parent), Gwendolyn López and Tab (child). Brenda reports that family lives locally and is able to provide assistance at discharge. Patient's family pracitioner is TIMMY Chandler. [facility]. Patient is motivated to participate in therapy and return to her prior level of functioning.     PHYSICAL THERAPY  Patient presents with (L) sided weakness, pain limitations in (L) calf and (L) shoulder, need for physical (A) for basic mobility needs.  Patient depends on 2WW for gait with distance limitation of 14 feet with min (A) with cues required for walker and LE sequencing.  Patient demonstrates high fall risk.  Patient requires mod (A) for bed mobility and repositioning needs.  Patient is an extremely motivated and hardworking individual demonstrating good prognostic indicators.  Equipment Needed: No  Other: To be determined at patient progresses.    SPEECH THERAPY  Clinical Swallow Evaluation: Patient presents with intact oral phase with inability to fully discern potential presence of pharyngeal phase deficits without formal  safety, stroke prevention   Barriers to Education: Endurance    Recent Labs     11/12/24  0744 11/12/24  1703 11/12/24  1943   POCGLU 115* 112* 120*       No results found for: \"LDLDIRECT\"      Vitals:    11/13/24 1433 11/13/24 2002 11/14/24 0039 11/14/24 0839   BP: (!) 100/59 129/74  124/72   Pulse:  84  83   Resp:  15     Temp:  98.4 °F (36.9 °C)     TempSrc:  Oral     SpO2:  99%     Weight:   92 kg (202 lb 13.2 oz)    Height:              Family Education: Need to make contact with family to initiate education  Fall Risk:  Falling star program initiated  Is the patient appropriate for a stay in the functional apartment? yes, prior to assist prior to DC if agreeable     Discharge Plan   Estimated Discharge Date:  TBD    Destination: discharge home with supervision  Services at Discharge: TBD  Is patient appropriate for an outpatient driving evaluation? yes, prior to return to driving when appropriate   Equipment at Discharge: Other: Pt does not own any equiptment at this time; recommend tub/shower bench; monitor for LHAE and BSC.  Other: To be determined at patient progresses.  Factors facilitating achievement of predicted outcomes: Family support, Motivated, Cooperative, and Pleasant  Barriers to the achievement of predicted outcomes: Communication deficit, Decreased endurance, Upper extremity weakness, and Lower extremity weakness  Follow up with physiatrist? yes, 6-8 weeks following discharge     Team Members Present at Conference:  :JOANA Pereira  Occupational Therapist:Gomez VEGA, OTR/L [id number]  Physical Therapist:Caelb Gil, [place] [id number]  Speech Therapist:Sadia Mcfarlane MS, CCC-SLP [id number]  Nurse:Marilin Wilks RN  Psychologist: N/A    I approve the established interdisciplinary plan of care as documented within the medical record of Yulissa Andersen. I was present and led the interdisciplinary care conference.    Noa Leal DO  Electronically signed by Noa Leal DO on 11/14/2024

## 2024-11-13 NOTE — PLAN OF CARE
Problem: Chronic Conditions and Co-morbidities  Goal: Patient's chronic conditions and co-morbidity symptoms are monitored and maintained or improved  Outcome: Progressing  Flowsheets (Taken 11/13/2024 0510)  Care Plan - Patient's Chronic Conditions and Co-Morbidity Symptoms are Monitored and Maintained or Improved: Monitor and assess patient's chronic conditions and comorbid symptoms for stability, deterioration, or improvement     Problem: Discharge Planning  Goal: Discharge to home or other facility with appropriate resources  Outcome: Progressing  Flowsheets (Taken 11/13/2024 0510)  Discharge to home or other facility with appropriate resources: Identify barriers to discharge with patient and caregiver     Problem: Skin/Tissue Integrity  Goal: Absence of new skin breakdown  Description: 1.  Monitor for areas of redness and/or skin breakdown  2.  Assess vascular access sites hourly  3.  Every 4-6 hours minimum:  Change oxygen saturation probe site  4.  Every 4-6 hours:  If on nasal continuous positive airway pressure, respiratory therapy assess nares and determine need for appliance change or resting period.  Outcome: Progressing     Problem: ABCDS Injury Assessment  Goal: Absence of physical injury  Outcome: Progressing  Flowsheets (Taken 11/13/2024 0510)  Absence of Physical Injury: Implement safety measures based on patient assessment     Problem: Safety - Adult  Goal: Free from fall injury  Outcome: Progressing  Flowsheets (Taken 11/13/2024 0510)  Free From Fall Injury: Instruct family/caregiver on patient safety     Problem: Pain  Goal: Verbalizes/displays adequate comfort level or baseline comfort level  Outcome: Progressing  Flowsheets (Taken 11/13/2024 0510)  Verbalizes/displays adequate comfort level or baseline comfort level: Encourage patient to monitor pain and request assistance     Problem: Musculoskeletal - Adult  Goal: Return mobility to safest level of function  Outcome: Progressing  Flowsheets  (Taken 11/13/2024 0510)  Return Mobility to Safest Level of Function: Assess patient stability and activity tolerance for standing, transferring and ambulating with or without assistive devices     Problem: Musculoskeletal - Adult  Goal: Maintain proper alignment of affected body part  Outcome: Progressing  Flowsheets (Taken 11/13/2024 0510)  Maintain proper alignment of affected body part: Support and protect limb and body alignment per provider's orders     Problem: Musculoskeletal - Adult  Goal: Return ADL status to a safe level of function  Outcome: Progressing  Flowsheets (Taken 11/13/2024 0510)  Return ADL Status to a Safe Level of Function: Administer medication as ordered

## 2024-11-13 NOTE — PROGRESS NOTES
[facility]  Individualized Disclosure Statement      Patient: Yulissa Andersen      Scope of Service  [facility] provides 24 hour individualized service to patients with functional limitations due to, but not limited to: stroke, brain injury, spinal cord injury, major multiple trauma, fractures, amputation, and neurological disorders. The Rehabilitation Center provides rehabilitative nursing and medical services as well as physical, occupational, speech, and recreation therapies.  [facility] is fully accredited by the Commission on Accreditation of Rehabilitation Facilities (CARF) as a comprehensive provider of rehabilitation services.  Patients admitted to the [facility] receive a minimum of three hours of therapy per day, at least five days per week, with a revised therapy schedule on weekends and holidays.  Physical therapy, occupational therapy, and speech therapy are provided seven days per week including holidays.  Other therapeutic services are available on weekends and evenings as needed or scheduled.  Intensity of Treatment  Your treatment program will consist of Nursing Care and:  1.5 hours of Physical Therapy, per day  1.5 hours of Occupational Therapy, per day   30-60 minutes of Speech Therapy, per day  1 hour of Recreational Therapy, per week  Depending on your needs, the exact time spent with each of the above disciplines may fluctuate, however you will receive at least 3 hours of therapy at least 5 days per week.    [facility] maintains contracts with most insurance plans.  Depending on the type of coverage, the insurance may impose limits on the coverage for rehabilitation care.  Coverage is based on the premise that you are able to fully participate in the rehabilitation program and show continued progress. Please verify your own insurance information. A

## 2024-11-13 NOTE — PLAN OF CARE
Problem: Discharge Planning  Goal: Discharge to home or other facility with appropriate resources  2024 1246 by Raquel Sadler LISW-S  Note:   [facility]  Physical Medicine Case Management Assessment    [x] Inpatient Rehabilitation Unit    Patient Name: Yulissa Andersen        MRN: [id number]    : 1975  (49 y.o.)  Gender: female   Date of Admission: 2024  1:04 PM    Family/Social/Home Environment:   Prior to admission, patient was living with her partner, Rene, and their children who are 14 and 16 years old. Patient reports being independent at home. Patient was completing her ADLs, housekeeping, meal prep, errands, finances and driving. Patient was working full-time as a  at [employer]. Patient's support includes Brenda (parent), Gwendolyn López and Tab (child). Brenda reports that family lives locally and is able to provide assistance at discharge. Patient's family pracitioner is TIMMY Chandler. Patient prefers [facility]. Patient is motivated to participate in therapy and return to her prior level of functioning.    Social/Functional History  Lives With: Significant other (And two children ages 14 and 16 years)  Type of Home: House  Home Layout: Two level, Bed/Bath upstairs ((B) rails to second floor)  Home Access: Stairs to enter with rails  Entrance Stairs - Number of Steps: 4-5  Entrance Stairs - Rails: Both  Bathroom Shower/Tub: Tub/Shower unit  Bathroom Toilet: Standard  Bathroom Equipment: Hand-held shower  Bathroom Accessibility: Accessible  Home Equipment: None  Has the patient had two or more falls in the past year or any fall with injury in the past year?: No  Receives Help From: Family  ADL Assistance: Independent  Homemaking Assistance: Independent  Ambulation Assistance: Independent  Transfer Assistance: Independent  Active : Yes  Mode of Transportation: SUV  Occupation: Full time employment  Type of  Occupation:   Leisure & Hobbies: Pt enjoys some reading and participating in activites with her children and grandchild.  IADL Comments: Pt is responsible for cooking, cleaning, laundry (her own only), finances, and shopping at home. Pt reports that she recieves assistance from family for completion of yard work.  Additional Comments: All family members work but pt reports someone would be available to provide assistance at home as needed.    Contact/Guardian Information: Brenda Garcia (parent) [phone], Rene Sanchez (partner) [phone]    Community Resources Utilized: Patient was not using community resources prior to admission.    Sexuality/Intimacy: Patient did not disclose sexuality/intimacy concerns.    Complementary Health Approaches: Patient did not disclose desires towards complementary health approaches.     Anticipated Needs/Discharge Plans: SW will follow and maintain involvement in discharge planning.    SW met with patient on this date to introduce self, complete SW assessment and initiate discharge planning. Prior to admission, patient was living with her partner, Rene, and their children who are 14 and 16 years old. Patient reports being independent at home. Patient was completing her ADLs, housekeeping, meal prep, errands, finances and driving. Patient was working full-time as a  at [employer]. Patient's support includes Brenda (parent), Gwendolyn López and Tab (child). Brenda reports that family lives locally and is able to provide assistance at discharge. Patient's family pracitioner is TIMMY Chandler. Patient prefers [facility]. Patient is motivated to participate in therapy and return to her prior level of functioning. SW educated patient on Thursday, 11/14. SW will follow and maintain involvement in discharge planning.        Read-Only, Retired: Discharge Planning  Living Arrangements: Spouse/Significant Other,

## 2024-11-13 NOTE — PROGRESS NOTES
[facility]  INPATIENT PHYSICAL THERAPY  EVALUATION  [facility] - 8K-12/012-A    Discharge Recommendations: Continue to assess pending progress  Equipment Recommendations: No  To be determined at patient progresses.            Time In: 1030  Time Out: 1200  Timed Code Treatment Minutes: 75 Minutes  Minutes: 90          Date: 2024  Patient Name: Yulissa Andersen,  Gender:  female        MRN: [id number]  : 1975  (49 y.o.)      Referring Practitioner: Noa Leal DO  Diagnosis: Cerebrovascular accident (CVA) determined by clinical assessment ([facility])  Additional Pertinent Hx: Per chart review: History obtained via: ED documentation, acute care documentation, and patient  48 yo female patient initially presented to [facility] ED on 2024 with complaints of headache and AMS.  In the ED, patient's boyfriend reported that she woke up with a severe migraine and associated nausea.  Patient reportedly drove herself to PCP where they transferred her to ED due to hypertensive emergency.  She reportedly received labetalol in room.  She was noted to have deterioration of her mental status.  Stroke alert was activated.  Initial NIH was 18 (reported aphasia, partial gaze palsy, bilateral blindness, all 4 extremities drift and hit the bed and left sided sensation deficit).  CT head was reportedly negative.  CT of the head and neck was reportedly negative for LVO or critical stenosis patient determined to be a good candidate for TNK which was given after initiation of Cardene drip for BP management.  After TNK, patient was admitted to ICU for further evaluation management. Patient stay complicated by angioedema thought to be secondary to Vasotec.  Patient given Decadron, Benadryl, and FFP x2.  MRI of brain reportedly negative.  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  Exercises were completed for increased independence with functional mobility.    Functional Outcome Measures:  Not completed    Modified [place]idan:  Premorbid Functional Status: 0 - No symptoms at all. Fully independent.  Current Functional Status:  +5 - Severe disability; bedridden, incontinent and requiring constant nursing care and attention   Patient could not live alone, could not walk from one room to another without physical help from another person, and can not sit up in bed without any help.  Education provided regarding stroke rehabilitation management.    ASSESSMENT:  Activity Tolerance:  Patient tolerance of treatment:Good.    Treatment Initiated: Treatment and education initiated within context of evaluation.  Evaluation time included review of current medical information, gathering information related to past medical, social and functional history, completion of standardized testing, formal and informal observation of tasks, assessment of data and development of plan of care and goals.  Treatment time included skilled education and facilitation of tasks to increase safety and independence with functional mobility for improved independence and quality of life.    Assessment:  Body Structures, Functions, Activity Limitations Requiring Skilled Therapeutic Intervention: Decreased functional mobility , Decreased strength, Decreased endurance, Decreased balance, Decreased posture, Decreased vision/visual deficit  Assessment: Yulissa Andersen is a 49 y.o. female that presents with (L) sided weakness, pain limitations in (L) calf and (L) shoulder, need for physical (A) for basic mobility needs. Patient depends on 2WW for gait with distance limitation of 14 feet with min (A) with cues required for walker and LE sequencing. Patient demonstrates high fall risk. Patient

## 2024-11-14 ENCOUNTER — APPOINTMENT (OUTPATIENT)
Dept: GENERAL RADIOLOGY | Age: 49
End: 2024-11-14
Attending: STUDENT IN AN ORGANIZED HEALTH CARE EDUCATION/TRAINING PROGRAM
Payer: COMMERCIAL

## 2024-11-14 LAB
BASOPHILS ABSOLUTE: 0 THOU/MM3 (ref 0–0.1)
BASOPHILS NFR BLD AUTO: 0.2 %
BILIRUB UR QL STRIP.AUTO: NEGATIVE
CHARACTER UR: CLEAR
COLOR, UA: YELLOW
DEPRECATED RDW RBC AUTO: 47.3 FL (ref 35–45)
EOSINOPHIL NFR BLD AUTO: 1.3 %
EOSINOPHILS ABSOLUTE: 0.2 THOU/MM3 (ref 0–0.4)
ERYTHROCYTE [DISTWIDTH] IN BLOOD BY AUTOMATED COUNT: 15.4 % (ref 11.5–14.5)
GLUCOSE UR QL STRIP.AUTO: NEGATIVE MG/DL
HCT VFR BLD AUTO: 36 % (ref 37–47)
HGB BLD-MCNC: 11.6 GM/DL (ref 12–16)
HGB UR QL STRIP.AUTO: NEGATIVE
IMM GRANULOCYTES # BLD AUTO: 0.09 THOU/MM3 (ref 0–0.07)
IMM GRANULOCYTES NFR BLD AUTO: 0.7 %
KETONES UR QL STRIP.AUTO: NEGATIVE
LYMPHOCYTES ABSOLUTE: 4 THOU/MM3 (ref 1–4.8)
LYMPHOCYTES NFR BLD AUTO: 32.2 %
MCH RBC QN AUTO: 27.6 PG (ref 26–33)
MCHC RBC AUTO-ENTMCNC: 32.2 GM/DL (ref 32.2–35.5)
MCV RBC AUTO: 85.5 FL (ref 81–99)
MONOCYTES ABSOLUTE: 1 THOU/MM3 (ref 0.4–1.3)
MONOCYTES NFR BLD AUTO: 8.2 %
NEUTROPHILS ABSOLUTE: 7.2 THOU/MM3 (ref 1.8–7.7)
NEUTROPHILS NFR BLD AUTO: 57.4 %
NITRITE UR QL STRIP: NEGATIVE
NRBC BLD AUTO-RTO: 0 /100 WBC
PH UR STRIP.AUTO: 5.5 [PH] (ref 5–9)
PLATELET # BLD AUTO: 356 THOU/MM3 (ref 130–400)
PLATELET BLD QL SMEAR: ADEQUATE
PMV BLD AUTO: 9.6 FL (ref 9.4–12.4)
PROT UR STRIP.AUTO-MCNC: NEGATIVE MG/DL
RBC # BLD AUTO: 4.21 MILL/MM3 (ref 4.2–5.4)
SCAN OF BLOOD SMEAR: NORMAL
SMUDGE CELLS BLD QL SMEAR: PRESENT
SP GR UR REFRACT.AUTO: 1.01 (ref 1–1.03)
UROBILINOGEN, URINE: 0.2 EU/DL (ref 0–1)
VARIANT LYMPHS BLD QL SMEAR: ABNORMAL %
WBC # BLD AUTO: 12.5 THOU/MM3 (ref 4.8–10.8)
WBC #/AREA URNS HPF: NEGATIVE /[HPF]

## 2024-11-14 PROCEDURE — 99232 SBSQ HOSP IP/OBS MODERATE 35: CPT | Performed by: STUDENT IN AN ORGANIZED HEALTH CARE EDUCATION/TRAINING PROGRAM

## 2024-11-14 PROCEDURE — 85025 COMPLETE CBC W/AUTO DIFF WBC: CPT

## 2024-11-14 PROCEDURE — 6370000000 HC RX 637 (ALT 250 FOR IP): Performed by: FAMILY MEDICINE

## 2024-11-14 PROCEDURE — 97110 THERAPEUTIC EXERCISES: CPT

## 2024-11-14 PROCEDURE — 71046 X-RAY EXAM CHEST 2 VIEWS: CPT

## 2024-11-14 PROCEDURE — 97535 SELF CARE MNGMENT TRAINING: CPT

## 2024-11-14 PROCEDURE — 6370000000 HC RX 637 (ALT 250 FOR IP): Performed by: STUDENT IN AN ORGANIZED HEALTH CARE EDUCATION/TRAINING PROGRAM

## 2024-11-14 PROCEDURE — 1180000000 HC REHAB R&B

## 2024-11-14 PROCEDURE — 97116 GAIT TRAINING THERAPY: CPT

## 2024-11-14 PROCEDURE — 97035 APP MDLTY 1+ULTRASOUND EA 15: CPT

## 2024-11-14 PROCEDURE — 97112 NEUROMUSCULAR REEDUCATION: CPT

## 2024-11-14 PROCEDURE — 97530 THERAPEUTIC ACTIVITIES: CPT

## 2024-11-14 PROCEDURE — 81003 URINALYSIS AUTO W/O SCOPE: CPT

## 2024-11-14 PROCEDURE — 36415 COLL VENOUS BLD VENIPUNCTURE: CPT

## 2024-11-14 PROCEDURE — 6370000000 HC RX 637 (ALT 250 FOR IP)

## 2024-11-14 PROCEDURE — 92507 TX SP LANG VOICE COMM INDIV: CPT

## 2024-11-14 RX ADMIN — APIXABAN 10 MG: 5 TABLET, FILM COATED ORAL at 08:40

## 2024-11-14 RX ADMIN — Medication 5000 UNITS: at 08:40

## 2024-11-14 RX ADMIN — AMLODIPINE BESYLATE 5 MG: 5 TABLET ORAL at 08:39

## 2024-11-14 RX ADMIN — ASPIRIN 81 MG 81 MG: 81 TABLET ORAL at 08:40

## 2024-11-14 RX ADMIN — HYDRALAZINE HYDROCHLORIDE 100 MG: 50 TABLET ORAL at 21:21

## 2024-11-14 RX ADMIN — VALSARTAN 320 MG: 320 TABLET ORAL at 08:40

## 2024-11-14 RX ADMIN — HYDROCHLOROTHIAZIDE 25 MG: 25 TABLET ORAL at 08:39

## 2024-11-14 RX ADMIN — HYDRALAZINE HYDROCHLORIDE 100 MG: 50 TABLET ORAL at 14:27

## 2024-11-14 RX ADMIN — CARVEDILOL 25 MG: 25 TABLET, FILM COATED ORAL at 08:40

## 2024-11-14 RX ADMIN — DOCUSATE SODIUM 100 MG: 100 CAPSULE, LIQUID FILLED ORAL at 08:40

## 2024-11-14 RX ADMIN — APIXABAN 10 MG: 5 TABLET, FILM COATED ORAL at 21:21

## 2024-11-14 RX ADMIN — CARVEDILOL 25 MG: 25 TABLET, FILM COATED ORAL at 21:21

## 2024-11-14 RX ADMIN — ACETAMINOPHEN 650 MG: 325 TABLET ORAL at 08:41

## 2024-11-14 RX ADMIN — HYDRALAZINE HYDROCHLORIDE 100 MG: 50 TABLET ORAL at 08:40

## 2024-11-14 RX ADMIN — ATORVASTATIN CALCIUM 40 MG: 40 TABLET, FILM COATED ORAL at 21:21

## 2024-11-14 RX ADMIN — POLYETHYLENE GLYCOL 3350 17 G: 17 POWDER, FOR SOLUTION ORAL at 08:39

## 2024-11-14 ASSESSMENT — PAIN SCALES - WONG BAKER
WONGBAKER_NUMERICALRESPONSE: NO HURT

## 2024-11-14 ASSESSMENT — PAIN DESCRIPTION - DESCRIPTORS: DESCRIPTORS: ACHING

## 2024-11-14 ASSESSMENT — PAIN SCALES - GENERAL
PAINLEVEL_OUTOF10: 0
PAINLEVEL_OUTOF10: 0
PAINLEVEL_OUTOF10: 5
PAINLEVEL_OUTOF10: 0

## 2024-11-14 ASSESSMENT — PAIN DESCRIPTION - LOCATION: LOCATION: SHOULDER

## 2024-11-14 ASSESSMENT — PAIN - FUNCTIONAL ASSESSMENT: PAIN_FUNCTIONAL_ASSESSMENT: PREVENTS OR INTERFERES SOME ACTIVE ACTIVITIES AND ADLS

## 2024-11-14 ASSESSMENT — PAIN DESCRIPTION - ORIENTATION: ORIENTATION: LEFT

## 2024-11-14 NOTE — CARE COORDINATION
The patient is alert and fully oriented. She is experiencing difficulty with verbal expression, indicating expressive aphasia. The patient is continent and uses a wheelchair or walker to go to the bathroom or commode as needed. She reported left shoulder pain this morning, which has improved throughout the day. Currently, the patient is resting comfortably in bed with the call light within reach.

## 2024-11-14 NOTE — PROGRESS NOTES
[facility]  Occupational Therapy  Daily Note    Discharge Recommendations: Continue to assess pending progress, patient will require continued therapy,    Equipment Recommendations: Yes Pt does not own any equiptment at this time; recommend tub/shower bench; monitor for LHAE and BSC.       Time In: 1102  Time Out: 1232  Timed Code Treatment Minutes: 90 Minutes  Minutes: 90          Date: 2024  Patient Name: Yulissa Andersen,   Gender: female      [facility]  MRN: [id number]  : 1975  (49 y.o.)  Referring Practitioner: Noa Leal DO  Diagnosis: Cerebrovascular accident (CVA) determined by clinical assessment (HCC)  Additional Pertinent Hx: Yulissa Andersen  is a 49 y.o. female with PMH significant for CAD, DM, HTN, OA, GERD, migraines, and CVA () who is being admitted to the inpatient rehabilitation unit on 2024. Patient initially presented to [facility] ED on 2024 with complaints of headache and AMS. In the ED, patient's boyfriend reported that she woke up with a severe migraine and associated nausea. Patient reportedly drove herself to PCP where they transferred her to ED due to hypertensive emergency.  She was noted to have deterioration of her mental status. Stroke alert was activated.  Initial NIH was 18 (reported aphasia, partial gaze palsy, bilateral blindness, all 4 extremities drift and hit the bed and left sided sensation deficit). CT of the head and neck was reportedly negative for LVO or critical stenosis patient determined to be a good candidate for TNK which was given after initiation of Cardene drip for BP management.  After TNK, patient was admitted to ICU for further evaluation management. Patient stay complicated by angioedema thought to be secondary to Vasotec. MRI of brain reportedly negative. CTA reportedly with a possible filling defect in the distal right middle cerebral artery, 3.3 mm aneurysm arising from the

## 2024-11-14 NOTE — PLAN OF CARE
Problem: Chronic Conditions and Co-morbidities  Goal: Patient's chronic conditions and co-morbidity symptoms are monitored and maintained or improved  11/14/2024 1201 by Yaquelin Michelle RN  Outcome: Progressing  Flowsheets (Taken 11/14/2024 1201)  Care Plan - Patient's Chronic Conditions and Co-Morbidity Symptoms are Monitored and Maintained or Improved:   Monitor and assess patient's chronic conditions and comorbid symptoms for stability, deterioration, or improvement   Collaborate with multidisciplinary team to address chronic and comorbid conditions and prevent exacerbation or deterioration  Problem: Discharge Planning  Goal: Discharge to home or other facility with appropriate resources  11/14/2024 1201 by Yaquelin Michelle RN  Outcome: Progressing  Flowsheets (Taken 11/14/2024 1201)  Discharge to home or other facility with appropriate resources:   Identify barriers to discharge with patient and caregiver   Arrange for needed discharge resources and transportation as appropriate     Problem: Skin/Tissue Integrity  Goal: Absence of new skin breakdown  Description: 1.  Monitor for areas of redness and/or skin breakdown  2.  Assess vascular access sites hourly  3.  Every 4-6 hours minimum:  Change oxygen saturation probe site  4.  Every 4-6 hours:  If on nasal continuous positive airway pressure, respiratory therapy assess nares and determine need for appliance change or resting period.  11/14/2024 1201 by Yaquelin Michelle RN  Outcome: Progressing       Problem: ABCDS Injury Assessment  Goal: Absence of physical injury  11/14/2024 1201 by Yaquelin Michelle RN  Outcome: Progressing  Flowsheets (Taken 11/14/2024 1201)  Absence of Physical Injury: Implement safety measures based on patient assessment       Problem: Safety - Adult  Goal: Free from fall injury  11/14/2024 1201 by Yaquelin Michelle RN  Outcome: Progressing  Flowsheets (Taken 11/14/2024 1201)  Free From Fall Injury:   Instruct family/caregiver on patient  RN

## 2024-11-14 NOTE — CARE COORDINATION
Pt showed interest in BP medications. Writer when through medication list and explained what each medication was for. Pt also stated that she was in pain. Writer gave tylenol. Wrier checked on Pt later on in shift, Pt advised she was at a \"0\" on the pain scale. Pt comfortable with call light in reach.

## 2024-11-14 NOTE — PROGRESS NOTES
[facility]  INPATIENT PHYSICAL THERAPY  DAILY NOTE  [facility] - 8K-12/012-A      Discharge Recommendations: Continue to assess pending progress and Patient would benefit from continued PT at discharge  Equipment Recommendations: No  To be determined at patient progresses.            Time In: 0700  Time Out: 0830  Timed Code Treatment Minutes: 90 Minutes  Minutes: 90          Date: 2024  Patient Name: Yulissa Andersen,  Gender:  female        MRN: [id number]  : 1975  (49 y.o.)     Referring Practitioner: Noa Leal DO  Diagnosis: Cerebrovascular accident (CVA) determined by clinical assessment (HCC)  Additional Pertinent Hx: Per chart review: History obtained via: ED documentation, acute care documentation, and patient  48 yo female patient initially presented to [facility] ED on 2024 with complaints of headache and AMS.  In the ED, patient's boyfriend reported that she woke up with a severe migraine and associated nausea.  Patient reportedly drove herself to PCP where they transferred her to ED due to hypertensive emergency.  She reportedly received labetalol in room.  She was noted to have deterioration of her mental status.  Stroke alert was activated.  Initial NIH was 18 (reported aphasia, partial gaze palsy, bilateral blindness, all 4 extremities drift and hit the bed and left sided sensation deficit).  CT head was reportedly negative.  CT of the head and neck was reportedly negative for LVO or critical stenosis patient determined to be a good candidate for TNK which was given after initiation of Cardene drip for BP management.  After TNK, patient was admitted to ICU for further evaluation management. Patient stay complicated by angioedema thought to be secondary to Vasotec.  Patient given Decadron, Benadryl, and FFP x2.  MRI of brain reportedly negative.  CTA reportedly with a possible filling defect in the distal right middle cerebral artery, 3.3 mm  transfers with cueing for hand placement, B foot placement and anterior weight shifting. Patient requires minimal assistance for ambulation with use of RW with slowed gait speed and decreased toe clearance and hip and knee flexion. Patient overall can continue to benefit from skilled PT treatment in order to assist with BLE strengthening, gait training, transfer training, bed mobility, core strengthening and dynamic balance for increased functional mobility.   Activity Tolerance:  Patient tolerance of  treatment:Good.  Plan: Current Treatment Recommendations: Strengthening, Balance training, Functional mobility training, Transfer training, Endurance training, Gait training, Stair training, Neuromuscular re-education, Home exercise program, Safety education & training, Therapeutic activities  General Plan:  (5x/wk 90 minutes)    Education:  Learners: Patient  Patient Education: Bed Mobility, Transfers, Gait, Verbal Exercise Instruction,  - Patient Verbalized Understanding, - Patient Requires Continued Education    Goals:  Patient Goals : To return to (I) living  Short Term Goals  Time Frame for Short Term Goals: 3 wks from IP rehab evaluation  Short Term Goal 1: bed mobility with HOB flat, no rails, mod I for increased functional ind  Short Term Goal 2: sit <>stand from various surfaces with RW mod I for safe transfers  Short Term Goal 3: ambulate 100' with RW with SBA with step thru gait pattern and good step consistency  Short Term Goal 4: navigate 2 steps with LRD SBA for safe enter/exit of home  Short Term Goal 5: Patient to demonstrate (I) WC mobility with st path, turns around obstacles and thru doorways.  Long Term Goals  Time Frame for Long Term Goals : 4 wks from IP rehabilitation evaluation  Long Term Goal 1: Patient to demonstrate modified (I) car transfers for transportation needs.  Long Term Goal 2: Patient to ambulate with 2WW with mod (I) with dual task activities and with door negotiation for safe

## 2024-11-14 NOTE — PROGRESS NOTES
RECREATIONAL THERAPY  [facility]      Date:  11/14/2024            Patient Name: Yulissa Andersen           MRN: [id number]  Acct: [id number]          YOB: 1975 (49 y.o.)       Gender: female      Physician: Referring Practitioner: Noa Leal DO    REASON FOR MISSED TREATMENT:  Attempted RT evaluation x2 today -will attempt again tomorrow     Morelia Wallace, CTRS    11/14/2024

## 2024-11-14 NOTE — PROGRESS NOTES
[facility]  Diagnosis List for Inpatient Rehab facility (IRF) - Patient Assessment Instrument (CHARLES)    Patient Name: Yulissa Andersen        MRN: [id number]    : 1975  (49 y.o.)  Gender: female     Primary impairment requiring rehabilitation: 1.4 CVA, No paresis     Etiologic Diagnosis that led to the condition: CVA    Comorbid conditions affecting rehabilitation:  Strokelike symptoms- clinically suspected CVA  Acute DVT  Left internal carotid artery aneurysm  Type 2 diabetes  HTN  Migraines  Angioedema  Obesity, class II (BMI 35.18)  Impaired mobility and ADLs  Expressive  language impairment

## 2024-11-14 NOTE — PROGRESS NOTES
[facility]  INPATIENT SPEECH THERAPY  [facility]  DAILY NOTE    Discharge Recommendations: Outpatient Speech Therapy and Continue to Assess Pending Progress    SLP Individual Minutes  Time In: 0900  Time Out: 30  Minutes: 30  Timed Code Treatment Minutes: 0 Minutes   Speech-Language Tx: 30 minutes    Date: 2024  Patient Name: Yulissa Andersen      CSN: [id number]   : 1975  (49 y.o.)  Gender: female   Referring Physician:  Noa Leal DO  Diagnosis: Cerebrovascular accident (CVA) determined by clinical assessment (HCC)  Precautions: fall risk  Current Diet: Regular solids + Thin liquids  Respiratory Status: Room Air  Swallowing Strategies: Standard Universal Swallow Precautions  Date of Last MBS/FEES: Not Applicable    Pain:  No pain reported.    Subjective:  Patient seen while resting in wheelchair. Alert, pleasant, and participatory. Mother present for duration of session and daughter, Tab, entered room midway through session. Patient remains with hesitant, effortful speech/language production.     Short-Term Goals:  SHORT TERM GOAL #1:  Goal 1: Patient will complete verbal expression tasks (including BASIC naming, automatic speech tasks, biographics, verbal fluency, word level repetition) with 60% accuracy, max cues to improve functional expressive communication.  INTERVENTIONS:  Biographic Naming:   First name: independent with slow, effortful rate of production  Last name: independent  Age: mod assist (patient stated \"[age], seven\" vs target \"forty seven\"  Number of children: independent (\"five\")  Names of children:   \"Tab\": independent with effortful, hesitant production      Age: mod assist (patient stated \"[age], eight\" vs target \"tw[age] eight\")   \"Keyon\": independent with effortful, hesitant production     \"Caprice\": independent with effortful, hesitant production     \"Trevon\": independent with effortful, hesitant production     \"Patricia\":

## 2024-11-14 NOTE — PLAN OF CARE
Problem: Chronic Conditions and Co-morbidities  Goal: Patient's chronic conditions and co-morbidity symptoms are monitored and maintained or improved  Outcome: Progressing     Problem: Discharge Planning  Goal: Discharge to home or other facility with appropriate resources  20244 by Joanne Sanders LPN  Outcome: Progressing  2024 1246 by Raquel Sadler LISW-S  Note:   [facility]  Physical Medicine Case Management Assessment    [x] Inpatient Rehabilitation Unit    Patient Name: Yulissa Andersen        MRN: [id number]    : 1975  (49 y.o.)  Gender: female   Date of Admission: 2024  1:04 PM    Family/Social/Home Environment:   Prior to admission, patient was living with her partner, Rene, and their children who are 14 and 16 years old. Patient reports being independent at home. Patient was completing her ADLs, housekeeping, meal prep, errands, finances and driving. Patient was working full-time as a  at [employer]. Patient's support includes Brenda (parent), Gwendolyn López and Tab (child). Brenda reports that family lives locally and is able to provide assistance at discharge. Patient's family pracitioner is TIMMY Chandler. Patient prefers [facility]. Patient is motivated to participate in therapy and return to her prior level of functioning.    Social/Functional History  Lives With: Significant other (And two children ages 14 and 16 years)  Type of Home: House  Home Layout: Two level, Bed/Bath upstairs ((B) rails to second floor)  Home Access: Stairs to enter with rails  Entrance Stairs - Number of Steps: 4-5  Entrance Stairs - Rails: Both  Bathroom Shower/Tub: Tub/Shower unit  Bathroom Toilet: Standard  Bathroom Equipment: Hand-held shower  Bathroom Accessibility: Accessible  Home Equipment: None  Has the patient had two or more falls in the past year or any fall with injury in the past year?: No  Receives  to identify coping skills, available support systems and cultural and spiritual values  2. Provide emotional support, including active listening and acknowledgement of concerns of patient and caregivers  3. Reduce environmental stimuli, as able  4. Instruct patient/family in relaxation techniques, as appropriate  5. Assess for spiritual pain/suffering and initiate Spiritual Care, Psychosocial Clinical Specialist consults as needed  Outcome: Progressing     Problem: Decision Making  Goal: Pt/Family able to effectively weigh alternatives and participate in decision making related to treatment and care  Description: INTERVENTIONS:  1. Determine when there are differences between patient's view, family's view, and healthcare provider's view of condition  2. Facilitate patient and family articulation of goals for care  3. Help patient and family identify pros/cons of alternative solutions  4. Provide information as requested by patient/family  5. Respect patient/family right to receive or not to receive information  6. Serve as a liaison between patient and family and health care team  7. Initiate Consults from Ethics, Palliative Care or initiate Family Care Conference as is appropriate  Outcome: Progressing

## 2024-11-14 NOTE — PLAN OF CARE
Problem: Discharge Planning  Goal: Discharge to home or other facility with appropriate resources  11/14/2024 1119 by Raquel Sadler LISW-S  Note: Team conference held Thursday, 11/14. Recommendations of the team were explained to the patient and family by Dr Leal and GAYLE. Team is recommending that patient continue on acute inpatient rehab for PT, OT and ST, with an undetermined discharge date. Following discharge, the team's recommendations are undetermined. Care plan reviewed with patient and family. Patient and family verbalized understanding of the plan of care and contributed to goal setting. SW to follow and maintain involvement in discharge planning.

## 2024-11-14 NOTE — PROGRESS NOTES
Physical Medicine & Rehabilitation   Progress Note    Chief Complaint:   Clinically suspected CVA     Subjective: Patient seen this morning on rounds with JOANA Pereira, following patient's inpatient Rehab Team Conference. Patient seen and examined. No acute events overnight. She is seen with family at bedside today. WBC trending up. She denies dysuria but does state that she has some increased frequency. She also reports an intermittent productive cough. No reports of any sore throat or nasal congestion. No reports of any CP or SOB.     Family reports that there are multiple family members will be available to assist patient at time of discharge home.  Patient does have stairs to enter home (5) and also a full flight of stairs to reach bed/bath in the home.  We talked possibilities of how to troubleshoot this.  They are also thinking of alternative options for discharge if necessary.    Rehabilitation: PT, OT, and SLP updates reviewed during team rounds. See separate note.       Review of Systems:  CONSTITUTIONAL:  negative for  fevers and chills  EYES:  positive for  visual disturbance- diplopia impaired vision in left visual fields  HEENT:  negative for  hearing loss and sore throat  RESPIRATORY:  negative for  dyspnea and wheezing  CARDIOVASCULAR:  negative for  chest pain, palpitations  GASTROINTESTINAL: positive for constipation  GENITOURINARY:  negative for dysuria  SKIN:  negative for rash  MUSCULOSKELETAL:  positive for tightness  NEUROLOGICAL:  positive for speech problems, visual disturbance, coordination problems, weakness, numbness, and tingling  BEHAVIOR/PSYCH:  negative  System review otherwise negative      Objective:  /72   Pulse 83   Temp 98.1 °F (36.7 °C) (Oral)   Resp 20   Ht 1.651 m (5' 5\")   Wt 92 kg (202 lb 13.2 oz)   LMP 01/20/2013   SpO2 99%   BMI 33.75 kg/m²   Patient is awake and alert. Sitting up in wheelchair; daughter, mother, and friend at bedside  (12.5)- reporting some increased urinary frequency and productive cough. CXR and UA ordered and pending   Follow up: PCP (1-2 weeks), Neurology (Kasi), Neurointervention (Owen)- 12/2024   and case management consultations for coordination of care and discharge planning. Team conference held today with anticipated DC home TBD.         Missed Therapy Time:  None    Noa Leal DO

## 2024-11-14 NOTE — PROGRESS NOTES
[facility]  INPATIENT SPEECH THERAPY  [employer]  DAILY NOTE    Discharge Recommendations: Outpatient Speech Therapy and Continue to Assess Pending Progress    SLP Individual Minutes  Time In: 1030  Time Out: 1100  Minutes: 30  Timed Code Treatment Minutes: 0 Minutes   Speech-Language Tx: 30 minutes    Date: 2024  Patient Name: Yulissa Andersen      CSN: [id number]   : 1975  (49 y.o.)  Gender: female   Referring Physician:  Noa Leal DO  Diagnosis: Cerebrovascular accident (CVA) determined by clinical assessment (HCC)  Precautions: fall risk  Current Diet: Regular solids + Thin liquids  Respiratory Status: Room Air  Swallowing Strategies: Standard Universal Swallow Precautions  Date of Last MBS/FEES: Not Applicable    Pain:  No pain reported.    Subjective:  Patient seen positioned upright in wheelchair. Alert, pleasant, and agreeable to skilled ST services. Mother and daughter present and active throughout, and friend entered room midway through session. Dr. Leal and , Raquel also briefly in room to provide update following team conference this morning. Patient remains with hesitant, effortful speech/language production.     Short-Term Goals:  SHORT TERM GOAL #1:  Goal 1: Patient will complete verbal expression tasks (including BASIC naming, automatic speech tasks, biographics, verbal fluency, word level repetition) with 60% accuracy, max cues to improve functional expressive communication.  INTERVENTIONS: Confrontational Naming with FLOWERS PROGRAM objects: 66 independently     Picture Descriptions: ST provided patient with multiple picture scenes and instructed patient to describe in complete sentences. Verbal prompting/cues needed to provide detail, creating a \"story\" r/t given stimuli. ST presented x5 different scenes; consistent cuing required to elicit descriptive/content words. Noted breakdowns in word retrieval, despite increased time

## 2024-11-15 LAB
BASOPHILS ABSOLUTE: 0 THOU/MM3 (ref 0–0.1)
BASOPHILS NFR BLD AUTO: 0.4 %
DEPRECATED RDW RBC AUTO: 47.8 FL (ref 35–45)
EOSINOPHIL NFR BLD AUTO: 1.7 %
EOSINOPHILS ABSOLUTE: 0.2 THOU/MM3 (ref 0–0.4)
ERYTHROCYTE [DISTWIDTH] IN BLOOD BY AUTOMATED COUNT: 15.1 % (ref 11.5–14.5)
HCT VFR BLD AUTO: 36.9 % (ref 37–47)
HGB BLD-MCNC: 11.7 GM/DL (ref 12–16)
IMM GRANULOCYTES # BLD AUTO: 0.04 THOU/MM3 (ref 0–0.07)
IMM GRANULOCYTES NFR BLD AUTO: 0.4 %
LYMPHOCYTES ABSOLUTE: 3.8 THOU/MM3 (ref 1–4.8)
LYMPHOCYTES NFR BLD AUTO: 36.5 %
MCH RBC QN AUTO: 27.5 PG (ref 26–33)
MCHC RBC AUTO-ENTMCNC: 31.7 GM/DL (ref 32.2–35.5)
MCV RBC AUTO: 86.8 FL (ref 81–99)
MONOCYTES ABSOLUTE: 0.9 THOU/MM3 (ref 0.4–1.3)
MONOCYTES NFR BLD AUTO: 8.3 %
NEUTROPHILS ABSOLUTE: 5.4 THOU/MM3 (ref 1.8–7.7)
NEUTROPHILS NFR BLD AUTO: 52.7 %
NRBC BLD AUTO-RTO: 0 /100 WBC
PLATELET # BLD AUTO: 365 THOU/MM3 (ref 130–400)
PMV BLD AUTO: 9.6 FL (ref 9.4–12.4)
RBC # BLD AUTO: 4.25 MILL/MM3 (ref 4.2–5.4)
WBC # BLD AUTO: 10.3 THOU/MM3 (ref 4.8–10.8)

## 2024-11-15 PROCEDURE — 97110 THERAPEUTIC EXERCISES: CPT

## 2024-11-15 PROCEDURE — 92526 ORAL FUNCTION THERAPY: CPT

## 2024-11-15 PROCEDURE — 6370000000 HC RX 637 (ALT 250 FOR IP): Performed by: STUDENT IN AN ORGANIZED HEALTH CARE EDUCATION/TRAINING PROGRAM

## 2024-11-15 PROCEDURE — 6370000000 HC RX 637 (ALT 250 FOR IP): Performed by: FAMILY MEDICINE

## 2024-11-15 PROCEDURE — 99232 SBSQ HOSP IP/OBS MODERATE 35: CPT | Performed by: STUDENT IN AN ORGANIZED HEALTH CARE EDUCATION/TRAINING PROGRAM

## 2024-11-15 PROCEDURE — 97112 NEUROMUSCULAR REEDUCATION: CPT

## 2024-11-15 PROCEDURE — 6370000000 HC RX 637 (ALT 250 FOR IP)

## 2024-11-15 PROCEDURE — 92507 TX SP LANG VOICE COMM INDIV: CPT

## 2024-11-15 PROCEDURE — 97530 THERAPEUTIC ACTIVITIES: CPT

## 2024-11-15 PROCEDURE — 1180000000 HC REHAB R&B

## 2024-11-15 PROCEDURE — 97116 GAIT TRAINING THERAPY: CPT

## 2024-11-15 PROCEDURE — 36415 COLL VENOUS BLD VENIPUNCTURE: CPT

## 2024-11-15 PROCEDURE — 97035 APP MDLTY 1+ULTRASOUND EA 15: CPT

## 2024-11-15 PROCEDURE — 85025 COMPLETE CBC W/AUTO DIFF WBC: CPT

## 2024-11-15 RX ADMIN — CARVEDILOL 25 MG: 25 TABLET, FILM COATED ORAL at 08:43

## 2024-11-15 RX ADMIN — ACETAMINOPHEN 650 MG: 325 TABLET ORAL at 19:54

## 2024-11-15 RX ADMIN — ACETAMINOPHEN 650 MG: 325 TABLET ORAL at 06:48

## 2024-11-15 RX ADMIN — Medication 5000 UNITS: at 08:42

## 2024-11-15 RX ADMIN — ATORVASTATIN CALCIUM 40 MG: 40 TABLET, FILM COATED ORAL at 19:53

## 2024-11-15 RX ADMIN — VALSARTAN 320 MG: 320 TABLET ORAL at 08:42

## 2024-11-15 RX ADMIN — HYDRALAZINE HYDROCHLORIDE 100 MG: 50 TABLET ORAL at 19:53

## 2024-11-15 RX ADMIN — AMLODIPINE BESYLATE 5 MG: 5 TABLET ORAL at 08:43

## 2024-11-15 RX ADMIN — CARVEDILOL 25 MG: 25 TABLET, FILM COATED ORAL at 19:53

## 2024-11-15 RX ADMIN — ASPIRIN 81 MG 81 MG: 81 TABLET ORAL at 08:43

## 2024-11-15 RX ADMIN — HYDROCHLOROTHIAZIDE 25 MG: 25 TABLET ORAL at 08:43

## 2024-11-15 RX ADMIN — HYDRALAZINE HYDROCHLORIDE 100 MG: 50 TABLET ORAL at 13:26

## 2024-11-15 RX ADMIN — SENNOSIDES 8.6 MG: 8.6 TABLET ORAL at 19:53

## 2024-11-15 RX ADMIN — POLYETHYLENE GLYCOL 3350 17 G: 17 POWDER, FOR SOLUTION ORAL at 08:43

## 2024-11-15 RX ADMIN — HYDRALAZINE HYDROCHLORIDE 100 MG: 50 TABLET ORAL at 08:43

## 2024-11-15 RX ADMIN — APIXABAN 10 MG: 5 TABLET, FILM COATED ORAL at 19:53

## 2024-11-15 RX ADMIN — DOCUSATE SODIUM 100 MG: 100 CAPSULE, LIQUID FILLED ORAL at 19:53

## 2024-11-15 RX ADMIN — DOCUSATE SODIUM 100 MG: 100 CAPSULE, LIQUID FILLED ORAL at 08:43

## 2024-11-15 RX ADMIN — APIXABAN 10 MG: 5 TABLET, FILM COATED ORAL at 08:43

## 2024-11-15 ASSESSMENT — PAIN SCALES - WONG BAKER

## 2024-11-15 ASSESSMENT — 9 HOLE PEG TEST
TESTTIME_SECONDS: 52.08
TEST_RESULT: FUNCTIONAL
TEST_RESULT: FUNCTIONAL
TESTTIME_SECONDS: 31.19

## 2024-11-15 ASSESSMENT — PAIN DESCRIPTION - DESCRIPTORS
DESCRIPTORS: THROBBING
DESCRIPTORS: ACHING

## 2024-11-15 ASSESSMENT — PAIN SCALES - GENERAL
PAINLEVEL_OUTOF10: 6
PAINLEVEL_OUTOF10: 0
PAINLEVEL_OUTOF10: 8
PAINLEVEL_OUTOF10: 5
PAINLEVEL_OUTOF10: 5

## 2024-11-15 ASSESSMENT — PAIN DESCRIPTION - ORIENTATION
ORIENTATION: LEFT
ORIENTATION: LEFT

## 2024-11-15 ASSESSMENT — PAIN DESCRIPTION - LOCATION
LOCATION: LEG
LOCATION: SHOULDER

## 2024-11-15 ASSESSMENT — PAIN DESCRIPTION - PAIN TYPE: TYPE: ACUTE PAIN

## 2024-11-15 ASSESSMENT — PAIN - FUNCTIONAL ASSESSMENT: PAIN_FUNCTIONAL_ASSESSMENT: ACTIVITIES ARE NOT PREVENTED

## 2024-11-15 NOTE — PROGRESS NOTES
Patient is alert and oriented x 4. Patient's pain on her left shoulder is controlled and she is just taking Tylenol as needed. Patient is continent of bladder and bowel. Patient is resting comfortably in the bed, with call light within reach.

## 2024-11-15 NOTE — CONSULTS
Department of Family Practice  Consult Note        Reason for Consult:  Medical management while on the Inpatient Rehab unit.    Requesting Physician:  Dr Leal    CHIEF COMPLAINT:   The need to continue the intensive time with therapies following the acute hospital stay.    History Obtained From:  patient, EMR    HISTORY OF PRESENT ILLNESS:              The patient is a 49 y.o. female with significant past medical history of       Diagnosis Date    Coronary artery disease involving native coronary artery of native heart without angina pectoris 10/27/2021    Diabetes mellitus (HCC)     Hypertension     Incomplete bladder emptying     Microscopic hematuria     Osteoarthritis of knee 01/31/2023    Unspecified cerebral artery occlusion with cerebral infarction 1995      who presents with having a severe HA and then a stroke. She came to the ED but did not require any acute neurologic intervention.     Past Medical History:        Diagnosis Date    Coronary artery disease involving native coronary artery of native heart without angina pectoris 10/27/2021    Diabetes mellitus (HCC)     Hypertension     Incomplete bladder emptying     Microscopic hematuria     Osteoarthritis of knee 01/31/2023    Unspecified cerebral artery occlusion with cerebral infarction 1995     Past Surgical History:        Procedure Laterality Date    BREAST SURGERY Left 02/10/2015    I & D Dr. Mac     CYST REMOVAL      DISSECTION GROIN N/A 9/19/2019    I&D OF PUBIC ABSCESS performed by Tan Mac MD at [facility] OR    HYSTERECTOMY (CERVIX STATUS UNKNOWN)  April 2013     total    HYSTEROSCOPY      WY EXC B9 LESION MRGN XCP SK TG T/A/L 2.1-3.0 CM N/A 6/4/2018    EXCISION OF BACK MASS performed by Tan Mac MD at [facility] OR     Current Medications:   Current Facility-Administered Medications: lidocaine 4 % external patch 1 patch, 1 patch, TransDERmal, Daily  Vitamin D (CHOLECALCIFEROL) tablet 5,000 Units, 5,000 Units, Oral,  Daily  sodium chloride flush 0.9 % injection 5-40 mL, 5-40 mL, IntraVENous, 2 times per day  sodium chloride flush 0.9 % injection 5-40 mL, 5-40 mL, IntraVENous, PRN  0.9 % sodium chloride infusion, , IntraVENous, PRN  potassium chloride 20 mEq/50 mL IVPB (Central Line), 20 mEq, IntraVENous, PRN  magnesium sulfate 2000 mg in 50 mL IVPB premix, 2,000 mg, IntraVENous, PRN  albuterol (PROVENTIL) (2.5 MG/3ML) 0.083% nebulizer solution 2.5 mg, 2.5 mg, Nebulization, Q2H PRN  potassium chloride (KLOR-CON M) extended release tablet 40 mEq, 40 mEq, Oral, PRN **OR** potassium bicarb-citric acid (EFFER-K) effervescent tablet 40 mEq, 40 mEq, Oral, PRN  carvedilol (COREG) tablet 25 mg, 25 mg, Oral, BID  amLODIPine (NORVASC) tablet 5 mg, 5 mg, Oral, Daily  hydrALAZINE (APRESOLINE) tablet 100 mg, 100 mg, Oral, TID  valsartan (DIOVAN) tablet 320 mg, 320 mg, Oral, Daily  polyethylene glycol (GLYCOLAX) packet 17 g, 17 g, Oral, BID  senna (SENOKOT) tablet 8.6 mg, 1 tablet, Oral, Nightly  hydroCHLOROthiazide (HYDRODIURIL) tablet 25 mg, 25 mg, Oral, Daily  atorvastatin (LIPITOR) tablet 40 mg, 40 mg, Oral, Nightly  acetaminophen (TYLENOL) tablet 650 mg, 650 mg, Oral, Q4H PRN  ondansetron (ZOFRAN-ODT) disintegrating tablet 4 mg, 4 mg, Oral, Q8H PRN  aspirin chewable tablet 81 mg, 81 mg, Oral, Daily  docusate sodium (COLACE) capsule 100 mg, 100 mg, Oral, BID  docusate sodium (ENEMEEZ) enema 283 mg, 1 enema, Rectal, Daily PRN  apixaban (ELIQUIS) tablet 10 mg, 10 mg, Oral, BID **FOLLOWED BY** [START ON 11/18/2024] apixaban (ELIQUIS) tablet 5 mg, 5 mg, Oral, BID  Allergies:  Latex, Ace inhibitors, Bactrim [sulfamethoxazole-trimethoprim], Penicillins, Clindamycin/lincomycin, Ciprofloxacin, Doxycycline, and Lisinopril    Social History:   MARITAL STATUS:  single    Family History:       Problem Relation Age of Onset    Heart Disease Mother     High Blood Pressure Mother     Arthritis Mother     Breast Cancer Maternal Cousin         dx

## 2024-11-15 NOTE — PROGRESS NOTES
\"story\" r/t given stimuli. ST presented x5 different scenes; consistent cuing required to elicit descriptive/content words. Noted breakdowns in word retrieval, despite increased time provided. Patient continuing to state one word responses, requiring mod-max prompting and verbal cues to attend to specific details in photos for increased utterance lengths. ST prompted repetition of target stimuli to facilitate recovery of speech and language function.         INTERVENTIONS:  FLOWERS PROGRAM:              2 Element Commands:  independently              3 Element Commands:  independently              2 Step Commands via Action Placement: 3/3 independently              2 Element Command with Word Attributes; via pointin/4 independently  *Patient benefiting from occasional x1-2 repetitions of stimuli  *Overall excellent success following structured commands; Plans to increase complexity in subsequent sessions to facilitate further recovery of function      Review of Systems:  CONSTITUTIONAL:  negative for  fevers and chills  EYES:  positive for  visual disturbance- diplopia impaired vision in left visual fields  HEENT:  negative for  hearing loss and sore throat  RESPIRATORY:  negative for  dyspnea and wheezing  CARDIOVASCULAR:  negative for  chest pain, palpitations  GASTROINTESTINAL: positive for constipation  GENITOURINARY:  negative for dysuria  SKIN:  negative for rash  MUSCULOSKELETAL:  positive for tightness  NEUROLOGICAL:  positive for speech problems, visual disturbance, coordination problems, weakness, numbness, and tingling  BEHAVIOR/PSYCH:  negative  System review otherwise negative      Objective:  /86   Pulse 80   Temp 97.5 °F (36.4 °C) (Oral)   Resp 20   Ht 1.651 m (5' 5\")   Wt 91.8 kg (202 lb 6.1 oz)   LMP 2013   SpO2 98%   BMI 33.68 kg/m²   Patient is awake and alert. Sitting up in wheelchair; no family at bedside   Orientation: not reassessed today  Mood: within normal  Hemoglobin 11.7 (L) 12.0 - 16.0 gm/dl    Hematocrit 36.9 (L) 37.0 - 47.0 %    MCV 86.8 81.0 - 99.0 fL    MCH 27.5 26.0 - 33.0 pg    MCHC 31.7 (L) 32.2 - 35.5 gm/dl    RDW-CV 15.1 (H) 11.5 - 14.5 %    RDW-SD 47.8 (H) 35.0 - 45.0 fL    Platelets 365 130 - 400 thou/mm3    MPV 9.6 9.4 - 12.4 fL    Seg Neutrophils 52.7 %    Lymphocytes 36.5 %    Monocytes % 8.3 %    Eosinophils 1.7 %    Basophils 0.4 %    Immature Granulocytes % 0.4 %    Neutrophils Absolute 5.4 1.8 - 7.7 thou/mm3    Lymphocytes Absolute 3.8 1.0 - 4.8 thou/mm3    Monocytes Absolute 0.9 0.4 - 1.3 thou/mm3    Eosinophils Absolute 0.2 0.0 - 0.4 thou/mm3    Basophils Absolute 0.0 0.0 - 0.1 thou/mm3    Immature Grans (Abs) 0.04 0.00 - 0.07 thou/mm3    nRBC 0 /100 wbc       Impression:  Strokelike symptoms- clinically suspected CVA  Acute DVT  Left internal carotid artery aneurysm  Type 2 diabetes  HTN  Migraines  Angioedema  Obesity, class II (BMI 35.18)  Impaired mobility and ADLs  Expressive  language impairment     Plan:  Continue inpatient rehabilitation program involving at least 3 hours per day, 5 days per week of intensive rehabilitation.  Rehabilitation services will include PT, OT, and SLP/RT in order to improve functional status prior to discharge.  Family education and training will be completed.  Equipment evaluations and recommendations will be completed as appropriate.       Rehabilitation nursing will be involved for bowel, bladder, skin, and pain management.  Nursing will also provide education and training to patient and family.    Dr. Stuart consulted for medical management   Clinically suspected CVA: Continue atorvastatin 40 mg QHS. Discussed with discharging hospsitalist who agreed with initiating ASA 81 mg for secondary CVA prophylaxis. Neurology most recent note stated no antiplt or antithrombotics for 24 hours post TNK  Left internal carotid artery aneurysm: Will need outpatient neurointervention follow-up  Acute DVT LLE: Continue

## 2024-11-15 NOTE — PROGRESS NOTES
still effortful speech.     PAIN: left anterior shoulder/ pectoral musculature,  2/10 pain     Vitals: Nurse checked vitals prior to session    COGNITION: Slow Processing and Expressive language deficits     ADL:   Upper Body Dressing: Minimal assistance to doff and don her shirt for US treatment.     BALANCE:  Sitting Balance:  Supervision  Standing Balance: Contact Guard Assistance.     TRANSFERS:  Sit to Stand:  Contact Guard Assistance, with increased time for completion, cues for hand placement.    Stand to Sit: Contact Guard Assistance, cues for hand placement.    Cues for hand placement, pt able to push up from armrest with L hand with  pain reported in L shoulder  Multiple transfers completed throughout session     FUNCTIONAL MOBILITY:  Assistive Device: Rolling Walker  Assist Level:  Contact Guard assistance   Distance: 4 feet, 3 feet, 44 ft  in gym      BED MOBILITY:   Sit to Supine: Contact Guard Assistance, X 1 with increased time for completion.    Supine to Sit: Contact Guard Assistance, X 1 with increased time for completion.     Completed on mat table.     HAND ASSESSMENT  Hand Dominance: Right  Left Hand Strength -  (lbs)  Handle Setting 2: 11, 15, 19 avg: avg: 15   strength improved with praise and encouragement     Right Hand Strength -  (lbs)  Handle Setting 2: 64, 48, 61 av.6    Fine Motor Skills  Left 9-Hole Peg Test: Functional  Left 9 Hole Peg Test Time (secs): 52.08 (decreased L shoulder ROM impacting 9HPT)  Right 9-Hole Peg Test: Functional  Right 9 Hole Peg Test Time (secs): 31.19  Fine Motor Comment: Pt presents with overall slowed movements impacting 9 hpt scores      ADDITIONAL ACTIVITIES:  Pt reports she feels the US treatment helped yesterday.  Improved L shoulder AROM noted during transfers.  Therefore, Ultrasound treatment continued on left pectoral musculature and anterior shoulder - 1.0 randall per cm squared, 1.0 mHZ, 100%, x8 minutes.  Completed for pain free  Long Term Goals : 2 weeks from initial OT eval  Long Term Goal 1: Pt will demonstrate an increased score measured by the Modified Barthel Index to 85/100 to promote increased occupational performance in ADL tasks.   Long Term Goal 2: Pt will complete a light IADL task with good attention to LUE with supervision to increase independence in home maintainence.   Long Term Goal 3: Pt will complete a simple community reintegration task with supervision to increase independence in going shopping.             Following session, patient left in safe position with all fall risk precautions in place.

## 2024-11-15 NOTE — CARE COORDINATION
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 Writer spent some time discussing her concerns, providing support and reassurance, which seemed to improve her mood. The patient now appears to be in better spirits and is resting comfortably with call light within reach.

## 2024-11-15 NOTE — PLAN OF CARE
Problem: Chronic Conditions and Co-morbidities  Goal: Patient's chronic conditions and co-morbidity symptoms are monitored and maintained or improved  11/15/2024 0858 by Yaquelin Michelle RN  Outcome: Progressing  Flowsheets (Taken 11/15/2024 0858)  Care Plan - Patient's Chronic Conditions and Co-Morbidity Symptoms are Monitored and Maintained or Improved:   Monitor and assess patient's chronic conditions and comorbid symptoms for stability, deterioration, or improvement   Collaborate with multidisciplinary team to address chronic and comorbid conditions and prevent exacerbation or deterioration     Problem: Discharge Planning  Goal: Discharge to home or other facility with appropriate resources  11/15/2024 0858 by Yaquelin Michelle RN  Outcome: Progressing  Flowsheets (Taken 11/15/2024 0858)  Discharge to home or other facility with appropriate resources:   Identify barriers to discharge with patient and caregiver   Arrange for needed discharge resources and transportation as appropriate   Identify discharge learning needs (meds, wound care, etc)     Problem: Skin/Tissue Integrity  Goal: Absence of new skin breakdown  Description: 1.  Monitor for areas of redness and/or skin breakdown  2.  Assess vascular access sites hourly  3.  Every 4-6 hours minimum:  Change oxygen saturation probe site  4.  Every 4-6 hours:  If on nasal continuous positive airway pressure, respiratory therapy assess nares and determine need for appliance change or resting period.  11/15/2024 0858 by Yaquelin Michelle RN  Outcome: Progressing     Problem: ABCDS Injury Assessment  Goal: Absence of physical injury  11/15/2024 0858 by Yaquelin Michelle RN  Outcome: Progressing  Flowsheets (Taken 11/15/2024 0858)  Absence of Physical Injury: Implement safety measures based on patient assessment     Problem: Safety - Adult  Goal: Free from fall injury  11/15/2024 0858 by Yaquelin Michelle RN  Outcome: Progressing  Flowsheets (Taken 11/15/2024 0858)  Free

## 2024-11-15 NOTE — PROGRESS NOTES
[facility]  INPATIENT PHYSICAL THERAPY  DAILY NOTE  [facility] - 8K-12/[id number]-A      Discharge Recommendations: Continue to assess pending progress  Equipment Recommendations: No  To be determined at patient progresses.            Time In: 0700  Time Out: 0830  Timed Code Treatment Minutes: 90 Minutes  Minutes: 90          Date: 11/15/2024  Patient Name: Yulissa Andersen,  Gender:  female        MRN: [id number]  : 1975  (49 y.o.)     Referring Practitioner: Noa Leal DO  Diagnosis: Cerebrovascular accident (CVA) determined by clinical assessment ([facility])  Additional Pertinent Hx: Per chart review: History obtained via: ED documentation, acute care documentation, and patient  50 yo female patient initially presented to [facility] ED on 2024 with complaints of headache and AMS.  In the ED, patient's boyfriend reported that she woke up with a severe migraine and associated nausea.  Patient reportedly drove herself to PCP where they transferred her to ED due to hypertensive emergency.  She reportedly received labetalol in room.  She was noted to have deterioration of her mental status.  Stroke alert was activated.  Initial NIH was 18 (reported aphasia, partial gaze palsy, bilateral blindness, all 4 extremities drift and hit the bed and left sided sensation deficit).  CT head was reportedly negative.  CT of the head and neck was reportedly negative for LVO or critical stenosis patient determined to be a good candidate for TNK which was given after initiation of Cardene drip for BP management.  After TNK, patient was admitted to ICU for further evaluation management. Patient stay complicated by angioedema thought to be secondary to Vasotec.  Patient given Decadron, Benadryl, and FFP x2.  MRI of brain reportedly negative.  CTA reportedly with a possible filling defect in the distal right middle cerebral artery, 3.3 mm aneurysm arising from the terminal segment of the left  internal carotid artery, no evidence of vasospasm..  Echo was obtained reportedly with EF 55-60%, no PFO.  EEG was obtained and was reportedly normal.  Patient was transferred out of the ICU on 11/9/2024.  On day of consultation, patient is seen laying in bed with her son at bedside. Patient reports ongoing left sided vision impairment, left sided weakness and speech impairment. She denies any CP or SOB. No reports of any significant changes to bowel or bladder. Patient is able to provide majority of history but is limited based on language impairments. Prior to admission, patient was reportedly independent with ADLs and mobility without the use of any AD. She was employed an working full time at [employer] as a . She lives with her boyfriend and 2 children (ages 14 and 16). An adult son is in the room who does not reside with them. He states that all family members work but that someone would be available to provide assistance on DC.     Prior Level of Function:  Lives With: Significant other (And two children ages 14 and 16 years)  Type of Home: House  Home Layout: Two level, Bed/Bath upstairs ((B) rails to second floor)  Home Access: Stairs to enter with rails  Entrance Stairs - Number of Steps: 4-5  Entrance Stairs - Rails: Both  Home Equipment: None   Bathroom Shower/Tub: Tub/Shower unit  Bathroom Toilet: Standard  Bathroom Equipment: Hand-held shower  Bathroom Accessibility: Accessible    Receives Help From: Family  ADL Assistance: Independent  Homemaking Assistance: Independent  Ambulation Assistance: Independent  Transfer Assistance: Independent  Active : Yes  IADL Comments: Pt is responsible for cooking, cleaning, laundry (her own only), finances, and shopping at home. Pt reports that she recieves assistance from family for completion of yard work.  Additional Comments: All family members work but pt reports someone would be available to provide assistance at home as

## 2024-11-15 NOTE — PROGRESS NOTES
[facility]  INPATIENT SPEECH THERAPY  [facility]  DAILY NOTE    Discharge Recommendations: Outpatient Speech Therapy and Continue to Assess Pending Progress    SLP Individual Minutes  Time In: 1230  Time Out: 1300  Minutes: 30  Timed Code Treatment Minutes: 0 Minutes   Speech-Language Tx: 20 minutes  Dysphagia Tx: 10 minutes    Date: 11/15/2024  Patient Name: Yulissa Andersen      CSN: [id number]   : 1975  (49 y.o.)  Gender: female   Referring Physician:  Noa Leal DO  Diagnosis: Cerebrovascular accident (CVA) determined by clinical assessment (HCC)  Precautions: fall risk  Current Diet: Regular solids + Thin liquids  Respiratory Status: Room Air  Swallowing Strategies: Standard Universal Swallow Precautions  Date of Last MBS/FEES: Not Applicable    Pain:  No pain reported.    Subjective:  Patient upright in wheelchair, currently emotional related to current deficits and difficulties; Increased emotional lability from prior session this date. ST providing comfort, support, active listening, and validating information. Patient alert and cooperative throughout. No family present.     Short-Term Goals:  SHORT TERM GOAL #1:  Goal 1: Patient will complete verbal expression tasks (including BASIC naming, automatic speech tasks, biographics, verbal fluency, word level repetition) with 60% accuracy, max cues to improve functional expressive communication.  INTERVENTIONS: DNT due to focus on other ST goals    SHORT TERM GOAL #2:  Goal 2: Patient will complete auditory/reading comprehension (including direction following of 2+ commands, mildly complex yes/no questions, functional reading comprehension) with 70% accuracy, mod cues to improve functional recepetive communication.  INTERVENTIONS: Short Sentence Comprehension:  independently  *Slow speech production; requiring extensive time  *Difficulty with production of multi-syllabic words     *Excellent reading

## 2024-11-15 NOTE — PROGRESS NOTES
[facility]  INPATIENT SPEECH THERAPY  [facility]  DAILY NOTE    Discharge Recommendations: Outpatient Speech Therapy and Continue to Assess Pending Progress    SLP Individual Minutes  Time In: 0900  Time Out: 30  Minutes: 30  Timed Code Treatment Minutes: 0 Minutes   Speech-Language Tx: 30 minutes    Date: 11/15/2024  Patient Name: Yulissa Andersen      CSN: [id number]   : 1975  (49 y.o.)  Gender: female   Referring Physician:  Noa Leal DO  Diagnosis: Cerebrovascular accident (CVA) determined by clinical assessment (HCC)  Precautions: fall risk  Current Diet: Regular solids + Thin liquids  Respiratory Status: Room Air  Swallowing Strategies: Standard Universal Swallow Precautions  Date of Last MBS/FEES: Not Applicable    Pain:  No pain reported.    Subjective:  Patient upright in wheelchair with no family present upon ST arrival. Alert and agreeable to ST interventions. Noted improvement in speech production clarity and lengths of words this date.      Short-Term Goals:  SHORT TERM GOAL #1:  Goal 1: Patient will complete verbal expression tasks (including BASIC naming, automatic speech tasks, biographics, verbal fluency, word level repetition) with 60% accuracy, max cues to improve functional expressive communication.  INTERVENTIONS:   ST facilitated use of Melodic Intonation Therapy with singing of \"happy birthday\" to promote improved verbal fluency of language/speech productions. Overall limited success with production at appropriate rate. Also attempted singing of \"days of the week\" song (utilized in patient's  classroom) which patient was able to navigate to on cell phone independently. Encouraged independently listening and singing along to music in spare time to facilitate improved verbal fluency.     SHORT TERM GOAL #2:  Goal 2: Patient will complete auditory/reading comprehension (including direction following of 2+ commands, mildly complex

## 2024-11-15 NOTE — PROGRESS NOTES
[facility]  Recreational Therapy  Inpatient Rehabilitation Evaluation        Time Spent with Patient: 15 minutes    Date:  11/15/2024       Patient Name: Yulissa Andersen      MRN: [id number]       YOB: 1975 (49 y.o.)       Gender: female     Referring Practitioner: Noa Leal DO    RESTRICTIONS/PRECAUTIONS:  Restrictions/Precautions: Fall Risk, General Precautions          PAIN: 5-nurse just gave her medication     SUBJECTIVE:  pt lives with her boyfriend and 2 sons -she has 6 children altogether     VISION:  Visual Impairment-chart review states her L eye lateral vision is cut off    HEARING: Within Normal Limit    LEISURE INTERESTS:   Pt has expressive aphasia, she was independent PTA, she is a , a friend visiting states pt is always taking care of everyone else and she is always doing something staying very active-pt agreed to having some word puzzles, some adult coloring materials in room and a deck of cards and card hernandes for when family are present-pt very pleasant, asked her what she is thankful for this thanksgiving and she pointed to herself and said Me! Became tearful with supportive contact given- pt wants to paint on Sunday with peers and already paid the money for it    BARRIERS TO LEISURE INTERESTS:    Communication deficit, Decreased endurance, Impaired vision, Pain, and Upper extremity weakness           Patient Education  New Education Provided: Importance of Leisure, RT Plan of Care    Plan:  Continue to follow patient through this admission  Include patient in appropriate groups  See patient individually    Electronically signed by: Morelia Wallace, CTRS  Date: 11/15/2024

## 2024-11-15 NOTE — PLAN OF CARE
Problem: Chronic Conditions and Co-morbidities  Goal: Patient's chronic conditions and co-morbidity symptoms are monitored and maintained or improved  11/14/2024 2235 by Joanne Sanders LPN  Outcome: Progressing  Flowsheets (Taken 11/14/2024 2107)  Care Plan - Patient's Chronic Conditions and Co-Morbidity Symptoms are Monitored and Maintained or Improved: Monitor and assess patient's chronic conditions and comorbid symptoms for stability, deterioration, or improvement     Problem: Discharge Planning  Goal: Discharge to home or other facility with appropriate resources  11/14/2024 2235 by Joanne Sanders LPN  Outcome: Progressing  Flowsheets (Taken 11/14/2024 2107)  Discharge to home or other facility with appropriate resources: Identify barriers to discharge with patient and caregiver     Problem: Skin/Tissue Integrity  Goal: Absence of new skin breakdown  Description: 1.  Monitor for areas of redness and/or skin breakdown  2.  Assess vascular access sites hourly  3.  Every 4-6 hours minimum:  Change oxygen saturation probe site  4.  Every 4-6 hours:  If on nasal continuous positive airway pressure, respiratory therapy assess nares and determine need for appliance change or resting period.  11/14/2024 2235 by Joanne Sanders LPN  Outcome: Progressing     Problem: ABCDS Injury Assessment  Goal: Absence of physical injury  11/14/2024 2235 by Joanne Sanders LPN  Outcome: Progressing     Problem: Safety - Adult  Goal: Free from fall injury  11/14/2024 2235 by Joanne Sanders LPN  Outcome: Progressing     Problem: Pain  Goal: Verbalizes/displays adequate comfort level or baseline comfort level  11/14/2024 2235 by Joanne Sanders LPN  Outcome: Progressing     Problem: Neurosensory - Adult  Goal: Achieves stable or improved neurological status  11/14/2024 2235 by Joanne Sanders LPN  Outcome: Progressing  Flowsheets (Taken 11/14/2024 2107)  Achieves stable or  care  3. Help patient and family identify pros/cons of alternative solutions  4. Provide information as requested by patient/family  5. Respect patient/family right to receive or not to receive information  6. Serve as a liaison between patient and family and health care team  7. Initiate Consults from Ethics, Palliative Care or initiate Family Care Conference as is appropriate  11/14/2024 2235 by Joanne Sanders LPN  Outcome: Progressing  Flowsheets (Taken 11/14/2024 2107)  Patient/family able to effectively weigh alternatives and participate in decision making related to treatment and care:   Determine when there are differences between patient's view, family's view, and healthcare provider's view of condition   Facilitate patient and family articulation of goals for care   Help patient and family identify pros/cons of alternative solutions   Provide information as requested by patient/family   Respect patient/family right to receive or not to receive information

## 2024-11-15 NOTE — PROGRESS NOTES
Comprehensive Nutrition Assessment    Type and Reason for Visit:  Initial, Consult (Oral Nutrition Supplements)    Nutrition Recommendations/Plan:   Recommend diet as per SLP.  Added CHO controlled diet to diet order - hx DM.  Trial Glucerna ONS TID.  Consider MVI.  Encouraged po, good nutrition at best efforts.  Pt. Reports watching diet pta for blood sugar control.     Malnutrition Assessment:  Malnutrition Status:  At risk for malnutrition (11/15/24 1027)    Context:  Acute Illness     Findings of the 6 clinical characteristics of malnutrition:  Energy Intake:  75% or less of estimated energy requirements for 7 or more days  Weight Loss:   (no significant weight loss - weights tend to fluctuate up/down a few pounds)     Body Fat Loss:  No body fat loss     Muscle Mass Loss:  No muscle mass loss    Fluid Accumulation:  Unable to assess     Strength:  Not Performed    Nutrition Assessment:     Pt. nutritionally compromised AEB inadequate oral intake.  At risk for further nutrition compromise r/t admit s/p stroke and underlying medical condition (hx CAD, DM, HTN, CVA).       Nutrition Related Findings:    Pt. Report/Treatments/Miscellaneous:   11/15- pt. Seen - reports appetite is \"not too good\"; denies any nausea or abdominal pain; denies any trouble with chewing/swallowing; reports good appetite pta; states consumes 2 meals/day and snacks; reports watching diet pta; states blood sugars are well controlled; agrees to trial Glucerna ONS and receive CHO controlled diet while in hospital  GI Status: BM 11/14  Pertinent Labs: 11/13: Glucose 121, BUN 19, Cr 0.7; 11/7: HgbA1c 6.1%  Pertinent Meds: Vitamin D, Colace, Glycolax, Senokot      Wound Type: None       Current Nutrition Intake & Therapies:    Average Meal Intake: 1-25%, 51-75%  Average Supplements Intake:  (initiated)  ADULT DIET; Regular; 4 carb choices (60 gm/meal)  ADULT ORAL NUTRITION SUPPLEMENT; Breakfast, Dinner; Diabetic Oral

## 2024-11-16 PROCEDURE — 1180000000 HC REHAB R&B

## 2024-11-16 PROCEDURE — 97035 APP MDLTY 1+ULTRASOUND EA 15: CPT

## 2024-11-16 PROCEDURE — 97110 THERAPEUTIC EXERCISES: CPT

## 2024-11-16 PROCEDURE — 97116 GAIT TRAINING THERAPY: CPT

## 2024-11-16 PROCEDURE — 6370000000 HC RX 637 (ALT 250 FOR IP): Performed by: STUDENT IN AN ORGANIZED HEALTH CARE EDUCATION/TRAINING PROGRAM

## 2024-11-16 PROCEDURE — 6370000000 HC RX 637 (ALT 250 FOR IP)

## 2024-11-16 PROCEDURE — 6370000000 HC RX 637 (ALT 250 FOR IP): Performed by: FAMILY MEDICINE

## 2024-11-16 PROCEDURE — 97535 SELF CARE MNGMENT TRAINING: CPT

## 2024-11-16 PROCEDURE — 97112 NEUROMUSCULAR REEDUCATION: CPT

## 2024-11-16 PROCEDURE — 97530 THERAPEUTIC ACTIVITIES: CPT

## 2024-11-16 PROCEDURE — 92507 TX SP LANG VOICE COMM INDIV: CPT

## 2024-11-16 RX ADMIN — HYDRALAZINE HYDROCHLORIDE 100 MG: 50 TABLET ORAL at 15:11

## 2024-11-16 RX ADMIN — HYDRALAZINE HYDROCHLORIDE 100 MG: 50 TABLET ORAL at 08:32

## 2024-11-16 RX ADMIN — VALSARTAN 320 MG: 320 TABLET ORAL at 08:33

## 2024-11-16 RX ADMIN — ATORVASTATIN CALCIUM 40 MG: 40 TABLET, FILM COATED ORAL at 20:46

## 2024-11-16 RX ADMIN — Medication 5000 UNITS: at 08:32

## 2024-11-16 RX ADMIN — APIXABAN 10 MG: 5 TABLET, FILM COATED ORAL at 20:46

## 2024-11-16 RX ADMIN — DOCUSATE SODIUM 100 MG: 100 CAPSULE, LIQUID FILLED ORAL at 20:47

## 2024-11-16 RX ADMIN — APIXABAN 10 MG: 5 TABLET, FILM COATED ORAL at 08:32

## 2024-11-16 RX ADMIN — CARVEDILOL 25 MG: 25 TABLET, FILM COATED ORAL at 08:33

## 2024-11-16 RX ADMIN — HYDRALAZINE HYDROCHLORIDE 100 MG: 50 TABLET ORAL at 20:46

## 2024-11-16 RX ADMIN — ASPIRIN 81 MG 81 MG: 81 TABLET ORAL at 08:32

## 2024-11-16 RX ADMIN — SENNOSIDES 8.6 MG: 8.6 TABLET ORAL at 20:47

## 2024-11-16 RX ADMIN — DOCUSATE SODIUM 100 MG: 100 CAPSULE, LIQUID FILLED ORAL at 08:32

## 2024-11-16 RX ADMIN — CARVEDILOL 25 MG: 25 TABLET, FILM COATED ORAL at 20:46

## 2024-11-16 RX ADMIN — HYDROCHLOROTHIAZIDE 25 MG: 25 TABLET ORAL at 08:33

## 2024-11-16 RX ADMIN — AMLODIPINE BESYLATE 5 MG: 5 TABLET ORAL at 08:32

## 2024-11-16 RX ADMIN — POLYETHYLENE GLYCOL 3350 17 G: 17 POWDER, FOR SOLUTION ORAL at 08:33

## 2024-11-16 ASSESSMENT — PAIN SCALES - WONG BAKER
WONGBAKER_NUMERICALRESPONSE: NO HURT

## 2024-11-16 ASSESSMENT — PAIN DESCRIPTION - LOCATION: LOCATION: LEG;SHOULDER

## 2024-11-16 ASSESSMENT — PAIN SCALES - GENERAL
PAINLEVEL_OUTOF10: 0
PAINLEVEL_OUTOF10: 5

## 2024-11-16 ASSESSMENT — PAIN DESCRIPTION - ORIENTATION: ORIENTATION: LEFT

## 2024-11-16 ASSESSMENT — PAIN DESCRIPTION - DESCRIPTORS: DESCRIPTORS: ACHING

## 2024-11-16 NOTE — PROGRESS NOTES
[facility]  INPATIENT PHYSICAL THERAPY  DAILY NOTE  [facility] - [id number]      Discharge Recommendations: Continue to assess pending progress and currently needing 24 hour care  Equipment Recommendations: No  To be determined at patient progresses.            Time In: 1330  Time Out: 1500  Timed Code Treatment Minutes: 90 Minutes  Minutes: 90          Date: 2024  Patient Name: Yulissa Andersen,  Gender:  female        MRN: [id number]  : 1975  (49 y.o.)     Referring Practitioner: Noa Leal DO  Diagnosis: Cerebrovascular accident (CVA) determined by clinical assessment (HCC)  Additional Pertinent Hx: Per chart review: History obtained via: ED documentation, acute care documentation, and patient  50 yo female patient initially presented to [facility] ED on 2024 with complaints of headache and AMS.  In the ED, patient's boyfriend reported that she woke up with a severe migraine and associated nausea.  Patient reportedly drove herself to PCP where they transferred her to ED due to hypertensive emergency.  She reportedly received labetalol in room.  She was noted to have deterioration of her mental status.  Stroke alert was activated.  Initial NIH was 18 (reported aphasia, partial gaze palsy, bilateral blindness, all 4 extremities drift and hit the bed and left sided sensation deficit).  CT head was reportedly negative.  CT of the head and neck was reportedly negative for LVO or critical stenosis patient determined to be a good candidate for TNK which was given after initiation of Cardene drip for BP management.  After TNK, patient was admitted to ICU for further evaluation management. Patient stay complicated by angioedema thought to be secondary to Vasotec.  Patient given Decadron, Benadryl, and FFP x2.  MRI of brain reportedly negative.  CTA reportedly with a possible filling defect in the distal right middle cerebral artery, 3.3 mm aneurysm arising from the

## 2024-11-16 NOTE — CARE COORDINATION
Patient in bed awake, family at bedside. Patient denies chest pain and SOB. C/o pain in left leg. PRN Tylenol given as ordered. Will continue to monitor. Call light and bedside table within reach

## 2024-11-16 NOTE — PLAN OF CARE
Problem: Chronic Conditions and Co-morbidities  Goal: Patient's chronic conditions and co-morbidity symptoms are monitored and maintained or improved  Outcome: Progressing  Flowsheets (Taken 11/15/2024 2339)  Care Plan - Patient's Chronic Conditions and Co-Morbidity Symptoms are Monitored and Maintained or Improved: Monitor and assess patient's chronic conditions and comorbid symptoms for stability, deterioration, or improvement     Problem: Discharge Planning  Goal: Discharge to home or other facility with appropriate resources  Outcome: Progressing  Flowsheets (Taken 11/15/2024 2339)  Discharge to home or other facility with appropriate resources: Identify barriers to discharge with patient and caregiver     Problem: Skin/Tissue Integrity  Goal: Absence of new skin breakdown  Description: 1.  Monitor for areas of redness and/or skin breakdown  2.  Assess vascular access sites hourly  3.  Every 4-6 hours minimum:  Change oxygen saturation probe site  4.  Every 4-6 hours:  If on nasal continuous positive airway pressure, respiratory therapy assess nares and determine need for appliance change or resting period.  Outcome: Progressing     Problem: ABCDS Injury Assessment  Goal: Absence of physical injury  Outcome: Progressing  Flowsheets (Taken 11/15/2024 2339)  Absence of Physical Injury: Implement safety measures based on patient assessment     Problem: Safety - Adult  Goal: Free from fall injury  Outcome: Progressing  Flowsheets (Taken 11/15/2024 2339)  Free From Fall Injury: Instruct family/caregiver on patient safety     Problem: Pain  Goal: Verbalizes/displays adequate comfort level or baseline comfort level  Outcome: Progressing  Flowsheets (Taken 11/15/2024 2339)  Verbalizes/displays adequate comfort level or baseline comfort level:   Encourage patient to monitor pain and request assistance   Assess pain using appropriate pain scale   Administer analgesics based on type and severity of pain and evaluate

## 2024-11-16 NOTE — PROGRESS NOTES
[facility]  Occupational Therapy  Daily Note    Discharge Recommendations: Continue to assess pending progress, Outpatient OT, and Patient would benefit from continued OT at discharge  Equipment Recommendations: Yes Pt does not own any equiptment at this time; recommend tub/shower bench; monitor for LHAE and BSC.      Time In: 1030  Time Out: 1200  Timed Code Treatment Minutes: 90 Minutes  Minutes: 90          Date: 2024  Patient Name: Yulissa Andersen,   Gender: female      [facility]  MRN: [id number]  : 1975  (49 y.o.)  Referring Practitioner: Noa Leal DO  Diagnosis: Cerebrovascular accident (CVA) determined by clinical assessment (HCC)  Additional Pertinent Hx: Yulissa Andersen  is a 49 y.o. female with PMH significant for CAD, DM, HTN, OA, GERD, migraines, and CVA () who is being admitted to the inpatient rehabilitation unit on 2024. Patient initially presented to [facility] ED on 2024 with complaints of headache and AMS. In the ED, patient's boyfriend reported that she woke up with a severe migraine and associated nausea. Patient reportedly drove herself to PCP where they transferred her to ED due to hypertensive emergency.  She was noted to have deterioration of her mental status. Stroke alert was activated.  Initial NIH was 18 (reported aphasia, partial gaze palsy, bilateral blindness, all 4 extremities drift and hit the bed and left sided sensation deficit). CT of the head and neck was reportedly negative for LVO or critical stenosis patient determined to be a good candidate for TNK which was given after initiation of Cardene drip for BP management.  After TNK, patient was admitted to ICU for further evaluation management. Patient stay complicated by angioedema thought to be secondary to Vasotec. MRI of brain reportedly negative. CTA reportedly with a possible filling defect in the distal right middle cerebral artery, 3.3 mm  surfaces with increased time, cues for hand placement    FUNCTIONAL MOBILITY:  Assistive Device: Rolling Walker  Assist Level:  Contact Guard Assistance.   Distance:  To bathroom, approx 5 ft x4 trials  Slow pace, increased time to advance LLE.      ADDITIONAL ACTIVITIES:  Ultrasound treatment completed on left pectoral musculature and anterior shoulder - 1.0 randall per cm squared, 1 mHZ, 100%, x8 minutes. Completed for pain free motion needed for UB ADLs of dressing/bathing.     HERNANDEZ facilitated PROM stretching in LUE, completed with pt supine on mat table. Task completed to reduce tightness and pain to promote pain frre motion and ability to endure ADL tasks. Completed 1 set of 10 reps of the following: sh flexion, sh extension, sh abduction, external rotation, and internal rotation.      Patient completed LUE A/AROM exercises with elbow flexion/extension, forearm pronation/supination, wrist flexion/extension and finger flexion/extension x10 reps x1 set.     Modified Duplin Scale:  +3 - Moderate disability; requiring some help, but able to walk without physical assistance (SBA/CGA).   Patient could not live alone but could walk from one room to another without physical help from another person.  Education provided regarding stroke rehabilitation management.    ASSESSMENT:     Activity Tolerance:  Patient tolerance of  treatment: Good treatment tolerance      Plan: Times Per Week: 5x/per wk for 90 minutes  Current Treatment Recommendations: Strengthening, ROM, Balance training, Functional mobility training, Endurance training, Pain management, Equipment evaluation, education, & procurement, Neuromuscular re-education, Safety education & training, Patient/Caregiver education & training, Self-Care / ADL, Home management training    Education:  Learners: Patient  Safety with transfers and mobility, ultra sound, stretching, A/AROM, ADL strategies using esme technique     Goals  Short Term Goals  Time Frame for Short

## 2024-11-16 NOTE — PROGRESS NOTES
mildly complex yes/no questions, functional reading comprehension) with 70% accuracy, mod cues to improve functional recepetive communication.  INTERVENTIONS: Not directly addressed d/t focus on other goals.      SHORT TERM GOAL #3:  Goal 3: Patient will complete written expressive tasks (ie: biographics, common words/phrases) with 70% accuracy, mod cues to improve written communication.  INTERVENTIONS:     Writing Biographics:  Yulissa (First Name): Independent  Ganesh (Last Name): Independent  Tab (Daughter): Independent  Keyon (Son): Independent   *Good spelling/writing with no letter formation errors  *Patient able to independently name letters when writing \"Keyon\"    SHORT TERM GOAL #4:  Goal 4: Patient will consume a regular diet and thin liquids with stable pulmonary status and implementation of swallow strategies to assist with hydration/nutrition  INTERVENTIONS: Not directly addressed this date d/t focus on other goals.    SHORT TERM GOAL #5:  Goal 5: Patient will complete structured speech tasks (word repetition, DDK) with use of SOS speech strategies and 90% speech intelligibility within all contexts  INTERVENTIONS: Not directly addressed due to focus on other goals. However, ST provided education about speech strategies, instructing patient to take a deep breath prior to speaking to assist with generating audible phonation. Patient receptive to ST instruction and implemented recommended strategy when naming biographics, given verbal prompt from ST.    SHORT TERM GOAL #6:  Goal 6: Patient will complete MOCA screener to further develop goals/POC as clinically indicated  INTERVENTIONS: Not directly addressed due to focus on other goals.    Long-Term Goals:  Time Frame for Long Term Goals: 3 weeks    LONG TERM GOAL #1:  Goal 1: Patient will improve functional communication skills to a level of supervision to optimize independence with verbal expression and auditory comprehension to assist with transition  to home environment and community re-integration.    LONG TERM GOAL #2:  Goal 2: Patient will consume a regular diet and thin liquids with stable pulmonary status and implementation of swallow strategies to assist with hydration/nutrition      Comprehension: 6 - Complex ideas 90% or device (hearing aid or glasses- if patient is primarily a visual learner)  Expression: 5 - Expresses basic ideas/needs only (hungry/hot/pain)  Social Interaction: 6 - Patient requires medication for mood and/or effect  Problem Solving: 3 - Patient solves simple/routine tasks 50%-74%  Memory: 3 - Patient remembers 50%-74% of the time    Functional Oral Intake Scale: Total Oral Intake: 7.  Total oral intake with no restrictions    EDUCATION:  Learner: Patient  Education:  Reviewed ST goals and Plan of Care and Reviewed recommendations for follow-up  Evaluation of Education: Verbalizes understanding and Demonstrates with assistance    ASSESSMENT/PLAN:  Activity Tolerance:  Patient tolerance of treatment: good; participating in services  Assessment/Plan: Patient progressing toward established goals.  Continues to require skilled care of licensed speech pathologist to progress toward achievement of established goals and plan of care.  Plan for Next Session: receptive language tasks, SOS strategies    ROSITA BolivarS., CF-SLP, [id number]

## 2024-11-16 NOTE — PLAN OF CARE
Problem: Chronic Conditions and Co-morbidities  Goal: Patient's chronic conditions and co-morbidity symptoms are monitored and maintained or improved  11/16/2024 1301 by Montserrat Hong LPN  Outcome: Progressing  Flowsheets (Taken 11/16/2024 1301)  Care Plan - Patient's Chronic Conditions and Co-Morbidity Symptoms are Monitored and Maintained or Improved: Monitor and assess patient's chronic conditions and comorbid symptoms for stability, deterioration, or improvement     Problem: Discharge Planning  Goal: Discharge to home or other facility with appropriate resources  11/16/2024 1301 by Montserrat Hong LPN  Outcome: Progressing  Flowsheets (Taken 11/16/2024 1301)  Discharge to home or other facility with appropriate resources: Identify barriers to discharge with patient and caregiver     Problem: Skin/Tissue Integrity  Goal: Absence of new skin breakdown  Description: 1.  Monitor for areas of redness and/or skin breakdown  2.  Assess vascular access sites hourly  3.  Every 4-6 hours minimum:  Change oxygen saturation probe site  4.  Every 4-6 hours:  If on nasal continuous positive airway pressure, respiratory therapy assess nares and determine need for appliance change or resting period.  11/16/2024 1301 by Montserrat Hong LPN  Outcome: Progressing  Note: No new skin breakdown at this time.      Problem: ABCDS Injury Assessment  Goal: Absence of physical injury  11/16/2024 1301 by Montserrat Hong LPN  Outcome: Progressing  Flowsheets (Taken 11/16/2024 1301)  Absence of Physical Injury: Implement safety measures based on patient assessment     Problem: Safety - Adult  Goal: Free from fall injury  11/16/2024 1301 by Montserrat Hong LPN  Outcome: Progressing  Flowsheets (Taken 11/16/2024 1301)  Free From Fall Injury: Instruct family/caregiver on patient safety     Problem: Pain  Goal: Verbalizes/displays adequate comfort level or baseline comfort level  11/16/2024 1301 by Gely

## 2024-11-16 NOTE — PROGRESS NOTES
[facility]  INPATIENT SPEECH THERAPY  [facility]  DAILY NOTE    Discharge Recommendations: Outpatient Speech Therapy and Continue to Assess Pending Progress    SLP Individual Minutes  Time In: 1000  Time Out: 1030  Minutes: 30  Timed Code Treatment Minutes: 0 Minutes   Speech-Language Tx: 30 minutes  Dysphagia Tx: 0 minutes    Date: 2024  Patient Name: Yulissa Andersen      CSN: [id number]   : 1975  (49 y.o.)  Gender: female   Referring Physician:  Noa Leal DO  Diagnosis: Cerebrovascular accident (CVA) determined by clinical assessment (HCC)  Precautions: fall risk  Current Diet: Regular solids + Thin liquids  Respiratory Status: Room Air  Swallowing Strategies: Standard Universal Swallow Precautions  Date of Last MBS/FEES: Not Applicable    Pain:  No pain reported.    Subjective:  Patient seen sitting upright in chair upon ST arrival; alert and agreeable to ST interventions. Patient pleasant, cooperative, and motivated throughout. No family present.     Short-Term Goals:  SHORT TERM GOAL #1:  Goal 1: Patient will complete verbal expression tasks (including BASIC naming, automatic speech tasks, biographics, verbal fluency, word level repetition) with 60% accuracy, max cues to improve functional expressive communication.  INTERVENTIONS:     Confrontational Namin/20 independent, 2/20 min phonemic cue  *Patient with good ability to name items without cues given additional time  *After naming items, patient completed x5 trials of phrase repetitions with the items (\"I see a X\") given verbal model from ST. Patient with good ability to repeat phrase given visual hand cues from ST for each word.    SHORT TERM GOAL #2:  Goal 2: Patient will complete auditory/reading comprehension (including direction following of 2+ commands, mildly complex yes/no questions, functional reading comprehension) with 70% accuracy, mod cues to improve functional recepetive

## 2024-11-17 PROCEDURE — 1180000000 HC REHAB R&B

## 2024-11-17 PROCEDURE — 6370000000 HC RX 637 (ALT 250 FOR IP): Performed by: FAMILY MEDICINE

## 2024-11-17 PROCEDURE — 6370000000 HC RX 637 (ALT 250 FOR IP): Performed by: STUDENT IN AN ORGANIZED HEALTH CARE EDUCATION/TRAINING PROGRAM

## 2024-11-17 PROCEDURE — 6370000000 HC RX 637 (ALT 250 FOR IP)

## 2024-11-17 RX ADMIN — HYDRALAZINE HYDROCHLORIDE 100 MG: 50 TABLET ORAL at 09:08

## 2024-11-17 RX ADMIN — APIXABAN 10 MG: 5 TABLET, FILM COATED ORAL at 21:49

## 2024-11-17 RX ADMIN — VALSARTAN 320 MG: 320 TABLET ORAL at 09:08

## 2024-11-17 RX ADMIN — CARVEDILOL 25 MG: 25 TABLET, FILM COATED ORAL at 21:49

## 2024-11-17 RX ADMIN — HYDROCHLOROTHIAZIDE 25 MG: 25 TABLET ORAL at 09:08

## 2024-11-17 RX ADMIN — APIXABAN 10 MG: 5 TABLET, FILM COATED ORAL at 09:08

## 2024-11-17 RX ADMIN — CARVEDILOL 25 MG: 25 TABLET, FILM COATED ORAL at 09:08

## 2024-11-17 RX ADMIN — AMLODIPINE BESYLATE 5 MG: 5 TABLET ORAL at 09:08

## 2024-11-17 RX ADMIN — HYDRALAZINE HYDROCHLORIDE 100 MG: 50 TABLET ORAL at 15:33

## 2024-11-17 RX ADMIN — ASPIRIN 81 MG 81 MG: 81 TABLET ORAL at 09:08

## 2024-11-17 RX ADMIN — ATORVASTATIN CALCIUM 40 MG: 40 TABLET, FILM COATED ORAL at 21:49

## 2024-11-17 RX ADMIN — DOCUSATE SODIUM 100 MG: 100 CAPSULE, LIQUID FILLED ORAL at 09:08

## 2024-11-17 RX ADMIN — HYDRALAZINE HYDROCHLORIDE 100 MG: 50 TABLET ORAL at 21:49

## 2024-11-17 RX ADMIN — DOCUSATE SODIUM 100 MG: 100 CAPSULE, LIQUID FILLED ORAL at 21:49

## 2024-11-17 RX ADMIN — Medication 5000 UNITS: at 09:08

## 2024-11-17 ASSESSMENT — PAIN SCALES - GENERAL: PAINLEVEL_OUTOF10: 0

## 2024-11-17 NOTE — CARE COORDINATION
Patient in bed resting. Denies pain. Patient refused to be repositioned this shift. Education provided on importance of repositioning. Patient slept well this shift. No concerns voiced.

## 2024-11-17 NOTE — PLAN OF CARE
Problem: Chronic Conditions and Co-morbidities  Goal: Patient's chronic conditions and co-morbidity symptoms are monitored and maintained or improved  11/17/2024 1210 by Montserrat Hong LPN  Outcome: Progressing  Flowsheets (Taken 11/17/2024 1210)  Care Plan - Patient's Chronic Conditions and Co-Morbidity Symptoms are Monitored and Maintained or Improved: Monitor and assess patient's chronic conditions and comorbid symptoms for stability, deterioration, or improvement     Problem: Discharge Planning  Goal: Discharge to home or other facility with appropriate resources  11/17/2024 1210 by Montserrat Hong LPN  Outcome: Progressing  Flowsheets (Taken 11/17/2024 1210)  Discharge to home or other facility with appropriate resources: Identify barriers to discharge with patient and caregiver     Problem: Skin/Tissue Integrity  Goal: Absence of new skin breakdown  Description: 1.  Monitor for areas of redness and/or skin breakdown  2.  Assess vascular access sites hourly  3.  Every 4-6 hours minimum:  Change oxygen saturation probe site  4.  Every 4-6 hours:  If on nasal continuous positive airway pressure, respiratory therapy assess nares and determine need for appliance change or resting period.  11/17/2024 1210 by Montserrat Hong LPN  Outcome: Progressing  Note: No new skin breakdown at this time.      Problem: ABCDS Injury Assessment  Goal: Absence of physical injury  Outcome: Progressing  Flowsheets (Taken 11/17/2024 1210)  Absence of Physical Injury: Implement safety measures based on patient assessment     Problem: Safety - Adult  Goal: Free from fall injury  11/17/2024 1210 by Montserrat Hong LPN  Outcome: Progressing  Flowsheets (Taken 11/17/2024 1210)  Free From Fall Injury: Instruct family/caregiver on patient safety     Problem: Pain  Goal: Verbalizes/displays adequate comfort level or baseline comfort level  11/17/2024 1210 by Montserrat Hong LPN  Outcome: Progressing  Flowsheets

## 2024-11-17 NOTE — PROGRESS NOTES
Patient educated on use of incentive spirometer. Patient verbalized understanding and demonstrated proper use. Emphasized importance and usage of device, with coughing and deep breathing every 4 hours while awake.

## 2024-11-17 NOTE — PROGRESS NOTES
[facility]  Recreational Therapy  Daily Note  [facility]    Time Spent with Patient: 75 minutes    Date:  11/17/2024       Patient Name: Yulissa Andersen      MRN: [id number]      YOB: 1975 (49 y.o.)       Gender: female     Referring Practitioner: Noa Leal DO    RESTRICTIONS/PRECAUTIONS:  Restrictions/Precautions: Fall Risk, General Precautions          PAIN: 0    SUBJECTIVE:  I am ready    OBJECTIVE:  Pt came to the [place] pod to paint with her mom, her sister and her cousin-she picked out her colors and her saying but the entire time her affect was very flat until during her picture we told her she had to smile-after painting her family took her off the unit to get out of room-pt paid for her sister and her mom's painting too         Patient Education  New Education Provided: Importance of Leisure,     Electronically signed by: Morelia Wallace, CTRS  Date: 11/17/2024

## 2024-11-17 NOTE — PLAN OF CARE
patient/family to identify coping skills, available support systems and cultural and spiritual values   Provide emotional support, including active listening and acknowledgement of concerns of patient and caregivers     Problem: Nutrition Deficit:  Goal: Optimize nutritional status  11/16/2024 2328 by Justa Fleming RN  Outcome: Progressing  Flowsheets (Taken 11/16/2024 2328)  Nutrient intake appropriate for improving, restoring, or maintaining nutritional needs: Monitor oral intake, labs, and treatment plans

## 2024-11-18 LAB
ANION GAP SERPL CALC-SCNC: 11 MEQ/L (ref 8–16)
BASOPHILS ABSOLUTE: 0 THOU/MM3 (ref 0–0.1)
BASOPHILS NFR BLD AUTO: 0.5 %
BUN SERPL-MCNC: 19 MG/DL (ref 7–22)
CALCIUM SERPL-MCNC: 9.2 MG/DL (ref 8.5–10.5)
CHLORIDE SERPL-SCNC: 103 MEQ/L (ref 98–111)
CO2 SERPL-SCNC: 25 MEQ/L (ref 23–33)
CREAT SERPL-MCNC: 0.7 MG/DL (ref 0.4–1.2)
DEPRECATED RDW RBC AUTO: 49.1 FL (ref 35–45)
EOSINOPHIL NFR BLD AUTO: 1 %
EOSINOPHILS ABSOLUTE: 0.1 THOU/MM3 (ref 0–0.4)
ERYTHROCYTE [DISTWIDTH] IN BLOOD BY AUTOMATED COUNT: 15.2 % (ref 11.5–14.5)
GFR SERPL CREATININE-BSD FRML MDRD: > 90 ML/MIN/1.73M2
GLUCOSE SERPL-MCNC: 122 MG/DL (ref 70–108)
HCT VFR BLD AUTO: 32.2 % (ref 37–47)
HGB BLD-MCNC: 10 GM/DL (ref 12–16)
IMM GRANULOCYTES # BLD AUTO: 0.04 THOU/MM3 (ref 0–0.07)
IMM GRANULOCYTES NFR BLD AUTO: 0.5 %
LYMPHOCYTES ABSOLUTE: 3.2 THOU/MM3 (ref 1–4.8)
LYMPHOCYTES NFR BLD AUTO: 38.7 %
MCH RBC QN AUTO: 27.3 PG (ref 26–33)
MCHC RBC AUTO-ENTMCNC: 31.1 GM/DL (ref 32.2–35.5)
MCV RBC AUTO: 88 FL (ref 81–99)
MONOCYTES ABSOLUTE: 0.7 THOU/MM3 (ref 0.4–1.3)
MONOCYTES NFR BLD AUTO: 8 %
NEUTROPHILS ABSOLUTE: 4.3 THOU/MM3 (ref 1.8–7.7)
NEUTROPHILS NFR BLD AUTO: 51.3 %
NRBC BLD AUTO-RTO: 0 /100 WBC
PLATELET # BLD AUTO: 325 THOU/MM3 (ref 130–400)
PLATELET BLD QL SMEAR: ADEQUATE
PMV BLD AUTO: 9.9 FL (ref 9.4–12.4)
POTASSIUM SERPL-SCNC: 4.1 MEQ/L (ref 3.5–5.2)
RBC # BLD AUTO: 3.66 MILL/MM3 (ref 4.2–5.4)
SCAN OF BLOOD SMEAR: NORMAL
SODIUM SERPL-SCNC: 139 MEQ/L (ref 135–145)
WBC # BLD AUTO: 8.3 THOU/MM3 (ref 4.8–10.8)

## 2024-11-18 PROCEDURE — 99232 SBSQ HOSP IP/OBS MODERATE 35: CPT | Performed by: STUDENT IN AN ORGANIZED HEALTH CARE EDUCATION/TRAINING PROGRAM

## 2024-11-18 PROCEDURE — 97129 THER IVNTJ 1ST 15 MIN: CPT

## 2024-11-18 PROCEDURE — 97116 GAIT TRAINING THERAPY: CPT

## 2024-11-18 PROCEDURE — 97535 SELF CARE MNGMENT TRAINING: CPT

## 2024-11-18 PROCEDURE — 97112 NEUROMUSCULAR REEDUCATION: CPT

## 2024-11-18 PROCEDURE — 6370000000 HC RX 637 (ALT 250 FOR IP): Performed by: FAMILY MEDICINE

## 2024-11-18 PROCEDURE — 36415 COLL VENOUS BLD VENIPUNCTURE: CPT

## 2024-11-18 PROCEDURE — 97130 THER IVNTJ EA ADDL 15 MIN: CPT

## 2024-11-18 PROCEDURE — 97110 THERAPEUTIC EXERCISES: CPT

## 2024-11-18 PROCEDURE — 1180000000 HC REHAB R&B

## 2024-11-18 PROCEDURE — 97530 THERAPEUTIC ACTIVITIES: CPT

## 2024-11-18 PROCEDURE — 85025 COMPLETE CBC W/AUTO DIFF WBC: CPT

## 2024-11-18 PROCEDURE — 6370000000 HC RX 637 (ALT 250 FOR IP): Performed by: STUDENT IN AN ORGANIZED HEALTH CARE EDUCATION/TRAINING PROGRAM

## 2024-11-18 PROCEDURE — 6370000000 HC RX 637 (ALT 250 FOR IP)

## 2024-11-18 PROCEDURE — 80048 BASIC METABOLIC PNL TOTAL CA: CPT

## 2024-11-18 RX ORDER — NEOMYCIN/BACITRACIN/POLYMYXINB 3.5-400-5K
OINTMENT (GRAM) TOPICAL 2 TIMES DAILY
Status: DISCONTINUED | OUTPATIENT
Start: 2024-11-18 | End: 2024-11-23 | Stop reason: HOSPADM

## 2024-11-18 RX ADMIN — VALSARTAN 320 MG: 320 TABLET ORAL at 09:53

## 2024-11-18 RX ADMIN — ASPIRIN 81 MG 81 MG: 81 TABLET ORAL at 09:54

## 2024-11-18 RX ADMIN — APIXABAN 10 MG: 5 TABLET, FILM COATED ORAL at 09:54

## 2024-11-18 RX ADMIN — BACITRACIN ZINC, NEOMYCIN, POLYMYXIN B: 400; 3.5; 5 OINTMENT TOPICAL at 12:03

## 2024-11-18 RX ADMIN — HYDRALAZINE HYDROCHLORIDE 100 MG: 50 TABLET ORAL at 16:07

## 2024-11-18 RX ADMIN — BACITRACIN ZINC, NEOMYCIN, POLYMYXIN B: 400; 3.5; 5 OINTMENT TOPICAL at 21:32

## 2024-11-18 RX ADMIN — HYDROCHLOROTHIAZIDE 25 MG: 25 TABLET ORAL at 09:53

## 2024-11-18 RX ADMIN — CARVEDILOL 25 MG: 25 TABLET, FILM COATED ORAL at 09:54

## 2024-11-18 RX ADMIN — APIXABAN 5 MG: 5 TABLET, FILM COATED ORAL at 21:30

## 2024-11-18 RX ADMIN — CARVEDILOL 25 MG: 25 TABLET, FILM COATED ORAL at 21:28

## 2024-11-18 RX ADMIN — AMLODIPINE BESYLATE 5 MG: 5 TABLET ORAL at 09:54

## 2024-11-18 RX ADMIN — Medication 5000 UNITS: at 09:53

## 2024-11-18 RX ADMIN — ATORVASTATIN CALCIUM 40 MG: 40 TABLET, FILM COATED ORAL at 21:28

## 2024-11-18 RX ADMIN — HYDRALAZINE HYDROCHLORIDE 100 MG: 50 TABLET ORAL at 21:28

## 2024-11-18 RX ADMIN — HYDRALAZINE HYDROCHLORIDE 100 MG: 50 TABLET ORAL at 09:53

## 2024-11-18 ASSESSMENT — PAIN SCALES - GENERAL: PAINLEVEL_OUTOF10: 0

## 2024-11-18 NOTE — CARE COORDINATION
Patient rested well this shift, no c/o pain or discomfort. Uneventful shift. Bed alarm on, call light in reach.

## 2024-11-18 NOTE — PROGRESS NOTES
Call light and alarm to chair went off in patients room.  Came into room to assist patient back to chair, daughter was standing beside patient trying to get her up. She stated she was going to take her to the shower, education done regarding staff has to be with patient when ambulating to and from bathroom due to patient not being heide in room or checked off with family members to take patient to the bathroom. Daughter looked at me and asked why I was in there room. More education completed, primary nurse Marilin called to assist with patient.

## 2024-11-18 NOTE — PROGRESS NOTES
[facility]  INPATIENT SPEECH THERAPY  [facility]  DAILY NOTE    Discharge Recommendations: Outpatient Speech Therapy and Continue to Assess Pending Progress    SLP Individual Minutes  Time In: 0900  Time Out: 0930  Minutes: 30  Timed Code Treatment Minutes: 30 Minutes   Speech-Language Tx: 0 minutes  Dysphagia Tx: 0 minutes  Cognitive tx: 30 minutes    Date: 2024  Patient Name: Yulissa Andersen      CSN: [id number]   : 1975  (49 y.o.)  Gender: female   Referring Physician:  oNa Leal DO  Diagnosis: Cerebrovascular accident (CVA) determined by clinical assessment (HCC)  Precautions: fall risk  Current Diet: Regular solids + Thin liquids  Respiratory Status: Room Air  Swallowing Strategies: Standard Universal Swallow Precautions  Date of Last MBS/FEES: Not Applicable    Pain:  No pain reported.    Subjective:  Patient seen sitting upright in wheelchair upon ST arrival; alert and agreeable to ST interventions. Daughter present midway through session.     Short-Term Goals:  SHORT TERM GOAL #1:  Goal 1: Patient will complete verbal expression tasks (including BASIC naming, automatic speech tasks, biographics, verbal fluency, word level repetition) with 60% accuracy, max cues to improve functional expressive communication.  INTERVENTIONS: DNT due to focus on other ST goals    SHORT TERM GOAL #2:  Goal 2: Patient will complete auditory/reading comprehension (including direction following of 2+ commands, mildly complex yes/no questions, functional reading comprehension) with 70% accuracy, mod cues to improve functional recepetive communication.  INTERVENTIONS: DNT due to other ST goals    SHORT TERM GOAL #3:  Goal 3: Patient will complete written expressive tasks (ie: biographics, common words/phrases) with 70% accuracy, mod cues to improve written communication.  INTERVENTIONS: Not directly addressed d/t focus on other goals.    SHORT TERM GOAL #4:  Goal 4: Patient  restrictions    EDUCATION:  Learner: Patient and Family  Education:  Reviewed ST goals and Plan of Care and Reviewed recommendations for follow-up  Evaluation of Education: Verbalizes understanding and Demonstrates with assistance    ASSESSMENT/PLAN:  Activity Tolerance:  Patient tolerance of treatment: good; participating in services  Assessment/Plan: Patient progressing toward established goals.  Continues to require skilled care of licensed speech pathologist to progress toward achievement of established goals and plan of care.  Plan for Next Session: MOCA completion    TERRENCE Del Valle Speech Intern

## 2024-11-18 NOTE — PROGRESS NOTES
[facility]  Occupational Therapy  Daily Note    Discharge Recommendations: Home with Outpatient OT and Patient would benefit from continued OT at discharge  Equipment Recommendations: Yes Pt does not own any equiptment at this time; recommend tub/shower bench; monitor for LHAE and BSC.       Time In: 1330  Time Out: 1500  Timed Code Treatment Minutes: 90 Minutes  Minutes: 90          Date: 2024  Patient Name: Yulissa Andersen,   Gender: female      [facility]  MRN: [id number]  : 1975  (49 y.o.)  Referring Practitioner: Noa Leal DO  Diagnosis: Cerebrovascular accident (CVA) determined by clinical assessment (HCC)  Additional Pertinent Hx: Yulissa Andersen  is a 49 y.o. female with PMH significant for CAD, DM, HTN, OA, GERD, migraines, and CVA () who is being admitted to the inpatient rehabilitation unit on 2024. Patient initially presented to [facility] ED on 2024 with complaints of headache and AMS. In the ED, patient's boyfriend reported that she woke up with a severe migraine and associated nausea. Patient reportedly drove herself to PCP where they transferred her to ED due to hypertensive emergency.  She was noted to have deterioration of her mental status. Stroke alert was activated.  Initial NIH was 18 (reported aphasia, partial gaze palsy, bilateral blindness, all 4 extremities drift and hit the bed and left sided sensation deficit). CT of the head and neck was reportedly negative for LVO or critical stenosis patient determined to be a good candidate for TNK which was given after initiation of Cardene drip for BP management.  After TNK, patient was admitted to ICU for further evaluation management. Patient stay complicated by angioedema thought to be secondary to Vasotec. MRI of brain reportedly negative. CTA reportedly with a possible filling defect in the distal right middle cerebral artery, 3.3 mm aneurysm arising from the

## 2024-11-18 NOTE — PROGRESS NOTES
toward achievement of established goals and plan of care.  Plan for Next Session: speech tasks, expressive/receptive language, recall wrap and ST info, cognitive tx    TERRENCE Del Valle Speech Intern

## 2024-11-18 NOTE — PROGRESS NOTES
available to provide assistance at home as needed.    Restrictions/Precautions:  Restrictions/Precautions: Fall Risk, General Precautions  Position Activity Restriction  Other position/activity restrictions: Expressive Aphasia     SUBJECTIVE: Patient seated in wheelchair upon arrival. Patient pleasant and agreeable to therapy.      PAIN: 0/10    Vitals: Vitals not assessed per clinical judgement, see nursing flowsheet    OBJECTIVE:  Bed Mobility:  Not Tested    Transfers:  Sit to Stand: Contact Guard Assistance  Stand to Sit:Contact Guard Assistance  *Patient instructed in sit<>stand transfers from wheelchair with use of RW. Patient required cueing for anterior weight shifting.     Ambulation:  Stand By Assistance, Contact Guard Assistance  Distance: 193 feet, 220 feet and various short distances in rehab gym  Surface: Level Tile  Device: Rolling Walker and Left AFO  Gait Deviations: Forward Flexed Posture, Slow Joanne, Decreased Gait Speed, Decreased Heel Strike Bilaterally, Decreased Foot Clearance Left, and Increased reliance on assistive device     Stairs:  Not Tested    Balance:  Dynamic Standing Balance: Contact Guard Assistance    Neuro Re-ed  Pt. Completed standing and Stepping activities using Single UE support, Bilateral UE support, and No UE support on Rolling Walker and Left AFO to improve proprioception, coordination, gait mechanics, hip/quad stability, lateral weight shifting, and Environmental awareness for improved functional mobility.   Patient instructed in stepping through ladder placed in parallel bars with CGA. Patient required cueing for increased L hip and knee flexion for toe clearance through ladder. Patient instructed in stepping through ladder in order to assist with weight shifting, hip and knee flexion and  gait mechanics  Patient instructed in forward and lateral stepping over 6inch hurdles placed in parallel bars with initially minimal assistance for clearance of LLE however able to

## 2024-11-18 NOTE — PLAN OF CARE
Problem: Chronic Conditions and Co-morbidities  Goal: Patient's chronic conditions and co-morbidity symptoms are monitored and maintained or improved  11/18/2024 1340 by Marilin Wilks RN  Outcome: Progressing  Flowsheets (Taken 11/18/2024 1340)  Care Plan - Patient's Chronic Conditions and Co-Morbidity Symptoms are Monitored and Maintained or Improved:   Monitor and assess patient's chronic conditions and comorbid symptoms for stability, deterioration, or improvement   Collaborate with multidisciplinary team to address chronic and comorbid conditions and prevent exacerbation or deterioration   Update acute care plan with appropriate goals if chronic or comorbid symptoms are exacerbated and prevent overall improvement and discharge     Problem: Discharge Planning  Goal: Discharge to home or other facility with appropriate resources  11/18/2024 1340 by Marilin Wilks RN  Outcome: Progressing  Flowsheets (Taken 11/18/2024 1340)  Discharge to home or other facility with appropriate resources:   Arrange for needed discharge resources and transportation as appropriate   Identify discharge learning needs (meds, wound care, etc)     Problem: Skin/Tissue Integrity  Goal: Absence of new skin breakdown  11/18/2024 1340 by Marilin Wilks RN  Outcome: Progressing  Note: Skin tag under rt underarm. Scant amount of drainage.      Problem: Safety - Adult  Goal: Free from fall injury  11/18/2024 1340 by Marilin Wilks RN  Outcome: Progressing  Flowsheets (Taken 11/18/2024 1340)  Free From Fall Injury:   Instruct family/caregiver on patient safety   Based on caregiver fall risk screen, instruct family/caregiver to ask for assistance with transferring infant if caregiver noted to have fall risk factors     Problem: Pain  Goal: Verbalizes/displays adequate comfort level or baseline comfort level  11/18/2024 1340 by Marilin Wilks, RN  Outcome: Progressing  Flowsheets (Taken 11/18/2024 1340)  Verbalizes/displays adequate comfort  Progressing  Flowsheets (Taken 11/18/2024 1340)  Nutrient intake appropriate for improving, restoring, or maintaining nutritional needs:   Assess nutritional status and recommend course of action   Monitor oral intake, labs, and treatment plans   Order, calculate, and assess calorie counts as needed   Recommend appropriate diets, oral nutritional supplements, and vitamin/mineral supplements

## 2024-11-18 NOTE — PLAN OF CARE
Problem: Chronic Conditions and Co-morbidities  Goal: Patient's chronic conditions and co-morbidity symptoms are monitored and maintained or improved  11/17/2024 2344 by Cheryle Pleitez RN  Outcome: Progressing  Flowsheets (Taken 11/17/2024 2344)  Care Plan - Patient's Chronic Conditions and Co-Morbidity Symptoms are Monitored and Maintained or Improved: Monitor and assess patient's chronic conditions and comorbid symptoms for stability, deterioration, or improvement     Problem: Discharge Planning  Goal: Discharge to home or other facility with appropriate resources  11/17/2024 2344 by Cheryle Pleitez RN  Outcome: Progressing  Flowsheets (Taken 11/17/2024 2344)  Discharge to home or other facility with appropriate resources: Identify barriers to discharge with patient and caregiver     Problem: ABCDS Injury Assessment  Goal: Absence of physical injury  11/17/2024 2344 by Cheryle Pleitez RN  Outcome: Progressing  Flowsheets (Taken 11/17/2024 2344)  Absence of Physical Injury: Implement safety measures based on patient assessment  Note: Patient has remained free of physical injury during this shift. Safe environment provided, call light within reach, and hourly rounding completed.      Problem: Safety - Adult  Goal: Free from fall injury  11/17/2024 2344 by Cheryle Pleitez RN  Outcome: Progressing  Flowsheets (Taken 11/17/2024 2344)  Free From Fall Injury: Instruct family/caregiver on patient safety  Note: Patient has remained free of physical injury during this shift. Safe environment provided, call light within reach, and hourly rounding completed.      Problem: Pain  Goal: Verbalizes/displays adequate comfort level or baseline comfort level  11/17/2024 2344 by Cheryle Pleitez RN  Outcome: Progressing  Flowsheets (Taken 11/17/2024 2344)  Verbalizes/displays adequate comfort level or baseline comfort level: Encourage patient to monitor pain and request assistance   Care plan reviewed with

## 2024-11-18 NOTE — PROGRESS NOTES
16.0 gm/dl    Hematocrit 32.2 (L) 37.0 - 47.0 %    MCV 88.0 81.0 - 99.0 fL    MCH 27.3 26.0 - 33.0 pg    MCHC 31.1 (L) 32.2 - 35.5 gm/dl    RDW-CV 15.2 (H) 11.5 - 14.5 %    RDW-SD 49.1 (H) 35.0 - 45.0 fL    Platelets 325 130 - 400 thou/mm3    MPV 9.9 9.4 - 12.4 fL    Seg Neutrophils 51.3 %    Lymphocytes 38.7 %    Monocytes % 8.0 %    Eosinophils 1.0 %    Basophils 0.5 %    Immature Granulocytes % 0.5 %    Platelet Estimate ADEQUATE Adequate    Neutrophils Absolute 4.3 1.8 - 7.7 thou/mm3    Lymphocytes Absolute 3.2 1.0 - 4.8 thou/mm3    Monocytes Absolute 0.7 0.4 - 1.3 thou/mm3    Eosinophils Absolute 0.1 0.0 - 0.4 thou/mm3    Basophils Absolute 0.0 0.0 - 0.1 thou/mm3    Immature Grans (Abs) 0.04 0.00 - 0.07 thou/mm3    nRBC 0 /100 wbc   Basic Metabolic Panel    Collection Time: 11/18/24  6:20 AM   Result Value Ref Range    Sodium 139 135 - 145 meq/L    Potassium 4.1 3.5 - 5.2 meq/L    Chloride 103 98 - 111 meq/L    CO2 25 23 - 33 meq/L    Glucose 122 (H) 70 - 108 mg/dL    BUN 19 7 - 22 mg/dL    Creatinine 0.7 0.4 - 1.2 mg/dL    Calcium 9.2 8.5 - 10.5 mg/dL   Scan of Blood Smear    Collection Time: 11/18/24  6:20 AM   Result Value Ref Range    SCAN OF BLOOD SMEAR see below    Anion Gap    Collection Time: 11/18/24  6:20 AM   Result Value Ref Range    Anion Gap 11.0 8.0 - 16.0 meq/L   Glomerular Filtration Rate, Estimated    Collection Time: 11/18/24  6:20 AM   Result Value Ref Range    Est, Glom Filt Rate > 90 >60 ml/min/1.73m2         Impression:  Strokelike symptoms- clinically suspected CVA  Acute DVT  Left internal carotid artery aneurysm  Type 2 diabetes  HTN  Migraines  Angioedema  Obesity, class II (BMI 35.18)  Impaired mobility and ADLs  Expressive  language impairment     Plan:  Continue inpatient rehabilitation program involving at least 3 hours per day, 5 days per week of intensive rehabilitation.  Rehabilitation services will include PT, OT, and SLP/RT in order to improve functional status prior to

## 2024-11-19 PROCEDURE — 1180000000 HC REHAB R&B

## 2024-11-19 PROCEDURE — 97530 THERAPEUTIC ACTIVITIES: CPT

## 2024-11-19 PROCEDURE — 92507 TX SP LANG VOICE COMM INDIV: CPT

## 2024-11-19 PROCEDURE — 6370000000 HC RX 637 (ALT 250 FOR IP): Performed by: FAMILY MEDICINE

## 2024-11-19 PROCEDURE — 97116 GAIT TRAINING THERAPY: CPT

## 2024-11-19 PROCEDURE — 97130 THER IVNTJ EA ADDL 15 MIN: CPT

## 2024-11-19 PROCEDURE — 6370000000 HC RX 637 (ALT 250 FOR IP)

## 2024-11-19 PROCEDURE — 97112 NEUROMUSCULAR REEDUCATION: CPT

## 2024-11-19 PROCEDURE — 97129 THER IVNTJ 1ST 15 MIN: CPT

## 2024-11-19 PROCEDURE — 97535 SELF CARE MNGMENT TRAINING: CPT

## 2024-11-19 PROCEDURE — 6370000000 HC RX 637 (ALT 250 FOR IP): Performed by: STUDENT IN AN ORGANIZED HEALTH CARE EDUCATION/TRAINING PROGRAM

## 2024-11-19 PROCEDURE — 97110 THERAPEUTIC EXERCISES: CPT

## 2024-11-19 PROCEDURE — 99232 SBSQ HOSP IP/OBS MODERATE 35: CPT | Performed by: STUDENT IN AN ORGANIZED HEALTH CARE EDUCATION/TRAINING PROGRAM

## 2024-11-19 RX ADMIN — VALSARTAN 320 MG: 320 TABLET ORAL at 09:06

## 2024-11-19 RX ADMIN — Medication 5000 UNITS: at 09:05

## 2024-11-19 RX ADMIN — HYDRALAZINE HYDROCHLORIDE 100 MG: 50 TABLET ORAL at 21:55

## 2024-11-19 RX ADMIN — SENNOSIDES 8.6 MG: 8.6 TABLET ORAL at 21:55

## 2024-11-19 RX ADMIN — DOCUSATE SODIUM 100 MG: 100 CAPSULE, LIQUID FILLED ORAL at 21:56

## 2024-11-19 RX ADMIN — HYDRALAZINE HYDROCHLORIDE 100 MG: 50 TABLET ORAL at 09:06

## 2024-11-19 RX ADMIN — CARVEDILOL 25 MG: 25 TABLET, FILM COATED ORAL at 21:55

## 2024-11-19 RX ADMIN — BACITRACIN ZINC, NEOMYCIN, POLYMYXIN B: 400; 3.5; 5 OINTMENT TOPICAL at 21:58

## 2024-11-19 RX ADMIN — HYDRALAZINE HYDROCHLORIDE 100 MG: 50 TABLET ORAL at 14:31

## 2024-11-19 RX ADMIN — AMLODIPINE BESYLATE 5 MG: 5 TABLET ORAL at 09:06

## 2024-11-19 RX ADMIN — APIXABAN 5 MG: 5 TABLET, FILM COATED ORAL at 21:55

## 2024-11-19 RX ADMIN — APIXABAN 5 MG: 5 TABLET, FILM COATED ORAL at 09:06

## 2024-11-19 RX ADMIN — CARVEDILOL 25 MG: 25 TABLET, FILM COATED ORAL at 09:06

## 2024-11-19 RX ADMIN — BACITRACIN ZINC, NEOMYCIN, POLYMYXIN B: 400; 3.5; 5 OINTMENT TOPICAL at 09:07

## 2024-11-19 RX ADMIN — HYDROCHLOROTHIAZIDE 25 MG: 25 TABLET ORAL at 09:06

## 2024-11-19 RX ADMIN — ATORVASTATIN CALCIUM 40 MG: 40 TABLET, FILM COATED ORAL at 21:56

## 2024-11-19 RX ADMIN — ASPIRIN 81 MG 81 MG: 81 TABLET ORAL at 09:06

## 2024-11-19 NOTE — PROGRESS NOTES
[facility]  INPATIENT PHYSICAL THERAPY  DAILY NOTE  [facility] - [id number]-12/[id number]      Discharge Recommendations: Continue to assess pending progress and Patient would benefit from continued PT at discharge  Equipment Recommendations: No  To be determined at patient progresses.            Time In: 1055  Time Out: 1130  Timed Code Treatment Minutes: 35 Minutes  Minutes: 35          Date: 2024  Patient Name: Yulissa Andersen,  Gender:  female        MRN: [id number]  : 1975  (49 y.o.)     Referring Practitioner: Noa Leal DO  Diagnosis: Cerebrovascular accident (CVA) determined by clinical assessment (HCC)  Additional Pertinent Hx: Per chart review: History obtained via: ED documentation, acute care documentation, and patient  48 yo female patient initially presented to [facility] ED on 2024 with complaints of headache and AMS.  In the ED, patient's boyfriend reported that she woke up with a severe migraine and associated nausea.  Patient reportedly drove herself to PCP where they transferred her to ED due to hypertensive emergency.  She reportedly received labetalol in room.  She was noted to have deterioration of her mental status.  Stroke alert was activated.  Initial NIH was 18 (reported aphasia, partial gaze palsy, bilateral blindness, all 4 extremities drift and hit the bed and left sided sensation deficit).  CT head was reportedly negative.  CT of the head and neck was reportedly negative for LVO or critical stenosis patient determined to be a good candidate for TNK which was given after initiation of Cardene drip for BP management.  After TNK, patient was admitted to ICU for further evaluation management. Patient stay complicated by angioedema thought to be secondary to Vasotec.  Patient given Decadron, Benadryl, and FFP x2.  MRI of brain reportedly negative.  CTA reportedly with a possible filling defect in the distal right middle cerebral artery, 3.3 mm  aneurysm arising from the terminal segment of the left internal carotid artery, no evidence of vasospasm..  Echo was obtained reportedly with EF 55-60%, no PFO.  EEG was obtained and was reportedly normal.  Patient was transferred out of the ICU on 11/9/2024.  On day of consultation, patient is seen laying in bed with her son at bedside. Patient reports ongoing left sided vision impairment, left sided weakness and speech impairment. She denies any CP or SOB. No reports of any significant changes to bowel or bladder. Patient is able to provide majority of history but is limited based on language impairments. Prior to admission, patient was reportedly independent with ADLs and mobility without the use of any AD. She was employed an working full time at [employer] as a . She lives with her boyfriend and 2 children (ages 14 and 16). An adult son is in the room who does not reside with them. He states that all family members work but that someone would be available to provide assistance on DC.     Prior Level of Function:  Lives With: Significant other (And two children ages 14 and 16 years)  Type of Home: House  Home Layout: Two level, Bed/Bath upstairs ((B) rails to second floor)  Home Access: Stairs to enter with rails  Entrance Stairs - Number of Steps: 4-5  Entrance Stairs - Rails: Both  Home Equipment: None   Bathroom Shower/Tub: Tub/Shower unit  Bathroom Toilet: Standard  Bathroom Equipment: Hand-held shower  Bathroom Accessibility: Accessible    Receives Help From: Family  ADL Assistance: Independent  Homemaking Assistance: Independent  Ambulation Assistance: Independent  Transfer Assistance: Independent  Active : Yes  IADL Comments: Pt is responsible for cooking, cleaning, laundry (her own only), finances, and shopping at home. Pt reports that she recieves assistance from family for completion of yard work.  Additional Comments: All family members work but pt reports someone would be

## 2024-11-19 NOTE — PLAN OF CARE
Aletha Kincaid LPN  Outcome: Progressing  Flowsheets (Taken 11/19/2024 1128)  Nutrient intake appropriate for improving, restoring, or maintaining nutritional needs:   Assess nutritional status and recommend course of action   Monitor oral intake, labs, and treatment plans

## 2024-11-19 NOTE — PROGRESS NOTES
[facility]  INPATIENT SPEECH THERAPY  [facility]  DAILY NOTE    Discharge Recommendations: Outpatient Speech Therapy and Continue to Assess Pending Progress    SLP Individual Minutes  Time In: 1130  Time Out: 1200  Minutes: 30  Timed Code Treatment Minutes: 30 Minutes   Speech-Language Tx: 0 minutes  Dysphagia Tx: 0 minutes  Cognitive tx: 30 minutes    Date: 2024  Patient Name: Yulissa Andersen      CSN: [id number]   : 1975  (49 y.o.)  Gender: female   Referring Physician:  Noa Leal DO  Diagnosis: Cerebrovascular accident (CVA) determined by clinical assessment (HCC)  Precautions: fall risk  Current Diet: Regular solids + Thin liquids  Respiratory Status: Room Air  Swallowing Strategies: Standard Universal Swallow Precautions  Date of Last MBS/FEES: Not Applicable    Pain:  No pain reported.    Subjective:  Patient seen sitting upright in wheelchair upon ST arrival; alert and agreeable to ST interventions. Mother present at end of session.    Short-Term Goals:  SHORT TERM GOAL #1:  Goal 1: Patient will complete verbal expression tasks (including BASIC naming, automatic speech tasks, biographics, verbal fluency, word level repetition) with 60% accuracy, max cues to improve functional expressive communication.  INTERVENTIONS: DNT due to focus on other ST goals    SHORT TERM GOAL #2:  Goal 2: Patient will complete auditory/reading comprehension (including direction following of 2+ commands, mildly complex yes/no questions, functional reading comprehension) with 70% accuracy, mod cues to improve functional recepetive communication.  INTERVENTIONS: DNT due to other ST goals    SHORT TERM GOAL #3:  Goal 3: Patient will complete written expressive tasks (ie: biographics, common words/phrases) with 70% accuracy, mod cues to improve written communication.  INTERVENTIONS: Not directly addressed d/t focus on other goals.    SHORT TERM GOAL #4:  Goal 4: Patient will  Goals:  Time Frame for Long Term Goals: 3 weeks    LONG TERM GOAL #1:  Goal 1: Patient will improve functional communication skills to a level of supervision to optimize independence with verbal expression and auditory comprehension to assist with transition to home environment and community re-integration.    LONG TERM GOAL #2:  Goal 2: Patient will consume a regular diet and thin liquids with stable pulmonary status and implementation of swallow strategies to assist with hydration/nutrition      Comprehension: 6 - Complex ideas 90% or device (hearing aid or glasses- if patient is primarily a visual learner)  Expression: 5 - Expresses basic ideas/needs only (hungry/hot/pain)  Social Interaction: 6 - Patient requires medication for mood and/or effect  Problem Solvin - Patient solves simple/routine tasks 75-90%+   Memory: 4 - Patient remembers 75-90%+ of the time    Functional Oral Intake Scale: Total Oral Intake: 7.  Total oral intake with no restrictions    EDUCATION:  Learner: Patient and Family  Education:  Reviewed ST goals and Plan of Care, Reviewed recommendations for follow-up, Education Related to Health Promotion and Wellness, and Home Safety Education  Evaluation of Education: Verbalizes understanding and Demonstrates with assistance    ASSESSMENT/PLAN:  Activity Tolerance:  Patient tolerance of treatment: good; participating in services  Assessment/Plan: Patient progressing toward established goals.  Continues to require skilled care of licensed speech pathologist to progress toward achievement of established goals and plan of care.  Plan for Next Session: speech tasks, expressive/receptive language, medications/finances    TERRENCE Del Valle Speech Intern

## 2024-11-19 NOTE — PROGRESS NOTES
Physical Medicine & Rehabilitation   Progress Note    Chief Complaint:   Clinically suspected CVA     Subjective: Patient seen and examined. No acute events overnight.  She reports that she is feeling \"better\" today.  Daughter is at bedside.  Her speech is slightly more fluent today with decreased time to respond.  Daughter states that she has also noticed improvements in her speech.  She denies any chest pain or shortness of breath.  No reports of any significant changes to bowel or bladder.  She continues to tolerate progress with therapies.  She reports that the pain in her left shoulder is improving some and is responding to treatment with OT.    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      Rehabilitation:   PT 11/18/2024:  Transfers:  Sit to Stand: Contact Guard Assistance  Stand to Sit:Contact Guard Assistance  *Patient instructed in sit<>stand transfers from various surfaces including: recliner and wheelchair without AD.      Car:Contact Guard Assistance     Ambulation:  Contact Guard Assistance  Distance: 20 feet, 30 feet and 10 feet   Surface: Level Tile and Uneven Surface  Device: No Device  Gait Deviations: Forward Flexed Posture, Slow Joanne, Decreased Step Length Bilaterally, Decreased Gait Speed, Decreased Heel Strike Bilaterally, Decreased Foot Clearance Right, and Unsteady Gait   *Patient required cueing for R heel strike with occasional unsteadiness  *Patient instructed in TUG (see score below)     Stairs:  Platform: 6\" platform X 1 using One Handrail and Contact Guard Assistance.     Balance:  Dynamic Standing Balance: Contact Guard Assistance     Exercise:  None     Functional Outcome Measures:  Timed Up and Go (TUG)  Current Score: 40.56 Seconds (Date: 11/19/2024)     Interpretation of Score: This tests the patient’s mobility and fall risk. Less than 10 seconds = freely  mobile; <20 seconds = mostly independent; 20-29 seconds = variable mobility; > 20 seconds = impaired mobility; > 30 seconds = very high fall risk. Additional scorin.1 seconds in vestibular patients = increased fall risk; > 13.5 seconds in elderly = Increased fall risk)      OT 2024:  EXERCISES:   Soft tissue massage completed to L pectoral and anterior shoulder with decreased tightness noted.      OT guided patient in LUE table top slides x 10 reps in following planes  Sh flexion,   External rotation  Horiz abduction     OT progressed task for BUE open chain/ bilateral integration task of assembling PVC pipes following 2D diagram. While standing, patient followed model to construct 3D image with 100% accuracy x 2 trials.  Pt with small complaints of pain in L shoulder.      ADL:   Tub Transfer: Contact Guard Assistance.  Side stepping over tub, pt able to lift each LE up and over tub, stepping in and out with CGA for standing balance. Increased effort to bring LLE over tub, No AD.      IADL:   Basic Housekeeping:    Patient completed IADL laundry task that facilitated retrieving 8 linens from graded planes and heights, including 5 from floor level, to challenge standing endurance / LE strength. Pt used RW with CGA but was able to side step to retireve items from floor.  Pt then propelled RW to washing machine with SBA to place linens in the washing machine, then to the dryer using LUE.  OT introduced use of reacher to retieve items from washing machine, with patient using reacher in Left hand for additional neuro re-education.  OT facilitated patient placing linens in laundry basket and patient carried laundry basket through apartment to kitchen table with CGA.      OT facilitated continuation of ambulating without device throughout apartment with CGA for balance. Patient was engaged into dynamic standing therapeutic activity  that was graded to facilitate:bilateral integration, dynamic functional reach

## 2024-11-19 NOTE — PROGRESS NOTES
Physical Medicine & Rehabilitation   Progress Note    Chief Complaint:   Clinically suspected CVA     Subjective: Patient seen and examined.  No acute events overnight.  She reports that therapy today went well.  Family was here for family education/training.  No reports of any chest pain or shortness of breath.  No reports of any significant changes to bowel or bladder.  Patient continues to tolerate progress well with therapies.    Insurance notified  of intent to deny.  This was not received until approximately 4:30 PM.  Peer to peer offered and must be complete by tomorrow at 9 AM.  Due to this extremely short window, I did attempt to call for peer to peer.  However, number provided gave message of \"your call cannot be completed as dialed\".  I did leave a message for  to assist with finding an alternative number utilized in the morning.     Rehabilitation:   PT 11/20/2024:  Bed Mobility:  Supine to Sit: Stand By Assistance  Sit to Supine: Stand By Assistance   *Patient performed on mat table with HOB flat and no railings      Transfers:  Sit to Stand: Stand By Assistance, Contact Guard Assistance  Stand to Sit:Stand By Assistance, Contact Guard Assistance  *Patient instructed in sit<>stand transfers from various surfaces including: recliner, mat table and standard chair with arm rests without AD.     To/From Bed and Chair: Contact Guard Assistance     Ambulation:  Contact Guard Assistance  Distance: 300 feet x 2, 200 feet and various short distances in rehab gym   Surface: Level Tile  Device: No Device  Gait Deviations: Slow Joanne, Decreased Step Length on Left, Decreased Arm Swing, Decreased Trunk Rotation, Decreased Gait Speed, Decreased Heel Strike on Left, and Decreased Foot Clearance Left   *Patient instructed in TUG (see score below)     Stairs:  Stairs: 6\" steps X 4 using Bilateral Handrails and Contact Guard Assistance.  *Patient performed twice with cueing for proper  left sided facial asymmetry  ROM:  abnormal - LUE and LLE  Motor Exam: 5/5 throughout RUE and RLE                       LUE: 4-/5 EF, 4-/5 EE (all within within limited shoulder range)  *Prolonged time for MMT on the left   Tone:  normal  Muscle bulk: within normal limits  Gait: not assessed     Heart: well perfused extremities, RRR, no m/r/g noted  Lungs: breathing comfortably on room air without any increased WOB; CTAB  Abdomen: non-distended  Skin: warm and dry, no rash or erythema on exposed skin      Diagnostics:   No results found for this or any previous visit (from the past 24 hour(s)).        Impression:  Strokelike symptoms- clinically suspected CVA  Acute DVT  Left internal carotid artery aneurysm  Type 2 diabetes  HTN  Migraines  Angioedema  Obesity, class I (BMI 32.43)  Impaired mobility and ADLs  Expressive  language impairment     Plan:  Continue inpatient rehabilitation program involving at least 3 hours per day, 5 days per week of intensive rehabilitation.  Rehabilitation services will include PT, OT, and SLP/RT in order to improve functional status prior to discharge.  Family education and training will be completed.  Equipment evaluations and recommendations will be completed as appropriate.       Rehabilitation nursing will be involved for bowel, bladder, skin, and pain management.  Nursing will also provide education and training to patient and family.    Dr. Stuart consulted for medical management   Clinically suspected CVA: Continue atorvastatin 40 mg QHS. Discussed with discharging hospsitalist who agreed with initiating ASA 81 mg for secondary CVA prophylaxis. Neurology most recent note stated no antiplt or antithrombotics for 24 hours post TNK  Left internal carotid artery aneurysm: Will need outpatient neurointervention follow-up  Acute DVT LLE: Continue Eliquis 10 mg BID followed by 5 mg BID  HTN: Continue amlodipine 5 mg daily, coreg 25 mg BID, hydralazine 100 mg TID, HCTZ 25 mg

## 2024-11-19 NOTE — PLAN OF CARE
Problem: Chronic Conditions and Co-morbidities  Goal: Patient's chronic conditions and co-morbidity symptoms are monitored and maintained or improved  11/19/2024 0039 by Cheryle Pleitez RN  Outcome: Progressing  Flowsheets (Taken 11/19/2024 0039)  Care Plan - Patient's Chronic Conditions and Co-Morbidity Symptoms are Monitored and Maintained or Improved: Monitor and assess patient's chronic conditions and comorbid symptoms for stability, deterioration, or improvement     Problem: Discharge Planning  Goal: Discharge to home or other facility with appropriate resources  11/19/2024 0039 by Cheryle Pleitez RN  Outcome: Progressing  Flowsheets (Taken 11/19/2024 0039)  Discharge to home or other facility with appropriate resources: Identify barriers to discharge with patient and caregiver     Problem: ABCDS Injury Assessment  Goal: Absence of physical injury  Outcome: Progressing  Flowsheets (Taken 11/19/2024 0039)  Absence of Physical Injury: Implement safety measures based on patient assessment  Note: Patient has remained free of physical injury during this shift. Safe environment provided, call light within reach, and hourly rounding completed.      Problem: Safety - Adult  Goal: Free from fall injury  11/19/2024 0039 by Cheryle Pleitez RN  Outcome: Progressing  Flowsheets (Taken 11/19/2024 0039)  Free From Fall Injury: Instruct family/caregiver on patient safety  Note: Patient has remained free of physical injury during this shift. Safe environment provided, call light within reach, and hourly rounding completed.      Problem: Pain  Goal: Verbalizes/displays adequate comfort level or baseline comfort level  11/19/2024 0039 by Cheryle Pletiez RN  Outcome: Progressing  Flowsheets (Taken 11/19/2024 0039)  Verbalizes/displays adequate comfort level or baseline comfort level: Encourage patient to monitor pain and request assistance   Care plan reviewed with patient.  Patient  verbalize understanding of

## 2024-11-19 NOTE — PROGRESS NOTES
[facility]  INPATIENT SPEECH THERAPY  [facility]  DAILY NOTE    Discharge Recommendations: Outpatient Speech Therapy and Continue to Assess Pending Progress    SLP Individual Minutes  Time In: 0930  Time Out: 1000  Minutes: 30  Timed Code Treatment Minutes: 0 Minutes   Speech-Language Tx: 30 minutes  Dysphagia Tx: 0 minutes  Cognitive tx: 0 minutes    Date: 2024  Patient Name: Yulissa Andersen      CSN: [id number]   : 1975  (49 y.o.)  Gender: female   Referring Physician:  Noa Leal DO  Diagnosis: Cerebrovascular accident (CVA) determined by clinical assessment (HCC)  Precautions: fall risk  Current Diet: Regular solids + Thin liquids  Respiratory Status: Room Air  Swallowing Strategies: Standard Universal Swallow Precautions  Date of Last MBS/FEES: Not Applicable    Pain:  No pain reported.    Subjective:  Patient seen sitting upright in wheelchair upon ST arrival; pleasant and cooperative to ST interventions.No family present.    Short-Term Goals:  SHORT TERM GOAL #1:  Goal 1: Patient will complete verbal expression tasks (including BASIC naming, automatic speech tasks, biographics, verbal fluency, word level repetition) with 60% accuracy, max cues to improve functional expressive communication.  INTERVENTIONS: Verbal Fluency- Word Finding for word descriptions: Therapist provided patient with a list of six words. Patient was prompted to chose a word and describe the word for therapist to identify.   -House: \"place\", \"has an address\" \"you live in it... your family\"   -School: \"different subjects\" \"principal\" \"different kid... a lot of kid\"   -Apple: \"round\" \"red\" \"green or yellow\" \"it has core\" \"stem\" \"inside is white\"   -Shoe: \"you wear them for walk or run\" - prompt for word endings  -Flower: \"you can smell them\" \"they have stems\" \"they are yellow and white\"  -Computer: \"you can type on it\" \"it has windows on it\" \"the internet\"   -Dog: \"house pet\" \"hairy\"  tasks with no more than 4 errors across given activity in order to improve skilled required for multi-tasking responsibilites such as drving and IADLs  INTERVENTIONS: DNT due to focus on other ST goals    Long-Term Goals:  Time Frame for Long Term Goals: 3 weeks    LONG TERM GOAL #1:  Goal 1: Patient will improve functional communication skills to a level of supervision to optimize independence with verbal expression and auditory comprehension to assist with transition to home environment and community re-integration.    LONG TERM GOAL #2:  Goal 2: Patient will consume a regular diet and thin liquids with stable pulmonary status and implementation of swallow strategies to assist with hydration/nutrition      Comprehension: 6 - Complex ideas 90% or device (hearing aid or glasses- if patient is primarily a visual learner)  Expression: 5 - Expresses basic ideas/needs only (hungry/hot/pain)  Social Interaction: 6 - Patient requires medication for mood and/or effect  Problem Solving: 3 - Patient solves simple/routine tasks 50%-74%  Memory: 3 - Patient remembers 50%-74% of the time    Functional Oral Intake Scale: Total Oral Intake: 7.  Total oral intake with no restrictions    EDUCATION:  Learner: Patient  Education:  Reviewed ST goals and Plan of Care and Reviewed recommendations for follow-up  Evaluation of Education: Verbalizes understanding and Demonstrates with assistance    ASSESSMENT/PLAN:  Activity Tolerance:  Patient tolerance of treatment: good; participating in services  Assessment/Plan: Patient progressing toward established goals.  Continues to require skilled care of licensed speech pathologist to progress toward achievement of established goals and plan of care.  Plan for Next Session: speech tasks, expressive/receptive language, medications/finances    TERRENCE Del Valle Speech Intern

## 2024-11-19 NOTE — PROGRESS NOTES
[facility]  INPATIENT PHYSICAL THERAPY  DAILY NOTE  [facility] - 8K-12/012-A      Discharge Recommendations: Continue to assess pending progress and Patient would benefit from continued PT at discharge  Equipment Recommendations: No  To be determined at patient progresses.            Time In: 1330  Time Out: 1430  Timed Code Treatment Minutes: 60 Minutes  Minutes: 60          Date: 2024  Patient Name: Yulissa Andersen,  Gender:  female        MRN: [id number]  : 1975  (49 y.o.)     Referring Practitioner: Noa Leal DO  Diagnosis: Cerebrovascular accident (CVA) determined by clinical assessment (HCC)  Additional Pertinent Hx: Per chart review: History obtained via: ED documentation, acute care documentation, and patient  50 yo female patient initially presented to [facility] ED on 2024 with complaints of headache and AMS.  In the ED, patient's boyfriend reported that she woke up with a severe migraine and associated nausea.  Patient reportedly drove herself to PCP where they transferred her to ED due to hypertensive emergency.  She reportedly received labetalol in room.  She was noted to have deterioration of her mental status.  Stroke alert was activated.  Initial NIH was 18 (reported aphasia, partial gaze palsy, bilateral blindness, all 4 extremities drift and hit the bed and left sided sensation deficit).  CT head was reportedly negative.  CT of the head and neck was reportedly negative for LVO or critical stenosis patient determined to be a good candidate for TNK which was given after initiation of Cardene drip for BP management.  After TNK, patient was admitted to ICU for further evaluation management. Patient stay complicated by angioedema thought to be secondary to Vasotec.  Patient given Decadron, Benadryl, and FFP x2.  MRI of brain reportedly negative.  CTA reportedly with a possible filling defect in the distal right middle cerebral artery, 3.3 mm

## 2024-11-19 NOTE — CARE COORDINATION
Patient had good day with therapy. Many family members in to see her today. By 6:30 she was tired and ready to go to bed. Resting with call light in reach.

## 2024-11-19 NOTE — CARE COORDINATION
Patient rested well this shift, no c/o pain or discomfort, uneventful shift. Bed alarm on, call light in reach.

## 2024-11-19 NOTE — PROGRESS NOTES
Patient: Yulissa Andersen  Unit/Bed: 8K-12/012-A  YOB: 1975  MRN: [id number] Acct: [id number]   Admitting Diagnosis: CVA (cerebral vascular accident) ([facility]) [I63.9]  Cerebrovascular accident (CVA) determined by clinical assessment ([facility]) [I63.9]  Admit Date:  11/12/2024  Hospital Day: 7    Assessment:     Principal Problem:    Cerebrovascular accident (CVA) determined by clinical assessment ([facility])  Resolved Problems:    * No resolved hospital problems. *      Plan:     Continue to follow        Subjective:     Patient has no complaint of CP or SOB..   Medication side effects: none    Scheduled Meds:   neomycin-bacitracin-polymyxin   Topical BID    lidocaine  1 patch TransDERmal Daily    Vitamin D  5,000 Units Oral Daily    carvedilol  25 mg Oral BID    amLODIPine  5 mg Oral Daily    hydrALAZINE  100 mg Oral TID    valsartan  320 mg Oral Daily    polyethylene glycol  17 g Oral BID    senna  1 tablet Oral Nightly    hydroCHLOROthiazide  25 mg Oral Daily    atorvastatin  40 mg Oral Nightly    aspirin  81 mg Oral Daily    docusate sodium  100 mg Oral BID    apixaban  5 mg Oral BID     Continuous Infusions:   sodium chloride       PRN Meds:sodium chloride flush, sodium chloride, potassium chloride, magnesium sulfate, albuterol, potassium chloride **OR** potassium alternative oral replacement, acetaminophen, ondansetron, docusate sodium    Review of Systems  Pertinent items are noted in HPI.    Objective:     No data found.  I/O last 3 completed shifts:  In: 820 [P.O.:820]  Out: -   No intake/output data recorded.    /63   Pulse 86   Temp 99 °F (37.2 °C) (Oral)   Resp 16   Ht 1.651 m (5' 5\")   Wt 88.4 kg (194 lb 14.2 oz)   LMP 01/20/2013   SpO2 98%   BMI 32.43 kg/m²     General appearance: alert, appears stated age, and cooperative  Head: Normocephalic, without obvious abnormality, atraumatic  Lungs: clear to auscultation bilaterally  Heart: regular rate and rhythm, S1, S2 normal, no murmur, click,

## 2024-11-19 NOTE — PROGRESS NOTES
[facility]  Diagnosis List for Inpatient Rehab facility (IRF) - Patient Assessment Instrument (CHARLES)    Patient Name: Yulissa Andersen        MRN: [id number]    : 1975  (49 y.o.)  Gender: female     Primary impairment requiring rehabilitation: 1.4 CVA, No paresis     Etiologic Diagnosis that led to the condition: CVA     Comorbid conditions affecting rehabilitation:  Strokelike symptoms- clinically suspected CVA  Acute DVT  Left internal carotid artery aneurysm  Type 2 diabetes  HTN  Migraines  Angioedema  Obesity, class I (BMI 32.43)  Impaired mobility and ADLs  Expressive  language impairment    Electronically signed by Noa Leal DO on 2024 at 3:25 PM

## 2024-11-20 PROCEDURE — 97112 NEUROMUSCULAR REEDUCATION: CPT

## 2024-11-20 PROCEDURE — 97130 THER IVNTJ EA ADDL 15 MIN: CPT

## 2024-11-20 PROCEDURE — 6370000000 HC RX 637 (ALT 250 FOR IP): Performed by: FAMILY MEDICINE

## 2024-11-20 PROCEDURE — 97129 THER IVNTJ 1ST 15 MIN: CPT

## 2024-11-20 PROCEDURE — 1180000000 HC REHAB R&B

## 2024-11-20 PROCEDURE — 97530 THERAPEUTIC ACTIVITIES: CPT

## 2024-11-20 PROCEDURE — 6370000000 HC RX 637 (ALT 250 FOR IP)

## 2024-11-20 PROCEDURE — 97535 SELF CARE MNGMENT TRAINING: CPT

## 2024-11-20 PROCEDURE — 97116 GAIT TRAINING THERAPY: CPT

## 2024-11-20 PROCEDURE — 6370000000 HC RX 637 (ALT 250 FOR IP): Performed by: STUDENT IN AN ORGANIZED HEALTH CARE EDUCATION/TRAINING PROGRAM

## 2024-11-20 PROCEDURE — 99232 SBSQ HOSP IP/OBS MODERATE 35: CPT | Performed by: STUDENT IN AN ORGANIZED HEALTH CARE EDUCATION/TRAINING PROGRAM

## 2024-11-20 RX ADMIN — APIXABAN 5 MG: 5 TABLET, FILM COATED ORAL at 09:27

## 2024-11-20 RX ADMIN — HYDROCHLOROTHIAZIDE 25 MG: 25 TABLET ORAL at 09:27

## 2024-11-20 RX ADMIN — CARVEDILOL 25 MG: 25 TABLET, FILM COATED ORAL at 09:26

## 2024-11-20 RX ADMIN — HYDRALAZINE HYDROCHLORIDE 100 MG: 50 TABLET ORAL at 09:26

## 2024-11-20 RX ADMIN — APIXABAN 5 MG: 5 TABLET, FILM COATED ORAL at 21:03

## 2024-11-20 RX ADMIN — BACITRACIN ZINC, NEOMYCIN, POLYMYXIN B: 400; 3.5; 5 OINTMENT TOPICAL at 21:03

## 2024-11-20 RX ADMIN — BACITRACIN ZINC, NEOMYCIN, POLYMYXIN B: 400; 3.5; 5 OINTMENT TOPICAL at 09:27

## 2024-11-20 RX ADMIN — ATORVASTATIN CALCIUM 40 MG: 40 TABLET, FILM COATED ORAL at 21:03

## 2024-11-20 RX ADMIN — DOCUSATE SODIUM 100 MG: 100 CAPSULE, LIQUID FILLED ORAL at 09:26

## 2024-11-20 RX ADMIN — CARVEDILOL 25 MG: 25 TABLET, FILM COATED ORAL at 21:03

## 2024-11-20 RX ADMIN — VALSARTAN 320 MG: 320 TABLET ORAL at 09:27

## 2024-11-20 RX ADMIN — Medication 5000 UNITS: at 09:26

## 2024-11-20 RX ADMIN — AMLODIPINE BESYLATE 5 MG: 5 TABLET ORAL at 09:27

## 2024-11-20 RX ADMIN — HYDRALAZINE HYDROCHLORIDE 100 MG: 50 TABLET ORAL at 21:03

## 2024-11-20 RX ADMIN — HYDRALAZINE HYDROCHLORIDE 100 MG: 50 TABLET ORAL at 14:31

## 2024-11-20 RX ADMIN — ASPIRIN 81 MG 81 MG: 81 TABLET ORAL at 09:28

## 2024-11-20 ASSESSMENT — PAIN SCALES - WONG BAKER
WONGBAKER_NUMERICALRESPONSE: NO HURT
WONGBAKER_NUMERICALRESPONSE: NO HURT

## 2024-11-20 ASSESSMENT — PAIN SCALES - GENERAL: PAINLEVEL_OUTOF10: 0

## 2024-11-20 NOTE — PROGRESS NOTES
[facility]  Occupational Therapy  Progress Note    Discharge Recommendations: Outpatient OT   Equipment Recommendations: Yes Pt does not own any equiptment at this time; recommend shower chair after stepping over tub.       Time In: 0730  Time Out: 0900  Timed Code Treatment Minutes: 90 Minutes  Minutes: 90          Date: 2024  Patient Name: Yulissa Andersen,   Gender: female      [facility]  MRN: [id number]  : 1975  (49 y.o.)  Referring Practitioner: Noa Leal DO  Diagnosis: Cerebrovascular accident (CVA) determined by clinical assessment ([facility])  Additional Pertinent Hx: Yulissa Andersen  is a 49 y.o. female with PMH significant for CAD, DM, HTN, OA, GERD, migraines, and CVA () who is being admitted to the inpatient rehabilitation unit on 2024. Patient initially presented to [facility] ED on 2024 with complaints of headache and AMS. In the ED, patient's boyfriend reported that she woke up with a severe migraine and associated nausea. Patient reportedly drove herself to PCP where they transferred her to ED due to hypertensive emergency.  She was noted to have deterioration of her mental status. Stroke alert was activated.  Initial NIH was 18 (reported aphasia, partial gaze palsy, bilateral blindness, all 4 extremities drift and hit the bed and left sided sensation deficit). CT of the head and neck was reportedly negative for LVO or critical stenosis patient determined to be a good candidate for TNK which was given after initiation of Cardene drip for BP management.  After TNK, patient was admitted to ICU for further evaluation management. Patient stay complicated by angioedema thought to be secondary to Vasotec. MRI of brain reportedly negative. CTA reportedly with a possible filling defect in the distal right middle cerebral artery, 3.3 mm aneurysm arising from the terminal segment of the left internal carotid artery, no evidence of  flowsheet    COGNITION:  decreased recall, effortful speech with delayed word finding at times, able to converse    ADL:   No ADL's completed this session.- completed prior to OT arrival   Verbally reviewed tub/shower transfer for repetition     Modifed Barthel Index administered this date with pt scoring 85/100 indicating moderate dependency with daily task completion.    IADL:     Meal Preparation: Patient completed multiple step IADL task of baking cookies - task was graded to challenge no AD and following package instructions.  Patient was able to retrieve and transport all items with light contact guard assistance and no cues for kitchen safety or following instructions during the retrieval and transportation of items. Patient demo'ed overall standing tolerance of > 40 minutes throughout. Pt demo'ed impairments in L foot placement and tended to keep weight on R hemibody during standing tasks.  With cueing, patient able to correct.     Basic Housekeeping:  OT facilitated dynamic balance task of making the bed, challenging BUE integration and reaching outside base of support, including down to the floor x 4 trials to retreive pillows.  Pt ambulated around bed with light CGA for standing balance, tolerating standing x 8 minutes      Laundry: OT facilitated carrying laundry basket throughout apartment with light CGA for balance due to inconsistent Left foot placement.     Throughout IADL tasks, patient tolerated standing > 61 continuous minutes.     BALANCE:  Sitting Balance:  Independent.    Standing Balance: Stand By Assistance static standing , Contact Guard Assistance dynamic BUE release/ engagement in task     BED MOBILITY:  Not Tested    TRANSFERS:  Sit to Stand:  Stand By Assistance.    Stand to Sit: Stand By Assistance.    Multiple transfers completed throughout session from various furniture including couch. Good hand placement of L hand on surface     FUNCTIONAL MOBILITY:  Assistive Device: None  Assist

## 2024-11-20 NOTE — PLAN OF CARE
Problem: Chronic Conditions and Co-morbidities  Goal: Patient's chronic conditions and co-morbidity symptoms are monitored and maintained or improved  11/20/2024 1337 by Montserrat Hong LPN  Outcome: Progressing  Flowsheets (Taken 11/20/2024 1337)  Care Plan - Patient's Chronic Conditions and Co-Morbidity Symptoms are Monitored and Maintained or Improved: Monitor and assess patient's chronic conditions and comorbid symptoms for stability, deterioration, or improvement     Problem: Discharge Planning  Goal: Discharge to home or other facility with appropriate resources  Outcome: Progressing  Flowsheets (Taken 11/20/2024 1337)  Discharge to home or other facility with appropriate resources:   Identify barriers to discharge with patient and caregiver   Arrange for needed discharge resources and transportation as appropriate     Problem: Skin/Tissue Integrity  Goal: Absence of new skin breakdown  Description: 1.  Monitor for areas of redness and/or skin breakdown  2.  Assess vascular access sites hourly  3.  Every 4-6 hours minimum:  Change oxygen saturation probe site  4.  Every 4-6 hours:  If on nasal continuous positive airway pressure, respiratory therapy assess nares and determine need for appliance change or resting period.  11/20/2024 1337 by Montserrat Hong LPN  Outcome: Progressing  Note: No new skin breakdown at this time.      Problem: ABCDS Injury Assessment  Goal: Absence of physical injury  11/20/2024 1337 by Montserrat Hong LPN  Outcome: Progressing  Flowsheets (Taken 11/20/2024 1337)  Absence of Physical Injury: Implement safety measures based on patient assessment     Problem: Safety - Adult  Goal: Free from fall injury  11/20/2024 1337 by Montserrat Hong LPN  Outcome: Progressing  Flowsheets (Taken 11/20/2024 1337)  Free From Fall Injury: Instruct family/caregiver on patient safety     Problem: Pain  Goal: Verbalizes/displays adequate comfort level or baseline comfort  Family Care Conference as is appropriate  Outcome: Progressing  Flowsheets (Taken 11/20/2024 1332)  Patient/family able to effectively weigh alternatives and participate in decision making related to treatment and care: Help patient and family identify pros/cons of alternative solutions     Problem: Nutrition Deficit:  Goal: Optimize nutritional status  Outcome: Progressing  Flowsheets (Taken 11/20/2024 8416)  Nutrient intake appropriate for improving, restoring, or maintaining nutritional needs:   Assess nutritional status and recommend course of action   Monitor oral intake, labs, and treatment plans   Recommend appropriate diets, oral nutritional supplements, and vitamin/mineral supplements

## 2024-11-20 NOTE — PROGRESS NOTES
[facility]  INPATIENT PHYSICAL THERAPY  PROGRESS NOTE  [facility] - 8K-12/012-A      Discharge Recommendations: Continue to assess pending progress, Home with Outpatient PT, and Patient would benefit from continued PT at discharge  Equipment Recommendations:Equipment Needed: No  Other: To be determined at patient progresses.      Time In: 1000  Time Out: 1130  Timed Code Treatment Minutes: 90 Minutes  Minutes: 90          Date: 2024  Patient Name: Yulissa Andersen,  Gender:  female        MRN: [id number]  : 1975  (49 y.o.)     Referring Practitioner: Noa Leal DO  Diagnosis: Cerebrovascular accident (CVA) determined by clinical assessment (HCC)  Additional Pertinent Hx: Per chart review: History obtained via: ED documentation, acute care documentation, and patient  48 yo female patient initially presented to [facility] ED on 2024 with complaints of headache and AMS.  In the ED, patient's boyfriend reported that she woke up with a severe migraine and associated nausea.  Patient reportedly drove herself to PCP where they transferred her to ED due to hypertensive emergency.  She reportedly received labetalol in room.  She was noted to have deterioration of her mental status.  Stroke alert was activated.  Initial NIH was 18 (reported aphasia, partial gaze palsy, bilateral blindness, all 4 extremities drift and hit the bed and left sided sensation deficit).  CT head was reportedly negative.  CT of the head and neck was reportedly negative for LVO or critical stenosis patient determined to be a good candidate for TNK which was given after initiation of Cardene drip for BP management.  After TNK, patient was admitted to ICU for further evaluation management. Patient stay complicated by angioedema thought to be secondary to Vasotec.  Patient given Decadron, Benadryl, and FFP x2.  MRI of brain reportedly negative.  CTA reportedly with a possible filling defect in the  work but pt reports someone would be available to provide assistance at home as needed.    Restrictions/Precautions:  Restrictions/Precautions: Fall Risk, General Precautions  Position Activity Restriction  Other position/activity restrictions: Language Deficits     SUBJECTIVE: Patient seated in recliner upon arrival. Patient mom and 2 daughters present during session. PT provided some family training to family for transfers and ambulation. Patient pleasant and agreeable to therapy.      PAIN: 0/10    Vitals: Vitals not assessed per clinical judgement, see nursing flowsheet    OBJECTIVE:  Bed Mobility:  Supine to Sit: Stand By Assistance  Sit to Supine: Stand By Assistance   *Patient performed on mat table with HOB flat and no railings     Transfers:  Sit to Stand: Stand By Assistance, Contact Guard Assistance  Stand to Sit:Stand By Assistance, Contact Guard Assistance  *Patient instructed in sit<>stand transfers from various surfaces including: recliner, mat table and standard chair with arm rests without AD.    To/From Bed and Chair: Contact Guard Assistance    Ambulation:  Contact Guard Assistance  Distance: 300 feet x 2, 200 feet and various short distances in rehab gym   Surface: Level Tile  Device: No Device  Gait Deviations: Slow Joanne, Decreased Step Length on Left, Decreased Arm Swing, Decreased Trunk Rotation, Decreased Gait Speed, Decreased Heel Strike on Left, and Decreased Foot Clearance Left   *Patient instructed in TUG (see score below)    Stairs:  Stairs: 6\" steps X 4 using Bilateral Handrails and Contact Guard Assistance.  *Patient performed twice with cueing for proper technique     Balance:  Dynamic Standing Balance: Contact Guard Assistance  *Patient instructed in Tinetti Balance Test (see score below)    Neuro Re-ed  Pt. Completed standing, Stepping, and dynamic gait activities using No UE support on No Device to improve proprioception, coordination, gait mechanics, hip/quad stability, lateral

## 2024-11-20 NOTE — PROGRESS NOTES
[facility]  INPATIENT SPEECH THERAPY  [facility]  PROGRESS NOTE    Discharge Recommendations: Outpatient Speech Therapy and Continue to Assess Pending Progress    SLP Individual Minutes  Time In: 1215  Time Out: 1315  Minutes: 60  Timed Code Treatment Minutes: 60 Minutes   Speech-Language Tx: 0 minutes  Dysphagia Tx: 0 minutes  Cognitive tx: 60 minutes    Date: 2024  Patient Name: Yulissa Andersen      CSN: [id number]   : 1975  (49 y.o.)  Gender: female   Referring Physician:  Noa Leal DO  Diagnosis: Cerebrovascular accident (CVA) determined by clinical assessment (HCC)  Precautions: fall risk  Current Diet: Regular solids + Thin liquids  Respiratory Status: Room Air  Swallowing Strategies: Standard Universal Swallow Precautions  Date of Last MBS/FEES: Not Applicable    Pain:  No pain reported.    Subjective:  Patient seen sitting upright in wheelchair upon ST arrival;pleasant and cooperative throughout ST interventions. Mother and daughter present during session.    Short-Term Goals:  SHORT TERM GOAL #1:  Goal 1: Patient will complete verbal expression tasks (including BASIC naming, automatic speech tasks, biographics, verbal fluency, word level repetition) with 60% accuracy, max cues to improve functional expressive communication. GOAL MET  Revised Goal 1: Patient will complete verbal expression tasks (including BASIC naming, automatic speech tasks, biographics, verbal fluency, word level repetition) with 70% accuracy, mod cues to improve functional expressive communication.  INTERVENTIONS: DNT due to focus on other ST goals    PREVIOUS SESSION:  Verbal Fluency- Word Finding for word descriptions: Therapist provided patient with a list of six words. Patient was prompted to chose a word and describe the word for therapist to identify.              -House: \"place\", \"has an address\" \"you live in it... your family\"              -School: \"different subjects\"  management to ensure safe consumption of current diet level. Patient would benefit from skilled speech therapy services to address above deficits. Recommend driving evaluation prior to driving and ST services at discharge.   Activity Tolerance:  Patient tolerance of treatment: good; participating in services  Assessment/Plan: Patient progressing toward established goals.  Continues to require skilled care of licensed speech pathologist to progress toward achievement of established goals and plan of care.  Plan for Next Session: speech tasks, expressive/receptive language    TERRENCE Del Valle Speech Intern

## 2024-11-20 NOTE — PLAN OF CARE
Problem: Chronic Conditions and Co-morbidities  Goal: Patient's chronic conditions and co-morbidity symptoms are monitored and maintained or improved  Outcome: Progressing  Flowsheets (Taken 11/19/2024 2155)  Care Plan - Patient's Chronic Conditions and Co-Morbidity Symptoms are Monitored and Maintained or Improved: Monitor and assess patient's chronic conditions and comorbid symptoms for stability, deterioration, or improvement     Problem: Skin/Tissue Integrity  Goal: Absence of new skin breakdown  Description: 1.  Monitor for areas of redness and/or skin breakdown  2.  Assess vascular access sites hourly    Outcome: Progressing     Problem: ABCDS Injury Assessment  Goal: Absence of physical injury  Outcome: Progressing     Problem: Safety - Adult  Goal: Free from fall injury  Outcome: Progressing  Note:  Patient free from falls/injury at this time.  Standard safety precautions in place; call light and overhead table within reach.  Hourly rounding continues.     Problem: Pain  Goal: Verbalizes/displays adequate comfort level or baseline comfort level  Outcome: Progressing  Note:  Patient denies any pain at this time.       Problem: Musculoskeletal - Adult  Goal: Return mobility to safest level of function  Outcome: Progressing  Flowsheets (Taken 11/19/2024 2155)  Return Mobility to Safest Level of Function:   Assess patient stability and activity tolerance for standing, transferring and ambulating with or without assistive devices   Assist with transfers and ambulation using safe patient handling equipment as needed   Ensure adequate protection for wounds/incisions during mobilization

## 2024-11-20 NOTE — PROGRESS NOTES
environment at discharge.  Equipment Needed: Yes  Other: Pt does not own any equiptment at this time; recommend shower chair after stepping over tub.    RECREATIONAL THERAPY  Patient has been offered participation in recreational therapy activities and participates as able.   Pt has expressive aphasia, she was independent PTA, she is a , a friend visiting states pt is always taking care of everyone else and she is always doing something staying very active-pt agreed to having some word puzzles, some adult coloring materials in room and a deck of cards and card hernandes for when family are present-pt very pleasant, asked her what she is thankful for this thanksgiving and she pointed to herself and said Me! Became tearful with supportive contact given- pt wants to paint on Sunday with peers and already paid the money for it  -Pt came to the [place] pod to paint with her mom, her sister and her cousin-she picked out her colors and her saying but the entire time her affect was very flat until during her picture we told her she had to smile-after painting her family took her off the unit to get out of room-pt paid for her sister and her mom's painting too    NUTRITION  Weight - Scale: 89.2 kg (196 lb 10.4 oz) / Body mass index is 32.72 kg/m².  Current diet: ADULT DIET; Regular; 4 carb choices (60 gm/meal)  ADULT ORAL NUTRITION SUPPLEMENT; Breakfast, Dinner; Diabetic Oral Supplement  Please see nutrition note for details.    NURSING  Continent of Bowel: Yes.  Frequency: Several times a day.  Management: Toileting Q2. .  Continent of Bladder: Yes.  Frequency: Several times a day.  Management: Toileting Q2.  Pain is Managed:  Yes.  Management: Tylenol.  Frequency of Intervention: Q4 PRN.  Adequately Controlled: Yes  Sleep: Adequate  Signs and Symptoms of Infection:  No.   Signs and Symptoms of Skin Breakdown:  No.   Injury and/or Falls during Inpatient Rehabilitation Admission: No  Anticoagulants:  CCC-SLP 9632  Nurse:Rebekah Rodriguez, RN  Psychologist: Maryjo Jamison, PhD    I approve the established interdisciplinary plan of care as documented within the medical record of Yulissa Andersen. I was present and led the interdisciplinary care conference.    Noa Leal DO  Electronically signed by Noa Leal DO on 11/21/2024 at 10:26 AM

## 2024-11-21 PROCEDURE — 97535 SELF CARE MNGMENT TRAINING: CPT

## 2024-11-21 PROCEDURE — 92507 TX SP LANG VOICE COMM INDIV: CPT

## 2024-11-21 PROCEDURE — 97110 THERAPEUTIC EXERCISES: CPT

## 2024-11-21 PROCEDURE — 97116 GAIT TRAINING THERAPY: CPT

## 2024-11-21 PROCEDURE — 6370000000 HC RX 637 (ALT 250 FOR IP): Performed by: FAMILY MEDICINE

## 2024-11-21 PROCEDURE — 1180000000 HC REHAB R&B

## 2024-11-21 PROCEDURE — 6370000000 HC RX 637 (ALT 250 FOR IP)

## 2024-11-21 PROCEDURE — 6370000000 HC RX 637 (ALT 250 FOR IP): Performed by: STUDENT IN AN ORGANIZED HEALTH CARE EDUCATION/TRAINING PROGRAM

## 2024-11-21 PROCEDURE — 97530 THERAPEUTIC ACTIVITIES: CPT

## 2024-11-21 PROCEDURE — 97112 NEUROMUSCULAR REEDUCATION: CPT

## 2024-11-21 PROCEDURE — 99232 SBSQ HOSP IP/OBS MODERATE 35: CPT | Performed by: STUDENT IN AN ORGANIZED HEALTH CARE EDUCATION/TRAINING PROGRAM

## 2024-11-21 RX ADMIN — Medication 5000 UNITS: at 10:38

## 2024-11-21 RX ADMIN — ASPIRIN 81 MG 81 MG: 81 TABLET ORAL at 10:40

## 2024-11-21 RX ADMIN — ATORVASTATIN CALCIUM 40 MG: 40 TABLET, FILM COATED ORAL at 21:36

## 2024-11-21 RX ADMIN — AMLODIPINE BESYLATE 5 MG: 5 TABLET ORAL at 10:40

## 2024-11-21 RX ADMIN — VALSARTAN 320 MG: 320 TABLET ORAL at 10:39

## 2024-11-21 RX ADMIN — CARVEDILOL 25 MG: 25 TABLET, FILM COATED ORAL at 10:39

## 2024-11-21 RX ADMIN — HYDRALAZINE HYDROCHLORIDE 100 MG: 50 TABLET ORAL at 14:42

## 2024-11-21 RX ADMIN — ACETAMINOPHEN 650 MG: 325 TABLET ORAL at 21:36

## 2024-11-21 RX ADMIN — APIXABAN 5 MG: 5 TABLET, FILM COATED ORAL at 10:43

## 2024-11-21 RX ADMIN — APIXABAN 5 MG: 5 TABLET, FILM COATED ORAL at 21:36

## 2024-11-21 RX ADMIN — HYDROCHLOROTHIAZIDE 25 MG: 25 TABLET ORAL at 10:39

## 2024-11-21 RX ADMIN — HYDRALAZINE HYDROCHLORIDE 100 MG: 50 TABLET ORAL at 21:36

## 2024-11-21 RX ADMIN — CARVEDILOL 25 MG: 25 TABLET, FILM COATED ORAL at 21:36

## 2024-11-21 RX ADMIN — HYDRALAZINE HYDROCHLORIDE 100 MG: 50 TABLET ORAL at 10:39

## 2024-11-21 ASSESSMENT — PAIN DESCRIPTION - LOCATION: LOCATION: HEAD

## 2024-11-21 NOTE — DISCHARGE INSTRUCTIONS
NO DRIVING UNTIL OUTPATIENT DRIVING EVALUATION COMPLETED. OUTPATIENT DRIVING EVALUATION AVAILABLE THROUGH [facility] ([address], [address], [place] [address]. PH: [phone]).     CALL ANGELA NEFF [phone] FOR ASSISTANCE WITH TRANSPORTATION TO OUTPATIENT THERAPY WITH [facility].

## 2024-11-21 NOTE — PROGRESS NOTES
[facility]  INPATIENT PHYSICAL THERAPY  DAILY NOTE  [facility] - 8K-12/012-A      Discharge Recommendations: Home with Outpatient PT  Equipment Recommendations: No  To be determined at patient progresses.            Time In: 845  Time Out: 945  Timed Code Treatment Minutes: 60 Minutes  Minutes: 60          Date: 2024  Patient Name: Yulissa Andersen,  Gender:  female        MRN: [id number]  : 1975  (49 y.o.)     Referring Practitioner: Noa Leal DO  Diagnosis: Cerebrovascular accident (CVA) determined by clinical assessment ([facility])  Additional Pertinent Hx: Per chart review: History obtained via: ED documentation, acute care documentation, and patient  48 yo female patient initially presented to [facility] ED on 2024 with complaints of headache and AMS.  In the ED, patient's boyfriend reported that she woke up with a severe migraine and associated nausea.  Patient reportedly drove herself to PCP where they transferred her to ED due to hypertensive emergency.  She reportedly received labetalol in room.  She was noted to have deterioration of her mental status.  Stroke alert was activated.  Initial NIH was 18 (reported aphasia, partial gaze palsy, bilateral blindness, all 4 extremities drift and hit the bed and left sided sensation deficit).  CT head was reportedly negative.  CT of the head and neck was reportedly negative for LVO or critical stenosis patient determined to be a good candidate for TNK which was given after initiation of Cardene drip for BP management.  After TNK, patient was admitted to ICU for further evaluation management. Patient stay complicated by angioedema thought to be secondary to Vasotec.  Patient given Decadron, Benadryl, and FFP x2.  MRI of brain reportedly negative.  CTA reportedly with a possible filling defect in the distal right middle cerebral artery, 3.3 mm aneurysm arising from the terminal segment of the left internal carotid

## 2024-11-21 NOTE — CARE COORDINATION
Patient rested well this shift, uneventful shift, yesterday evening patients sister requested staff not to comb patients hair. Voices no concerns at this time, bed alarm on call light in reach.

## 2024-11-21 NOTE — PROGRESS NOTES
available to provide assistance at home as needed.    Restrictions/Precautions:  Restrictions/Precautions: Fall Risk, General Precautions  Position Activity Restriction  Other position/activity restrictions: Language Deficits     SUBJECTIVE: Patient seated in BS chair upon PTA arrival and agreeable to therapy treatment. Patient continues with expressive aphasia but able to communicate that she was waiting on RN to administer her meds.     PAIN: 0/10: denies    Vitals: Vitals not assessed per clinical judgement, see nursing flowsheet    OBJECTIVE:  Bed Mobility:  Not Tested    Transfers:  Sit to Stand: Stand By Assistance, Contact Guard Assistance  Stand to Sit:Stand By Assistance, Contact Guard Assistance    Ambulation:  Contact Guard Assistance  Distance: 200'x1  Surface: Level Tile  Device: No Device  Gait Deviations: Slow Joanne, Decreased Step Length on Left, Decreased Arm Swing, Decreased Trunk Rotation, Decreased Gait Speed, Decreased Heel Strike on Left, and Decreased Foot Clearance Left     Stairs:  Not Tested    Balance:  Not Tested    Exercise:  Patient was guided in 1 set(s) 15 reps of exercises to both lower extremities: ,Glut sets, Seated marches, Seated heel/toe raises, Long arc quads, Seated isometric hip adduction, and Seated abduction/adduction.  Exercises were completed for increased independence with functional mobility.    Functional Outcome Measures:  Not completed  Modified Saint Xavier Scale:  +3 - Moderate disability; requiring some help, but able to walk without physical assistance (SBA/CGA).   Patient could not live alone but could walk from one room to another without physical help from another person.  Education provided regarding stroke rehabilitation management.    ASSESSMENT:  Assessment: Patient progressing toward established goals.  Activity Tolerance:  Patient tolerance of  treatment:Good.  Plan: Current Treatment Recommendations: Strengthening, Balance training, Functional mobility  fall risk precautions in place.

## 2024-11-21 NOTE — PROGRESS NOTES
[facility]  INPATIENT SPEECH THERAPY  [facility]  DAILY NOTE    Discharge Recommendations: Outpatient Speech Therapy    SLP Individual Minutes  Time In: 1130  Time Out: 1200  Minutes: 30  Timed Code Treatment Minutes: 0 Minutes   Speech-Language Tx: 30 minutes  Dysphagia Tx: 0 minutes  Cognitive tx: 0 minutes    Date: 2024  Patient Name: Yulissa Andersen      CSN: [id number]   : 1975  (49 y.o.)  Gender: female   Referring Physician:  Noa Leal DO  Diagnosis: Cerebrovascular accident (CVA) determined by clinical assessment (HCC)  Precautions: fall risk  Current Diet: Regular solids + Thin liquids  Respiratory Status: Room Air  Swallowing Strategies: Standard Universal Swallow Precautions  Date of Last MBS/FEES: Not Applicable    Pain:  No pain reported.    Subjective:  Patient seen sitting upright in wheelchair upon ST arrival ;pleasant and cooperative throughout ST interventions. Mother and daughter present during session.    Short-Term Goals:  SHORT TERM GOAL #1:  Revised Goal 1: Patient will complete verbal expression tasks (including BASIC naming, automatic speech tasks, biographics, verbal fluency, word level repetition) with 70% accuracy, mod cues to improve functional expressive communication.  INTERVENTIONS: DNT due to focus on other ST goals    SHORT TERM GOAL #2:  Revised Goal 2: Patient will complete auditory/reading comprehension (including direction following of 2+ commands, mildly complex yes/no questions, functional reading comprehension) with 80% accuracy, min cues to improve functional recepetive communication.  INTERVENTIONS: Did not directly address due to focus on other ST goals    SHORT TERM GOAL #3:  Revised Goal 3: Patient will complete written expressive tasks (ie: biographics, common words/phrases) with 80% accuracy, min cues to improve written communication.  INTERVENTIONS: Not directly addressed d/t focus on other goals.    SHORT TERM  Continues to require skilled care of licensed speech pathologist to progress toward achievement of established goals and plan of care.  Plan for Next Session: family education, grocery simulation    TERRENCE Del Valle Speech Intern

## 2024-11-21 NOTE — PROGRESS NOTES
Physical Medicine & Rehabilitation   Progress Note    Chief Complaint:   Clinically suspected CVA     Subjective: Patient seen and examined.  No acute events overnight.  She reports a mild headache this morning.  She has not discussed with nursing has not trialed Tylenol.  Encouraged her to ask for Tylenol if headache still present when she gets up with therapy.  No reports of any chest pain or shortness of breath.  No reports of any significant changes to bowel or bladder.  She continues to tolerate progress with therapies.    I did call in regards to the peer to peer.  I was prompted to leave a message that said it would be responded to within the next 24 hours.  Will await callback.    Rehabilitation:   PT 11/20/2024:  Bed Mobility:  Supine to Sit: Stand By Assistance  Sit to Supine: Stand By Assistance   *Patient performed on mat table with HOB flat and no railings      Transfers:  Sit to Stand: Stand By Assistance, Contact Guard Assistance  Stand to Sit:Stand By Assistance, Contact Guard Assistance  *Patient instructed in sit<>stand transfers from various surfaces including: recliner, mat table and standard chair with arm rests without AD.     To/From Bed and Chair: Contact Guard Assistance     Ambulation:  Contact Guard Assistance  Distance: 300 feet x 2, 200 feet and various short distances in rehab gym   Surface: Level Tile  Device: No Device  Gait Deviations: Slow Joanne, Decreased Step Length on Left, Decreased Arm Swing, Decreased Trunk Rotation, Decreased Gait Speed, Decreased Heel Strike on Left, and Decreased Foot Clearance Left   *Patient instructed in TUG (see score below)     Stairs:  Stairs: 6\" steps X 4 using Bilateral Handrails and Contact Guard Assistance.  *Patient performed twice with cueing for proper technique      Balance:  Dynamic Standing Balance: Contact Guard Assistance  *Patient instructed in Tinetti Balance Test (see score below)     Neuro Re-ed  Pt. Completed standing, Stepping,  recommendations.  Prealbumin will be checked on admission.    Bladder: Will monitor for signs and symptoms of infection/retention  Bowel: Continue miralax BID and senna nightly  Labs: None  Follow up: PCP (1-2 weeks), Neurology (Kasi), Neurointervention (Owen)- 12/2024   and case management consultations for coordination of care and discharge planning. Team conference held Thursday with anticipated DC home TBD.   Insurance notified  of intent to deny.  Left voicemail on [id number] line today.  It stated that I should receive a call back within 24 hours.  Although patient is making significant progress, it is not easy or safe to send home patient who is going from completely independent to needing assistance around-the-clock abruptly.  Family education started today, however, could utilize additional time for ongoing education/training along with continued therapies to maximize her progress independence prior to returning home.  She just transitioned away from an assistive device with PT today and could utilize additional time to reinforce and build confidence prior to DC home.  This is a young woman with a significant functional decline who will not progress nearly as quickly at a lower level of care.  Based on her current evaluations, and insurance intent to deny, will aim for discharge home on Saturday.  This would allow for 2 more full days of therapy, and would only be extending her current approved time by 1 day.  Hopeful that this will be approved by insurance company.        Addendum: [id number] complete and patient approved through 11/22. Will plan for DC on 11/23      Missed Therapy Time:  None    Noa Leal,

## 2024-11-21 NOTE — PLAN OF CARE
Problem: Discharge Planning  Goal: Discharge to home or other facility with appropriate resources  11/21/2024 1217 by Raquel Sadler LISW-S  Note: Team conference held Thursday, 11/21. Recommendations of the team were explained to the patient by Dr Leal and SW. Team is recommending that patient continue on acute inpatient rehab for PT, OT and ST, with expected discharge date of Saturday, 11/23. Following discharge, team is recommending outpatient PT, OT and ST. Peer to Peer was completed on this date for additional time on IPR by Dr Leal. [employer] approved until 11/23. This was explained to patient and family. Care plan reviewed with patient. Patient verbalized understanding of the plan of care and contributed to goal setting. SW to follow and maintain involvement in discharge planning.

## 2024-11-21 NOTE — PROGRESS NOTES
[facility]  Occupational Therapy  Daily Note    Discharge Recommendations: Home with Outpatient OT  Equipment Recommendations: Yes Pt does not own any equiptment at this time; No AE recommended.     Time In: 1100  Time Out: 1130  Timed Code Treatment Minutes: 30 Minutes  Minutes: 30       Date: 2024  Patient Name: Yulissa Andersen,   Gender: female      [facility]  MRN: [id number]  : 1975  (49 y.o.)  Referring Practitioner: Noa Leal DO  Diagnosis: Cerebrovascular accident (CVA) determined by clinical assessment (HCC)  Additional Pertinent Hx: Yulissa Andersen  is a 49 y.o. female with PMH significant for CAD, DM, HTN, OA, GERD, migraines, and CVA () who is being admitted to the inpatient rehabilitation unit on 2024. Patient initially presented to [facility] ED on 2024 with complaints of headache and AMS. In the ED, patient's boyfriend reported that she woke up with a severe migraine and associated nausea. Patient reportedly drove herself to PCP where they transferred her to ED due to hypertensive emergency.  She was noted to have deterioration of her mental status. Stroke alert was activated.  Initial NIH was 18 (reported aphasia, partial gaze palsy, bilateral blindness, all 4 extremities drift and hit the bed and left sided sensation deficit). CT of the head and neck was reportedly negative for LVO or critical stenosis patient determined to be a good candidate for TNK which was given after initiation of Cardene drip for BP management.  After TNK, patient was admitted to ICU for further evaluation management. Patient stay complicated by angioedema thought to be secondary to Vasotec. MRI of brain reportedly negative. CTA reportedly with a possible filling defect in the distal right middle cerebral artery, 3.3 mm aneurysm arising from the terminal segment of the left internal carotid artery, no evidence of vasospasm. Patient was

## 2024-11-21 NOTE — PLAN OF CARE
Problem: Discharge Planning  Goal: Discharge to home or other facility with appropriate resources  11/21/2024 1331 by Raquel Sadler LISW-S  Note: SW met with patient and family on this date to discuss discharge planning. SW educated patient and family on services at discharge. They would like to do outpatient PT, OT and ST at [facility]. SW educated them on [facility] and contact information on how to schedule. Family plans to be present for patient's therapy sessions on Friday, 11/22. No outstanding needs or concerns.    SW made a referral to [facility] Outpatient Therapy.    SW will follow and maintain involvement in discharge planning.    Transportation:   Has transportation kept you from medical appointments, meetings, work, or from getting things needed for daily living? (Check all that apply)  No.      Health Literacy:   How often do you need to have someone help you when you read instructions, pamphlets, or other written material from your doctor or pharmacy?  0. - Never    Social Isolation:  How often do you feel lonely or isolated from those around you?  0. Never      Patient Mood Interview (PHQ-2 to 9) (from [employer]©)   Say to Patient: \"Over the last 2 weeks, have you been bothered by any of the following problems?\"   If symptom is present, enter 1 (yes) in column 1 (Symptom Presence)  If yes in column 1, then ask the patient: “About how often have you been bothered by this?”  Read and show the patient a card with the symptom frequency choices.    Indicate response in column 2, Symptom Frequency.   Symptom Presence  No (enter 0 in column 2)   Yes (enter 0-3 in column 2)  9.    No response (leave column 2 blank) Symptom Frequency  Never or 1 day  2-6 days (several days)  7-11 days (half or more of the days)  12-14 days (nearly every day    Symptom Presence Symptom Frequency   Little interest or pleasure in doing things 0. No 0. Never or 1 day   Feeling down, depressed, or hopeless 0. No 0. Never

## 2024-11-21 NOTE — PROGRESS NOTES
Comprehensive Nutrition Assessment    Type and Reason for Visit:  Reassess    Nutrition Recommendations/Plan:   Continue current diet.  Continue Glucerna ONS BID (pt. Only wants BID).  Recommend MVI.  Encouraged po, good nutrition at best efforts.     Malnutrition Assessment:  Malnutrition Status:  At risk for malnutrition (11/15/24 1027)    Context:  Acute Illness     Findings of the 6 clinical characteristics of malnutrition:  Energy Intake:  75% or less of estimated energy requirements for 7 or more days  Weight Loss:   (no significant weight loss - weights tend to fluctuate up/down a few pounds)     Body Fat Loss:  No body fat loss     Muscle Mass Loss:  No muscle mass loss    Fluid Accumulation:  Unable to assess     Strength:  Not Performed    Nutrition Assessment:     Pt. nutritionally compromised AEB inadequate oral intake.  At risk for further nutrition compromise r/t admit s/p stroke and underlying medical condition (hx CAD, DM, HTN, CVA).       Nutrition Related Findings:    Pt. Report/Treatments/Miscellaneous:   11/21-pt. Seen - reports eating \"ok\"; denies any nausea, abdominal pain or any difficulty chewing/swallowing; states good acceptance of Glucerna; wishes to continue BID (not TID); SLP following  11/15- pt. Seen - reports appetite is \"not too good\"; denies any nausea or abdominal pain; denies any trouble with chewing/swallowing; reports good appetite pta; states consumes 2 meals/day and snacks; reports watching diet pta; states blood sugars are well controlled; agrees to trial Glucerna ONS and receive CHO controlled diet while in hospital   GI Status: BM 11/18  Pertinent Labs: 11/18: Glucose 122, BUN 19, Cr 0.7  Pertinent Meds: Vitamin D, Colace, Glycolax, Senokot, Enemeez, Enemeez  Wound Type: None       Current Nutrition Intake & Therapies:    Average Meal Intake: 1-25%, 26-50%, 51-75%, %  Average Supplements Intake:  (states acceptance)  ADULT DIET; Regular; 4 carb choices (60  gm/meal)  ADULT ORAL NUTRITION SUPPLEMENT; Breakfast, Dinner; Diabetic Oral Supplement    Anthropometric Measures:  Height: 165.1 cm (5' 5\")  Ideal Body Weight (IBW): 125 lbs (57 kg)    Admission Body Weight: 91.7 kg (202 lb 2.6 oz) (11/12 no edema)  Current Body Weight: 89.2 kg (196 lb 10.4 oz) (11/21 no edema),     Current BMI (kg/m2): 32.7  Usual Body Weight:  (per pt. ~210#; per EMR: 11/22/23: 201# 3 oz, 4/23/24: 207# 2 oz, 6/2/24: 202# 10 oz, 11/8/24: 206# 9 oz)                          BMI Categories: Obese Class 1 (BMI 30.0-34.9)    Estimated Daily Nutrient Needs:  Energy Requirements Based On: Kcal/kg  Weight Used for Energy Requirements: Other (92)  Energy (kcal/day): 2832-1926 kcals (15-18)  Weight Used for Protein Requirements: Ideal (57)  Protein (g/day): 57-86 grams (1-1.5)       Nutrition Diagnosis:   Inadequate oral intake related to cognitive or neurological impairment as evidenced by intake 0-25%, intake 26-50%, intake 51-75%    Nutrition Interventions:   Food and/or Nutrient Delivery: Continue Current Diet, Continue Oral Nutrition Supplement  Nutrition Education/Counseling: Education/Counseling initiated  Coordination of Nutrition Care: Continue to monitor while inpatient       Goals:  Goals: PO intake 75% or greater, by next RD assessment  Type of Goal: Continue current goal       Nutrition Monitoring and Evaluation:      Food/Nutrient Intake Outcomes: Diet Advancement/Tolerance, Food and Nutrient Intake, Supplement Intake  Physical Signs/Symptoms Outcomes: Biochemical Data, Chewing or Swallowing, GI Status, Fluid Status or Edema, Nutrition Focused Physical Findings, Skin, Weight    Discharge Planning:    Too soon to determine     Joanne Walter, RD, LD  Contact: [phone]

## 2024-11-21 NOTE — PROGRESS NOTES
[facility]  INPATIENT SPEECH THERAPY  [facility]  DAILY NOTE    Discharge Recommendations: Outpatient Speech Therapy and Continue to Assess Pending Progress    SLP Individual Minutes  Time In: 0730  Time Out: 0800  Minutes: 30  Timed Code Treatment Minutes: 0 Minutes   Speech-Language Tx: 30 minutes  Dysphagia Tx: 0 minutes  Cognitive tx: 0 minutes    Date: 2024  Patient Name: Yulisas Andersen      CSN: [id number]   : 1975  (49 y.o.)  Gender: female   Referring Physician:  Noa Leal DO  Diagnosis: Cerebrovascular accident (CVA) determined by clinical assessment (HCC)  Precautions: fall risk  Current Diet: Regular solids + Thin liquids  Respiratory Status: Room Air  Swallowing Strategies: Standard Universal Swallow Precautions  Date of Last MBS/FEES: Not Applicable    Pain:  No pain reported.    Subjective:  Upon ST arrival, patient seen positioned semi-upright in bed; repositioned in upright position for completion of tasks. Pleasant and cooperative throughout ST interventions. No family present.    Short-Term Goals:  SHORT TERM GOAL #1:  Revised Goal 1: Patient will complete verbal expression tasks (including BASIC naming, automatic speech tasks, biographics, verbal fluency, word level repetition) with 70% accuracy, mod cues to improve functional expressive communication.  INTERVENTIONS: DNT due to focus on other ST goals    SHORT TERM GOAL #2:  Revised Goal 2: Patient will complete auditory/reading comprehension (including direction following of 2+ commands, mildly complex yes/no questions, functional reading comprehension) with 80% accuracy, min cues to improve functional recepetive communication.  INTERVENTIONS: Did not directly address due to focus on other ST goals; view goal #2 for details     SHORT TERM GOAL #3:  Revised Goal 3: Patient will complete written expressive tasks (ie: biographics, common words/phrases) with 80% accuracy, min cues to improve  verbal expression and auditory comprehension to assist with transition to home environment and community re-integration.     LONG TERM GOAL #2:  Goal 2: Patient will consume a regular diet and thin liquids with stable pulmonary status and implementation of swallow strategies to assist with hydration/nutrition     LONG TERM GOAL #3:   Goal 3:NEW GOAL Patient will improve cognitive-linguistic skills to a level of CAPO independent in order to resume independent management of ADLs/IADLs upon discharge.      Comprehension: 6 - Complex ideas 90% or device (hearing aid or glasses- if patient is primarily a visual learner)  Expression: 4 - Expresses basic ideas/needs 75-90%+ of the time  Social Interaction: 6 - Patient requires medication for mood and/or effect  Problem Solvin - Patient able to solve simple/routine tasks  Memory: 4 - Patient remembers 75-90%+ of the time    Functional Oral Intake Scale: Total Oral Intake: 7.  Total oral intake with no restrictions    EDUCATION:  Learner: Patient  Education:  Reviewed ST goals and Plan of Care, Reviewed recommendations for follow-up, Education Related to Health Promotion and Wellness, and Home Safety Education  Evaluation of Education: Verbalizes understanding and Demonstrates with assistance    ASSESSMENT/PLAN:    Activity Tolerance:  Patient tolerance of treatment: good; participating in services  Assessment/Plan: Patient progressing toward established goals.  Continues to require skilled care of licensed speech pathologist to progress toward achievement of established goals and plan of care.  Plan for Next Session: speech tasks, expressive/receptive language, grocery simulation     TERRENCE Del Valle Speech Intern

## 2024-11-21 NOTE — PROGRESS NOTES
[facility]  Occupational Therapy  Daily Note    Discharge Recommendations: Home with Outpatient OT  Equipment Recommendations: Yes No AE recommended.      Time In: 1330  Time Out: 1430  Timed Code Treatment Minutes: 60 Minutes  Minutes: 60          Date: 2024  Patient Name: Yulissa Andersen,   Gender: female      [facility]  MRN: [id number]  : 1975  (49 y.o.)  Referring Practitioner: Noa Leal DO  Diagnosis: Cerebrovascular accident (CVA) determined by clinical assessment ([facility])  Additional Pertinent Hx: Yulissa Andersen  is a 49 y.o. female with PMH significant for CAD, DM, HTN, OA, GERD, migraines, and CVA () who is being admitted to the inpatient rehabilitation unit on 2024. Patient initially presented to [facility] ED on 2024 with complaints of headache and AMS. In the ED, patient's boyfriend reported that she woke up with a severe migraine and associated nausea. Patient reportedly drove herself to PCP where they transferred her to ED due to hypertensive emergency.  She was noted to have deterioration of her mental status. Stroke alert was activated.  Initial NIH was 18 (reported aphasia, partial gaze palsy, bilateral blindness, all 4 extremities drift and hit the bed and left sided sensation deficit). CT of the head and neck was reportedly negative for LVO or critical stenosis patient determined to be a good candidate for TNK which was given after initiation of Cardene drip for BP management.  After TNK, patient was admitted to ICU for further evaluation management. Patient stay complicated by angioedema thought to be secondary to Vasotec. MRI of brain reportedly negative. CTA reportedly with a possible filling defect in the distal right middle cerebral artery, 3.3 mm aneurysm arising from the terminal segment of the left internal carotid artery, no evidence of vasospasm. Patient was transferred out of the ICU on 2024.  endure entire 60 minute session of standing/functional mobility without a seated rest break. Pt demonstrated inconsistencies with LLE weakness, with occasional toe drag.          Modified Rockbridge Scale:  +2 - Slight disability; unable to carry out all previous activities, but able to look after own affairs without assistance.   Patient could live alone without any help from another person.  Education provided regarding stroke rehabilitation management.    ASSESSMENT:     Activity Tolerance:  Patient tolerance of  treatment: Good treatment tolerance      Plan: Times Per Week: 5x/per wk for 90 minutes  Current Treatment Recommendations: Strengthening, ROM, Balance training, Functional mobility training, Endurance training, Pain management, Equipment evaluation, education, & procurement, Neuromuscular re-education, Safety education & training, Patient/Caregiver education & training, Self-Care / ADL, Home management training    Education:  Learners: Patient and Family  Role of OT, Plan of Care, IADL's, and Importance of Increasing Activity    Goals  Short Term Goals  Time Frame for Short Term Goals: 1 week  Short Term Goal 1: Pt will tolerate 5 minutes of standing with supervision to increase independence for grooming tasks at sink.  Short Term Goal 2: Pt will complete UB dressing while incorporating LUE with set up to increase independence in clothing management.  Short Term Goal 3: Pt will complete LB bathing/dressing with LHAE with supervision to increase independence in morning routine.  Short Term Goal 4: Pt will complete a multiple item retreval task with supervision for balance without AD to increase independence in home navigation.  Short Term Goal 5: Pt will complete a sit to stand trasnfers with less than 1 vcs for L hand placement with supervision to increase independence in toileting/showering.  Long Term Goals  Time Frame for Long Term Goals : 2 weeks from initial OT eval  Long Term Goal 1: Pt will

## 2024-11-22 LAB
ANION GAP SERPL CALC-SCNC: 13 MEQ/L (ref 8–16)
BASOPHILS ABSOLUTE: 0 THOU/MM3 (ref 0–0.1)
BASOPHILS NFR BLD AUTO: 0.4 %
BUN SERPL-MCNC: 15 MG/DL (ref 7–22)
CALCIUM SERPL-MCNC: 9.6 MG/DL (ref 8.5–10.5)
CHLORIDE SERPL-SCNC: 105 MEQ/L (ref 98–111)
CO2 SERPL-SCNC: 23 MEQ/L (ref 23–33)
CREAT SERPL-MCNC: 0.8 MG/DL (ref 0.4–1.2)
DEPRECATED RDW RBC AUTO: 47.8 FL (ref 35–45)
EOSINOPHIL NFR BLD AUTO: 1.5 %
EOSINOPHILS ABSOLUTE: 0.1 THOU/MM3 (ref 0–0.4)
ERYTHROCYTE [DISTWIDTH] IN BLOOD BY AUTOMATED COUNT: 15 % (ref 11.5–14.5)
GFR SERPL CREATININE-BSD FRML MDRD: > 90 ML/MIN/1.73M2
GLUCOSE SERPL-MCNC: 131 MG/DL (ref 70–108)
HCT VFR BLD AUTO: 31.3 % (ref 37–47)
HGB BLD-MCNC: 9.8 GM/DL (ref 12–16)
IMM GRANULOCYTES # BLD AUTO: 0.01 THOU/MM3 (ref 0–0.07)
IMM GRANULOCYTES NFR BLD AUTO: 0.1 %
LYMPHOCYTES ABSOLUTE: 2.7 THOU/MM3 (ref 1–4.8)
LYMPHOCYTES NFR BLD AUTO: 34.5 %
MCH RBC QN AUTO: 27.2 PG (ref 26–33)
MCHC RBC AUTO-ENTMCNC: 31.3 GM/DL (ref 32.2–35.5)
MCV RBC AUTO: 86.9 FL (ref 81–99)
MONOCYTES ABSOLUTE: 0.5 THOU/MM3 (ref 0.4–1.3)
MONOCYTES NFR BLD AUTO: 6.6 %
NEUTROPHILS ABSOLUTE: 4.5 THOU/MM3 (ref 1.8–7.7)
NEUTROPHILS NFR BLD AUTO: 56.9 %
NRBC BLD AUTO-RTO: 0 /100 WBC
PLATELET # BLD AUTO: 352 THOU/MM3 (ref 130–400)
PMV BLD AUTO: 9.7 FL (ref 9.4–12.4)
POTASSIUM SERPL-SCNC: 3.8 MEQ/L (ref 3.5–5.2)
RBC # BLD AUTO: 3.6 MILL/MM3 (ref 4.2–5.4)
SODIUM SERPL-SCNC: 141 MEQ/L (ref 135–145)
WBC # BLD AUTO: 7.9 THOU/MM3 (ref 4.8–10.8)

## 2024-11-22 PROCEDURE — 6370000000 HC RX 637 (ALT 250 FOR IP): Performed by: FAMILY MEDICINE

## 2024-11-22 PROCEDURE — 99233 SBSQ HOSP IP/OBS HIGH 50: CPT | Performed by: STUDENT IN AN ORGANIZED HEALTH CARE EDUCATION/TRAINING PROGRAM

## 2024-11-22 PROCEDURE — 85025 COMPLETE CBC W/AUTO DIFF WBC: CPT

## 2024-11-22 PROCEDURE — 97530 THERAPEUTIC ACTIVITIES: CPT

## 2024-11-22 PROCEDURE — 97110 THERAPEUTIC EXERCISES: CPT

## 2024-11-22 PROCEDURE — 92526 ORAL FUNCTION THERAPY: CPT

## 2024-11-22 PROCEDURE — 6370000000 HC RX 637 (ALT 250 FOR IP)

## 2024-11-22 PROCEDURE — 97112 NEUROMUSCULAR REEDUCATION: CPT

## 2024-11-22 PROCEDURE — 6370000000 HC RX 637 (ALT 250 FOR IP): Performed by: STUDENT IN AN ORGANIZED HEALTH CARE EDUCATION/TRAINING PROGRAM

## 2024-11-22 PROCEDURE — 80048 BASIC METABOLIC PNL TOTAL CA: CPT

## 2024-11-22 PROCEDURE — 92507 TX SP LANG VOICE COMM INDIV: CPT

## 2024-11-22 PROCEDURE — 36415 COLL VENOUS BLD VENIPUNCTURE: CPT

## 2024-11-22 PROCEDURE — 97116 GAIT TRAINING THERAPY: CPT

## 2024-11-22 PROCEDURE — 97535 SELF CARE MNGMENT TRAINING: CPT

## 2024-11-22 PROCEDURE — 1180000000 HC REHAB R&B

## 2024-11-22 RX ORDER — POLYETHYLENE GLYCOL 3350 17 G/17G
17 POWDER, FOR SOLUTION ORAL DAILY PRN
Status: DISCONTINUED | OUTPATIENT
Start: 2024-11-22 | End: 2024-11-23 | Stop reason: HOSPADM

## 2024-11-22 RX ORDER — ASPIRIN 81 MG/1
81 TABLET, CHEWABLE ORAL DAILY
Qty: 30 TABLET | Refills: 1 | Status: SHIPPED | OUTPATIENT
Start: 2024-11-23

## 2024-11-22 RX ORDER — HYDROCHLOROTHIAZIDE 25 MG/1
25 TABLET ORAL DAILY
Qty: 30 TABLET | Refills: 1 | Status: SHIPPED | OUTPATIENT
Start: 2024-11-23

## 2024-11-22 RX ORDER — ATORVASTATIN CALCIUM 40 MG/1
40 TABLET, FILM COATED ORAL NIGHTLY
Qty: 30 TABLET | Refills: 1 | Status: SHIPPED | OUTPATIENT
Start: 2024-11-22

## 2024-11-22 RX ORDER — POLYETHYLENE GLYCOL 3350 17 G/17G
17 POWDER, FOR SOLUTION ORAL DAILY PRN
COMMUNITY
Start: 2024-11-22 | End: 2024-12-22

## 2024-11-22 RX ADMIN — AMLODIPINE BESYLATE 5 MG: 5 TABLET ORAL at 08:35

## 2024-11-22 RX ADMIN — APIXABAN 5 MG: 5 TABLET, FILM COATED ORAL at 08:35

## 2024-11-22 RX ADMIN — CARVEDILOL 25 MG: 25 TABLET, FILM COATED ORAL at 20:15

## 2024-11-22 RX ADMIN — HYDRALAZINE HYDROCHLORIDE 100 MG: 50 TABLET ORAL at 14:10

## 2024-11-22 RX ADMIN — APIXABAN 5 MG: 5 TABLET, FILM COATED ORAL at 20:15

## 2024-11-22 RX ADMIN — Medication 5000 UNITS: at 08:34

## 2024-11-22 RX ADMIN — VALSARTAN 320 MG: 320 TABLET ORAL at 08:35

## 2024-11-22 RX ADMIN — ASPIRIN 81 MG 81 MG: 81 TABLET ORAL at 08:35

## 2024-11-22 RX ADMIN — HYDRALAZINE HYDROCHLORIDE 100 MG: 50 TABLET ORAL at 08:35

## 2024-11-22 RX ADMIN — CARVEDILOL 25 MG: 25 TABLET, FILM COATED ORAL at 08:35

## 2024-11-22 RX ADMIN — HYDROCHLOROTHIAZIDE 25 MG: 25 TABLET ORAL at 08:35

## 2024-11-22 RX ADMIN — ACETAMINOPHEN 650 MG: 325 TABLET ORAL at 20:16

## 2024-11-22 RX ADMIN — HYDRALAZINE HYDROCHLORIDE 100 MG: 50 TABLET ORAL at 20:15

## 2024-11-22 RX ADMIN — ATORVASTATIN CALCIUM 40 MG: 40 TABLET, FILM COATED ORAL at 20:15

## 2024-11-22 ASSESSMENT — PAIN SCALES - GENERAL: PAINLEVEL_OUTOF10: 4

## 2024-11-22 ASSESSMENT — PAIN DESCRIPTION - LOCATION: LOCATION: HEAD

## 2024-11-22 ASSESSMENT — PAIN DESCRIPTION - ORIENTATION: ORIENTATION: LEFT

## 2024-11-22 ASSESSMENT — PAIN - FUNCTIONAL ASSESSMENT: PAIN_FUNCTIONAL_ASSESSMENT: ACTIVITIES ARE NOT PREVENTED

## 2024-11-22 ASSESSMENT — PAIN DESCRIPTION - DESCRIPTORS: DESCRIPTORS: ACHING;DISCOMFORT

## 2024-11-22 NOTE — CARE COORDINATION
Released to be independent in room without assistive device per PT. Continues with expressive aphasia, able to make needs known. No needs at this time. Denies pain when asked.

## 2024-11-22 NOTE — PROGRESS NOTES
[facility]  Occupational Therapy  Daily Note    Discharge Recommendations: Home with Outpatient OT  Equipment Recommendations:   No AE recommended.    Time In: 1000  Time Out: 1130  Timed Code Treatment Minutes: 90 Minutes  Minutes: 90    Date: 2024  Patient Name: Yulissa Andersen,   Gender: female      [facility]: [facility]  MRN: [id number]  : 1975  (49 y.o.)  Referring Practitioner: Noa Leal DO  Diagnosis: Cerebrovascular accident (CVA) determined by clinical assessment ([facility])  Additional Pertinent Hx: Yulissa Andersen  is a 49 y.o. female with PMH significant for CAD, DM, HTN, OA, GERD, migraines, and CVA () who is being admitted to the inpatient rehabilitation unit on 2024. Patient initially presented to [facility] ED on 2024 with complaints of headache and AMS. In the ED, patient's boyfriend reported that she woke up with a severe migraine and associated nausea. Patient reportedly drove herself to PCP where they transferred her to ED due to hypertensive emergency.  She was noted to have deterioration of her mental status. Stroke alert was activated.  Initial NIH was 18 (reported aphasia, partial gaze palsy, bilateral blindness, all 4 extremities drift and hit the bed and left sided sensation deficit). CT of the head and neck was reportedly negative for LVO or critical stenosis patient determined to be a good candidate for TNK which was given after initiation of Cardene drip for BP management.  After TNK, patient was admitted to ICU for further evaluation management. Patient stay complicated by angioedema thought to be secondary to Vasotec. MRI of brain reportedly negative. CTA reportedly with a possible filling defect in the distal right middle cerebral artery, 3.3 mm aneurysm arising from the terminal segment of the left internal carotid artery, no evidence of vasospasm. Patient was transferred out of the ICU on 2024. Patient thought  demonstrate an increased score measured by the Modified Barthel Index to 85/100 to promote increased occupational performance in ADL tasks.  Long Term Goal 2: Pt will complete a light IADL task with good attention to LUE with supervision to increase independence in home maintainence.  Long Term Goal 3: Pt will complete a simple community reintegration task with supervision to increase independence in going shopping.    Following session, patient left in safe position with all fall risk precautions in place.

## 2024-11-22 NOTE — PROGRESS NOTES
artery, no evidence of vasospasm..  Echo was obtained reportedly with EF 55-60%, no PFO.  EEG was obtained and was reportedly normal.  Patient was transferred out of the ICU on 11/9/2024.  On day of consultation, patient is seen laying in bed with her son at bedside. Patient reports ongoing left sided vision impairment, left sided weakness and speech impairment. She denies any CP or SOB. No reports of any significant changes to bowel or bladder. Patient is able to provide majority of history but is limited based on language impairments. Prior to admission, patient was reportedly independent with ADLs and mobility without the use of any AD. She was employed an working full time at [employer] as a . She lives with her boyfriend and 2 children (ages 14 and 16). An adult son is in the room who does not reside with them. He states that all family members work but that someone would be available to provide assistance on DC.     Prior Level of Function:  Lives With: Significant other (And two children ages 14 and 16 years)  Type of Home: House  Home Layout: Two level, Bed/Bath upstairs ((B) rails to second floor)  Home Access: Stairs to enter with rails  Entrance Stairs - Number of Steps: 4-5  Entrance Stairs - Rails: Both  Home Equipment: None   Bathroom Shower/Tub: Tub/Shower unit  Bathroom Toilet: Standard  Bathroom Equipment: Hand-held shower  Bathroom Accessibility: Accessible    Receives Help From: Family  ADL Assistance: Independent  Homemaking Assistance: Independent  Ambulation Assistance: Independent  Transfer Assistance: Independent  Active : Yes  IADL Comments: Pt is responsible for cooking, cleaning, laundry (her own only), finances, and shopping at home. Pt reports that she recieves assistance from family for completion of yard work.  Additional Comments: All family members work but pt reports someone would be available to provide assistance at home as  an object from floor using No Device with Modified Independent     Exercise:  Patient was guided in 1 set(s) 10 reps of exercises to both lower extremities: Standing heel/toe raises, Standing marches, Standing hip abduction/adduction, Standing hip extension, Standing hamstring curls, Mini squats/ Exercises were completed for increased independence with functional mobility.  *PT provided and educated patient in HEP     Functional Outcome Measures:  Timed Up and Go (TUG)  Current Score: 27.43 Seconds (Date: 2024)    Interpretation of Score: This tests the patient’s mobility and fall risk. Less than 10 seconds = freely mobile; <20 seconds = mostly independent; 20-29 seconds = variable mobility; > 20 seconds = impaired mobility; > 30 seconds = very high fall risk. Additional scorin.1 seconds in vestibular patients = increased fall risk; > 13.5 seconds in elderly = Increased fall risk)  Modified Prentiss Scale:  +2 - Slight disability; unable to carry out all previous activities, but able to look after own affairs without assistance.   Patient could live alone without any help from another person.  Education provided regarding stroke rehabilitation management.    ASSESSMENT:  Assessment: Patient progressing toward established goals.  Activity Tolerance:  Patient tolerance of  treatment:Good.  Plan: Current Treatment Recommendations: Strengthening, Balance training, Functional mobility training, Transfer training, Endurance training, Gait training, Stair training, Neuromuscular re-education, Home exercise program, Safety education & training, Therapeutic activities  General Plan:  (5x/wk 90 minutes)    Education:  Learners: Patient  Patient Education: Home Exercise Program, Bed Mobility, Transfers, Gait, Stairs, Car Transfers, Verbal Exercise Instruction,  - Patient Verbalized Understanding, - Patient Requires Continued Education    Goals:  Patient Goals : To return to (I) living  Short Term Goals  Time Frame for

## 2024-11-22 NOTE — PROGRESS NOTES
[facility]  Recreational Therapy  Discharge Note  Inpatient Rehabilitation Unit         Date:  11/22/2024       Patient Name: Yulissa Andersen      MRN: [id number]       YOB: 1975 (49 y.o.)       Gender: female     Referring Practitioner: Noa Leal DO    Patient discharged from Recreational Therapy at this time.  See recreational therapy notes for details.    Electronically signed by: Morelia Wallace, CTRS  Date: 11/22/2024

## 2024-11-22 NOTE — PLAN OF CARE
Problem: Discharge Planning  Goal: Discharge to home or other facility with appropriate resources  11/22/2024 1134 by Raquel Sadler LISW-S  Note: Patient to be discharged on Saturday, 11/23 to home. Patient will be under the supervision of family. Family education was provided throughout patient's stay. Patient will be receiving services through [facility]. GAYLE provided information to Chela on 11/21 via Epic.    Dr Leal and GAYLE met with patient and family on this date to discuss discharge planning. Family requesting evaluations to be changed to earlier times from Wednesday, 11/27. SW worked with patient, family and outpatient therapy to determine appointments that were more accommodating. Patient and family were in agreement with discharge plans.

## 2024-11-22 NOTE — PROGRESS NOTES
[facility]  INPATIENT SPEECH THERAPY  [facility]  DAILY NOTE    Discharge Recommendations: Outpatient Speech Therapy    SLP Individual Minutes  Time In: 0900  Time Out: 0930  Minutes: 30  Timed Code Treatment Minutes: 0 Minutes   Speech-Language Tx: 30 minutes  Dysphagia Tx: 0 minutes  Cognitive tx: 0 minutes    Date: 2024  Patient Name: Yulissa Andersen      CSN: [id number]   : 1975  (49 y.o.)  Gender: female   Referring Physician:  Noa Leal DO  Diagnosis: Cerebrovascular accident (CVA) determined by clinical assessment (HCC)  Precautions: fall risk  Current Diet: Regular solids + Thin liquids  Respiratory Status: Room Air  Swallowing Strategies: Standard Universal Swallow Precautions  Date of Last MBS/FEES: Not Applicable    Pain:  No pain reported.    Subjective:  Upon ST arrival, patient seen positioned upright in recliner; patient transferred to wheelchair for completion of skilled ST services. Pleasant and positive throughout. Patient's two daughter present and active during session.    Short-Term Goals:  SHORT TERM GOAL #1:  Revised Goal 1: Patient will complete verbal expression tasks (including BASIC naming, automatic speech tasks, biographics, verbal fluency, word level repetition) with 70% accuracy, mod cues to improve functional expressive communication.  INTERVENTIONS: Retrieval of grocery list items in simulated store: ST prompted patient to utilize list to locate items in simulated grocery store. Once items gathered, therapist prompted patient to calculate totals of items in hopes $20 would cover amount. Patient's daughter serving as  to help patient elicit conversational request/questions. ST providing education on importance of functional activity choice and importance of providing complete phrases/sentences to people who may not be aware of deficits. Patient with excellent insight; demonstrating possible ways to show people it may  established goals and plan of care.  Plan for Next Session: family education    Tereso Mckeon, BS Speech Intern

## 2024-11-22 NOTE — PLAN OF CARE
Problem: Chronic Conditions and Co-morbidities  Goal: Patient's chronic conditions and co-morbidity symptoms are monitored and maintained or improved  Outcome: Progressing  Flowsheets (Taken 11/21/2024 2339)  Care Plan - Patient's Chronic Conditions and Co-Morbidity Symptoms are Monitored and Maintained or Improved: Monitor and assess patient's chronic conditions and comorbid symptoms for stability, deterioration, or improvement     Problem: Discharge Planning  Goal: Discharge to home or other facility with appropriate resources  11/21/2024 2339 by Cheryle Pleitez RN  Outcome: Progressing  Flowsheets (Taken 11/21/2024 2339)  Discharge to home or other facility with appropriate resources: Identify barriers to discharge with patient and caregiver     Problem: ABCDS Injury Assessment  Goal: Absence of physical injury  Outcome: Progressing  Flowsheets (Taken 11/21/2024 2339)  Absence of Physical Injury: Implement safety measures based on patient assessment  Note: Patient has remained free of physical injury during this shift. Safe environment provided, call light within reach, and hourly rounding completed.      Problem: Safety - Adult  Goal: Free from fall injury  Outcome: Progressing  Flowsheets (Taken 11/21/2024 2339)  Free From Fall Injury: Instruct family/caregiver on patient safety  Note: Patient has remained free of physical injury during this shift. Safe environment provided, call light within reach, and hourly rounding completed.      Problem: Pain  Goal: Verbalizes/displays adequate comfort level or baseline comfort level  Outcome: Progressing  Flowsheets (Taken 11/21/2024 2339)  Verbalizes/displays adequate comfort level or baseline comfort level: Encourage patient to monitor pain and request assistance   Care plan reviewed with patient.  Patient verbalize understanding of the plan of care and contribute to goal setting.

## 2024-11-22 NOTE — PROGRESS NOTES
[facility]  INPATIENT SPEECH THERAPY  [facility]  DAILY NOTE    Discharge Recommendations: Outpatient Speech Therapy    SLP Individual Minutes  Time In: 1130  Time Out: 1200  Minutes: 30  Timed Code Treatment Minutes: 0 Minutes   Speech-Language Tx: 20 minutes  Dysphagia Tx: 10 minutes  Cognitive tx: 0 minutes    Date: 2024  Patient Name: Yulissa Andersen      CSN: [id number]   : 1975  (49 y.o.)  Gender: female   Referring Physician:  Noa Leal DO  Diagnosis: Cerebrovascular accident (CVA) determined by clinical assessment (HCC)  Precautions: fall risk  Current Diet: Regular solids + Thin liquids  Respiratory Status: Room Air  Swallowing Strategies: Standard Universal Swallow Precautions  Date of Last MBS/FEES: Not Applicable    Pain:  No pain reported.    Subjective:  Upon ST arrival, patient seen positioned upright in recliner. Pleasant and cooperative throughout. Patient's two daughter, mom, and sister present and active during session.    Short-Term Goals:  SHORT TERM GOAL #1:  Revised Goal 1: Patient will complete verbal expression tasks (including BASIC naming, automatic speech tasks, biographics, verbal fluency, word level repetition) with 70% accuracy, mod cues to improve functional expressive communication.  INTERVENTIONS: Verbal Fluency- Word Finding for word descriptions: Therapist provided patient with a list words. Patient prompted to select a word and describe the word for therapist/family to identify.              -Book: \"front and back\", \"it has a writer or illustrator\", \"a lot of words\"   -Grass: \"it's green\", \"grows from the ground... and you can cut it\", \"or water it\"   -Bike: \"it has two wheel\", \"a handle bar\", \"it has pedals\"   -Dime: \"it has grey\", \"money\"; \"it worth ten cents\"   -Nap: \"you can take it in the middle of the day\" and \"you sleep\"   -Hand: \"you use it everyday\" and \"it has ten fingers with nails on them\"   -Water: \"you can

## 2024-11-22 NOTE — PROGRESS NOTES
Physical Medicine & Rehabilitation   Progress Note    Chief Complaint:   Clinically suspected CVA     Subjective: Patient seen and examined.  No acute events overnight.  She states overall she is feeling well.  She is looking forward to anticipated discharge home tomorrow.  Daughter asked to be able to meet with me this afternoon in order to review things in anticipation for discharge.  We discussed that I would come back at approximately 3:30 PM when daughter will be back in order to answer all questions and review medications in anticipation for discharge.  No reports of any chest pain or shortness of breath.  No reports of any significant changes to bowel or bladder.      Rehabilitation:   PT 11/21/2024:  Transfers:  Sit to Stand: Stand By Assistance, Contact Guard Assistance  Stand to Sit:Stand By Assistance, Contact Guard Assistance     Ambulation:  Contact Guard Assistance  Distance: 200'x1  Surface: Level Tile  Device: No Device  Gait Deviations: Slow Joanne, Decreased Step Length on Left, Decreased Arm Swing, Decreased Trunk Rotation, Decreased Gait Speed, Decreased Heel Strike on Left, and Decreased Foot Clearance Left      Exercise:  Patient was guided in 1 set(s) 15 reps of exercises to both lower extremities: ,Glut sets, Seated marches, Seated heel/toe raises, Long arc quads, Seated isometric hip adduction, and Seated abduction/adduction.  Exercises were completed for increased independence with functional mobility.      OT 11/21/2024:  ADL:   Tub Transfer: Supervision, X 1, with verbal cues , and with increased time for completion.  Pt home environment has a tub/shower set up. To simulate home environment OTD/S facilitated pt practice of side stepping technique to enter and exit the tub while standing. Pt able to re demonstrate technique OT previously provided education for with supervision. OT provided education on pt having supervision to start out at home to enter and exit tub to ensure safety.    had indeed began prior to this denial as family education had already started earlier this week.  Additionally, patient had been set up with outpatient services and all DME required for discharge.  Additionally, medications have already been sent to our pharmacy through the meds to beds program.  We discussed patient's current functional status and that she independent/supervision level of up to 300 feet x 2 without any AD, supervision level for stairs, and granted independent privileges within her room.  Patient has made significant progress which explains why a further stay was denied by insurance.  The friend insisted that patient has a secondary insurance that should cover what her primary will not cover.   explained that while a secondary insurance can assist, they will not approve services that the primary denies and a secondary insurance can never be utilized in place of primary insurance.    We then discussed patient's follow-ups.  We discussed the patient was scheduled for therapy evaluations on Wednesday, 11/27/2024. This friend felt that this was an inappropriate delay in therapy evaluations. We discussed that patient has been given home exercises to be doing at time of discharge leading up to therapy evaluations.   did explain that therapies were scheduled at that date to allow for all 3 evaluations to be completed consecutive for transportation convenience for the patient. Friend questioned if we had looked at alternative options such as outpatient therapies through any other non-[facility] Sites.  I then directed the conversation to the patient and asked if she would like us to look at alternative sites for therapy in order to accommodate an earlier start date.  offered to see if earlier evaluations could be put in place even if that meant that they were on different days, additionally, if [facility] was unable to accommodate, look at alternative sites.  Patient did state

## 2024-11-22 NOTE — CARE COORDINATION
Patient rested well this shift, no c/o pain or discomfort, uneventful shift, bed alarm on, call light in reach.

## 2024-11-23 VITALS
SYSTOLIC BLOOD PRESSURE: 122 MMHG | BODY MASS INDEX: 34.82 KG/M2 | TEMPERATURE: 98.1 F | HEIGHT: 65 IN | DIASTOLIC BLOOD PRESSURE: 71 MMHG | HEART RATE: 72 BPM | RESPIRATION RATE: 16 BRPM | WEIGHT: 209 LBS | OXYGEN SATURATION: 100 %

## 2024-11-23 PROCEDURE — 97129 THER IVNTJ 1ST 15 MIN: CPT

## 2024-11-23 PROCEDURE — 97130 THER IVNTJ EA ADDL 15 MIN: CPT

## 2024-11-23 PROCEDURE — 6370000000 HC RX 637 (ALT 250 FOR IP): Performed by: FAMILY MEDICINE

## 2024-11-23 PROCEDURE — 6370000000 HC RX 637 (ALT 250 FOR IP): Performed by: STUDENT IN AN ORGANIZED HEALTH CARE EDUCATION/TRAINING PROGRAM

## 2024-11-23 PROCEDURE — 97530 THERAPEUTIC ACTIVITIES: CPT

## 2024-11-23 PROCEDURE — 97116 GAIT TRAINING THERAPY: CPT

## 2024-11-23 PROCEDURE — 6370000000 HC RX 637 (ALT 250 FOR IP)

## 2024-11-23 PROCEDURE — 97535 SELF CARE MNGMENT TRAINING: CPT

## 2024-11-23 RX ADMIN — HYDROCHLOROTHIAZIDE 25 MG: 25 TABLET ORAL at 08:59

## 2024-11-23 RX ADMIN — HYDRALAZINE HYDROCHLORIDE 100 MG: 50 TABLET ORAL at 08:58

## 2024-11-23 RX ADMIN — BACITRACIN ZINC, NEOMYCIN, POLYMYXIN B: 400; 3.5; 5 OINTMENT TOPICAL at 09:05

## 2024-11-23 RX ADMIN — AMLODIPINE BESYLATE 5 MG: 5 TABLET ORAL at 08:59

## 2024-11-23 RX ADMIN — Medication 5000 UNITS: at 08:58

## 2024-11-23 RX ADMIN — CARVEDILOL 25 MG: 25 TABLET, FILM COATED ORAL at 08:59

## 2024-11-23 RX ADMIN — VALSARTAN 320 MG: 320 TABLET ORAL at 08:58

## 2024-11-23 RX ADMIN — DOCUSATE SODIUM 100 MG: 100 CAPSULE, LIQUID FILLED ORAL at 08:59

## 2024-11-23 RX ADMIN — APIXABAN 5 MG: 5 TABLET, FILM COATED ORAL at 08:59

## 2024-11-23 RX ADMIN — ASPIRIN 81 MG 81 MG: 81 TABLET ORAL at 08:59

## 2024-11-23 NOTE — PROGRESS NOTES
Patient: Yulissa Andersen  Unit/Bed: 8K-12/012-A  YOB: 1975  MRN: [id number] Acct: [id number]   Admitting Diagnosis: CVA (cerebral vascular accident) ([facility]) [I63.9]  Cerebrovascular accident (CVA) determined by clinical assessment ([facility]) [I63.9]  Admit Date:  11/12/2024  Hospital Day: 10    Assessment:     Principal Problem:    Cerebrovascular accident (CVA) determined by clinical assessment ([facility])  Resolved Problems:    * No resolved hospital problems. *      Plan:     Medically stable for discharge tomorrow        Subjective:     Patient has no complaint of CP or SOB..   Medication side effects: none    Scheduled Meds:   neomycin-bacitracin-polymyxin   Topical BID    lidocaine  1 patch TransDERmal Daily    Vitamin D  5,000 Units Oral Daily    carvedilol  25 mg Oral BID    amLODIPine  5 mg Oral Daily    hydrALAZINE  100 mg Oral TID    valsartan  320 mg Oral Daily    hydroCHLOROthiazide  25 mg Oral Daily    atorvastatin  40 mg Oral Nightly    aspirin  81 mg Oral Daily    docusate sodium  100 mg Oral BID    apixaban  5 mg Oral BID     Continuous Infusions:   sodium chloride       PRN Meds:polyethylene glycol, sodium chloride flush, sodium chloride, potassium chloride, magnesium sulfate, albuterol, potassium chloride **OR** potassium alternative oral replacement, acetaminophen, ondansetron, docusate sodium    Review of Systems  Pertinent items are noted in HPI.    Objective:     Patient Vitals for the past 8 hrs:   BP Pulse Resp   11/22/24 1408 (!) 131/57 79 16     I/O last 3 completed shifts:  In: 480 [P.O.:480]  Out: -   No intake/output data recorded.    BP (!) 131/57   Pulse 79   Temp 97.9 °F (36.6 °C) (Oral)   Resp 16   Ht 1.651 m (5' 5\")   Wt 89.4 kg (197 lb 1.5 oz)   LMP 01/20/2013   SpO2 100%   BMI 32.80 kg/m²     General appearance: alert, appears stated age, and cooperative  Head: Normocephalic, without obvious abnormality, atraumatic  Lungs: clear to auscultation bilaterally  Heart:

## 2024-11-23 NOTE — DISCHARGE SUMMARY
Physical Medicine & Rehabilitation   Discharge Summary     Patient Identification:  Yulissa Andersen  : 1975  Admit date: 2024  Discharge date: 2024   Attending provider: Noa Leal DO        Primary care provider: Amanda García APRN - CNP     Discharge Diagnoses:   Strokelike symptoms- clinically suspected CVA  Acute DVT  Left internal carotid artery aneurysm  Type 2 diabetes  HTN  Migraines  Angioedema  Obesity, class I (BMI 32.43)  Impaired mobility and ADLs  Expressive  language impairment    Consults:   Family Medicine      Inpatient Acute Hospital Course:   Yulissa Andersen  is a 49 y.o. female with PMH significant for CAD, DM, HTN, OA, GERD, migraines, and CVA () who is being admitted to the inpatient rehabilitation unit on 2024.  History obtained via: ED documentation, acute care documentation, and patient      Patient initially [facility] ED [facility]24 with complaints of headache and AMS.  In the ED, patient's boyfriend reported that she woke up with a severe migraine and associated nausea.  Patient reportedly drove herself to PCP where they transferred her to ED due to hypertensive emergency.  She reportedly received labetalol in room.  She was noted to have deterioration of her mental status.  Stroke alert was activated.  Initial NIH was 18 (reported aphasia, partial gaze palsy, bilateral blindness, all 4 extremities drift and hit the bed and left sided sensation deficit).  CT head was reportedly negative.  CT of the head and neck was reportedly negative for LVO or critical stenosis patient determined to be a good candidate for TNK which was given after initiation of Cardene drip for BP management.  After TNK, patient was admitted to ICU for further evaluation management.     Patient stay complicated by angioedema thought to be secondary to Vasotec.  Patient given Decadron, Benadryl, and FFP x2.  MRI of brain reportedly negative.  CTA reportedly with a possible

## 2024-11-23 NOTE — PROGRESS NOTES
improvement in integration of LUE during these tasks. She is able to complete IADL routines with modified independence to intermittent supervision with fatigue due to impairments in L foot clearance causing patient to be a fall risk. She demo's impairments in LUE and is limited by L shoulder pain during functional tasks. Due to impairments in L side, pt continues to require skilled OT on an OP basis to improve aforementioned deficits for safe return and engagement in ADL, IADL, and eventual RTW.   Plan: Home with OP OT     Education:  Learners: Patient  ADL's, IADL's, and Home Exercise Program    Goals  Patient goals : To return to home    Time Frame for Short Term Goals: 1 week  Short Term Goal 1: Pt will tolerate 5 minutes of standing with supervision to increase independence for grooming tasks at sink. GOAL MET  Short Term Goal 2: Pt will complete UB dressing while incorporating LUE with set up to increase independence in clothing management. GOAL MET  Short Term Goal 3: Pt will complete LB bathing/dressing with LHAE with supervision to increase independence in morning routine. GOAL MET  Short Term Goal 4: Pt will complete a multiple item retreval task with supervision for balance without AD to increase independence in home navigation. GOAL MET  Short Term Goal 5: Pt will complete a sit to stand trasnfers with less than 1 vcs for L hand placement with supervision to increase independence in toileting/showering. GOAL MET    Time Frame for Long Term Goals : 2 weeks from initial OT eval  Long Term Goal 1: Pt will demonstrate an increased score measured by the Modified Barthel Index to 85/100 to promote increased occupational performance in ADL tasks. GOAL MET  Long Term Goal 2: Pt will complete a light IADL task with good attention to LUE with supervision to increase independence in home maintainence. GOAL MET  Long Term Goal 3: Pt will complete a simple community reintegration task with supervision to increase  independence in going shopping. GOAL MET    Following session, patient left in safe position with all fall risk precautions in place.

## 2024-11-23 NOTE — PLAN OF CARE
Problem: Skin/Tissue Integrity  Goal: Absence of new skin breakdown  Description: 1.  Monitor for areas of redness and/or skin breakdown  2.  Assess vascular access sites hourly  3.  Every 4-6 hours minimum:  Change oxygen saturation probe site  4.  Every 4-6 hours:  If on nasal continuous positive airway pressure, respiratory therapy assess nares and determine need for appliance change or resting period.  Outcome: Progressing     Problem: ABCDS Injury Assessment  Goal: Absence of physical injury  Outcome: Progressing  Flowsheets (Taken 11/22/2024 2202)  Absence of Physical Injury: Implement safety measures based on patient assessment     Problem: Safety - Adult  Goal: Free from fall injury  Outcome: Progressing  Flowsheets (Taken 11/22/2024 2202)  Free From Fall Injury: Instruct family/caregiver on patient safety

## 2024-11-23 NOTE — PROGRESS NOTES
Education    Goals:  Patient Goals : To return to (I) living    Short Term Goals  Time Frame for Short Term Goals: 3 wks from IP rehab evaluation  Short Term Goal 1: bed mobility with HOB flat, no rails, mod I for increased functional ind- GOAL MET   Short Term Goal 2: sit <>stand from various surfaces with RW mod I for safe transfers- GOAL MET   Short Term Goal 3: ambulate 100' with RW with SBA with step thru gait pattern and good step consistency- GOAL DISCONTINUED   Short Term Goal 4: navigate 2 steps with LRD SBA for safe enter/exit of home- GOAL MET   Short Term Goal 5: Patient to demonstrate (I) WC mobility with st path, turns around obstacles and thru doorways.- GOAL DISCONTINUED   Short Term Goal 6: Patient to perform TUG in </=20 seconds in order to assist with functional dynamic balance. GOAL NOT MET, completed on 11/22 with 27.43 seconds       Long Term Goals  Time Frame for Long Term Goals : 4 wks from IP rehabilitation evaluation  Long Term Goal 1: Patient to demonstrate modified (I) car transfers for transportation needs.- GOAL MET   Long Term Goal 2: Patient to ambulate with 2WW with mod (I) with dual task activities and with door negotiation for safe household ambulation.- GOAL DISCONTINUED   Long Term Goal 3: Patient to ascend/descend flight of steps with (B) rails with modified (I) to be able to access upstairs bathroom and bedroom. GOAL NOT MET  Long Term Goal 4: Patient to demonstrate 24/28 on [place]etti to indicate low risk of falls. GOAL NOT MET per note on 11/20 with 16/28   Long Term Goal 5: Patient to ambulate without AD with Mod I over level and uneven surfaces in order to assist with home and community mobility.- GOAL MET         Following session, patient left in safe position with all fall risk precautions in place.       note on 11/20 with 16/28   Long Term Goal 5: Patient to ambulate without AD with Mod I over level and uneven surfaces in order to assist with home and community mobility.- GOAL MET         Following session, patient left in safe position with all fall risk precautions in place.

## 2024-11-23 NOTE — PROGRESS NOTES
[facility]  INPATIENT SPEECH THERAPY  [facility]  DISCHARGE NOTE    Discharge Recommendations: Outpatient Speech Therapy    SLP Individual Minutes  Time In: 0900  Time Out: 0925  Minutes: 25  Timed Code Treatment Minutes: 25 Minutes   Speech-Language Tx: 0 minutes  Dysphagia Tx: 0 minutes  Cognitive tx: 25 minutes    Date: 2024  Patient Name: Yulissa Andersen      CSN: [id number]   : 1975  (49 y.o.)  Gender: female   Referring Physician:  Noa Leal DO  Diagnosis: Cerebrovascular accident (CVA) determined by clinical assessment (HCC)  Precautions: fall risk  Current Diet: Regular solids + Thin liquids  Respiratory Status: Room Air  Swallowing Strategies: Standard Universal Swallow Precautions  Date of Last MBS/FEES: Not Applicable    Pain:  No pain reported.    Subjective:  Upon ST arrival, patient positioned upright in recliner. Pleasant and cooperative throughout. RN present providing morning medications and boyfriendRodrigue, entered room midway through session.     Short-Term Goals:  SHORT TERM GOAL #1:  Revised Goal 1: Patient will complete verbal expression tasks (including BASIC naming, automatic speech tasks, biographics, verbal fluency, word level repetition) with 70% accuracy, mod cues to improve functional expressive communication. GOAL MET.  INTERVENTIONS: Not directly addressed due to focus on other goals. ST did encourage patient to complete all communication with verbal expression vs writing to promote improved verbal fluency and output vs rely on written communication. Explained availability of writing option in true moment of \"anomia\" if able to write instead of verbalize, however overall recommendations for verbal expression as primary method of communication (I.e., \"constraint induced\" recommendations).     PREVIOUS SESSION:  Verbal Fluency- Word Finding for word descriptions: Therapist provided patient with a list words. Patient prompted to

## 2024-11-23 NOTE — PROGRESS NOTES
Patient discharged in stable condition as per order of attending physician to Home per staff at Time: 9:50 am and Via Wheelchair to car.    AVS provided by RN at time of discharge, which includes all necessary medical information pertaining to the patients current course of illness, treatment, medications, post-discharge goals of care, and treatment preferences.     Patient/ family verbalize understanding of discharge plan and are in agreement with goal/plan/treatment preferences.     Belongings including None sent with patient.      Home medications sent home with patient N/A    Availability of \"My Chart\" offered to patient as a tool for updated health record.  Steps for activation discussed with patient as mentioned on AVS.

## 2024-11-23 NOTE — PLAN OF CARE
Problem: Chronic Conditions and Co-morbidities  Goal: Patient's chronic conditions and co-morbidity symptoms are monitored and maintained or improved  11/23/2024 0935 by Lety Lopes RN  Outcome: Adequate for Discharge     Problem: Discharge Planning  Goal: Discharge to home or other facility with appropriate resources  11/23/2024 0935 by Lety Lopes RN  Outcome: Adequate for Discharge     Problem: Skin/Tissue Integrity  Goal: Absence of new skin breakdown  Description: 1.  Monitor for areas of redness and/or skin breakdown  2.  Assess vascular access sites hourly  3.  Every 4-6 hours minimum:  Change oxygen saturation probe site  4.  Every 4-6 hours:  If on nasal continuous positive airway pressure, respiratory therapy assess nares and determine need for appliance change or resting period.  11/23/2024 0935 by Lety Lopes RN  Outcome: Adequate for Discharge     Problem: ABCDS Injury Assessment  Goal: Absence of physical injury  11/23/2024 0935 by Lety Lopes RN  Outcome: Adequate for Discharge     Problem: Safety - Adult  Goal: Free from fall injury  11/23/2024 0935 by Lety Lopes RN  Outcome: Adequate for Discharge     Problem: Pain  Goal: Verbalizes/displays adequate comfort level or baseline comfort level  11/23/2024 0935 by Lety Lopes RN  Outcome: Adequate for Discharge     Problem: Neurosensory - Adult  Goal: Achieves stable or improved neurological status  11/23/2024 0935 by Lety Lopes RN  Outcome: Adequate for Discharge     Problem: Neurosensory - Adult  Goal: Achieves maximal functionality and self care  11/23/2024 0935 by Lety Lopes RN  Outcome: Adequate for Discharge     Problem: Musculoskeletal - Adult  Goal: Return mobility to safest level of function  11/23/2024 0935 by Lety Lopes RN  Outcome: Adequate for Discharge     Problem: Musculoskeletal - Adult  Goal: Maintain proper alignment of affected body part  11/23/2024 0935 by Lety Lopes

## 2024-11-25 ENCOUNTER — HOSPITAL ENCOUNTER (OUTPATIENT)
Dept: PHYSICAL THERAPY | Age: 49
Setting detail: THERAPIES SERIES
Discharge: HOME | End: 2024-11-25
Payer: COMMERCIAL

## 2024-11-25 ENCOUNTER — TELEPHONE (OUTPATIENT)
Dept: NEUROLOGY | Age: 49
End: 2024-11-25

## 2024-11-25 ENCOUNTER — HOSPITAL ENCOUNTER (OUTPATIENT)
Dept: OCCUPATIONAL THERAPY | Age: 49
Setting detail: THERAPIES SERIES
Discharge: HOME | End: 2024-11-25
Payer: COMMERCIAL

## 2024-11-25 PROCEDURE — 97163 PT EVAL HIGH COMPLEX 45 MIN: CPT

## 2024-11-25 PROCEDURE — 97165 OT EVAL LOW COMPLEX 30 MIN: CPT

## 2024-11-25 NOTE — TELEPHONE ENCOUNTER
----- Message from Dr. Noa Leal, DO sent at 11/22/2024  5:17 PM EST -----  Stephen Garcia,     This patient or Yulissa is discharging from rehab tomorrow.  It looks like she was scheduled for follow-up in the neurointervention clinic in January.  The last neurology note recommends follow-up in office with Dr. Weaver in December.  Family was very concerned about how long it was until her next follow-up.  Is there any way that we can get her in sooner?  Just so you know if you call to schedule.  She does have speech disturbance he may need to talk to a family member.    Thanks!    Noa

## 2024-11-25 NOTE — PROGRESS NOTES
* PLEASE SIGN, DATE AND TIME CERTIFICATION BELOW AND RETURN TO [facility] OUTPATIENT REHABILITATION (FAX #: [phone]).  ATTEST/CO-SIGN IF ACCESSING VIA INBevvyET.  THANK YOU.**    I certify that I have examined the patient below and determined that Physical Medicine and Rehabilitation service is necessary and that I approve the established plan of care for up to 90 days or as specifically noted.  Attestation, signature or co-signature of physician indicates approval of certification requirements.    ________________________ ____________  Physician Signature   Date    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  she has a past surgical history that includes cyst removal; hysteroscopy; Hysterectomy (2013); Breast surgery (Left, 02/10/2015); pr exc b9 lesion mrgn xcp sk tg t/a/l 2.1-3.0 cm (N/A, 2018); and DISSECTION GROIN (N/A, 2019).     Allergies Allergies   Allergen Reactions    Latex Hives    Ace Inhibitors Angioedema    
to Sit: Modified Independent    Sit to Supine: Modified Independent      TRANSFERS:  Sit to Stand:  Independent.    Stand to Sit: Independent.      FUNCTIONAL MOBILITY:  Assistive Device: None  Assist Level:  Independent.   Distance: To and from therapy gym  Slowed deliberate movement.  Demonstrates some dragging of her left leg but no trip or LOB.  Speed of gait is inconsistent.      TREATMENT   Precautions:    Pain: 6/10 anterior shoulder     “X” in shaded column indicates Activity Completed Today   Modalities Parameters/  Location  Notes/Comments      Patient reports she had 3 US treatments to her shoulder on rehab and she felt it helped some.               Manual Therapy Time/  Technique  Notes/Comments                     Exercises   Sets/  Sec Reps  Notes/Comments   Scapular pinches 1 10     Table slides    Patient reports she is completing this at home.                                      Activities Time    Notes/Comments                       Specific Interventions Next Treatment: US/ice, estim to L shoulder, ROM, strengthening.      Activity/Treatment Tolerance:  [x]  Patient tolerated treatment well  []  Patient limited by fatigue  []  Patient limited by pain   []  Patient limited by other medical complications  []  Other:     Assessment: Yulissa presents with decreased ROM L shoulder, decreased strength LUE following admission and rehab stay.  Needs skilled OT services to improve these areas and improved independence at home.  Areas for Improvement: impaired activity tolerance, impaired coordination, impaired ROM, impaired strength, and pain  Prognosis: good    GOALS:  Patient Goal: Be able to do all the things I could do before.    Short Term Goals:  Time Frame: 10 visits  Patient will demonstrate AROM L shoulder flexion to 90, abduction to 90, IR thumb tip to waistline for increased ease with putting on a coat.  Patient will demonstrate L  strength to 20# to be able to open food jars and

## 2024-11-25 NOTE — PROGRESS NOTES
Physical Therapy    * PLEASE SIGN, DATE AND TIME CERTIFICATION BELOW AND RETURN TO [facility] OUTPATIENT REHABILITATION (FAX #: [phone]).  ATTEST/CO-SIGN IF ACCESSING VIA INLuminescent TechnologiesET.  THANK YOU.**    I certify that I have examined the patient below and determined that Physical Medicine and Rehabilitation service is necessary and that I approve the established plan of care for up to 90 days or as specifically noted.  Attestation, signature or co-signature of physician indicates approval of certification requirements.    ________________________ ____________  Physician Signature   Date   [facility]  PHYSICAL THERAPY  [x] EVALUATION  [] DAILY NOTE (LAND) [] DAILY NOTE (AQUATIC ) [] PROGRESS NOTE [] DISCHARGE NOTE    [] OUTPATIENT REHABILITATION CENTER [facility]   [] [facility]    [] [facility]   [] [facility]    Date: 2024  Patient Name:  Yulissa Andersen  : 1975  MRN: [id number]  CSN: [id number]    Referring Practitioner Noa Leal DO [id number]      Diagnosis  Diagnoses       I63.9 (ICD-10-CM) - Cerebral infarction, unspecified           Treatment Diagnosis R26.0  Ataxic Gait  R26.89  Abnormalities of gait and mobility  R26.81  Unsteadiness on feet  R27.9  Unspecified lack of coordination  G81.90 Hemiplegia Affecting Unspecified Side  M62.81 Generalized Weakness   Date of Evaluation 24   Additional Pertinent History Yulissa Andersen has a past medical history of Coronary artery disease involving native coronary artery of native heart without angina pectoris, Diabetes mellitus (HCC), Hypertension, Incomplete bladder emptying, Microscopic hematuria, Osteoarthritis of knee, and Unspecified cerebral artery occlusion with cerebral infarction.  she has a past surgical history that includes cyst removal; hysteroscopy; Hysterectomy (2013); Breast surgery (Left, 02/10/2015); pr exc b9 lesion mrgn xcp sk tg t/a/l 2.1-3.0 cm (N/A, 2018); and

## 2024-11-25 NOTE — TELEPHONE ENCOUNTER
I spoke with the daughter and explained that due to Dr. Weaver's schedule and the holidays January is when we are able to get this patient scheduled. I did confirm with MELISA Holguin prior to scheduling the patient in January, since they did state December, and Nydia stated this was fine.   The daughter was understanding with no further questions or concerns at this time, but I did encourage her to call me back if any questions arise!!

## 2024-11-26 ENCOUNTER — HOSPITAL ENCOUNTER (OUTPATIENT)
Dept: SPEECH THERAPY | Age: 49
Setting detail: THERAPIES SERIES
Discharge: HOME | End: 2024-11-26
Payer: COMMERCIAL

## 2024-11-26 PROCEDURE — 92523 SPEECH SOUND LANG COMPREHEN: CPT

## 2024-11-26 NOTE — PROGRESS NOTES
* PLEASE SIGN, DATE AND TIME CERTIFICATION BELOW AND RETURN TO [facility] OUTPATIENT REHABILITATION (FAX #: [phone]).  ATTEST/CO-SIGN IF ACCESSING VIA INFortegra FinancialET.  THANK YOU.**    I certify that I have examined the patient below and determined that Physical Medicine and Rehabilitation service is necessary and that I approve the established plan of care for up to 90 days or as specifically noted.  Attestation, signature or co-signature of physician indicates approval of certification requirements.    ________________________ ____________  Physician Signature   Date    [facility]  SPEECH THERAPY  [x] SPEECH LANGUAGE COGNITIVE EVALUATION  [] DAILY NOTE   [] PROGRESS NOTE [] DISCHARGE NOTE    [x] OUTPATIENT REHABILITATION CENTER [facility]   [] [facility] [place]    [] [facility]   [] [facility]    Date: 2024  Patient Name:  Yulissa Andersen  : 1975  MRN: [id number]  CSN: [id number]    Referring Practitioner Noa Leal DO [id number]      Diagnosis  Diagnoses       I63.9 (ICD-10-CM) - Cerebral infarction, unspecified           Treatment Diagnosis R41.89 Other symptoms and signs involving cognitive functions and awareness  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  she has a past surgical history that includes cyst removal; hysteroscopy; Hysterectomy (2013); Breast surgery (Left, 02/10/2015); pr exc b9 lesion mrgn xcp sk tg t/a/l 2.1-3.0 cm (N/A, 2018); and DISSECTION GROIN (N/A, 2019).     Allergies Allergies   Allergen

## 2024-12-11 ENCOUNTER — HOSPITAL ENCOUNTER (OUTPATIENT)
Dept: OCCUPATIONAL THERAPY | Age: 49
Setting detail: THERAPIES SERIES
Discharge: HOME | End: 2024-12-11
Payer: COMMERCIAL

## 2024-12-11 ENCOUNTER — HOSPITAL ENCOUNTER (OUTPATIENT)
Dept: SPEECH THERAPY | Age: 49
Setting detail: THERAPIES SERIES
Discharge: HOME | End: 2024-12-11
Payer: COMMERCIAL

## 2024-12-11 ENCOUNTER — HOSPITAL ENCOUNTER (OUTPATIENT)
Dept: PHYSICAL THERAPY | Age: 49
Setting detail: THERAPIES SERIES
Discharge: HOME | End: 2024-12-11
Payer: COMMERCIAL

## 2024-12-11 PROCEDURE — 97112 NEUROMUSCULAR REEDUCATION: CPT

## 2024-12-11 PROCEDURE — 97110 THERAPEUTIC EXERCISES: CPT

## 2024-12-11 PROCEDURE — 97129 THER IVNTJ 1ST 15 MIN: CPT | Performed by: SPEECH-LANGUAGE PATHOLOGIST

## 2024-12-11 PROCEDURE — 97130 THER IVNTJ EA ADDL 15 MIN: CPT | Performed by: SPEECH-LANGUAGE PATHOLOGIST

## 2024-12-11 NOTE — PROGRESS NOTES
ataxic gait and dragging Left toes on the floor but is able to improve and clear floor with verbal cueing. Decreased dyskonesia noted when patient was distracted with conversation during session. Patient felt good at conclusion of session.    GOALS:  Patient Goal: Teaching, driving, get back to life as normal     Short Term Goals: 4 weeks  Patient will improve Sheldon score from 24/56 to 40/56 to decrease fall risk and improve mobility.   Patient will complete 5 sit to/from stand transfers from standard chair without UEs in 30 seconds to improve functional strength for improved safety of transfers  Patient will ambulate without AD with = heel strike,  straight path and no LOB for safe community mobility.    Long Term Goals: 8 weeks   Patient will improve L strength to 4+/5 for ease with transfers, gait and climbing stairs.   Patient will improve Sheldon score to 55/56 to decrease fall risk and improve mobility on various terrain.  Patient will be independent and compliant with HEP to achieve above goals.    Patient will be able to perform sit to stand transfer from 12 in height chair to assist with return to work related tasks at   Patient will demonstrate ability to perform L LE SLS for 10 sec without LOB to assist with uneven surface navigation.     Patient Education:   [x]  HEP/Education Completed: Verbal review of HEP and instruction to continue.    [employer] Access Code:  []  No new Education completed  []  Reviewed Prior HEP      [x]  Patient verbalized and/or demonstrated understanding of education provided.  []  Patient unable to verbalize and/or demonstrate understanding of education provided.  Will continue education.  []  Barriers to learning:     PLAN:  Treatment Recommendations: Strengthening, Range of Motion, Balance Training, Functional Mobility Training, Transfer Training, Endurance Training, Gait Training, Stair Training, Neuromuscular Re-education, Home Exercise Program, Patient Education,

## 2024-12-11 NOTE — PROGRESS NOTES
[facility]  Therapy Contact Note      Date: 2024  Patient Name: Yulissa Andersen        MRN: [id number]    Account Number: [id number]  : 1975  (49 y.o.)  Gender: female       Set up [facility] for the following dates:    24 830  24 1000  24 1015  24 845  24 845    Confirmed  times with patient.     Deidre Fallon, Rehab Tech     
Training, and 55963-Rspimsxqczskf Re-Education, 81528 - Ultrasound   Progress Note Counter 2/10 for progress note (Reporting Period: 11/25/24 to     )   Recertification Date 01/20/25   Physician Follow-Up 01/22/25   Physician Orders    History of Present Illness Yulissa Andersen is a 49 y.o. female admitted to inpatient rehabilitation from 11/12/2024-11/23/2024 for clinically suspected CVA. During this time, the patient participated in an aggressive multidisciplinary inpatient rehabilitation program involving 3 hours per day, 5 days per week of rehabilitation. Dr Stuart followed during the IPR stay for medical management. Patient had a relatively uncomplicated rehab stay and progressed well with therapies making significant functional gains throughout her stay.  Prior to determining discharge date, patient's insurance send and intent to deny.  Peer to peer was complete and patient was granted a couple more days on acute inpatient rehab unit to allow time for further discharge planning and family education/training.      SUBJECTIVE: Patient pleasant and cooperative.      OBJECTIVE:    TREATMENT   Precautions: Latex allergy per patient    Pain: 5-6/10 anterior shoulder     “X” in shaded column indicates Activity Completed Today   Modalities Parameters/  Location  Notes/Comments      Patient reports she had 3 US treatments to her shoulder on rehab and she felt it helped some.               Manual Therapy Time/  Technique  Notes/Comments   STM to anterior shoulder for pain control  X                Exercises   Sets/  Sec Reps  Notes/Comments   Scapular pinches 1 10 X    Table slides 1 10 X     Supine dowel juanito for chest press 1 10 X    Supine shoulder flexion with light CGA from therapist 1 10 X Cues to stretch arm away from body   Red digit flex for whole hand gripping 1 10 X    Red therabar for bends in supination, pronation and twists 1 10 ea X    Hand gripper with black spring at 25  2 sets 10 X

## 2024-12-11 NOTE — PROGRESS NOTES
Goals: 4 weeks    SHORT TERM GOAL #1: Patient will complete sustained phonation /e/ and /ah/ of 6+ seconds demonstrating with adequate diaphragmatic breath support in order to improve overall vocal intensity and loudness.   INTERVENTIONS: Educated the patient on this goal and rationale for it, however, did not test this date d/t focus on other goals.    SHORT TERM GOAL #2: Patient will complete pitch glides (high to low and low to high) with implementation of diaphragmatic breathing in order to improve overall vocal inflection and pitch range.   INTERVENTIONS: Educated the patient on this goal and rationale for it, however, did not test this date d/t focus on other goals.    SHORT TERM GOAL #3: Patient will complete DDK rates, automatic speech tasks, naming tasks (picture naming and verbal picture/word description) and open/closed ended sentence completion tasks with 70% accuracy provided mod cues to improve overall speech intelligibility/expressive verbal output.   INTERVENTIONS: Educated the patient on this goal and rationale for it, however, did not test this date d/t focus on other goals.    SHORT TERM GOAL #4: Patient will complete basic oral reading tasks (functional phrases, short paragraph, tongue twisters, lists, menu's, ect) with 70% accuracy provided mod cues to improve overall speech intelligibility/expressive verbal output.   INTERVENTIONS: Educated the patient on this goal and rationale for it, however, did not test this date d/t focus on other goals.    SHORT TERM GOAL #5:Patient will complete lingual exercises x20 in order to improve overall lingual coordination and ROM to successfully and consistent produce wants/needs and engage within informal conversation.   INTERVENTIONS: Patient briefly educated on the following lingual exercises and patient return demonstrated completion.  Encouraged the patient to complete 2 x per day.  *external lingual lateralization  *lingual circles  *internal lingual

## 2024-12-18 ENCOUNTER — HOSPITAL ENCOUNTER (OUTPATIENT)
Dept: PHYSICAL THERAPY | Age: 49
Setting detail: THERAPIES SERIES
Discharge: HOME | End: 2024-12-18
Payer: COMMERCIAL

## 2024-12-18 ENCOUNTER — HOSPITAL ENCOUNTER (OUTPATIENT)
Dept: SPEECH THERAPY | Age: 49
Setting detail: THERAPIES SERIES
Discharge: HOME | End: 2024-12-18
Payer: COMMERCIAL

## 2024-12-18 ENCOUNTER — HOSPITAL ENCOUNTER (OUTPATIENT)
Dept: OCCUPATIONAL THERAPY | Age: 49
Setting detail: THERAPIES SERIES
Discharge: HOME | End: 2024-12-18
Payer: COMMERCIAL

## 2024-12-18 PROCEDURE — 97110 THERAPEUTIC EXERCISES: CPT

## 2024-12-18 PROCEDURE — 97112 NEUROMUSCULAR REEDUCATION: CPT

## 2024-12-18 PROCEDURE — 92507 TX SP LANG VOICE COMM INDIV: CPT | Performed by: SPEECH-LANGUAGE PATHOLOGIST

## 2024-12-18 NOTE — PROGRESS NOTES
tablet, Take 1 tablet by mouth nightly, Disp: 30 tablet, Rfl: 1    hydroCHLOROthiazide (HYDRODIURIL) 25 MG tablet, Take 1 tablet by mouth daily, Disp: 30 tablet, Rfl: 1    polyethylene glycol (GLYCOLAX) 17 g packet, Take 1 packet by mouth daily as needed for Constipation, Disp: , Rfl:     valsartan (DIOVAN) 320 MG tablet, TAKE 1 TABLET BY MOUTH ONCE DAILY, Disp: 90 tablet, Rfl: 3    amLODIPine (NORVASC) 5 MG tablet, Take 1 tablet by mouth daily, Disp: 90 tablet, Rfl: 3    carvedilol (COREG) 25 MG tablet, Take 1 tablet by mouth 2 times daily, Disp: 180 tablet, Rfl: 3    Blood Pressure Monitor KIT, 1 each by Does not apply route once for 1 dose, Disp: 1 kit, Rfl: 0    hydrALAZINE (APRESOLINE) 25 MG tablet, Take 4 tablets by mouth 3 times daily, Disp: , Rfl:     Multiple Vitamins-Minerals (THERAPEUTIC MULTIVITAMIN-MINERALS) tablet, Take 1 tablet by mouth daily, Disp: , Rfl:     traZODone (DESYREL) 50 MG tablet, TAKE 1 TO 2 TABLETS BY MOUTH ONCE DAILY AT BEDTIME, Disp: , Rfl:     omeprazole (PRILOSEC) 20 MG delayed release capsule, Take 1 capsule by mouth daily as needed, Disp: , Rfl:     magnesium oxide (MAG-OX) 400 MG tablet, Take 1 tablet by mouth daily, Disp: , Rfl:     vitamin D (ERGOCALCIFEROL) 56558 units CAPS capsule, Take 1 capsule by mouth once a week, Disp: , Rfl:       Functional Outcome Measure Used Functional Impact Scale   Functional Outcome Score 70 (11/25/24)       Insurance: Primary: Payor: [facility] /  /  / ,   Secondary: [facility]   Authorization Information PRE CERTIFICATION REQUIRED: YES THRU [employer] -SUBMITTED AUTH FOR OT EVAL -RECEIVED AUTH FOR 1 OT EVAL (21538) FROM 11/25/24-1/9/24 AUTH# [id number] -SCANNED INTO [facility]  INSURANCE THERAPY BENEFIT: Allowed 40 visits per calendar year.  0 visits have been used.. Hard Max..   AQUATIC THERAPY COVERED: Yes  MODALITIES COVERED:  Yes   Initial CPT Codes Requested 97110-Therapeutic Exercise,  97530-Therapeutic Activities, 86266-YS ADL

## 2024-12-18 NOTE — PROGRESS NOTES
INTERVENTIONS:   Diadokokinetic speech tasks:   *papapa x 5 consecutive: fair to good rate, good precision, min cues  *tatata x 5 consecutive: fair to good rate, good precision, min cues  *kakaka x 5 consecutive: fair to good rate with slow rate as she progressed with repetitions, good precision, min cues  *pataka x 5 consecutive: fair rate, good precision, min cues  **handout provided with these tasks    SHORT TERM GOAL #4: Patient will complete basic oral reading tasks (functional phrases, short paragraph, tongue twisters, lists, menu's, ect) with 70% accuracy provided mod cues to improve overall speech intelligibility/expressive verbal output.   INTERVENTIONS: Educated the patient on speech strategies and rationale for using them.  Patient was able to recall reviewing these strategies when she was in the hospital.  Reviewed the rationale for using the strategies.  Encouraged the patient to practice reading out loud with use of the speech strategies.     **handout of the strategies provided to the patient today     SHORT TERM GOAL #5:Patient will complete lingual exercises x20 in order to improve overall lingual coordination and ROM to successfully and consistent produce wants/needs and engage within informal conversation.   INTERVENTIONS:   Patient completed the following lingual exercises this session:  *external lingual lateralization x 15 with good success, min cues to keep a strong tongue  *lingual circles x 10 in each direction with fair to good success, min cues for improved range of motion  *internal lingual lateralization x 15 with good success, no cues  *Patient reports she has been working on these exercises \"a little bit\" since the last session.  Patient encouraged to complete her HEP 2 x per day, including the above tasks.    SHORT TERM GOAL #6: Patient will complete executive function, thought organization, attention and short term recall tasks with no more than min cues for improved success with

## 2024-12-20 ENCOUNTER — HOSPITAL ENCOUNTER (OUTPATIENT)
Dept: PHYSICAL THERAPY | Age: 49
Setting detail: THERAPIES SERIES
Discharge: HOME | End: 2024-12-20
Payer: COMMERCIAL

## 2024-12-20 ENCOUNTER — HOSPITAL ENCOUNTER (OUTPATIENT)
Dept: SPEECH THERAPY | Age: 49
Setting detail: THERAPIES SERIES
Discharge: HOME | End: 2024-12-20
Payer: COMMERCIAL

## 2024-12-20 ENCOUNTER — HOSPITAL ENCOUNTER (OUTPATIENT)
Dept: OCCUPATIONAL THERAPY | Age: 49
Setting detail: THERAPIES SERIES
Discharge: HOME | End: 2024-12-20
Payer: COMMERCIAL

## 2024-12-20 PROCEDURE — 92507 TX SP LANG VOICE COMM INDIV: CPT

## 2024-12-20 PROCEDURE — 97110 THERAPEUTIC EXERCISES: CPT

## 2024-12-20 PROCEDURE — 97112 NEUROMUSCULAR REEDUCATION: CPT

## 2024-12-20 NOTE — PROGRESS NOTES
organization, attention and short term recall tasks with no more than min cues for improved success with completion of ADL's/IADL's.  INTERVENTIONS: Patient reports she has been writing all of her appointments on a calendar at home. Did not address specifically this date.      Long Term Goals:  Time Frame for Long-Term Goals: 8 weeks    LONG-TERM GOAL #1: [facility] from a Level 3 to a Level 5 in order to improve overall speech intelligibility.  ONGOING    Rehabilitation Potential/Prognosis: good  Areas for Improvement: Impaired cognition, Impaired speech, and Impaired voice  Overall Assessment:   Specific Interventions Next Treatment: SOS-Speak up, open mouth, slow down, repetition, reading, coordination/language processing, lingual/labial exercises, cognitive tasks       Activity/Treatment Tolerance:  [x]  Patient tolerated treatment well  []  Patient limited by fatigue  []  Patient limited by pain   []  Patient limited by other medical complications  []  Other:     Patient Education:   [x]  HEP/Education Completed: Plan of Care, updated goals, rationale for tasks, diaphragmatic breathing, speech strategies, tasks to address vocal intensity, read out loud at home  []  No new Education completed  []  Reviewed Prior HEP      [x]  Patient verbalized and/or demonstrated understanding of education provided.  []  Patient unable to verbalize and/or demonstrate understanding of education provided.  Will continue education.  []  Barriers to learning: none      PLAN:  Treatment Recommendations:     []  Plan of care initiated.  Plan to see patient 2 times per week for 8 weeks to address the treatment planned outlined above.  [x]  Continue with current plan of care  []  Modify plan of care as follows:    []  Hold pending physician visit  []  Discharge    Time In 1018   Time Out 1100   Timed Code Minutes: 0 min   Total Treatment Time: 42 min       Charity Anthony M.S. CCC-SLP

## 2024-12-20 NOTE — PROGRESS NOTES
Balance Training, Functional Mobility Training, Transfer Training, Endurance Training, Gait Training, Stair Training, Neuromuscular Re-education, Home Exercise Program, Patient Education, Safety Education and Training, Self-Care Education and Training, Aquatics, and Postural Retraining    []  Plan of care initiated.  Plan to see patient 2 times per week for 8 weeks to address the treatment planned outlined above.  [x]  Continue with current plan of care  []  Modify plan of care as follows:    []  Hold pending physician visit  []  Discharge    Time In 1101   Time Out 1145   Timed Code Minutes: 44 min   Total Treatment Time: 44 min       Electronically Signed by: Azucena Barrett PTA

## 2024-12-20 NOTE — PROGRESS NOTES
therapist 1 10 X Cues to stretch arm away from body- discomfort noted anterior shoulder    ^supine SA punch at 90 degrees  1 10 X ^new this date. Reports a stretch, no pain (reports this was better than shoulder flexion, pain noted at end range)    Red digit flex for whole hand gripping 1 10 X Encouraged 3 sec hold at end range    Red therabar for bends in supination, pronation and twists 1 10 ea X focus on completing movements from each hand equally.    Hand gripper with black spring at 25  2 sets 10     Supine isometrics in all movements  5 sec 5  Completed with no pain    Supine ball pushes with bilateral hands  5 sec 10     ^Doorway stretch for pec, hands down  10 sec 3 x ^new this date, completed prior to supine to help stretch anterior shoulder for pain management, good stretch noted                  Activities Time    Notes/Comments   Grooved pegboard for left hand fine motor coordination task   In hand manipulation to take pegs out.  Able to hold 8 pegs when taking them out.     CGA to ambulate to speech therapy  X To waiting room this date           Specific Interventions Next Treatment: US/ice, estim to L shoulder, ROM, strengthening.      Activity/Treatment Tolerance:  [x]  Patient tolerated treatment well  []  Patient limited by fatigue  [x]  Patient limited by pain   []  Patient limited by other medical complications  []  Other:     Assessment:  Patient is progressing towards her goals. Targeted right anterior shoulder pain through stretches and pain free exercises this date, with fair tolerance. Will assess tolerance next session and progress as able.       Areas for Improvement: impaired activity tolerance, impaired coordination, impaired ROM, impaired strength, and pain  Prognosis: good    GOALS:  Patient Goal: Be able to do all the things I could do before.    Short Term Goals:  Time Frame: 10 visits  Patient will demonstrate AROM L shoulder flexion to 90, abduction to 90, IR thumb tip to waistline

## 2024-12-24 ENCOUNTER — HOSPITAL ENCOUNTER (OUTPATIENT)
Dept: SPEECH THERAPY | Age: 49
Setting detail: THERAPIES SERIES
Discharge: HOME | End: 2024-12-24
Payer: COMMERCIAL

## 2024-12-24 ENCOUNTER — HOSPITAL ENCOUNTER (OUTPATIENT)
Dept: PHYSICAL THERAPY | Age: 49
Setting detail: THERAPIES SERIES
Discharge: HOME | End: 2024-12-24
Payer: COMMERCIAL

## 2024-12-24 ENCOUNTER — HOSPITAL ENCOUNTER (OUTPATIENT)
Dept: OCCUPATIONAL THERAPY | Age: 49
Setting detail: THERAPIES SERIES
Discharge: HOME | End: 2024-12-24
Payer: COMMERCIAL

## 2024-12-24 PROCEDURE — 97110 THERAPEUTIC EXERCISES: CPT

## 2024-12-24 PROCEDURE — 97112 NEUROMUSCULAR REEDUCATION: CPT

## 2024-12-24 PROCEDURE — 92507 TX SP LANG VOICE COMM INDIV: CPT | Performed by: SPEECH-LANGUAGE PATHOLOGIST

## 2024-12-24 NOTE — PROGRESS NOTES
[facility]  SPEECH THERAPY  [] SPEECH LANGUAGE COGNITIVE EVALUATION  [x] DAILY NOTE   [] PROGRESS NOTE [] DISCHARGE NOTE    [x] [facility] [place]   [] [facility] CARE [place]    [] 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  she has a past surgical history that includes cyst removal; hysteroscopy; Hysterectomy (2013); Breast surgery (Left, 02/10/2015); pr exc b9 lesion mrgn xcp sk tg t/a/l 2.1-3.0 cm (N/A, 2018); and DISSECTION GROIN (N/A, 2019).     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  Marcelo Gunderson,     Penicillins Hives    Clindamycin/Lincomycin Rash    Ciprofloxacin Hives    Doxycycline Hives    Lisinopril      Attacked kidneys      Medications   Current Outpatient Medications:     apixaban (ELIQUIS) 5 MG TABS tablet, Take 1 tablet by mouth 2 times daily, Disp: 60 tablet, Rfl: 1    aspirin 81 MG chewable tablet, Take 1 tablet by mouth daily,

## 2024-12-24 NOTE — PROGRESS NOTES
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  she has a past surgical history that includes cyst removal; hysteroscopy; Hysterectomy (2013); Breast surgery (Left, 02/10/2015); pr exc b9 lesion mrgn xcp sk tg t/a/l 2.1-3.0 cm (N/A, 2018); and DISSECTION GROIN (N/A, 2019).     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  Marcelo Gunderson,     Penicillins Hives    Clindamycin/Lincomycin Rash    Ciprofloxacin Hives    Doxycycline Hives    Lisinopril      Attacked kidneys      Medications   Current Outpatient Medications:     apixaban (ELIQUIS) 5 MG TABS tablet, Take 1 tablet by mouth 2 times daily, Disp: 60 tablet, Rfl: 1    aspirin 81 MG chewable tablet, Take 1 tablet by mouth daily, Disp: 30 tablet, Rfl: 1    atorvastatin (LIPITOR) 40 MG

## 2024-12-24 NOTE — PROGRESS NOTES
[facility]  PHYSICAL THERAPY  [] EVALUATION  [x] DAILY NOTE (LAND) [] DAILY NOTE (AQUATIC ) [] PROGRESS NOTE [] DISCHARGE NOTE    [x] OUTPATIENT REHABILITATION CENTER [facility]   [] [place] AMBULATORY CARE [place]    [] [facility]   [] [facility]LIE [facility]    Date: 2024  Patient Name:  Yulissa Andersen  : 1975  MRN: [id number]  CSN: [id number]    Referring Practitioner oNa Leal DO [id number]      Diagnosis  Diagnoses       I63.9 (ICD-10-CM) - Cerebral infarction, unspecified           Treatment Diagnosis R26.0  Ataxic Gait  R26.89  Abnormalities of gait and mobility  R26.81  Unsteadiness on feet  R27.9  Unspecified lack of coordination  G81.90 Hemiplegia Affecting Unspecified Side  M62.81 Generalized Weakness   Date of Evaluation 24   Additional Pertinent History Yulissa Andersen has a past medical history of Coronary artery disease involving native coronary artery of native heart without angina pectoris, Diabetes mellitus (HCC), Hypertension, Incomplete bladder emptying, Microscopic hematuria, Osteoarthritis of knee, and Unspecified cerebral artery occlusion with cerebral infarction.  she has a past surgical history that includes cyst removal; hysteroscopy; Hysterectomy (2013); Breast surgery (Left, 02/10/2015); pr exc b9 lesion mrgn xcp sk tg t/a/l 2.1-3.0 cm (N/A, 2018); and DISSECTION GROIN (N/A, 2019).     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  Marcelo Gunderson, DO    Penicillins Hives    Clindamycin/Lincomycin Rash    Ciprofloxacin Hives    Doxycycline Hives    Lisinopril      Attacked kidneys      Medications   Current Outpatient Medications:     apixaban (ELIQUIS) 5 MG TABS tablet, Take 1 tablet by mouth 2 times daily, Disp: 60 tablet, Rfl: 1

## 2024-12-27 ENCOUNTER — HOSPITAL ENCOUNTER (OUTPATIENT)
Dept: OCCUPATIONAL THERAPY | Age: 49
Setting detail: THERAPIES SERIES
Discharge: HOME | End: 2024-12-27
Payer: COMMERCIAL

## 2024-12-27 ENCOUNTER — HOSPITAL ENCOUNTER (OUTPATIENT)
Dept: PHYSICAL THERAPY | Age: 49
Setting detail: THERAPIES SERIES
Discharge: HOME | End: 2024-12-27
Payer: COMMERCIAL

## 2024-12-27 ENCOUNTER — HOSPITAL ENCOUNTER (OUTPATIENT)
Dept: SPEECH THERAPY | Age: 49
Setting detail: THERAPIES SERIES
Discharge: HOME | End: 2024-12-27
Payer: COMMERCIAL

## 2024-12-27 PROCEDURE — 97110 THERAPEUTIC EXERCISES: CPT

## 2024-12-27 PROCEDURE — 97530 THERAPEUTIC ACTIVITIES: CPT

## 2024-12-27 PROCEDURE — 97112 NEUROMUSCULAR REEDUCATION: CPT

## 2024-12-27 PROCEDURE — 92507 TX SP LANG VOICE COMM INDIV: CPT | Performed by: SPEECH-LANGUAGE PATHOLOGIST

## 2024-12-27 PROCEDURE — 97035 APP MDLTY 1+ULTRASOUND EA 15: CPT

## 2024-12-27 NOTE — PROGRESS NOTES
was provided with x 4 functional errands and instructed to recall:  *Immediate recall: 3/4 independent, 1/4 with mod cues  *5 minute delay: 4/4 independent; patient reports she used repetition to aid in recall    Attention task:  Patient was instructed to [place] the numbers '4' and '7' throughout stimulus sheet #5 from the APT. Patient completed the task with 2 errors throughout and completed within 2 minutes and 20 seconds.     **Patient reports she was ordering food for her whole family through [employer] last night and she had to tell her kids to stop telling her their orders because it was too much information for her all at once.      Long Term Goals:  Time Frame for Long-Term Goals: 8 weeks    LONG-TERM GOAL #1: Patient will improve [facility] NOMS MOTOR SPEECH score from a Level 3 to a Level 5 in order to improve overall speech intelligibility.  ONGOING    Rehabilitation Potential/Prognosis: good  Areas for Improvement: Impaired cognition, Impaired speech, and Impaired voice  Overall Assessment:   Specific Interventions Next Treatment: SOS-Speak up, open mouth, slow down, repetition, reading, coordination/language processing, lingual/labial exercises, cognitive tasks       Activity/Treatment Tolerance:  [x]  Patient tolerated treatment well  []  Patient limited by fatigue  []  Patient limited by pain   []  Patient limited by other medical complications  []  Other:     Patient Education:   [x]  HEP/Education Completed: Plan of Care, goals, rationale for tasks, diaphragmatic breathing, speech strategies, talk more on the phone, memory strategies.  []  No new Education completed  []  Reviewed Prior HEP      [x]  Patient verbalized and/or demonstrated understanding of education provided.  []  Patient unable to verbalize and/or demonstrate understanding of education provided.  Will continue education.  []  Barriers to learning: none      PLAN:  Treatment Recommendations:     []  Plan of care initiated.  Plan to see

## 2024-12-27 NOTE — PROGRESS NOTES
estim to L shoulder, ROM, strengthening.      Activity/Treatment Tolerance:  [x]  Patient tolerated treatment well  []  Patient limited by fatigue  [x]  Patient limited by pain   []  Patient limited by other medical complications  []  Other:     Assessment:  Yulissa reports she is making nice progress toward goals.  She reports she can now shower on her own, go up and down steps, managing her own medications.  She can tie her own shoes now.  She is demonstrating improved ROM and strength in the LUE.  Patient continues to have pain in the anterior shoulder but is able to move it better.  Encouraged her to discuss this pain with her family provider. She has been getting some relief with the use of US and STM to the proximal bicep and shows evidence of improvement through less pain report and increased AROM.  See goals for current objective measures.      Areas for Improvement: impaired activity tolerance, impaired coordination, impaired ROM, impaired strength, and pain  Prognosis: good    GOALS:  Patient Goal: Be able to do all the things I could do before.    Short Term Goals:  Time Frame: 10 visits  Patient will demonstrate AROM L shoulder flexion to 90, abduction to 90, IR thumb tip to waistline for increased ease with putting on a coat. AROM L shoulder flexion = 85, abduction = 100, ER = 80, IR = thumb tip 3\" below waistline.  GOAL MET (for abduction)  GOAL NOT MET (for flexion and IR)  - REVISED GOAL:  Patient will demonstrate AROM L shoulder flexion to 105, abduction to 120, IR thumb tip to waistline for increased ease with putting on a coat.  Patient will demonstrate L  strength to 20# to be able to open food jars and containers.  38#.  GOAL MET -  REVISED GOAL:  Patient will demonstrate L  strength to at least 45# to be able to open food jars and containers.  Patient will report pain at rest no more than 3/10 for improved ease with sleep.  8/10 during sleep.  GOAL NOT MET - CONTINUE  Patient will

## 2024-12-30 NOTE — PROGRESS NOTES
[facility]  Therapy Contact Note      Date: 2024  Patient Name: Yulissa Andersen        MRN: [id number]    Account Number: [id number]  : 1975  (49 y.o.)  Gender: female       Called patient to set up transportation. Patient stated she no longer needed transportation and will notify us if something changes.Patient made aware that we will need to know at least one day prior to appointment if she needs transportation set up.    Deidre Fallon, Rehab Tech

## 2025-01-03 ENCOUNTER — HOSPITAL ENCOUNTER (OUTPATIENT)
Dept: SPEECH THERAPY | Age: 50
Setting detail: THERAPIES SERIES
Discharge: HOME | End: 2025-01-03
Payer: COMMERCIAL

## 2025-01-03 ENCOUNTER — HOSPITAL ENCOUNTER (OUTPATIENT)
Dept: OCCUPATIONAL THERAPY | Age: 50
Setting detail: THERAPIES SERIES
Discharge: HOME | End: 2025-01-03
Payer: COMMERCIAL

## 2025-01-03 PROCEDURE — 92507 TX SP LANG VOICE COMM INDIV: CPT | Performed by: SPEECH-LANGUAGE PATHOLOGIST

## 2025-01-03 PROCEDURE — 97035 APP MDLTY 1+ULTRASOUND EA 15: CPT

## 2025-01-03 PROCEDURE — 97110 THERAPEUTIC EXERCISES: CPT

## 2025-01-03 NOTE — PROGRESS NOTES
[facility]  SPEECH THERAPY  [] SPEECH LANGUAGE COGNITIVE EVALUATION  [x] DAILY NOTE   [] PROGRESS NOTE [] DISCHARGE NOTE    [x] [facility] [place]   [] [facility] CARE [place]    [] [facility]   [] CHARLIE 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  she has a past surgical history that includes cyst removal; hysteroscopy; Hysterectomy (2013); Breast surgery (Left, 02/10/2015); pr exc b9 lesion mrgn xcp sk tg t/a/l 2.1-3.0 cm (N/A, 2018); and DISSECTION GROIN (N/A, 2019).     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  Marcelo Gunderson,     Penicillins Hives    Clindamycin/Lincomycin Rash    Ciprofloxacin Hives    Doxycycline Hives    Lisinopril      Attacked kidneys      Medications   Current Outpatient Medications:     apixaban (ELIQUIS) 5 MG TABS tablet, Take 1 tablet by mouth 2 times daily, Disp: 60 tablet, Rfl: 1    aspirin 81 MG chewable tablet, Take 1 tablet by mouth daily, Disp:

## 2025-01-03 NOTE — PROGRESS NOTES
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  she has a past surgical history that includes cyst removal; hysteroscopy; Hysterectomy (2013); Breast surgery (Left, 02/10/2015); pr exc b9 lesion mrgn xcp sk tg t/a/l 2.1-3.0 cm (N/A, 2018); and DISSECTION GROIN (N/A, 2019).     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  Marcelo Gunderson,     Penicillins Hives    Clindamycin/Lincomycin Rash    Ciprofloxacin Hives    Doxycycline Hives    Lisinopril      Attacked kidneys      Medications   Current Outpatient Medications:     apixaban (ELIQUIS) 5 MG TABS tablet, Take 1 tablet by mouth 2 times daily, Disp: 60 tablet, Rfl: 1    aspirin 81 MG chewable tablet, Take 1 tablet by mouth daily, Disp: 30 tablet, Rfl: 1    atorvastatin (LIPITOR) 40 MG

## 2025-01-07 RX ORDER — ASPIRIN 81 MG
81 TABLET,CHEWABLE ORAL DAILY
Qty: 90 TABLET | Refills: 1 | Status: SHIPPED | OUTPATIENT
Start: 2025-01-07

## 2025-01-07 RX ORDER — ATORVASTATIN CALCIUM 40 MG/1
40 TABLET, FILM COATED ORAL NIGHTLY
Qty: 90 TABLET | Refills: 1 | Status: SHIPPED | OUTPATIENT
Start: 2025-01-07

## 2025-01-07 RX ORDER — HYDROCHLOROTHIAZIDE 25 MG/1
25 TABLET ORAL DAILY
Qty: 90 TABLET | Refills: 1 | Status: SHIPPED | OUTPATIENT
Start: 2025-01-07

## 2025-01-07 RX ORDER — APIXABAN 5 MG/1
5 TABLET, FILM COATED ORAL 2 TIMES DAILY
Qty: 180 TABLET | Refills: 1 | Status: SHIPPED | OUTPATIENT
Start: 2025-01-07

## 2025-01-08 NOTE — PLAN OF CARE
Problem: Chronic Conditions and Co-morbidities  Goal: Patient's chronic conditions and co-morbidity symptoms are monitored and maintained or improved  11/21/2024 0008 by Cheryle Pleitez RN  Outcome: Progressing  Flowsheets (Taken 11/21/2024 0008)  Care Plan - Patient's Chronic Conditions and Co-Morbidity Symptoms are Monitored and Maintained or Improved: Monitor and assess patient's chronic conditions and comorbid symptoms for stability, deterioration, or improvement     Problem: Discharge Planning  Goal: Discharge to home or other facility with appropriate resources  11/21/2024 0008 by Cheryle Pleitez RN  Outcome: Progressing  Flowsheets (Taken 11/21/2024 0008)  Discharge to home or other facility with appropriate resources: Identify barriers to discharge with patient and caregiver     Problem: ABCDS Injury Assessment  Goal: Absence of physical injury  11/21/2024 0008 by Cheryle Pleitez RN  Outcome: Progressing  Flowsheets (Taken 11/21/2024 0008)  Absence of Physical Injury: Implement safety measures based on patient assessment  Note: Patient has remained free of physical injury during this shift. Safe environment provided, call light within reach, and hourly rounding completed.      Problem: Safety - Adult  Goal: Free from fall injury  11/21/2024 0008 by Cheryle Pleitez RN  Outcome: Progressing  Flowsheets (Taken 11/21/2024 0008)  Free From Fall Injury: Instruct family/caregiver on patient safety  Note: Patient has remained free of physical injury during this shift. Safe environment provided, call light within reach, and hourly rounding completed.      Problem: Pain  Goal: Verbalizes/displays adequate comfort level or baseline comfort level  11/21/2024 0008 by Cheryle Pleitez RN  Outcome: Progressing  Flowsheets (Taken 11/21/2024 0008)  Verbalizes/displays adequate comfort level or baseline comfort level: Encourage patient to monitor pain and request assistance   Care plan reviewed with  Patient returning call to confirm arrival time for 9:30am.

## 2025-01-09 ENCOUNTER — HOSPITAL ENCOUNTER (OUTPATIENT)
Dept: OCCUPATIONAL THERAPY | Age: 50
Setting detail: THERAPIES SERIES
Discharge: HOME | End: 2025-01-09
Payer: COMMERCIAL

## 2025-01-09 ENCOUNTER — HOSPITAL ENCOUNTER (OUTPATIENT)
Dept: PHYSICAL THERAPY | Age: 50
Setting detail: THERAPIES SERIES
Discharge: HOME | End: 2025-01-09
Payer: COMMERCIAL

## 2025-01-09 ENCOUNTER — HOSPITAL ENCOUNTER (OUTPATIENT)
Dept: SPEECH THERAPY | Age: 50
Setting detail: THERAPIES SERIES
Discharge: HOME | End: 2025-01-09
Payer: COMMERCIAL

## 2025-01-09 PROCEDURE — 97116 GAIT TRAINING THERAPY: CPT

## 2025-01-09 PROCEDURE — 97535 SELF CARE MNGMENT TRAINING: CPT

## 2025-01-09 PROCEDURE — 97035 APP MDLTY 1+ULTRASOUND EA 15: CPT

## 2025-01-09 PROCEDURE — 97110 THERAPEUTIC EXERCISES: CPT

## 2025-01-09 PROCEDURE — 92507 TX SP LANG VOICE COMM INDIV: CPT | Performed by: SPEECH-LANGUAGE PATHOLOGIST

## 2025-01-09 PROCEDURE — 97112 NEUROMUSCULAR REEDUCATION: CPT

## 2025-01-09 NOTE — PROGRESS NOTES
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  she has a past surgical history that includes cyst removal; hysteroscopy; Hysterectomy (2013); Breast surgery (Left, 02/10/2015); pr exc b9 lesion mrgn xcp sk tg t/a/l 2.1-3.0 cm (N/A, 2018); and DISSECTION GROIN (N/A, 2019).     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  Marcelo Gunderson,     Penicillins Hives    Clindamycin/Lincomycin Rash    Ciprofloxacin Hives    Doxycycline Hives    Lisinopril      Attacked kidneys      Medications   Current Outpatient Medications:     ASPIRIN LOW DOSE 81 MG chewable tablet, TAKE 1 TABLET BY MOUTH ONCE DAILY, Disp: 90 tablet, Rfl: 1    ELIQUIS 5 MG TABS tablet, TAKE 1 TABLET BY MOUTH TWICE DAILY, Disp: 180 tablet, Rfl: 1    atorvastatin (LIPITOR) 40

## 2025-01-09 NOTE — PROGRESS NOTES
ELIQUIS 5 MG TABS tablet, TAKE 1 TABLET BY MOUTH TWICE DAILY, Disp: 180 tablet, Rfl: 1    atorvastatin (LIPITOR) 40 MG tablet, TAKE 1 TABLET BY MOUTH ONCE DAILY AT night, Disp: 90 tablet, Rfl: 1    hydroCHLOROthiazide (HYDRODIURIL) 25 MG tablet, TAKE 1 TABLET BY MOUTH ONCE DAILY, Disp: 90 tablet, Rfl: 1    valsartan (DIOVAN) 320 MG tablet, TAKE 1 TABLET BY MOUTH ONCE DAILY, Disp: 90 tablet, Rfl: 3    amLODIPine (NORVASC) 5 MG tablet, Take 1 tablet by mouth daily, Disp: 90 tablet, Rfl: 3    carvedilol (COREG) 25 MG tablet, Take 1 tablet by mouth 2 times daily, Disp: 180 tablet, Rfl: 3    Blood Pressure Monitor KIT, 1 each by Does not apply route once for 1 dose, Disp: 1 kit, Rfl: 0    hydrALAZINE (APRESOLINE) 25 MG tablet, Take 4 tablets by mouth 3 times daily, Disp: , Rfl:     Multiple Vitamins-Minerals (THERAPEUTIC MULTIVITAMIN-MINERALS) tablet, Take 1 tablet by mouth daily, Disp: , Rfl:     traZODone (DESYREL) 50 MG tablet, TAKE 1 TO 2 TABLETS BY MOUTH ONCE DAILY AT BEDTIME, Disp: , Rfl:     omeprazole (PRILOSEC) 20 MG delayed release capsule, Take 1 capsule by mouth daily as needed, Disp: , Rfl:     magnesium oxide (MAG-OX) 400 MG tablet, Take 1 tablet by mouth daily, Disp: , Rfl:     vitamin D (ERGOCALCIFEROL) 59822 units CAPS capsule, Take 1 capsule by mouth once a week, Disp: , Rfl:       Functional Outcome Measure Used PATEL Balance    Functional Outcome Score 24 (11/25/24)   34 (12/27/24)      Insurance: Primary: Payor: [facility] /  /  / ,   Secondary: [facility]   Authorization Information PRE CERTIFICATION REQUIRED: YES THRU [employer] -SUBMITTED AUTH FOR PT EVAL -RECEIVED AUTH FOR 1 PT EVAL (95180) FROM 11/25/24-12/9/24 AUTH# [id number] -SCANNED INTO EPIC   INSURANCE THERAPY BENEFIT: Allowed 40 visits per calendar year.  0 visits have been used.. Hard Max..   AQUATIC THERAPY COVERED: Yes  MODALITIES COVERED:  Yes     Received auth for 5 PT visits for CPT codes 64942, 68918, 40232, 04829, 13561

## 2025-01-09 NOTE — PROGRESS NOTES
* PLEASE SIGN, DATE AND TIME CERTIFICATION BELOW AND RETURN TO [facility] OUTPATIENT REHABILITATION (FAX #: [phone]).  ATTEST/CO-SIGN IF ACCESSING VIA INZaf[employer]ET.  THANK YOU.**    I certify that I have examined the patient below and determined that Physical Medicine and Rehabilitation service is necessary and that I approve the established plan of care for up to 90 days or as specifically noted.  Attestation, signature or co-signature of physician indicates approval of certification requirements.    ________________________ ____________  Physician Signature   Date     [facility]  SPEECH THERAPY  [] SPEECH LANGUAGE COGNITIVE EVALUATION  [] DAILY NOTE   [x] PROGRESS NOTE [] DISCHARGE NOTE    [x] OUTPATIENT REHABILITATION CENTER [facility]   [] [facility] CARE [place]    [] [facility]   [] [facility]    Date: 2025  Patient Name:  Yulissa Andersen  : 1975  MRN: [id number]  CSN: [id number]    Referring Practitioner Noa Leal DO [id number]      Diagnosis  Diagnoses       I63.9 (ICD-10-CM) - Cerebral infarction, unspecified           Treatment Diagnosis R41.89 Other symptoms and signs involving cognitive functions and awareness  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  she has a past surgical history that includes cyst removal; hysteroscopy; Hysterectomy (2013); Breast surgery (Left, 02/10/2015); pr exc b9 lesion mrgn xcp sk tg t/a/l 2.1-3.0 cm (N/A, 2018); and DISSECTION GROIN (N/A, 2019).     Allergies Allergies   Allergen

## 2025-01-14 ENCOUNTER — HOSPITAL ENCOUNTER (OUTPATIENT)
Age: 50
Discharge: HOME | End: 2025-01-16
Payer: COMMERCIAL

## 2025-01-14 DIAGNOSIS — R07.9 INTERMITTENT CHEST PAIN: ICD-10-CM

## 2025-01-14 DIAGNOSIS — Z86.73 HISTORY OF STROKE: ICD-10-CM

## 2025-01-14 DIAGNOSIS — R00.2 PALPITATIONS: ICD-10-CM

## 2025-01-14 PROCEDURE — 93270 REMOTE 30 DAY ECG REV/REPORT: CPT

## 2025-01-14 NOTE — PROGRESS NOTES
Contacted ordering eLxii Bryant APRN - CNP to clarify echo order. Echo on today's schedule cancelled due the patients recent echo with bubble study completed as inpatient on 11-7-24. Echo report faxed to ordering.

## 2025-01-15 ENCOUNTER — HOSPITAL ENCOUNTER (OUTPATIENT)
Dept: OCCUPATIONAL THERAPY | Age: 50
Setting detail: THERAPIES SERIES
Discharge: HOME | End: 2025-01-15
Payer: COMMERCIAL

## 2025-01-15 ENCOUNTER — HOSPITAL ENCOUNTER (OUTPATIENT)
Dept: PHYSICAL THERAPY | Age: 50
Setting detail: THERAPIES SERIES
Discharge: HOME | End: 2025-01-15
Payer: COMMERCIAL

## 2025-01-15 PROCEDURE — 97530 THERAPEUTIC ACTIVITIES: CPT

## 2025-01-15 PROCEDURE — 97116 GAIT TRAINING THERAPY: CPT

## 2025-01-15 PROCEDURE — 97035 APP MDLTY 1+ULTRASOUND EA 15: CPT

## 2025-01-15 PROCEDURE — 97110 THERAPEUTIC EXERCISES: CPT

## 2025-01-15 PROCEDURE — 97112 NEUROMUSCULAR REEDUCATION: CPT

## 2025-01-15 NOTE — PROGRESS NOTES
functional movements/strengthening next session.     GOALS:  Patient Goal: Teaching, driving, get back to life as normal     Short Term Goals: 4 weeks  Patient will improve Sheldon score from 24/56 to 40/56 to decrease fall risk and improve mobility.     Patient will complete 5 sit to/from stand transfers from standard chair without UEs in 30 seconds to improve functional strength for improved safety of transfers.    Patient will ambulate without AD with = heel strike,  straight path and no LOB for safe community mobility.      Long Term Goals: 8 weeks   Patient will improve L strength to 4+/5 for ease with transfers, gait and climbing stairs.     Patient will improve Sheldon score to 55/56 to decrease fall risk and improve mobility on various terrain.     Patient will be independent and compliant with HEP to achieve above goals.      Patient will be able to perform sit to stand transfer from 12 in height chair to assist with return to work related tasks at      Patient will demonstrate ability to perform L LE SLS for 10 sec without LOB to assist with uneven surface navigation.       Patient Education:   [x]  HEP/Education Completed: Continue with HEP, exercise progressions  [facility] Access Code:  []  No new Education completed  []  Reviewed Prior HEP      [x]  Patient verbalized and/or demonstrated understanding of education provided.  []  Patient unable to verbalize and/or demonstrate understanding of education provided.  Will continue education.  []  Barriers to learning:     PLAN:  Treatment Recommendations: Strengthening, Range of Motion, Balance Training, Functional Mobility Training, Transfer Training, Endurance Training, Gait Training, Stair Training, Neuromuscular Re-education, Home Exercise Program, Patient Education, Safety Education and Training, Self-Care Education and Training, Aquatics, and Postural Retraining    []  Plan of care initiated.  Plan to see patient 2 times per week for 8 weeks to

## 2025-01-15 NOTE — PROGRESS NOTES
than 18 hits for improved coordination to be able to turn the steering wheel.    Long Term Goals:  Time Frame: 8 weeks  Patient will report ability to complete a cooking task at home with no more than minimal difficulty.  GOAL NOT MET - CONTINUE  Patient will report ability to sleep with no more than minimal difficulty because of her shoulder.  GOAL NOT MET - CONTINUE  GOAL INITIATED:  Patient will participate in a driving evaluation to return to driving.    Patient Education:   []  HEP/Education Completed: Plan of Care, Goals, HEP  Scapular pinches, table slides as established on inpatient rehab and will continue to build upon.  12/24/24: orange band bicep curl, ER  1/9/25: introduced driving simulator  []  No new Education completed  [x]  Reviewed Prior HEP      [x]  Patient verbalized and/or demonstrated understanding of education provided.  []  Patient unable to verbalize and/or demonstrate understanding of education provided.  Will continue education.  [x]  Barriers to learning: none    PLAN:  Treatment Recommendations: Strengthening, Range of Motion, Neuromuscular Re-education, Manual Therapy - Soft Tissue Mobilization, Manual Therapy - Joint Manipulation, Pain Management, Home Exercise Program, Patient Education, and Modalities    []  Plan of care initiated.    [x]  Continue with current plan of care   []  Modify plan of care as follows:    []  Hold pending physician visit  []  Discharge    Time In 0805   Time Out 0845   Timed Code Minutes:  40 min   Total Treatment Time:  40 min       Electronically Signed by: Teena VEGA OTR/L [id number]

## 2025-01-17 ENCOUNTER — HOSPITAL ENCOUNTER (OUTPATIENT)
Dept: OCCUPATIONAL THERAPY | Age: 50
Setting detail: THERAPIES SERIES
Discharge: HOME | End: 2025-01-17
Payer: COMMERCIAL

## 2025-01-17 ENCOUNTER — HOSPITAL ENCOUNTER (OUTPATIENT)
Dept: SPEECH THERAPY | Age: 50
Setting detail: THERAPIES SERIES
Discharge: HOME | End: 2025-01-17
Payer: COMMERCIAL

## 2025-01-17 PROCEDURE — 97035 APP MDLTY 1+ULTRASOUND EA 15: CPT

## 2025-01-17 PROCEDURE — 97110 THERAPEUTIC EXERCISES: CPT

## 2025-01-17 PROCEDURE — 92507 TX SP LANG VOICE COMM INDIV: CPT | Performed by: SPEECH-LANGUAGE PATHOLOGIST

## 2025-01-17 PROCEDURE — 97140 MANUAL THERAPY 1/> REGIONS: CPT

## 2025-01-17 NOTE — PROGRESS NOTES
[facility]  OCCUPATIONAL THERAPY   EVALUATION  [] DAILY NOTE (LAND) [] DAILY NOTE (AQUATIC ) [x] PROGRESS NOTE [] DISCHARGE NOTE    [x] OUTPATIENT REHABILITATION CENTER [facility]   [] [place] [facility]ULAT[facility] CARE [place]    [] [facility]   [] [facility]    Date: 2025  Patient Name:  Yulissa Andersen  : 1975  MRN: [id number]  CSN: [id number]    Referring Practitioner Noa Leal DO [id number]      Diagnosis  Diagnoses       I63.9 (ICD-10-CM) - Cerebral infarction, unspecified           Treatment Diagnosis I69.30 Unspecified Sequelae of Cerebral Infarction  G81.90 Hemiplegia Affecting Unspecified Side   Date of Evaluation 24   Additional Pertinent History Yulissa Andersen has a past medical history of Coronary artery disease involving native coronary artery of native heart without angina pectoris, Diabetes mellitus (HCC), Hypertension, Incomplete bladder emptying, Microscopic hematuria, Osteoarthritis of knee, and Unspecified cerebral artery occlusion with cerebral infarction.  she has a past surgical history that includes cyst removal; hysteroscopy; Hysterectomy (2013); Breast surgery (Left, 02/10/2015); pr exc b9 lesion mrgn xcp sk tg t/a/l 2.1-3.0 cm (N/A, 2018); and DISSECTION GROIN (N/A, 2019).     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  Marcelo Gunderson,     Penicillins Hives    Clindamycin/Lincomycin Rash    Ciprofloxacin Hives    Doxycycline Hives    Lisinopril      Attacked kidneys      Medications   Current Outpatient Medications:     ASPIRIN LOW DOSE 81 MG chewable tablet, TAKE 1 TABLET BY MOUTH ONCE DAILY, Disp: 90 tablet, Rfl: 1    ELIQUIS 5 MG TABS tablet, TAKE 1 TABLET BY MOUTH TWICE DAILY, Disp: 180 tablet, Rfl: 1    atorvastatin (LIPITOR) 40

## 2025-01-17 NOTE — PROGRESS NOTES
0847   Time Out 8230   Timed Code Minutes: 0 min   Total Treatment Time: 43 min       Cherrie Cabezas M.S. CCC-SLP [id number]

## 2025-01-21 ENCOUNTER — TELEPHONE (OUTPATIENT)
Dept: NEUROLOGY | Age: 50
End: 2025-01-21

## 2025-01-21 ENCOUNTER — OFFICE VISIT (OUTPATIENT)
Dept: NEUROLOGY | Age: 50
End: 2025-01-21
Payer: COMMERCIAL

## 2025-01-21 VITALS — SYSTOLIC BLOOD PRESSURE: 134 MMHG | DIASTOLIC BLOOD PRESSURE: 86 MMHG

## 2025-01-21 DIAGNOSIS — I72.0 CAROTID ANEURYSM, LEFT (HCC): Primary | ICD-10-CM

## 2025-01-21 DIAGNOSIS — R47.89 ALTERATION IN SPEECH: ICD-10-CM

## 2025-01-21 PROCEDURE — 3075F SYST BP GE 130 - 139MM HG: CPT

## 2025-01-21 PROCEDURE — 1036F TOBACCO NON-USER: CPT

## 2025-01-21 PROCEDURE — G8427 DOCREV CUR MEDS BY ELIG CLIN: HCPCS

## 2025-01-21 PROCEDURE — 3079F DIAST BP 80-89 MM HG: CPT

## 2025-01-21 PROCEDURE — 99214 OFFICE O/P EST MOD 30 MIN: CPT

## 2025-01-21 PROCEDURE — G8417 CALC BMI ABV UP PARAM F/U: HCPCS

## 2025-01-21 RX ORDER — MULTIVITAMIN
TABLET ORAL
COMMUNITY
Start: 2024-10-08

## 2025-01-21 RX ORDER — ATOGEPANT 60 MG/1
TABLET ORAL
COMMUNITY

## 2025-01-21 RX ORDER — CLOPIDOGREL BISULFATE 75 MG/1
75 TABLET ORAL DAILY
Qty: 90 TABLET | Refills: 0 | Status: SHIPPED | OUTPATIENT
Start: 2025-01-21

## 2025-01-21 ASSESSMENT — ENCOUNTER SYMPTOMS
TROUBLE SWALLOWING: 0
VOMITING: 0
NAUSEA: 0
ABDOMINAL PAIN: 0
COUGH: 0
SHORTNESS OF BREATH: 0

## 2025-01-21 NOTE — PROGRESS NOTES
Physical Medicine & Rehabilitation Outpatient Follow Up Note    Chief Complaint:    Chief Complaint   Patient presents with    Follow-up     8 week f/u       Subjective:    Yulissa Andersen is a 49 y.o.   female with PMH significant for CAD, DM, HTN, OA, GERD, migraines, and CVA (1995) who was admitted to [facility] acute IPR unit from  11/12/2024-11/23/2024 following a clinically suspected CVA. She presents today for rehab DC follow-up.     Patient initially presented to [facility] ED on 11/7/2024 with complaints of headache and AMS.  In the ED, patient's boyfriend reported that she woke up with a severe migraine and associated nausea.  Patient reportedly drove herself to PCP where they transferred her to ED due to hypertensive emergency.  She reportedly received labetalol in room.  She was noted to have deterioration of her mental status.  Stroke alert was activated.  Initial NIH was 18 (reported aphasia, partial gaze palsy, bilateral blindness, all 4 extremities drift and hit the bed and left sided sensation deficit).  CT head was reportedly negative.  CT of the head and neck was reportedly negative for LVO or critical stenosis patient determined to be a good candidate for TNK which was given after initiation of Cardene drip for BP management.  After TNK, patient was admitted to ICU for further evaluation management.     Patient stay complicated by angioedema thought to be secondary to Vasotec.  Patient given Decadron, Benadryl, and FFP x2.  MRI of brain reportedly negative.  CTA reportedly with a possible filling defect in the distal right middle cerebral artery, 3.3 mm aneurysm arising from the terminal segment of the left internal carotid artery, no evidence of vasospasm.  Echo was obtained reportedly with EF 55-60%, no PFO.  EEG was obtained and was reportedly normal.  Patient was transferred out of the ICU on 11/9/2024. Patient thought to have a clinically suspected CVA.     On day of consultation, patient is seen

## 2025-01-21 NOTE — PROGRESS NOTES
Neurointerventional Office Note    Date: 1/21/2025       Patient Name:Yulissa Andersen     YOB: 1975     Age:49 y.o.    Chief Complaint:   Chief Complaint   Patient presents with    Follow-up     Follow up - no imaging      Subjective   Yulissa Andersen is a 49 y.o. female  with a pmhx of HTN, migraines, T2DM, CAD, GERD and obesity who presents to the interventional neurology clinic for a scheduled follow up for management of left carotid artery aneurysm. She was initially evaluated inpatient at [facility] on 11/7/24 when she presented for hypertensive emergency, migraine headache with subsequent altered mental status. Stroke alert was activated and her NIH was 18 for aphasia, partial gaze palsy, bilateral blindness, all 4 extremities drift and hit the bed and left sided sensation deficit. She was treated with TNK. CT angiogram suggestive of possible RCVS and an aneurysm of the left internal carotid artery terminus, which prompted a cerebral angiogram with Dr. Weaver.  Angiogram on 11/8/24 demonstrated: No evidence of vasospasm. Changes of fibromuscular dysplasia in bilateral ICA cervical segments. 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  MRI brain was later noted to be negative for an acute stroke. She gradually began to improve, but did not return to her baseline.     She arrives to clinic today accompanied by her significant other. She notes that she has still not returned to her baseline. She continues to have significant blurriness in her left eye, but has not been to see an ophthalmologist yet. She also endorses left sided weakness and numbness. 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 She continues to have headaches, although not as severe as prior to her admission. She denies dizziness/lightheadedness, syncope and seizures.     Review of Systems   Review of Systems   Constitutional:  Positive for fatigue.

## 2025-01-21 NOTE — TELEPHONE ENCOUNTER
Procedure scheduled with patient for January 29, 2025 with an arrival of 8:30am. Informed patient that PAT would be calling her to discuss medication.   Patient okay to stay on Aspirin and Eliquis, as well as patient to begin Plavix tomorrow. P2Y12 to be drawn 1/28/25, patient informed of this as well.

## 2025-01-22 ENCOUNTER — OFFICE VISIT (OUTPATIENT)
Dept: PHYSICAL MEDICINE AND REHAB | Age: 50
End: 2025-01-22
Payer: COMMERCIAL

## 2025-01-22 ENCOUNTER — HOSPITAL ENCOUNTER (OUTPATIENT)
Dept: PHYSICAL THERAPY | Age: 50
Setting detail: THERAPIES SERIES
Discharge: HOME | End: 2025-01-22
Payer: COMMERCIAL

## 2025-01-22 VITALS
SYSTOLIC BLOOD PRESSURE: 126 MMHG | DIASTOLIC BLOOD PRESSURE: 74 MMHG | HEIGHT: 65 IN | BODY MASS INDEX: 34.82 KG/M2 | WEIGHT: 209 LBS

## 2025-01-22 DIAGNOSIS — I63.9 CEREBROVASCULAR ACCIDENT (CVA) DETERMINED BY CLINICAL ASSESSMENT (HCC): Primary | ICD-10-CM

## 2025-01-22 DIAGNOSIS — Z78.9 IMPAIRED MOBILITY AND ADLS: ICD-10-CM

## 2025-01-22 DIAGNOSIS — Z74.09 IMPAIRED MOBILITY AND ADLS: ICD-10-CM

## 2025-01-22 DIAGNOSIS — R47.9 SPEECH DISTURBANCE, UNSPECIFIED TYPE: ICD-10-CM

## 2025-01-22 PROCEDURE — G8427 DOCREV CUR MEDS BY ELIG CLIN: HCPCS | Performed by: STUDENT IN AN ORGANIZED HEALTH CARE EDUCATION/TRAINING PROGRAM

## 2025-01-22 PROCEDURE — 3078F DIAST BP <80 MM HG: CPT | Performed by: STUDENT IN AN ORGANIZED HEALTH CARE EDUCATION/TRAINING PROGRAM

## 2025-01-22 PROCEDURE — G8417 CALC BMI ABV UP PARAM F/U: HCPCS | Performed by: STUDENT IN AN ORGANIZED HEALTH CARE EDUCATION/TRAINING PROGRAM

## 2025-01-22 PROCEDURE — 99214 OFFICE O/P EST MOD 30 MIN: CPT | Performed by: STUDENT IN AN ORGANIZED HEALTH CARE EDUCATION/TRAINING PROGRAM

## 2025-01-22 PROCEDURE — 3074F SYST BP LT 130 MM HG: CPT | Performed by: STUDENT IN AN ORGANIZED HEALTH CARE EDUCATION/TRAINING PROGRAM

## 2025-01-22 PROCEDURE — 1036F TOBACCO NON-USER: CPT | Performed by: STUDENT IN AN ORGANIZED HEALTH CARE EDUCATION/TRAINING PROGRAM

## 2025-01-22 PROCEDURE — 97116 GAIT TRAINING THERAPY: CPT

## 2025-01-22 PROCEDURE — 97112 NEUROMUSCULAR REEDUCATION: CPT

## 2025-01-22 NOTE — PROGRESS NOTES
[facility]  PHYSICAL THERAPY  [] EVALUATION  [x] DAILY NOTE (LAND) [] DAILY NOTE (AQUATIC )  [] PROGRESS NOTE [] DISCHARGE NOTE    [x] OUTPATIENT REHABILITATION CENTER [facility]   [] [place] AMBULATORY CARE [place]    [] [facility]   [] [facility]    Date: 2025  Patient Name:  Yulissa Andersen  : 1975  MRN: [id number]  CSN: [id number]    Referring Practitioner Noa Leal DO [id number]      Diagnosis  Diagnoses       I63.9 (ICD-10-CM) - Cerebral infarction, unspecified           Treatment Diagnosis R26.0  Ataxic Gait  R26.89  Abnormalities of gait and mobility  R26.81  Unsteadiness on feet  R27.9  Unspecified lack of coordination  G81.90 Hemiplegia Affecting Unspecified Side  M62.81 Generalized Weakness   Date of Evaluation 24   Additional Pertinent History Yulissa Andersen has a past medical history of Coronary artery disease involving native coronary artery of native heart without angina pectoris, Diabetes mellitus (HCC), Hypertension, Incomplete bladder emptying, Microscopic hematuria, Osteoarthritis of knee, and Unspecified cerebral artery occlusion with cerebral infarction.  she has a past surgical history that includes cyst removal; hysteroscopy; Hysterectomy (2013); Breast surgery (Left, 02/10/2015); pr exc b9 lesion mrgn xcp sk tg t/a/l 2.1-3.0 cm (N/A, 2018); and DISSECTION GROIN (N/A, 2019).     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  Marcelo Gunderson, DO    Penicillins Hives    Clindamycin/Lincomycin Rash    Ciprofloxacin Hives    Doxycycline Hives    Lisinopril      Attacked kidneys      Medications   Current Outpatient Medications:     QULIPTA 60 MG TABS, TAKE 1 TABLET BY MOUTH ONCE DAILY AS NEEDED FOR MIGRAINE prevention, Disp: , Rfl:

## 2025-01-24 ENCOUNTER — HOSPITAL ENCOUNTER (OUTPATIENT)
Dept: PHYSICAL THERAPY | Age: 50
Setting detail: THERAPIES SERIES
Discharge: HOME | End: 2025-01-24
Payer: COMMERCIAL

## 2025-01-24 ENCOUNTER — HOSPITAL ENCOUNTER (OUTPATIENT)
Dept: SPEECH THERAPY | Age: 50
Setting detail: THERAPIES SERIES
Discharge: HOME | End: 2025-01-24
Payer: COMMERCIAL

## 2025-01-24 PROCEDURE — 97116 GAIT TRAINING THERAPY: CPT

## 2025-01-24 PROCEDURE — 97112 NEUROMUSCULAR REEDUCATION: CPT

## 2025-01-24 PROCEDURE — 92507 TX SP LANG VOICE COMM INDIV: CPT | Performed by: SPEECH-LANGUAGE PATHOLOGIST

## 2025-01-24 NOTE — PROGRESS NOTES
[facility]  PHYSICAL THERAPY  [] EVALUATION  [x] DAILY NOTE (LAND) [] DAILY NOTE (AQUATIC )  [] PROGRESS NOTE [] DISCHARGE NOTE    [x] OUTPATIENT REHABILITATION CENTER [facility]   [] [place] AMBULATORY CARE [place]    [] [facility]   [] [facility]    Date: 2025  Patient Name:  Yulissa Andersen  : 1975  MRN: [id number]  CSN: [id number]    Referring Practitioner Noa Leal DO [id number]      Diagnosis  Diagnoses       I63.9 (ICD-10-CM) - Cerebral infarction, unspecified           Treatment Diagnosis R26.0  Ataxic Gait  R26.89  Abnormalities of gait and mobility  R26.81  Unsteadiness on feet  R27.9  Unspecified lack of coordination  G81.90 Hemiplegia Affecting Unspecified Side  M62.81 Generalized Weakness   Date of Evaluation 24   Additional Pertinent History Yulissa Andersen has a past medical history of Coronary artery disease involving native coronary artery of native heart without angina pectoris, Diabetes mellitus (HCC), Hypertension, Incomplete bladder emptying, Microscopic hematuria, Osteoarthritis of knee, and Unspecified cerebral artery occlusion with cerebral infarction.  she has a past surgical history that includes cyst removal; hysteroscopy; Hysterectomy (2013); Breast surgery (Left, 02/10/2015); pr exc b9 lesion mrgn xcp sk tg t/a/l 2.1-3.0 cm (N/A, 2018); and DISSECTION GROIN (N/A, 2019).     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  Marcelo Gunderson, DO    Penicillins Hives    Ciprofloxacin Hives    Clindamycin/Lincomycin Rash    Doxycycline Hives    Lisinopril      Attacked kidneys      Medications   Current Outpatient Medications:     metFORMIN (GLUCOPHAGE-XR) 500 MG extended release tablet, Take 2 tablets by mouth daily (with breakfast),

## 2025-01-24 NOTE — PROGRESS NOTES
[facility]  SPEECH THERAPY  [] SPEECH LANGUAGE COGNITIVE EVALUATION  [x] DAILY NOTE   [] PROGRESS NOTE [] DISCHARGE NOTE    [x] OUTPATIENT REHABILITATION CENTER - [place]   [] [facility] CARE [place]    [] [facility]   [] [facility]    Date: 2025  Patient Name:  Yulissa Andersen  : 1975  MRN: [id number]  CSN: [id number]    Referring Practitioner Noa Leal DO [id number]      Diagnosis  Diagnoses       I63.9 (ICD-10-CM) - Cerebral infarction, unspecified           Treatment Diagnosis R41.89 Other symptoms and signs involving cognitive functions and awareness  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  she has a past surgical history that includes cyst removal; hysteroscopy; Hysterectomy (2013); Breast surgery (Left, 02/10/2015); pr exc b9 lesion mrgn xcp sk tg t/a/l 2.1-3.0 cm (N/A, 2018); and DISSECTION GROIN (N/A, 2019).     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  Marcelo Gunderson,     Penicillins Hives    Clindamycin/Lincomycin Rash    Ciprofloxacin Hives    Doxycycline Hives    Lisinopril      Attacked kidneys      Medications   Current Outpatient Medications:     apixaban (ELIQUIS) 5 MG TABS tablet, Take 1 tablet by mouth 2 times daily, Disp: 60 tablet, Rfl: 1    aspirin 81 MG chewable tablet, Take 1 tablet by mouth daily, Disp:

## 2025-01-26 NOTE — PROGRESS NOTES
Try zyrtec or claritin 10 mg once per day, especially on days you are at work.    Increase your Qvar   Use albuterol every 4 hours for any cough, congestion in the chest, wheezing or short of breath.    Increase the qvar with new RX.  (You may use old inhaler 2 puffs 2 x per day).      Tessalon can suppress the cough.      Follow up with primary care provider for recheck in 2 -3 weeks.      Decreased dyskonesia noted when patient was distracted with naming colors and items  Continues with atypical presentation of CVA.  Patient able to  rhomberg stance without UE assistance of LOB today.     GOALS:  Patient Goal: Teaching, driving, get back to life as normal     Short Term Goals: 4 weeks  Patient will improve Sheldon score from 24/56 to 40/56 to decrease fall risk and improve mobility.   Patient will complete 5 sit to/from stand transfers from standard chair without UEs in 30 seconds to improve functional strength for improved safety of transfers  Patient will ambulate without AD with = heel strike,  straight path and no LOB for safe community mobility.    Long Term Goals: 8 weeks   Patient will improve L strength to 4+/5 for ease with transfers, gait and climbing stairs.   Patient will improve Sheldon score to 55/56 to decrease fall risk and improve mobility on various terrain.  Patient will be independent and compliant with HEP to achieve above goals.    Patient will be able to perform sit to stand transfer from 12 in height chair to assist with return to work related tasks at   Patient will demonstrate ability to perform L LE SLS for 10 sec without LOB to assist with uneven surface navigation.     Patient Education:   [x]  HEP/Education Completed: Verbal review of HEP and instruction to continue.    [facility] Access Code:  []  No new Education completed  []  Reviewed Prior HEP      [x]  Patient verbalized and/or demonstrated understanding of education provided.  []  Patient unable to verbalize and/or demonstrate understanding of education provided.  Will continue education.  []  Barriers to learning:     PLAN:  Treatment Recommendations: Strengthening, Range of Motion, Balance Training, Functional Mobility Training, Transfer Training, Endurance Training, Gait Training, Stair Training, Neuromuscular Re-education, Home Exercise Program, Patient Education, Safety Education and Training,

## 2025-01-27 ENCOUNTER — TELEPHONE (OUTPATIENT)
Dept: NEUROLOGY | Age: 50
End: 2025-01-27

## 2025-01-27 NOTE — TELEPHONE ENCOUNTER
Called to confirm procedure with patient for 1/29/25 with an arrival of 8:30am. Reminded patient to have P2Y12 completed tomorrow, 1/28/25. Patient verbalized understanding of.

## 2025-01-28 ENCOUNTER — HOSPITAL ENCOUNTER (OUTPATIENT)
Age: 50
Discharge: HOME | End: 2025-01-28
Payer: COMMERCIAL

## 2025-01-28 ENCOUNTER — HOSPITAL ENCOUNTER (OUTPATIENT)
Dept: PHYSICAL THERAPY | Age: 50
Setting detail: THERAPIES SERIES
Discharge: HOME | End: 2025-01-28
Payer: COMMERCIAL

## 2025-01-28 ENCOUNTER — TELEPHONE (OUTPATIENT)
Dept: NEUROLOGY | Age: 50
End: 2025-01-28

## 2025-01-28 DIAGNOSIS — I72.0 CAROTID ANEURYSM, LEFT (HCC): Primary | ICD-10-CM

## 2025-01-28 DIAGNOSIS — I72.0 CAROTID ANEURYSM, LEFT (HCC): ICD-10-CM

## 2025-01-28 LAB
P2Y12 ASSAY: 198 PRU (ref 180–376)
REASON FOR REJECTION: NORMAL
REJECTED TEST: NORMAL

## 2025-01-28 PROCEDURE — 36415 COLL VENOUS BLD VENIPUNCTURE: CPT

## 2025-01-28 PROCEDURE — 97110 THERAPEUTIC EXERCISES: CPT

## 2025-01-28 PROCEDURE — 85576 BLOOD PLATELET AGGREGATION: CPT

## 2025-01-28 PROCEDURE — 97112 NEUROMUSCULAR REEDUCATION: CPT

## 2025-01-28 NOTE — TELEPHONE ENCOUNTER
P2Y12 specimen rejected.  Called patient to see if patient can go to [facility] to get new P2Y12 drawn. Patient stated she would go in a few minutes and verbalized understanding of with no further questions or concerns.

## 2025-01-28 NOTE — PROGRESS NOTES
on Airex 12x  x    Alternating Hamstring curls on Airex 12x  x    3-way hip alternating left and right on Airex  12x each  x     Alternating toe taps to Airex 20x      Slingshot over low maggie 10x B Green     Dynamic gait: Sidestepping, tandem, high marching  2 laps ea // bars    Cognitive dual task of counting by 2s and naming colors, animals  Able to perform with conversation this session, tandem walking only      Blaze pods  4x30s L  2x30s R   4 pods in an arch, 1 lighting up at a time, dual task of naming animals or counting by 2s           Rhomberg stance  1'x2  x    Step stance- B lead  30s ea       Ascending and descending 6 inch stairs (4 steps) * 2 sets  B handrails     [facility]one retrieval - 4 cones at different heights x2      Ambulation:        Timed Get up and Go task 5x each turning way Min 12 sec turning left  Min 12 sec turning right  Turning both left and right to return to chair  Noted improved left foot clearance with prompt to speed up her kosta and step sizes.    Walking in Hallway/ open clinic - lateral stepping, retro walking, and then grapevine, and retro > turn and go forward 2 laps each CGA   Gait belt,   Only 1 lap with grapevine   Walking in open clinic - head turns horizontal, vertical  2 laps x 30 ft CGA   Cues for slower head movements due reduce dizziness   Walking over cobble-blocks on floor, open clinic  2x 2 blocks CGA      Walking over hurdles (5 green hurdles) * 4x ea    Reciprocal pattern with forward            Specific Interventions Next Treatment: gait training, Nustep, B LE and core strengthening, gait and balance training. Stair training    Activity/Treatment Tolerance:  [x]  Patient tolerated treatment well  []  Patient limited by fatigue  []  Patient limited by pain   []  Patient limited by medical complications  []  Other:     Assessment: Patient fatigued quickly this morning with increased symptoms noted throughout program.  Patient demonstrates decreased activity

## 2025-01-29 ENCOUNTER — ANESTHESIA EVENT (OUTPATIENT)
Age: 50
End: 2025-01-29
Payer: COMMERCIAL

## 2025-01-29 ENCOUNTER — ANESTHESIA (OUTPATIENT)
Age: 50
End: 2025-01-29
Payer: COMMERCIAL

## 2025-01-29 ENCOUNTER — HOSPITAL ENCOUNTER (INPATIENT)
Age: 50
LOS: 2 days | Discharge: HOME | DRG: 026 | End: 2025-01-31
Attending: PSYCHIATRY & NEUROLOGY
Payer: COMMERCIAL

## 2025-01-29 DIAGNOSIS — E87.20 METABOLIC ACIDOSIS: Primary | ICD-10-CM

## 2025-01-29 DIAGNOSIS — I67.1 CEREBRAL ANEURYSM: ICD-10-CM

## 2025-01-29 PROBLEM — I72.9 ANEURYSM (HCC): Status: ACTIVE | Noted: 2025-01-29

## 2025-01-29 LAB
ABO GROUP BLD: NORMAL
ANION GAP SERPL CALC-SCNC: 13 MEQ/L (ref 8–16)
APTT PPP: 35.3 SECONDS (ref 22–38)
APTT PPP: 36.7 SECONDS (ref 22–38)
B-HCG SERPL QL: NEGATIVE
BASOPHILS ABSOLUTE: 0 THOU/MM3 (ref 0–0.1)
BASOPHILS NFR BLD AUTO: 0.1 %
BUN SERPL-MCNC: 12 MG/DL (ref 7–22)
CALCIUM SERPL-MCNC: 9 MG/DL (ref 8.5–10.5)
CHLORIDE SERPL-SCNC: 104 MEQ/L (ref 98–111)
CO2 SERPL-SCNC: 20 MEQ/L (ref 23–33)
CREAT SERPL-MCNC: 0.8 MG/DL (ref 0.4–1.2)
DEPRECATED RDW RBC AUTO: 47.4 FL (ref 35–45)
DEPRECATED RDW RBC AUTO: 47.8 FL (ref 35–45)
ECHO BSA: 2.08 M2
EOSINOPHIL NFR BLD AUTO: 0.1 %
EOSINOPHILS ABSOLUTE: 0 THOU/MM3 (ref 0–0.4)
ERYTHROCYTE [DISTWIDTH] IN BLOOD BY AUTOMATED COUNT: 15.4 % (ref 11.5–14.5)
ERYTHROCYTE [DISTWIDTH] IN BLOOD BY AUTOMATED COUNT: 15.5 % (ref 11.5–14.5)
GFR SERPL CREATININE-BSD FRML MDRD: > 90 ML/MIN/1.73M2
GLUCOSE BLD STRIP.AUTO-MCNC: 167 MG/DL (ref 70–108)
GLUCOSE SERPL-MCNC: 150 MG/DL (ref 70–108)
HCT VFR BLD AUTO: 32.4 % (ref 37–47)
HCT VFR BLD AUTO: 33.2 % (ref 37–47)
HCT VFR BLD AUTO: 36.1 % (ref 37–47)
HGB BLD-MCNC: 10.2 GM/DL (ref 12–16)
HGB BLD-MCNC: 10.8 GM/DL (ref 12–16)
HGB BLD-MCNC: 11.7 GM/DL (ref 12–16)
IAT IGG-SP REAG SERPL QL: NORMAL
IMM GRANULOCYTES # BLD AUTO: 0.13 THOU/MM3 (ref 0–0.07)
IMM GRANULOCYTES NFR BLD AUTO: 0.8 %
INR PPP: 1.25 (ref 0.85–1.13)
INR PPP: 1.42 (ref 0.85–1.13)
KCT BLD-ACNC: 227 SECONDS (ref 1–150)
LYMPHOCYTES ABSOLUTE: 1.1 THOU/MM3 (ref 1–4.8)
LYMPHOCYTES NFR BLD AUTO: 6.9 %
MCH RBC QN AUTO: 27.1 PG (ref 26–33)
MCH RBC QN AUTO: 27.7 PG (ref 26–33)
MCHC RBC AUTO-ENTMCNC: 31.5 GM/DL (ref 32.2–35.5)
MCHC RBC AUTO-ENTMCNC: 32.4 GM/DL (ref 32.2–35.5)
MCV RBC AUTO: 85.5 FL (ref 81–99)
MCV RBC AUTO: 85.9 FL (ref 81–99)
MONOCYTES ABSOLUTE: 0.1 THOU/MM3 (ref 0.4–1.3)
MONOCYTES NFR BLD AUTO: 0.8 %
NEUTROPHILS ABSOLUTE: 14.6 THOU/MM3 (ref 1.8–7.7)
NEUTROPHILS NFR BLD AUTO: 91.3 %
NRBC BLD AUTO-RTO: 0 /100 WBC
PLATELET # BLD AUTO: 377 THOU/MM3 (ref 130–400)
PLATELET # BLD AUTO: 425 THOU/MM3 (ref 130–400)
PMV BLD AUTO: 9.4 FL (ref 9.4–12.4)
PMV BLD AUTO: 9.6 FL (ref 9.4–12.4)
POTASSIUM SERPL-SCNC: 4.1 MEQ/L (ref 3.5–5.2)
RBC # BLD AUTO: 3.77 MILL/MM3 (ref 4.2–5.4)
RBC # BLD AUTO: 4.22 MILL/MM3 (ref 4.2–5.4)
REASON FOR REJECTION: NORMAL
REJECTED TEST: NORMAL
RH BLD: NORMAL
SODIUM SERPL-SCNC: 137 MEQ/L (ref 135–145)
WBC # BLD AUTO: 16 THOU/MM3 (ref 4.8–10.8)
WBC # BLD AUTO: 8.7 THOU/MM3 (ref 4.8–10.8)

## 2025-01-29 PROCEDURE — 3700000001 HC ADD 15 MINUTES (ANESTHESIA): Performed by: PSYCHIATRY & NEUROLOGY

## 2025-01-29 PROCEDURE — 85027 COMPLETE CBC AUTOMATED: CPT

## 2025-01-29 PROCEDURE — C1889 IMPLANT/INSERT DEVICE, NOC: HCPCS | Performed by: PSYCHIATRY & NEUROLOGY

## 2025-01-29 PROCEDURE — 84703 CHORIONIC GONADOTROPIN ASSAY: CPT

## 2025-01-29 PROCEDURE — B3141ZZ FLUOROSCOPY OF LEFT COMMON CAROTID ARTERY USING LOW OSMOLAR CONTRAST: ICD-10-PCS | Performed by: PSYCHIATRY & NEUROLOGY

## 2025-01-29 PROCEDURE — 82948 REAGENT STRIP/BLOOD GLUCOSE: CPT

## 2025-01-29 PROCEDURE — 75894 X-RAYS TRANSCATH THERAPY: CPT

## 2025-01-29 PROCEDURE — 6370000000 HC RX 637 (ALT 250 FOR IP)

## 2025-01-29 PROCEDURE — 6360000002 HC RX W HCPCS

## 2025-01-29 PROCEDURE — 85610 PROTHROMBIN TIME: CPT

## 2025-01-29 PROCEDURE — 85018 HEMOGLOBIN: CPT

## 2025-01-29 PROCEDURE — 2000000000 HC ICU R&B

## 2025-01-29 PROCEDURE — 75898 FOLLOW-UP ANGIOGRAPHY: CPT | Performed by: PSYCHIATRY & NEUROLOGY

## 2025-01-29 PROCEDURE — 85347 COAGULATION TIME ACTIVATED: CPT

## 2025-01-29 PROCEDURE — 85014 HEMATOCRIT: CPT

## 2025-01-29 PROCEDURE — 86885 COOMBS TEST INDIRECT QUAL: CPT

## 2025-01-29 PROCEDURE — C1877 STENT, NON-COAT/COV W/O DEL: HCPCS | Performed by: PSYCHIATRY & NEUROLOGY

## 2025-01-29 PROCEDURE — 80048 BASIC METABOLIC PNL TOTAL CA: CPT

## 2025-01-29 PROCEDURE — 3700000000 HC ANESTHESIA ATTENDED CARE: Performed by: PSYCHIATRY & NEUROLOGY

## 2025-01-29 PROCEDURE — 6360000002 HC RX W HCPCS: Performed by: PSYCHIATRY & NEUROLOGY

## 2025-01-29 PROCEDURE — 87641 MR-STAPH DNA AMP PROBE: CPT

## 2025-01-29 PROCEDURE — B41F1ZZ FLUOROSCOPY OF RIGHT LOWER EXTREMITY ARTERIES USING LOW OSMOLAR CONTRAST: ICD-10-PCS | Performed by: PSYCHIATRY & NEUROLOGY

## 2025-01-29 PROCEDURE — 03VL3DZ RESTRICTION OF LEFT INTERNAL CAROTID ARTERY WITH INTRALUMINAL DEVICE, PERCUTANEOUS APPROACH: ICD-10-PCS | Performed by: PSYCHIATRY & NEUROLOGY

## 2025-01-29 PROCEDURE — B3171ZZ FLUOROSCOPY OF LEFT INTERNAL CAROTID ARTERY USING LOW OSMOLAR CONTRAST: ICD-10-PCS | Performed by: PSYCHIATRY & NEUROLOGY

## 2025-01-29 PROCEDURE — 36221 PLACE CATH THORACIC AORTA: CPT

## 2025-01-29 PROCEDURE — 36415 COLL VENOUS BLD VENIPUNCTURE: CPT

## 2025-01-29 PROCEDURE — 61624 TCAT PERM OCCLS/EMBOLJ CNS: CPT | Performed by: PSYCHIATRY & NEUROLOGY

## 2025-01-29 PROCEDURE — 2500000003 HC RX 250 WO HCPCS

## 2025-01-29 PROCEDURE — 2580000003 HC RX 258

## 2025-01-29 PROCEDURE — 75894 X-RAYS TRANSCATH THERAPY: CPT | Performed by: PSYCHIATRY & NEUROLOGY

## 2025-01-29 PROCEDURE — 86900 BLOOD TYPING SEROLOGIC ABO: CPT

## 2025-01-29 PROCEDURE — 85025 COMPLETE CBC W/AUTO DIFF WBC: CPT

## 2025-01-29 PROCEDURE — 85730 THROMBOPLASTIN TIME PARTIAL: CPT

## 2025-01-29 PROCEDURE — B31Q1ZZ FLUOROSCOPY OF CERVICO-CEREBRAL ARCH USING LOW OSMOLAR CONTRAST: ICD-10-PCS | Performed by: PSYCHIATRY & NEUROLOGY

## 2025-01-29 PROCEDURE — 61624 TCAT PERM OCCLS/EMBOLJ CNS: CPT

## 2025-01-29 PROCEDURE — 99223 1ST HOSP IP/OBS HIGH 75: CPT | Performed by: INTERNAL MEDICINE

## 2025-01-29 PROCEDURE — 86901 BLOOD TYPING SEROLOGIC RH(D): CPT

## 2025-01-29 PROCEDURE — 36217 PLACE CATHETER IN ARTERY: CPT | Performed by: PSYCHIATRY & NEUROLOGY

## 2025-01-29 RX ORDER — SODIUM CHLORIDE 0.9 % (FLUSH) 0.9 %
5-40 SYRINGE (ML) INJECTION PRN
Status: DISCONTINUED | OUTPATIENT
Start: 2025-01-29 | End: 2025-01-29 | Stop reason: HOSPADM

## 2025-01-29 RX ORDER — HYDRALAZINE HYDROCHLORIDE 20 MG/ML
5 INJECTION INTRAMUSCULAR; INTRAVENOUS ONCE
Status: COMPLETED | OUTPATIENT
Start: 2025-01-29 | End: 2025-01-29

## 2025-01-29 RX ORDER — ROCURONIUM BROMIDE 10 MG/ML
INJECTION, SOLUTION INTRAVENOUS
Status: DISCONTINUED | OUTPATIENT
Start: 2025-01-29 | End: 2025-01-29 | Stop reason: SDUPTHER

## 2025-01-29 RX ORDER — INSULIN LISPRO 100 [IU]/ML
0-4 INJECTION, SOLUTION INTRAVENOUS; SUBCUTANEOUS
Status: DISCONTINUED | OUTPATIENT
Start: 2025-01-29 | End: 2025-01-31 | Stop reason: HOSPADM

## 2025-01-29 RX ORDER — VALSARTAN 320 MG/1
320 TABLET ORAL DAILY
Status: DISCONTINUED | OUTPATIENT
Start: 2025-01-29 | End: 2025-01-31 | Stop reason: HOSPADM

## 2025-01-29 RX ORDER — SODIUM CHLORIDE 0.9 % (FLUSH) 0.9 %
5-40 SYRINGE (ML) INJECTION EVERY 12 HOURS SCHEDULED
Status: DISCONTINUED | OUTPATIENT
Start: 2025-01-29 | End: 2025-01-31 | Stop reason: HOSPADM

## 2025-01-29 RX ORDER — HEPARIN SODIUM 1000 [USP'U]/ML
INJECTION, SOLUTION INTRAVENOUS; SUBCUTANEOUS
Status: DISCONTINUED | OUTPATIENT
Start: 2025-01-29 | End: 2025-01-29 | Stop reason: SDUPTHER

## 2025-01-29 RX ORDER — CLOPIDOGREL BISULFATE 75 MG/1
75 TABLET ORAL DAILY
Status: DISCONTINUED | OUTPATIENT
Start: 2025-01-29 | End: 2025-01-31 | Stop reason: HOSPADM

## 2025-01-29 RX ORDER — HYDRALAZINE HYDROCHLORIDE 20 MG/ML
INJECTION INTRAMUSCULAR; INTRAVENOUS
Status: COMPLETED
Start: 2025-01-29 | End: 2025-01-29

## 2025-01-29 RX ORDER — DEXTROSE MONOHYDRATE 100 MG/ML
INJECTION, SOLUTION INTRAVENOUS CONTINUOUS PRN
Status: DISCONTINUED | OUTPATIENT
Start: 2025-01-29 | End: 2025-01-31 | Stop reason: HOSPADM

## 2025-01-29 RX ORDER — SODIUM CHLORIDE 0.9 % (FLUSH) 0.9 %
5-40 SYRINGE (ML) INJECTION EVERY 12 HOURS SCHEDULED
Status: DISCONTINUED | OUTPATIENT
Start: 2025-01-29 | End: 2025-01-29 | Stop reason: HOSPADM

## 2025-01-29 RX ORDER — SODIUM CHLORIDE 9 MG/ML
INJECTION, SOLUTION INTRAVENOUS PRN
Status: DISCONTINUED | OUTPATIENT
Start: 2025-01-29 | End: 2025-01-29 | Stop reason: HOSPADM

## 2025-01-29 RX ORDER — PROPOFOL 10 MG/ML
INJECTION, EMULSION INTRAVENOUS
Status: DISCONTINUED | OUTPATIENT
Start: 2025-01-29 | End: 2025-01-29 | Stop reason: SDUPTHER

## 2025-01-29 RX ORDER — FENTANYL CITRATE 50 UG/ML
INJECTION, SOLUTION INTRAMUSCULAR; INTRAVENOUS
Status: DISCONTINUED | OUTPATIENT
Start: 2025-01-29 | End: 2025-01-29 | Stop reason: SDUPTHER

## 2025-01-29 RX ORDER — HYDROCHLOROTHIAZIDE 25 MG/1
25 TABLET ORAL DAILY
Status: DISCONTINUED | OUTPATIENT
Start: 2025-01-29 | End: 2025-01-31

## 2025-01-29 RX ORDER — GLUCAGON 1 MG/ML
1 KIT INJECTION PRN
Status: DISCONTINUED | OUTPATIENT
Start: 2025-01-29 | End: 2025-01-31 | Stop reason: HOSPADM

## 2025-01-29 RX ORDER — SODIUM CHLORIDE 0.9 % (FLUSH) 0.9 %
5-40 SYRINGE (ML) INJECTION PRN
Status: DISCONTINUED | OUTPATIENT
Start: 2025-01-29 | End: 2025-01-31 | Stop reason: HOSPADM

## 2025-01-29 RX ORDER — PANTOPRAZOLE SODIUM 40 MG/1
40 TABLET, DELAYED RELEASE ORAL
Status: DISCONTINUED | OUTPATIENT
Start: 2025-01-30 | End: 2025-01-31 | Stop reason: HOSPADM

## 2025-01-29 RX ORDER — AMLODIPINE BESYLATE 5 MG/1
5 TABLET ORAL DAILY
Status: DISCONTINUED | OUTPATIENT
Start: 2025-01-29 | End: 2025-01-31 | Stop reason: HOSPADM

## 2025-01-29 RX ORDER — TRAZODONE HYDROCHLORIDE 50 MG/1
50 TABLET, FILM COATED ORAL NIGHTLY PRN
Status: DISCONTINUED | OUTPATIENT
Start: 2025-01-29 | End: 2025-01-31 | Stop reason: HOSPADM

## 2025-01-29 RX ORDER — ASPIRIN 81 MG/1
81 TABLET, CHEWABLE ORAL DAILY
Status: DISCONTINUED | OUTPATIENT
Start: 2025-01-29 | End: 2025-01-31 | Stop reason: HOSPADM

## 2025-01-29 RX ORDER — DEXAMETHASONE SODIUM PHOSPHATE 10 MG/ML
INJECTION, EMULSION INTRAMUSCULAR; INTRAVENOUS
Status: DISCONTINUED | OUTPATIENT
Start: 2025-01-29 | End: 2025-01-29 | Stop reason: SDUPTHER

## 2025-01-29 RX ORDER — SODIUM CHLORIDE 9 MG/ML
INJECTION, SOLUTION INTRAVENOUS PRN
Status: DISCONTINUED | OUTPATIENT
Start: 2025-01-29 | End: 2025-01-31 | Stop reason: HOSPADM

## 2025-01-29 RX ORDER — ONDANSETRON 2 MG/ML
INJECTION INTRAMUSCULAR; INTRAVENOUS
Status: DISCONTINUED | OUTPATIENT
Start: 2025-01-29 | End: 2025-01-29 | Stop reason: SDUPTHER

## 2025-01-29 RX ORDER — CARVEDILOL 25 MG/1
25 TABLET ORAL 2 TIMES DAILY
Status: DISCONTINUED | OUTPATIENT
Start: 2025-01-29 | End: 2025-01-31 | Stop reason: HOSPADM

## 2025-01-29 RX ORDER — ACETAMINOPHEN 325 MG/1
650 TABLET ORAL EVERY 4 HOURS PRN
Status: DISCONTINUED | OUTPATIENT
Start: 2025-01-29 | End: 2025-01-31 | Stop reason: HOSPADM

## 2025-01-29 RX ORDER — MIDAZOLAM HYDROCHLORIDE 1 MG/ML
INJECTION, SOLUTION INTRAMUSCULAR; INTRAVENOUS
Status: DISCONTINUED | OUTPATIENT
Start: 2025-01-29 | End: 2025-01-29 | Stop reason: SDUPTHER

## 2025-01-29 RX ORDER — ATORVASTATIN CALCIUM 40 MG/1
40 TABLET, FILM COATED ORAL NIGHTLY
Status: DISCONTINUED | OUTPATIENT
Start: 2025-01-29 | End: 2025-01-31 | Stop reason: HOSPADM

## 2025-01-29 RX ORDER — GLYCOPYRROLATE 1 MG/5 ML
SYRINGE (ML) INTRAVENOUS
Status: DISCONTINUED | OUTPATIENT
Start: 2025-01-29 | End: 2025-01-29 | Stop reason: SDUPTHER

## 2025-01-29 RX ORDER — LIDOCAINE HYDROCHLORIDE 20 MG/ML
INJECTION, SOLUTION INFILTRATION; PERINEURAL
Status: DISCONTINUED | OUTPATIENT
Start: 2025-01-29 | End: 2025-01-29 | Stop reason: SDUPTHER

## 2025-01-29 RX ORDER — HYDRALAZINE HYDROCHLORIDE 50 MG/1
100 TABLET, FILM COATED ORAL 3 TIMES DAILY
Status: DISCONTINUED | OUTPATIENT
Start: 2025-01-29 | End: 2025-01-31 | Stop reason: HOSPADM

## 2025-01-29 RX ADMIN — ROCURONIUM BROMIDE 20 MG: 10 INJECTION INTRAVENOUS at 10:52

## 2025-01-29 RX ADMIN — FENTANYL CITRATE 50 MCG: 50 INJECTION, SOLUTION INTRAMUSCULAR; INTRAVENOUS at 12:18

## 2025-01-29 RX ADMIN — SODIUM CHLORIDE: 9 INJECTION, SOLUTION INTRAVENOUS at 09:38

## 2025-01-29 RX ADMIN — FENTANYL CITRATE 50 MCG: 50 INJECTION, SOLUTION INTRAMUSCULAR; INTRAVENOUS at 10:24

## 2025-01-29 RX ADMIN — ATORVASTATIN CALCIUM 40 MG: 40 TABLET, FILM COATED ORAL at 21:23

## 2025-01-29 RX ADMIN — MIDAZOLAM 2 MG: 1 INJECTION INTRAMUSCULAR; INTRAVENOUS at 10:21

## 2025-01-29 RX ADMIN — SODIUM CHLORIDE 15 MG/HR: 9 INJECTION, SOLUTION INTRAVENOUS at 20:32

## 2025-01-29 RX ADMIN — SODIUM CHLORIDE 5 MG/HR: 9 INJECTION, SOLUTION INTRAVENOUS at 16:23

## 2025-01-29 RX ADMIN — PROPOFOL 50 MG: 10 INJECTION, EMULSION INTRAVENOUS at 12:07

## 2025-01-29 RX ADMIN — TICAGRELOR 180 MG: 90 TABLET ORAL at 09:39

## 2025-01-29 RX ADMIN — PROPOFOL 50 MG: 10 INJECTION, EMULSION INTRAVENOUS at 10:29

## 2025-01-29 RX ADMIN — SODIUM CHLORIDE 15 MG/HR: 9 INJECTION, SOLUTION INTRAVENOUS at 22:27

## 2025-01-29 RX ADMIN — SUGAMMADEX 400 MG: 100 INJECTION, SOLUTION INTRAVENOUS at 12:18

## 2025-01-29 RX ADMIN — CARVEDILOL 25 MG: 25 TABLET, FILM COATED ORAL at 21:23

## 2025-01-29 RX ADMIN — FENTANYL CITRATE 50 MCG: 50 INJECTION, SOLUTION INTRAMUSCULAR; INTRAVENOUS at 10:29

## 2025-01-29 RX ADMIN — ROCURONIUM BROMIDE 10 MG: 10 INJECTION INTRAVENOUS at 11:19

## 2025-01-29 RX ADMIN — HEPARIN SODIUM 5000 UNITS: 1000 INJECTION INTRAVENOUS; SUBCUTANEOUS at 10:45

## 2025-01-29 RX ADMIN — PROPOFOL 150 MG: 10 INJECTION, EMULSION INTRAVENOUS at 10:24

## 2025-01-29 RX ADMIN — HYDRALAZINE HYDROCHLORIDE 5 MG: 20 INJECTION INTRAMUSCULAR; INTRAVENOUS at 13:47

## 2025-01-29 RX ADMIN — AMLODIPINE BESYLATE 5 MG: 5 TABLET ORAL at 15:16

## 2025-01-29 RX ADMIN — ROCURONIUM BROMIDE 50 MG: 10 INJECTION INTRAVENOUS at 10:24

## 2025-01-29 RX ADMIN — SODIUM CHLORIDE 15 MG/HR: 9 INJECTION, SOLUTION INTRAVENOUS at 18:35

## 2025-01-29 RX ADMIN — HYDRALAZINE HYDROCHLORIDE 100 MG: 50 TABLET ORAL at 21:23

## 2025-01-29 RX ADMIN — DEXAMETHASONE SODIUM PHOSPHATE 10 MG: 10 INJECTION, EMULSION INTRAMUSCULAR; INTRAVENOUS at 10:32

## 2025-01-29 RX ADMIN — HEPARIN SODIUM 2000 UNITS: 1000 INJECTION INTRAVENOUS; SUBCUTANEOUS at 11:20

## 2025-01-29 RX ADMIN — Medication 0.1 MG: at 11:28

## 2025-01-29 RX ADMIN — LIDOCAINE HYDROCHLORIDE 100 MG: 20 INJECTION, SOLUTION INFILTRATION; PERINEURAL at 10:24

## 2025-01-29 RX ADMIN — ROCURONIUM BROMIDE 20 MG: 10 INJECTION INTRAVENOUS at 12:07

## 2025-01-29 RX ADMIN — LIDOCAINE HYDROCHLORIDE 4 ML: 20 INJECTION, SOLUTION INFILTRATION; PERINEURAL at 10:39

## 2025-01-29 RX ADMIN — ONDANSETRON 4 MG: 2 INJECTION INTRAMUSCULAR; INTRAVENOUS at 12:10

## 2025-01-29 RX ADMIN — HYDRALAZINE HYDROCHLORIDE 5 MG: 20 INJECTION INTRAMUSCULAR; INTRAVENOUS at 14:11

## 2025-01-29 RX ADMIN — FENTANYL CITRATE 50 MCG: 50 INJECTION, SOLUTION INTRAMUSCULAR; INTRAVENOUS at 12:10

## 2025-01-29 RX ADMIN — HYDRALAZINE HYDROCHLORIDE 100 MG: 50 TABLET ORAL at 15:17

## 2025-01-29 RX ADMIN — ROCURONIUM BROMIDE 20 MG: 10 INJECTION INTRAVENOUS at 11:42

## 2025-01-29 ASSESSMENT — PAIN DESCRIPTION - ORIENTATION
ORIENTATION: RIGHT
ORIENTATION: RIGHT

## 2025-01-29 ASSESSMENT — ENCOUNTER SYMPTOMS
NAUSEA: 0
ABDOMINAL PAIN: 0
PHOTOPHOBIA: 0
VOICE CHANGE: 1
TROUBLE SWALLOWING: 0
SHORTNESS OF BREATH: 0
VOMITING: 0

## 2025-01-29 ASSESSMENT — PAIN SCALES - GENERAL
PAINLEVEL_OUTOF10: 7
PAINLEVEL_OUTOF10: 6

## 2025-01-29 ASSESSMENT — PAIN DESCRIPTION - LOCATION: LOCATION: GROIN

## 2025-01-29 ASSESSMENT — PAIN DESCRIPTION - ONSET: ONSET: ON-GOING

## 2025-01-29 ASSESSMENT — PAIN DESCRIPTION - PAIN TYPE: TYPE: ACUTE PAIN

## 2025-01-29 ASSESSMENT — PAIN DESCRIPTION - DESCRIPTORS: DESCRIPTORS: ACHING

## 2025-01-29 ASSESSMENT — PAIN - FUNCTIONAL ASSESSMENT: PAIN_FUNCTIONAL_ASSESSMENT: PREVENTS OR INTERFERES SOME ACTIVE ACTIVITIES AND ADLS

## 2025-01-29 ASSESSMENT — PAIN DESCRIPTION - FREQUENCY: FREQUENCY: INTERMITTENT

## 2025-01-29 NOTE — H&P
Neurointerventional H&P pre-procedure Note    Date:1/29/2025       Room:Cath Pool Room/PL  Patient Name:Yulissa Andersen     YOB: 1975     Age:49 y.o.    Performing Physician: Bettye Weaver MD     Pre-procedure diagnosis: Left internal carotid artery aneurysm    Procedure: Diagnostic cerebral angiogram with possible left internal carotid artery aneurysm embolization    Subjective     Yulissa Andersen is a 49 y.o.female  with a pmhx of HTN, migraines, T2DM, CAD, GERD, obesity and left carotid artery aneurysm. She was initially evaluated inpatient at [facility] on 11/7/24 when she presented for hypertensive emergency, migraine headache with subsequent altered mental status. Stroke alert was activated and her NIH was 18 for aphasia, partial gaze palsy, bilateral blindness, all 4 extremities drift and hit the bed and left sided sensation deficit. She was treated with TNK. CT angiogram suggestive of possible RCVS and an aneurysm of the left internal carotid artery terminus, which prompted a cerebral angiogram with Dr. Weaver.  Angiogram on 11/8/24 demonstrated: No evidence of vasospasm. Changes of fibromuscular dysplasia in bilateral ICA cervical segments. 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  MRI brain was later noted to be negative for an acute stroke. She gradually began to improve, but did not return to her baseline.     Today she notes her speech continues to mildly improve but is still slow and not at baseline. She continues to have left-sided weakness and numbness, unchanged since her last office visit.  She continues to endorse chronic daily headaches, notes she has not been taking Tylenol/ibuprofen at home due to concerns of her upcoming procedure.  She continues to participate in outpatient PT OT and SLP.  She denies dizziness/lightheadedness, syncope and seizures.       Review of Systems   Review of Systems   Constitutional:  Negative for activity

## 2025-01-29 NOTE — PLAN OF CARE
Neurointerventional Plan of Care Note     Date:1/29/2025  Room:Cath Pool Room/PL   Patient Name:uYlissa Andersen      YOB: 1975     Age:49 y.o.      Patient underwent DSA with left ICA aneurysm embolization with Dr. Weaver on 1/29. Results include: \"Left ICA terminus aneurysm, successful stent assisted coil embolization. Intraarterial vasodilators injection\".  Angioseal was used for wound closure, will need 3 hrs bedrest after procedure.   She was found to be a non-responder to plavix, P2Y12 at 198. She was loaded with 180 mg brilinta prior to her procedure. Continue ASA 81 mg. She is also on eliquis for DVT, can continue. Maintain normotensive BP (-140).     Upon exam after procedure, a skin tag near her right eye is bleeding. Repeat exam also revealed subconjunctival hemorrhages in bilateral eyes.  She complains of a new visual floater in her right eye.  Visual fields remain intact.  FemoStop was applied per RN due to concerns for developing hematoma on her right groin site.  Due to complaints for right thigh numbness FemoStop was removed, no evidence of hematoma on my exam but tenderness was noted.  Hematoma developing underneath her tongue on the left side.    Recheck of labs ordered including aPTT, INR, P2Y12, and CBC.     P2Y12 unable to be completed due to lack of supplies at the lab.  At this point recommending discontinuing Brilinta.  Continue on aspirin 81 mg and Eliquis per home regimen at this time.     She will need a follow up in OP neurointervention clinic in 2 months. A brain MRI already ordered for an OP basis, no other imaging needed.         Electronically signed by Martita Stallings PA-C on 1/29/2025 at 12:32 PM

## 2025-01-29 NOTE — ANESTHESIA PRE PROCEDURE
Department of Anesthesiology  Preprocedure Note       Name:  Yulissa Andersen   Age:  49 y.o.  :  1975                                          MRN:  [id number]         Date:  2025      Surgeon: * No surgeons listed *    Procedure: * No procedures listed *    Medications prior to admission:   Prior to Admission medications    Medication Sig Start Date End Date Taking? Authorizing Provider   metFORMIN (GLUCOPHAGE-XR) 500 MG extended release tablet Take 2 tablets by mouth daily (with breakfast) 24  Yes Paola Tomlinson MD   UBRELVY 100 MG TABS TAKE 1 TABLET BY MOUTH AS NEEDED FOR HEADACHE. REPEAT IN TWO HOURS IF NEEDED. MAX DOSE 200 MG IN 24 HOUR PERIOD. LIMIT 10 DAYS PER MONTH. 24  Yes Paola Tomlinson MD   QULIPTA 60 MG TABS TAKE 1 TABLET BY MOUTH ONCE DAILY AS NEEDED FOR MIGRAINE prevention   Yes Paola Tomlinson MD   dulaglutide (TRULICITY) 0.75 MG/0.5ML SOAJ SC injection Inject into the skin once a week 24  Yes Paola Tomlinson MD   Multiple Vitamin (MULTIVITAMIN) TABS Take by mouth 10/8/24  Yes Paola Tomlinson MD   clopidogrel (PLAVIX) 75 MG tablet Take 1 tablet by mouth daily 25  Yes Martita Stallings PA-C   ASPIRIN LOW DOSE 81 MG chewable tablet TAKE 1 TABLET BY MOUTH ONCE DAILY 25  Yes Jolie Betts MD   ELIQUIS 5 MG TABS tablet TAKE 1 TABLET BY MOUTH TWICE DAILY 25  Yes Jolie Betts MD   atorvastatin (LIPITOR) 40 MG tablet TAKE 1 TABLET BY MOUTH ONCE DAILY AT night 25  Yes Jolie Betts MD   hydroCHLOROthiazide (HYDRODIURIL) 25 MG tablet TAKE 1 TABLET BY MOUTH ONCE DAILY 25  Yes Jolie Betts MD   valsartan (DIOVAN) 320 MG tablet TAKE 1 TABLET BY MOUTH ONCE DAILY 24  Yes Jolie Betts MD   amLODIPine (NORVASC) 5 MG tablet Take 1 tablet by mouth daily 6/3/24  Yes Jolie Betts MD   carvedilol (COREG) 25 MG tablet Take 1 tablet by mouth 2 times daily 3/14/24  Yes Jolie Betts,

## 2025-01-29 NOTE — PLAN OF CARE
Problem: Chronic Conditions and Co-morbidities  Goal: Patient's chronic conditions and co-morbidity symptoms are monitored and maintained or improved  Outcome: Progressing  Flowsheets (Taken 1/29/2025 1551)  Care Plan - Patient's Chronic Conditions and Co-Morbidity Symptoms are Monitored and Maintained or Improved:   Monitor and assess patient's chronic conditions and comorbid symptoms for stability, deterioration, or improvement   Collaborate with multidisciplinary team to address chronic and comorbid conditions and prevent exacerbation or deterioration   Update acute care plan with appropriate goals if chronic or comorbid symptoms are exacerbated and prevent overall improvement and discharge     Problem: Discharge Planning  Goal: Discharge to home or other facility with appropriate resources  Outcome: Progressing  Flowsheets (Taken 1/29/2025 1551)  Discharge to home or other facility with appropriate resources:   Identify barriers to discharge with patient and caregiver   Identify discharge learning needs (meds, wound care, etc)   Refer to discharge planning if patient needs post-hospital services based on physician order or complex needs related to functional status, cognitive ability or social support system     Problem: Pain  Goal: Verbalizes/displays adequate comfort level or baseline comfort level  Outcome: Progressing  Flowsheets (Taken 1/29/2025 1551)  Verbalizes/displays adequate comfort level or baseline comfort level:   Encourage patient to monitor pain and request assistance   Administer analgesics based on type and severity of pain and evaluate response   Consider cultural and social influences on pain and pain management   Assess pain using appropriate pain scale     Care plan reviewed with patient and family.  Patient and family verbalize understanding of the plan of care and contribute to goal setting.

## 2025-01-29 NOTE — PROGRESS NOTES
Lab tubes are now available to draw the p2y12 inhibition test.  A second barrier has emerged with the main lab telling me the instrumentation to conduct this test is out of order with no time on fix.

## 2025-01-29 NOTE — PROCEDURES
catheter was then attached to a continuous heparinized pressure line.  Next, the microcatheter was introduced and advanced over the microwire and positioned into the aneurysm under direct fluoroscopy guidance. The aneurysm was catheterized without difficulty.  Another Excelsior SL 10 microcatheter was advanced over the Synchro microwire under roadmap into the left anterior cerebral artery A2 segment.  The microwire was taken outside.  Based on the vessel measurement and availability and Neuroform [place] stent 3 mm x 15 mm was introduced into the microcatheter.  The stent was then deployed in the left anterior cerebral artery A1 segment across the neck and of the aneurysm and then into the left internal carotid artery supraclinoid segment.  Follow-up angiogram was performed demonstrated complete unsuccessful deployment of the stent.  At this time 3 microcoils were introduced into the aneurysm through the microcatheter while monitoring under fluoroscopy.  Follow-up angiogram was performed after deployment of each 1 of these microcoils.  With a final follow-up angiogram demonstrated complete filling of the aneurysm with no residual contrast filling.  The microcatheter was then taken outside the system.  Finally follow-up angiogram of the left internal carotid artery - cranial station- standard PA and lateral angiograms were then performed to assess for thromboembolic complications; there are no thromboembolic complications, all parent vessels are patent. The images also demonstrate complete radiographic occlusion of the aneurysm Lai Keagan 1.    The catheter was removed from the system. An angiogram of the right common femoral and external iliac arteries was performed to confirm that the sheath was not in the superficial femoral branch. The 6-Ethiopian long sheath introducer was removed over a J-wire and exchanged for a 6-Eth[place] AngioSeal closure device. The AngioSeal closure devise was successfully deployed and  hemostasis was achieved. A sterile dressing was placed and the patient was transferred to the PACU in stable condition. The patient tolerated the procedure well.    Dr. Weaver was present for the entire procedure and subsequent image interpretation.    IMPRESSION:    An aneurysm of the left internal carotid artery terminal segment measuring 5.2 mm x 4.5 mm x 2.9 mm with a neck measuring 4.1 mm  Successful embolization of the aneurysm with no residual filling using stent assisted coiling

## 2025-01-29 NOTE — PROGRESS NOTES
1445-  Bruising on patient's tongue has worsened, most severe on left lateral side.  Continues to complain of swelling.  Subconjunctival hemorrhage present in the left eye.  Complaints of numbness on left thigh, femostop pressure reduced.  No evidence of increase in hematoma size.  Skin tag under right eye continues to ooze blood.

## 2025-01-29 NOTE — PROCEDURES
PROCEDURE NOTE  Date: 1/29/2025   Name: Yulissa Andersen  YOB: 1975    Procedures: Cerebral Angiogram with possible Aneurysm Coiling  1000 Verify consent, H&P, and/or immediate pre-procedure note completed  Staff involved in the case: Gomez GIRON RT, Nieves BECKER RT, Alina CHACON RT, Michelle KELLY RN, Dominic ELIZALDE RN, Ken BANERJEE RN, Dr. Weaver, Juancarlos CRNA  1012 Patient received in cath lab for procedure family taken to waiting room. Neuro assessment performed, see flowsheets.  (Neuro assessments suspended while in procedure, vital signs, and pulse oximeter every 5 minutes documented in flow sheets  1015 Pre-procedure peripheral pulse,  right pedal 2, right posterior tibial 2, left pedal 1, left posterior tibial 1  1017 Patient prepped for procedure  1036 Procedure started with Dr. Weaver.Timeout performed and the following information was verified: correct patient, correct procedure, correct procedure site, correct position, correct laterality (if applicable), procedure site marked and visible (if applicable), and consents verified.  Allergies reviewed.  Pertinent diagnostic and radiologic results present and relevant images available (if applicable).  All special equipment or special requirements available as applicable. Prophylactic antibiotics given as applicable.  Fire risk safety assessment completed, shared with team and appropriate interventions implemented.  1039 Lidocaine 2% 4ml administered to the right groin  1040 Access obtained at right femoral artery via 6Fr. 25cm sheath  1045 Angiogram of the left internal carotid  1055 Left Cerebral 3D angiogram   1100 Dr. Weaver reviewing images   1120   1148 [place] stent deployed across aneurysm   1153 Target XL coil deployed  1157 Target 360 [place] coil deployed  1158 v  1201 Target 360 [place] deployed  1205 Left internal carotid angiogram   1206 Angiogram of the right femoral for possible closure device  1208 Sheath-removed, intact, 6Fr. Angioseal use for closure  1209  Procedure completed; patient tolerated well.  1218 Femoral site; area soft to touch with no bleeding noted. Hemostasis achieved.  1220 Post-procedure peripheral pulse,  right pedal 2, right posterior tibial 2, left pedal 1, left posterior tibial 1  1230 Femoral dressing remains dry and intact with area soft. Neuro assessment performed, see flowsheet    1235 HR 84 /80 SpO2 100 10L Simple Mask per anesthesia  1235 Denny Score 8  1235 Case end time  1235 Patient on bed, patient exits procedural suite  Patient take to 4D06    Xray time- 19.3 minutes   827mGy DAP 78  Sedation time- per anesthesia  Total contrast- 124ml's

## 2025-01-29 NOTE — BRIEF OP NOTE
Brief Postoperative Note      Patient: Yulissa Andersen  YOB: 1975  MRN: [id number]    Date of Procedure: 1/29/2025    Cerebral aneurysm    Post-Op Diagnosis: Same       Aneurysm embolization    Surgeon(s):  Bettye Weaver MD    Assistant:  None    Anesthesia: GA    Estimated Blood Loss (mL): Minimal    Complications: None    Specimens:   * No specimens in log *    Implants:  Implant Name Type Inv. Item Serial No.  Lot No. LRB No. Used Action   COIL EMB L8CM DIA4MM 360DEG SFT DETACH TARGET XL - [id number] Vascular coils COIL EMB L8CM DIA4MM 360DEG SFT DETACH TARGET XL  [employer] [id number]  1 Implanted   STENT VASC 3X15 MM INTCRAN W/O TIP SYS NEUROFORM ATLS - [id number] Peripheral stents STENT VASC 3X15 MM INTCRAN W/O TIP SYS NEUROFORM ATLS  [employer] [id number]  1 Implanted   COIL EMB L4CM DIA1.5MM 360DEG LIZ DETACH TARGET - [id number] Vascular coils COIL EMB L4CM DIA1.5MM 360DEG LIZ DETACH TARGET  [employer] [id number]  1 Implanted         Drains: * No LDAs found *    Findings:  Left ICA terminus aneurysm  Successful stent assisted coil embolization  Intraarterial vasodilators injection    Electronically signed by Bettye Weaver MD on 1/29/2025 at 12:11 PM

## 2025-01-29 NOTE — CONSULTS
CRITICAL CARE PROGRESS NOTE      Patient:  Yulissa Andersen    Unit/Bed:4D-06/006-A  MRN: [id number]   PCP: Amanda García APRN - CNP  Date of Admission: 1/29/2025    Assessment and Plan(All pulmonary edema, renal failure, PE, and respiratory failure diagnoses are acute in nature unless otherwise specified):        Left ICA Terminus Aneurysm: s/p stent assisted coil embolization 1/29/25 by Dr. Weaver.   Continue Aspirin   Restart Eliquis tomorrow if no worsening bleeds  Stopping Brilinta due to bleeding with subjunctival hemorrhage and concern for lack of tolerance/allergy to Brilinta.   Goal blood pressure within normotensive range  Bedrest for 3 hours  Neuro checks  HTN: History of resistant HTN. On hydralazine, coreg, amlodipine, hydrochlorothiazide and valsartan  For better blood pressure control will start Cardene drip with home oral antihypertensives, with plan to wean cardene off as oral medications begin to take effect  Continue hydralazine and coreg  Can start valsartan and hydrochlorothiazide if kidney function stable post procedure  Can start amlodipine as Cardene is turned off as they are both CCBs  Migraines: Tylenol as needed. Holding home ubrelvy  T2DM: A1c 6.1. On metformin outpatient.   Low dose SSI  Hypoglycemia protocol  POCT glucose checks  GERD: Continue PPI  HLD: continue lipitor  DVT prophylaxis: SCDs    CC:  Left ICA Aneurysm  HPI: Ms. Andersen is a 48 yo female with pmhx of HTN, migraines, T2DM, CAD, GERD, obesity and left ICA aneurysm that initially presented to [facility] 11/7/24 for hypertensive emergency, migraine and altered mental status. A code stroke was called with an NIH of 18 for aphasia, partial gaze palsy, bilateral blindness, drift x4, and left sided sensation deficit and left facial droop. She was treated with TNK. CT angiogram found possible RCVS and an aneurysm of the left ICA terminus, which prompted further workup by Dr. Weaver. MRI was negative for stroke and patient gradually  improved but did not return to baseline. Still has a left sided facial droop and dysarthria. She presented today for scheduled diagnostic cerebral angiogram with possible left ICA aneurysm embolization and is now s/p stent assisted coiling of the aneurysm. Patient was transferred to ICU for close monitoring with strict bedrest for 3 hours. Brilinta stopped after loading dose as patient had bleeding, with hematoma of the tongue and subjunctival hemorrhage of bilateral eyes.   ROS: Denies headache, vision changes, focal weakness or numbness. All review of systems otherwise negative.   PMH:  Per HPI  SHX:  Nonsmoker. Denies alcohol or drug use.   FHX: Mother: heart disease, HTN, arthritis. Siblings: HTN.  Allergies: ACEi (angioedema), Bactrim (interstitial nephritis), Penicillins (hives), Ciprofloxacin (hives), Clindamycin/lincomycin (rash), doxycycline (hives)  Medications:     sodium chloride      niCARdipine 10 mg/hr (01/29/25 7657)    dextrose        sodium chloride flush  5-40 mL IntraVENous 2 times per day    [Held by provider] amLODIPine  5 mg Oral Daily    [Held by provider] aspirin  81 mg Oral Daily    atorvastatin  40 mg Oral Nightly    carvedilol  25 mg Oral BID    [Held by provider] clopidogrel  75 mg Oral Daily    [Held by provider] apixaban  5 mg Oral BID    hydrALAZINE  100 mg Oral TID    [Held by provider] hydroCHLOROthiazide  25 mg Oral Daily    [START ON 1/30/2025] pantoprazole  40 mg Oral QAM AC    [Held by provider] valsartan  320 mg Oral Daily    insulin lispro  0-4 Units SubCUTAneous 4x Daily AC & HS       Vital Signs:   T: 98.3: P: 110 RR: 14 B/P: 160/76: O2 Sat:99: I/O: +763 GCS: 15  Body mass index is 34.61 kg/m².  General:   Acutely ill-appearing black female  HEENT:  normocephalic and atraumatic.  No scleral icterus. PERR  Neck: supple.  No Thyromegaly.  Lungs: clear to auscultation.  No retractions  Cardiac: RRR.  No JVD.  Abdomen: soft.  Nontender.  Extremities:  No clubbing, cyanosis, or

## 2025-01-29 NOTE — FLOWSHEET NOTE
0900 Patient admitted to 2E1  Ambulatory for neuro angiogram and stent or coiling.   Patient NPO. Patient accompanied by domestic partner, Rene.  Vital signs obtained.   Assessment and data collection intiated.   Oriented to room.  Policies and procedures for 2E explained.   All questions answered with no further questions at this time.   Fall precautions reviewed with patient.     0900 Care plan reviewed with patient and family.  Patient and family verbalize understanding of the plan of care and contribute to goal setting.       0900 reported to Martita regarding elevated BP and medications patient held. Neuro team aware that patient had morning dose of Eliquis, asa, plavix and loading dose of brilinta. Also reported a low grade temp oral 99.0.    0925 Anesthesia at bedside.    180 mg of brilinta was given per neuro team      1007 to cath lab per bed, stable condition.

## 2025-01-30 ENCOUNTER — APPOINTMENT (OUTPATIENT)
Dept: GENERAL RADIOLOGY | Age: 50
DRG: 026 | End: 2025-01-30
Attending: PSYCHIATRY & NEUROLOGY
Payer: COMMERCIAL

## 2025-01-30 ENCOUNTER — TELEPHONE (OUTPATIENT)
Dept: NEUROLOGY | Age: 50
End: 2025-01-30

## 2025-01-30 ENCOUNTER — APPOINTMENT (OUTPATIENT)
Dept: PHYSICAL THERAPY | Age: 50
End: 2025-01-30
Payer: COMMERCIAL

## 2025-01-30 ENCOUNTER — APPOINTMENT (OUTPATIENT)
Dept: SPEECH THERAPY | Age: 50
End: 2025-01-30
Payer: COMMERCIAL

## 2025-01-30 PROBLEM — I67.1 CEREBRAL ANEURYSM: Status: ACTIVE | Noted: 2025-01-29

## 2025-01-30 LAB
ANION GAP SERPL CALC-SCNC: 13 MEQ/L (ref 8–16)
BUN SERPL-MCNC: 8 MG/DL (ref 7–22)
CALCIUM SERPL-MCNC: 8.5 MG/DL (ref 8.5–10.5)
CHLORIDE SERPL-SCNC: 103 MEQ/L (ref 98–111)
CO2 SERPL-SCNC: 18 MEQ/L (ref 23–33)
CREAT SERPL-MCNC: 0.6 MG/DL (ref 0.4–1.2)
DEPRECATED RDW RBC AUTO: 49 FL (ref 35–45)
ERYTHROCYTE [DISTWIDTH] IN BLOOD BY AUTOMATED COUNT: 15.5 % (ref 11.5–14.5)
FERRITIN SERPL IA-MCNC: 148 NG/ML (ref 10–291)
FLUAV RNA RESP QL NAA+PROBE: NOT DETECTED
FLUBV RNA RESP QL NAA+PROBE: NOT DETECTED
GFR SERPL CREATININE-BSD FRML MDRD: > 90 ML/MIN/1.73M2
GLUCOSE BLD STRIP.AUTO-MCNC: 152 MG/DL (ref 70–108)
GLUCOSE BLD STRIP.AUTO-MCNC: 167 MG/DL (ref 70–108)
GLUCOSE BLD STRIP.AUTO-MCNC: 179 MG/DL (ref 70–108)
GLUCOSE BLD STRIP.AUTO-MCNC: 203 MG/DL (ref 70–108)
GLUCOSE SERPL-MCNC: 172 MG/DL (ref 70–108)
HCT VFR BLD AUTO: 30.4 % (ref 37–47)
HGB BLD-MCNC: 9.7 GM/DL (ref 12–16)
HGB RETIC QN AUTO: 31.5 PG (ref 28.2–35.7)
IMM RETICS NFR: 24 % (ref 3–15.9)
IRON SATN MFR SERPL: 15 % (ref 20–50)
IRON SERPL-MCNC: 38 UG/DL (ref 50–170)
MCH RBC QN AUTO: 27.6 PG (ref 26–33)
MCHC RBC AUTO-ENTMCNC: 31.9 GM/DL (ref 32.2–35.5)
MCV RBC AUTO: 86.6 FL (ref 81–99)
MRSA DNA SPEC QL NAA+PROBE: NEGATIVE
PLATELET # BLD AUTO: 367 THOU/MM3 (ref 130–400)
PMV BLD AUTO: 9.5 FL (ref 9.4–12.4)
POTASSIUM SERPL-SCNC: 3.6 MEQ/L (ref 3.5–5.2)
RBC # BLD AUTO: 3.51 MILL/MM3 (ref 4.2–5.4)
RETICS # AUTO: 69 THOU/MM3 (ref 20–115)
RETICS/RBC NFR AUTO: 1.9 % (ref 0.5–2)
SARS-COV-2 RNA RESP QL NAA+PROBE: NOT DETECTED
SODIUM SERPL-SCNC: 134 MEQ/L (ref 135–145)
TIBC SERPL-MCNC: 262 UG/DL (ref 171–450)
WBC # BLD AUTO: 11.3 THOU/MM3 (ref 4.8–10.8)

## 2025-01-30 PROCEDURE — 2500000003 HC RX 250 WO HCPCS

## 2025-01-30 PROCEDURE — 36415 COLL VENOUS BLD VENIPUNCTURE: CPT

## 2025-01-30 PROCEDURE — 6370000000 HC RX 637 (ALT 250 FOR IP)

## 2025-01-30 PROCEDURE — 87636 SARSCOV2 & INF A&B AMP PRB: CPT

## 2025-01-30 PROCEDURE — 1200000003 HC TELEMETRY R&B

## 2025-01-30 PROCEDURE — 71046 X-RAY EXAM CHEST 2 VIEWS: CPT

## 2025-01-30 PROCEDURE — 99232 SBSQ HOSP IP/OBS MODERATE 35: CPT | Performed by: INTERNAL MEDICINE

## 2025-01-30 PROCEDURE — 85046 RETICYTE/HGB CONCENTRATE: CPT

## 2025-01-30 PROCEDURE — 85027 COMPLETE CBC AUTOMATED: CPT

## 2025-01-30 PROCEDURE — 82948 REAGENT STRIP/BLOOD GLUCOSE: CPT

## 2025-01-30 PROCEDURE — 80048 BASIC METABOLIC PNL TOTAL CA: CPT

## 2025-01-30 PROCEDURE — 2580000003 HC RX 258

## 2025-01-30 PROCEDURE — 99232 SBSQ HOSP IP/OBS MODERATE 35: CPT

## 2025-01-30 PROCEDURE — 82728 ASSAY OF FERRITIN: CPT

## 2025-01-30 PROCEDURE — 6360000002 HC RX W HCPCS

## 2025-01-30 PROCEDURE — 83540 ASSAY OF IRON: CPT

## 2025-01-30 PROCEDURE — 83550 IRON BINDING TEST: CPT

## 2025-01-30 RX ORDER — GUAIFENESIN/DEXTROMETHORPHAN 100-10MG/5
5 SYRUP ORAL EVERY 4 HOURS PRN
Status: DISCONTINUED | OUTPATIENT
Start: 2025-01-30 | End: 2025-01-31 | Stop reason: HOSPADM

## 2025-01-30 RX ADMIN — SODIUM CHLORIDE 15 MG/HR: 9 INJECTION, SOLUTION INTRAVENOUS at 00:15

## 2025-01-30 RX ADMIN — SODIUM CHLORIDE, PRESERVATIVE FREE 10 ML: 5 INJECTION INTRAVENOUS at 20:42

## 2025-01-30 RX ADMIN — APIXABAN 5 MG: 5 TABLET, FILM COATED ORAL at 20:41

## 2025-01-30 RX ADMIN — ATORVASTATIN CALCIUM 40 MG: 40 TABLET, FILM COATED ORAL at 20:41

## 2025-01-30 RX ADMIN — AMLODIPINE BESYLATE 5 MG: 5 TABLET ORAL at 08:41

## 2025-01-30 RX ADMIN — INSULIN LISPRO 1 UNITS: 100 INJECTION, SOLUTION INTRAVENOUS; SUBCUTANEOUS at 20:44

## 2025-01-30 RX ADMIN — APIXABAN 5 MG: 5 TABLET, FILM COATED ORAL at 09:09

## 2025-01-30 RX ADMIN — CARVEDILOL 25 MG: 25 TABLET, FILM COATED ORAL at 20:41

## 2025-01-30 RX ADMIN — CARVEDILOL 25 MG: 25 TABLET, FILM COATED ORAL at 08:41

## 2025-01-30 RX ADMIN — HYDRALAZINE HYDROCHLORIDE 100 MG: 50 TABLET ORAL at 08:41

## 2025-01-30 RX ADMIN — PANTOPRAZOLE SODIUM 40 MG: 40 TABLET, DELAYED RELEASE ORAL at 07:05

## 2025-01-30 RX ADMIN — HYDRALAZINE HYDROCHLORIDE 100 MG: 50 TABLET ORAL at 16:16

## 2025-01-30 RX ADMIN — SODIUM CHLORIDE 12.5 MG/HR: 9 INJECTION, SOLUTION INTRAVENOUS at 02:03

## 2025-01-30 RX ADMIN — SODIUM CHLORIDE, PRESERVATIVE FREE 10 ML: 5 INJECTION INTRAVENOUS at 08:45

## 2025-01-30 RX ADMIN — ASPIRIN 81 MG 81 MG: 81 TABLET ORAL at 08:41

## 2025-01-30 RX ADMIN — HYDRALAZINE HYDROCHLORIDE 100 MG: 50 TABLET ORAL at 20:41

## 2025-01-30 RX ADMIN — VALSARTAN 320 MG: 320 TABLET ORAL at 08:41

## 2025-01-30 ASSESSMENT — ENCOUNTER SYMPTOMS
ABDOMINAL PAIN: 0
VOMITING: 0
PHOTOPHOBIA: 0
TROUBLE SWALLOWING: 0
NAUSEA: 0
SHORTNESS OF BREATH: 0

## 2025-01-30 ASSESSMENT — PAIN SCALES - GENERAL: PAINLEVEL_OUTOF10: 0

## 2025-01-30 NOTE — PROGRESS NOTES
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 Weaver. History of fibromuscular dysplasia in bilateral ICA cervical segments.  Continue Aspirin   Restart Eliquis   Goal blood pressure within normotensive range  HTN: History of resistant HTN. On hydralazine, coreg, amlodipine, hydrochlorothiazide and valsartan. Cardene drip was started 1/29/25 for further blood pressure control while initiating oral medications. Cardene drip stopped at 3AM due to blood pressure control.   Continue amlodipine, valsartan, hydralazine and coreg. Will watch blood pressure and add back hydrochlorothiazide as tolerated.   Reactive Leukocytosis: Patient has WBC count of 11.3 today. As she is afebrile, without cough, shortness of breath, or tachycardic this is attributed to being post-embolization day 1. Will continue to monitor CBC.   Acute on Chronic Anemia: hgb dropped from 10.8 to 9.7 overnight. Post procedure, patient had bleeding from a skin tag on the face and subjunctival hemorrhages, with concern for hematoma at femoral entrance. Brilinta was stopped and bleeding resolved. Continue to monitor CBC.   Migraines: Tylenol as needed. Holding home ubrelvy  T2DM: A1c 6.1. On metformin outpatient.   Low dose SSI  Hypoglycemia protocol  POCT glucose checks  GERD: Continue PPI  HLD: continue lipitor  DVT prophylaxis: SCDs    CC:  Left ICA Aneurysm   HPI: Ms. Andersen is a 50 yo female with pmhx of HTN, migraines, T2DM, CAD, GERD, obesity and left ICA aneurysm that initially presented to [facility] 11/7/24 for hypertensive emergency, migraine and altered mental status. A code stroke was called with an NIH of 18  Oral Daily    insulin lispro  0-4 Units SubCUTAneous 4x Daily AC & HS       Vital Signs:   T: 98.3: P: 86 RR: 15 B/P: 135/70: O2 Sat:99: I/O: +540 (-43) GCS: 15  Body mass index is 34.61 kg/m².  General:   Acutely ill-appearing female  HEENT:  normocephalic and atraumatic.  No scleral icterus. PERR  Neck: supple.  No Thyromegaly.  Lungs: clear to auscultation.  No retractions  Cardiac: RRR.  No JVD.  Abdomen: soft.  Nontender.  Extremities:  No clubbing, cyanosis, or edema x 4.    Vasculature: capillary refill < 3 seconds. Palpable dorsalis pedis pulses.  Skin:  warm and dry.  Psych:  Alert and oriented x3.  Affect appropriate.  Neurologic:  No focal deficit. No seizures.    Data: (All radiographs, tracings, PFTs, and imaging are personally viewed and interpreted unless otherwise noted).   Sodium 134, potassium 3.6, carbon 18 BUN 8, creatinine 0.6, glucose 172, hematocrit 30.4, platelet 367  Telemetry: normal sinus rhythm     Case discussed with Dr. Mendes.   Electronically signed by Aundrea Newman-Waterhouse D.O.  resident - PGY-1.     CRITICAL CARE SPECIALIST.   Patient seen by me including key components of medical care.  Case discussed with resident physician.  Nicole Batista.  Italicized bold font, if present,  represents changes to the note made by me.  CC time 35 minutes.  Time was discontiguous. Time does not include procedure. Time does include my direct assessment of the patient and coordination of care.  Time represents more than 50% of the time involved with patient care by the CC team.  Electronically signed by Ayo Mendes MD.

## 2025-01-30 NOTE — PROGRESS NOTES
Spiritual Health History and Assessment/Progress Note  [facility]    (P) Spiritual/Emotional Needs,  ,  ,      Name: Yulissa Andersen MRN: [id number]    Age: 49 y.o.     Sex: female   Language: English   Sikh: None   Cerebral aneurysm     Date: 1/30/2025            Total Time Calculated: (P) 16 min              Spiritual Assessment began in [facility] 5K        Referral/Consult From: (P) Rounding   Encounter Overview/Reason: (P) Spiritual/Emotional Needs  Service Provided For: (P) Patient and family together    Marilin, Belief, Meaning:   Patient has beliefs or practices that help with coping during difficult times  Family/Friends have beliefs or practices that help with coping during difficult times      Importance and Influence:  Patient has spiritual/personal beliefs that influence decisions regarding their health  Family/Friends have spiritual/personal beliefs that influence decisions regarding the patient's health    Community:  Patient feels well-supported. Support system includes: Spouse/Partner and Marilin Community  Family/Friends feel well-supported. Support system includes: Spouse/Partner and Marilin Community    Assessment and Plan of Care:   Pt is a pleasant, 49y.o. female, sitting in easychair visiting with her significant other, in 5K-15. Yulissa shared successful procedure for aneurysm and transfer off the floor yet today. 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    Patient Interventions include: Facilitated expression of thoughts and feelings, Affirmed coping skills/support systems, and Other: Prayed for patient bedside with significant other.  Family/Friends Interventions include: Facilitated expression of thoughts and feelings, Affirmed coping skills/support systems, and Other: Prayed for patient bedside, along with significant other.    Patient Plan of Care:

## 2025-01-30 NOTE — PLAN OF CARE
Problem: Chronic Conditions and Co-morbidities  Goal: Patient's chronic conditions and co-morbidity symptoms are monitored and maintained or improved  1/30/2025 0358 by Mercy Garces RN  Outcome: Progressing  Flowsheets (Taken 1/29/2025 2000)  Care Plan - Patient's Chronic Conditions and Co-Morbidity Symptoms are Monitored and Maintained or Improved: Monitor and assess patient's chronic conditions and comorbid symptoms for stability, deterioration, or improvement  1/29/2025 1551 by Yariel Rivera, RN  Outcome: Progressing  Flowsheets (Taken 1/29/2025 1551)  Care Plan - Patient's Chronic Conditions and Co-Morbidity Symptoms are Monitored and Maintained or Improved:   Monitor and assess patient's chronic conditions and comorbid symptoms for stability, deterioration, or improvement   Collaborate with multidisciplinary team to address chronic and comorbid conditions and prevent exacerbation or deterioration   Update acute care plan with appropriate goals if chronic or comorbid symptoms are exacerbated and prevent overall improvement and discharge     Problem: Discharge Planning  Goal: Discharge to home or other facility with appropriate resources  1/30/2025 0358 by Mercy Garces RN  Outcome: Progressing  Flowsheets (Taken 1/29/2025 2000)  Discharge to home or other facility with appropriate resources: Identify barriers to discharge with patient and caregiver  1/29/2025 1551 by Yariel Rivera RN  Outcome: Progressing  Flowsheets (Taken 1/29/2025 1551)  Discharge to home or other facility with appropriate resources:   Identify barriers to discharge with patient and caregiver   Identify discharge learning needs (meds, wound care, etc)   Refer to discharge planning if patient needs post-hospital services based on physician order or complex needs related to functional status, cognitive ability or social support system     Problem: Pain  Goal: Verbalizes/displays adequate comfort level or baseline comfort

## 2025-01-30 NOTE — PLAN OF CARE
Hospitalist Progress Note  Internal Medicine Resident      Patient: Yulissa Andersen 49 y.o. female      Unit/Bed: 4D-06/006-A    Admit Date: 1/29/2025      ASSESSMENT AND PLAN  Active Problems  Left ICA terminus aneurysm: S/p successful stent assisted coil embolization with intra-arterial vasodilator injection 1/29/2025.  Received 180 mg loading dose of Brilinta at 0939 on 1/29, developed bleeding specifically subconjunctival hemorrhages and tongue hematoma, therefore held off maintenance antiplatelet therapy.  Initially used Cardene drip for tight BP control after procedure, drip stopped at 3 AM on 1/30.  Continuing aspirin 81 mg daily and Eliquis 5 mg twice daily  Follow neurorecommendations for restarting Plavix  Goal BP under 130/80  Continuing amlodipine 5 mg daily, carvedilol 25 mg twice daily, hydralazine 100 mg 3 times daily, valsartan 320 mg daily  Reactive leukocytosis, improved: WBC normal upon presentation, trended up to zenith 16, on 1/30 down to 11.  Likely reactive in setting of invasive procedure.  Subconjunctival hemorrhages, bilaterally: Complication of Brilinta loading dose, patient initially reporting 1 spot associated with the hemorrhage which is now improving.  Continuing to hold antiplatelet, will coordinate with neurology for starting Plavix.  New onset cough: Patient reporting new cough, will obtain COVID/flu test and chest x-ray.   NAGMA: Mild but worsening, upon admission bicarb low at 20, downtrending to 18.  AG WNL at 13.  Sending urine electrolytes to calculate urine anion gap, as well as urine pH.  NIDDM 2: HbA1c from November 2024 was 6.1% indicating good control on metformin 500 mg twice daily.  Holding metformin while inpatient, low-dose SSI with hypoglycemia protocol in place, POCT glucose checks.  Migraines: Home meds include Qulipta 60 mg daily for prevention, Ubrelvy 100 mg as needed for abortive therapy.  Acute on chronic normocytic anemia: Patient baseline hemoglobin appears  to be around 11-12, presented with hemoglobin 10, mildly downtrending => 9.7.  Distant iron study from March 2023 consistent with iron deficiency anemia, low iron plus low iron saturation, will repeat iron studies.  Acute mild hypotonic hyponatremia: Sodium mild downtrend from 137 => 134.  Resolved Problems  Hematoma of tongue: Consequence of Brilinta loading dose, improved  Chronic Conditions (reviewed and stable unless otherwise stated)  History of fibromuscular dysplasia: Affecting bilateral ICA cervical segments  Resistant HTN: Home meds include Norvasc 5 mg daily, Coreg 25 mg twice daily, hydralazine 25 mg 3 times daily, valsartan.  Increased hydralazine to 100 mg 3 times daily  GERD: Home meds include Prilosec 20 mg daily, substituted with Protonix 40 mg daily while inpatient  HLD: Continue Lipitor 40 mg daily  HX fibroids s/p hysterectomy: noted  HX angioedema suspected 2/2 Vasotec: Treated with Decadron, Benadryl and FFP x 2.  HX CVA 1995  History acute LLE DVT 11/2025: Seen on venous Doppler ultrasound, left posterior tibial and peroneal vein DVTs.  Started on Eliquis 5 mg twice daily thereafter  HX Hospital admission for strokelike symptoms 11/7/2024: initial NIHSS 18, CTh and CTA head and neck negative, received TNK. MRI showed no acute infarct.       LDA: []CVC / []PICC / []Midline / [][place] / []Drains / []Mediport / [x]None  Antibiotics: None  Steroids: None  Labs (still needed?): [x]Yes / []No  IVF (still needed?): []Yes / [x]No    Level of care: [x]Step Down / []Med-Surg  Bed Status: [x]Inpatient / []Observation  Telemetry: [x]Yes / []No  PT/OT: []Yes / []No    DVT Prophylaxis: [] Lovenox / [] Heparin / [x] SCDs / [] Already on Systemic Anticoagulation / [] None     Expected discharge date: 1/31  Disposition: Home     Code status: Full Code     ===================================================================    Chief Complaint: Left ICA aneurysm  Subjective (past 24 hours): Patient seen and

## 2025-01-30 NOTE — TELEPHONE ENCOUNTER
----- Message from Martita Stallings PA-C sent at 1/29/2025  4:27 PM EST -----  Regarding: Follow up  Hi Radha,     Can we please schedule a follow up for her in 2 months with Dr. Weaver? She already has a brain MRI ordered from her last OP visit, no other imaging needed prior.     Thanks,     Martita

## 2025-01-30 NOTE — PLAN OF CARE
Name: Yulissa Andersen  YOB: 1975  MRN: [id number]  Date: 01/30/25     Plan of Care       Ms. Andersen is a 50 yo female that presented for closer monitoring s/p coil embolization of left ICA aneurysm. She is stable for transfer to medical for observation overnight. Patient transferring to [facility]. Hand off given to Dr. Oliveros.     Electronically signed by Aundrea N Newman-Waterhouse, DO on 1/30/2025 at 12:12 PM

## 2025-01-30 NOTE — PROGRESS NOTES
Neurointerventional Progress Note    Date:1/30/2025       Room:[facility]/006-A  Patient Name:Yulissa Andersen     YOB: 1975     Age:49 y.o.    Subjective     Yulissa Andersen is a 49 y.o.female  with a pmhx of HTN, migraines, T2DM, CAD, GERD, obesity and left carotid artery aneurysm. She was initially evaluated inpatient at [facility] on 11/7/24 when she presented for hypertensive emergency, migraine headache with subsequent altered mental status. Stroke alert was activated and her NIH was 18 for aphasia, partial gaze palsy, bilateral blindness, all 4 extremities drift and hit the bed and left sided sensation deficit. She was treated with TNK. CT angiogram suggestive of possible RCVS and an aneurysm of the left internal carotid artery terminus, which prompted a cerebral angiogram with Dr. Weaver.  Angiogram on 11/8/24 demonstrated: No evidence of vasospasm. Changes of fibromuscular dysplasia in bilateral ICA cervical segments. 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  MRI brain was later noted to be negative for an acute stroke. She gradually began to improve, but did not return to her baseline.     Today she notes her speech continues to mildly improve but is still slow and not at baseline. She continues to have left-sided weakness and numbness, unchanged since her last office visit.  She continues to endorse chronic daily headaches, notes she has not been taking Tylenol/ibuprofen at home due to concerns of her upcoming procedure.  She continues to participate in outpatient PT OT and SLP.  She denies dizziness/lightheadedness, syncope and seizures.     Interval history 1/30/25:    No acute events overnight. Subconjunctival hemorrhage in bilateral eyes stable compared to yesterday.  She continues to endorse a visual floater in her right eye, improved compared to yesterday.  Her tongue edema secondary to the hematoma is also stable. She notes a mildly sore throat,

## 2025-01-30 NOTE — PLAN OF CARE
Problem: Chronic Conditions and Co-morbidities  Goal: Patient's chronic conditions and co-morbidity symptoms are monitored and maintained or improved  1/30/2025 1046 by Yariel Rivera RN  Outcome: Progressing  Flowsheets (Taken 1/29/2025 2000 by Mercy Garces RN)  Care Plan - Patient's Chronic Conditions and Co-Morbidity Symptoms are Monitored and Maintained or Improved: Monitor and assess patient's chronic conditions and comorbid symptoms for stability, deterioration, or improvement     Problem: Discharge Planning  Goal: Discharge to home or other facility with appropriate resources  1/30/2025 1046 by Yariel Rivera RN  Outcome: Progressing  Flowsheets (Taken 1/30/2025 1046)  Discharge to home or other facility with appropriate resources:   Identify barriers to discharge with patient and caregiver   Arrange for needed discharge resources and transportation as appropriate   Arrange for interpreters to assist at discharge as needed     Problem: Pain  Goal: Verbalizes/displays adequate comfort level or baseline comfort level  1/30/2025 1046 by Yariel Rivera RN  Outcome: Progressing  Flowsheets  Taken 1/30/2025 1046 by Yariel Rivera RN  Verbalizes/displays adequate comfort level or baseline comfort level:   Encourage patient to monitor pain and request assistance   Assess pain using appropriate pain scale   Administer analgesics based on type and severity of pain and evaluate response  Taken 1/30/2025 0430 by Mercy Garces RN  Verbalizes/displays adequate comfort level or baseline comfort level:   Encourage patient to monitor pain and request assistance   Assess pain using appropriate pain scale     Problem: Neurosensory - Adult  Goal: Achieves stable or improved neurological status  1/30/2025 1046 by Yariel Rivera RN  Outcome: Progressing  Flowsheets  Taken 1/30/2025 1046 by Yariel Rivera RN  Achieves stable or improved neurological status:   Assess for and report changes in neurological

## 2025-01-30 NOTE — CARE COORDINATION
Case Management Assessment Initial Evaluation    Date/Time of Evaluation: 1/30/2025 2:51 PM  Assessment Completed by: Raysa Stanley RN    If patient is discharged prior to next notation, then this note serves as note for discharge by case management.    Patient Name: Yulissa Andersen                   YOB: 1975  Diagnosis: Cerebral aneurysm [I67.1]  Aneurysm (HCC) [I72.9]                   Date / Time: 1/29/2025  8:15 AM  Location: [facility]15/015     Patient Admission Status: Inpatient   Readmission Risk Low 0-14, Mod 15-19), High > 20: Readmission Risk Score: 24.6    Current PCP: Amanda García APRN - CNP  Health Care Decision Makers:   Primary Decision Maker: Tab Andersen - Child - [phone]    Primary Decision Maker: Lynn Turner    Primary Decision Maker: Amelia Turner    Additional Case Management Notes: Presented for planned aneurysm embolization with Neurology yesterday. ICU post-procedure with hematoma of tongue and subjunctival hemorrhage bilateral eyes. Fem stop removed. Was initially on cardene drip; weaned off early this morning. Transferred to  this shift.     Afebrile. NSR. On room air. Ox4. Follows commands. Telemetry, wound care. Hospitalist and Neurology following. Norvasc, eliquis, asa, lipitor, coreg, plavix, hydralazine, SSI, protonix, diovan. Received loading dose of brilinta yesterday. Na+ 134, wbc 8.7 - 16 - now 11.3, hgb 10.2 - now 9.7, INR 1.42 - now 1.25.     Procedures:   1/29 Aneurysm embolization     Imaging: none    Patient Goals/Plan/Treatment Preferences: Home w/2 children. Resume OP SLP/PT/OT at [place]. Denies needs.     Pt transferred to [facility]. Handoff report given to GWEN Iniguez CM.      01/30/25 3811   Service Assessment   Patient Orientation Alert and Oriented   Cognition Alert   History Provided By Patient   Primary Caregiver Self   Accompanied By/Relationship mother   Support Systems Children;Family Members;Parent;Spouse/Significant Other

## 2025-01-30 NOTE — TELEPHONE ENCOUNTER
Appointment made at this time, for 3/11/25 at 2:20pm. Patient active on MyChart and  in patient at this time.

## 2025-01-31 VITALS
DIASTOLIC BLOOD PRESSURE: 78 MMHG | HEIGHT: 65 IN | RESPIRATION RATE: 17 BRPM | SYSTOLIC BLOOD PRESSURE: 128 MMHG | TEMPERATURE: 98.7 F | HEART RATE: 79 BPM | OXYGEN SATURATION: 98 % | BODY MASS INDEX: 34.66 KG/M2 | WEIGHT: 208 LBS

## 2025-01-31 PROBLEM — E87.1 HYPONATREMIA: Status: ACTIVE | Noted: 2025-01-31

## 2025-01-31 PROBLEM — H11.33 SUBCONJUNCTIVAL HEMORRHAGE OF BOTH EYES: Status: ACTIVE | Noted: 2025-01-31

## 2025-01-31 PROBLEM — E11.9 NON-INSULIN DEPENDENT TYPE 2 DIABETES MELLITUS (HCC): Status: ACTIVE | Noted: 2025-01-31

## 2025-01-31 PROBLEM — D64.9 ANEMIA: Status: ACTIVE | Noted: 2025-01-31

## 2025-01-31 PROBLEM — D72.829 LEUKOCYTOSIS: Status: ACTIVE | Noted: 2025-01-31

## 2025-01-31 PROBLEM — E87.20 METABOLIC ACIDOSIS: Status: ACTIVE | Noted: 2025-01-31

## 2025-01-31 PROBLEM — G43.909 MIGRAINE: Status: ACTIVE | Noted: 2025-01-31

## 2025-01-31 PROBLEM — R05.1 ACUTE COUGH: Status: ACTIVE | Noted: 2025-01-31

## 2025-01-31 LAB
ANION GAP SERPL CALC-SCNC: 9 MEQ/L (ref 8–16)
BUN SERPL-MCNC: 14 MG/DL (ref 7–22)
CALCIUM SERPL-MCNC: 8.4 MG/DL (ref 8.5–10.5)
CHLORIDE SERPL-SCNC: 106 MEQ/L (ref 98–111)
CO2 SERPL-SCNC: 20 MEQ/L (ref 23–33)
CREAT SERPL-MCNC: 0.7 MG/DL (ref 0.4–1.2)
DEPRECATED RDW RBC AUTO: 49 FL (ref 35–45)
ERYTHROCYTE [DISTWIDTH] IN BLOOD BY AUTOMATED COUNT: 15.9 % (ref 11.5–14.5)
FOLATE SERPL-MCNC: 7 NG/ML (ref 4.8–24.2)
GFR SERPL CREATININE-BSD FRML MDRD: > 90 ML/MIN/1.73M2
GLUCOSE BLD STRIP.AUTO-MCNC: 126 MG/DL (ref 70–108)
GLUCOSE BLD STRIP.AUTO-MCNC: 132 MG/DL (ref 70–108)
GLUCOSE SERPL-MCNC: 127 MG/DL (ref 70–108)
HCT VFR BLD AUTO: 28.6 % (ref 37–47)
HGB BLD-MCNC: 9.1 GM/DL (ref 12–16)
MAGNESIUM SERPL-MCNC: 2.2 MG/DL (ref 1.6–2.4)
MCH RBC QN AUTO: 27.3 PG (ref 26–33)
MCHC RBC AUTO-ENTMCNC: 31.8 GM/DL (ref 32.2–35.5)
MCV RBC AUTO: 85.9 FL (ref 81–99)
PLATELET # BLD AUTO: 336 THOU/MM3 (ref 130–400)
PMV BLD AUTO: 9.2 FL (ref 9.4–12.4)
POTASSIUM SERPL-SCNC: 3.5 MEQ/L (ref 3.5–5.2)
RBC # BLD AUTO: 3.33 MILL/MM3 (ref 4.2–5.4)
SODIUM SERPL-SCNC: 135 MEQ/L (ref 135–145)
VIT B12 SERPL-MCNC: 298 PG/ML (ref 211–911)
WBC # BLD AUTO: 11.3 THOU/MM3 (ref 4.8–10.8)

## 2025-01-31 PROCEDURE — 83735 ASSAY OF MAGNESIUM: CPT

## 2025-01-31 PROCEDURE — 80048 BASIC METABOLIC PNL TOTAL CA: CPT

## 2025-01-31 PROCEDURE — 6370000000 HC RX 637 (ALT 250 FOR IP)

## 2025-01-31 PROCEDURE — 82607 VITAMIN B-12: CPT

## 2025-01-31 PROCEDURE — 99239 HOSP IP/OBS DSCHRG MGMT >30: CPT | Performed by: STUDENT IN AN ORGANIZED HEALTH CARE EDUCATION/TRAINING PROGRAM

## 2025-01-31 PROCEDURE — 85027 COMPLETE CBC AUTOMATED: CPT

## 2025-01-31 PROCEDURE — 82746 ASSAY OF FOLIC ACID SERUM: CPT

## 2025-01-31 PROCEDURE — 82948 REAGENT STRIP/BLOOD GLUCOSE: CPT

## 2025-01-31 PROCEDURE — 36415 COLL VENOUS BLD VENIPUNCTURE: CPT

## 2025-01-31 RX ORDER — HYDROCHLOROTHIAZIDE 25 MG/1
25 TABLET ORAL DAILY
Status: DISCONTINUED | OUTPATIENT
Start: 2025-01-31 | End: 2025-01-31 | Stop reason: HOSPADM

## 2025-01-31 RX ORDER — HYDRALAZINE HYDROCHLORIDE 100 MG/1
100 TABLET, FILM COATED ORAL 3 TIMES DAILY
Qty: 90 TABLET | Refills: 3 | Status: SHIPPED | OUTPATIENT
Start: 2025-01-31

## 2025-01-31 RX ADMIN — CLOPIDOGREL BISULFATE 75 MG: 75 TABLET ORAL at 08:18

## 2025-01-31 RX ADMIN — HYDRALAZINE HYDROCHLORIDE 100 MG: 50 TABLET ORAL at 08:21

## 2025-01-31 RX ADMIN — VALSARTAN 320 MG: 320 TABLET ORAL at 08:19

## 2025-01-31 RX ADMIN — PANTOPRAZOLE SODIUM 40 MG: 40 TABLET, DELAYED RELEASE ORAL at 05:51

## 2025-01-31 RX ADMIN — HYDROCHLOROTHIAZIDE 25 MG: 25 TABLET ORAL at 12:48

## 2025-01-31 RX ADMIN — APIXABAN 5 MG: 5 TABLET, FILM COATED ORAL at 08:19

## 2025-01-31 RX ADMIN — AMLODIPINE BESYLATE 5 MG: 5 TABLET ORAL at 08:22

## 2025-01-31 RX ADMIN — ASPIRIN 81 MG 81 MG: 81 TABLET ORAL at 08:18

## 2025-01-31 RX ADMIN — CARVEDILOL 25 MG: 25 TABLET, FILM COATED ORAL at 08:19

## 2025-01-31 ASSESSMENT — PAIN DESCRIPTION - ONSET: ONSET: ON-GOING

## 2025-01-31 ASSESSMENT — PAIN SCALES - GENERAL: PAINLEVEL_OUTOF10: 4

## 2025-01-31 ASSESSMENT — PAIN - FUNCTIONAL ASSESSMENT: PAIN_FUNCTIONAL_ASSESSMENT: ACTIVITIES ARE NOT PREVENTED

## 2025-01-31 ASSESSMENT — PAIN DESCRIPTION - LOCATION: LOCATION: THROAT

## 2025-01-31 ASSESSMENT — PAIN DESCRIPTION - ORIENTATION: ORIENTATION: MID

## 2025-01-31 ASSESSMENT — PAIN DESCRIPTION - DESCRIPTORS: DESCRIPTORS: ACHING

## 2025-01-31 ASSESSMENT — PAIN DESCRIPTION - FREQUENCY: FREQUENCY: INTERMITTENT

## 2025-01-31 ASSESSMENT — PAIN DESCRIPTION - PAIN TYPE: TYPE: ACUTE PAIN

## 2025-01-31 NOTE — PLAN OF CARE
Neurointerventional Plan of Care Note     Date:1/31/2025  [facility]015[facility]A   Patient Name:Yulissa Andersen     YOB: 1975     Age:49 y.o.      Patient is doing well this morning with no additional complaints. Subconjunctival hemorrhages appear mildly improved today compared to yesterday. Sublingual hematoma is improving as well. Still notes some tenderness at her right groin site from the angio, but no appreciable hematoma palpated. She is to resume plavix today, and continue aspirin and eliquis per her home regimen. Ok for discharge from neurology perspective. Will follow up in clinic in about 2 months with Dr. Weaver. Brain MRI on an OP basis already ordered, no additional imaging needed.     Post procedure instructions reviewed:   Wound Care:  Dressing can be removed 24 hours after your procedure.   Keep Incision Clean and Dry   You may shower daily, but do not soak incision. Pat dry after showering.   No tub baths, soaking, swimming for 1 week after angiogram.   You do not need to cover the incision. Mild to moderate bruising and tenderness to the site is expected and may last up to 1-2 weeks after your procedure.    Activity:   Advance your activity as you can tolerate. You may do light house work; nothing strenuous You may walk all you want. You may go up and down the steps.   Ok to resume all PT/OT activities 1 week after procedure without restrictions.       Electronically signed by Martita Stallings PA-C on 1/31/2025 at 8:12 AM

## 2025-01-31 NOTE — ANESTHESIA POSTPROCEDURE EVALUATION
Department of Anesthesiology  Postprocedure Note    Patient: Yulissa Andersen  MRN: [id number]  YOB: 1975  Date of evaluation: 1/31/2025    Procedure Summary       Date: 01/29/25 Room / Location: [facility] Special Procedures; [facility] Cardiac Cath Lab    Anesthesia Start: 1017 Anesthesia Stop: 1238    Procedure: IR NS THORACIC ARCH CATH PLACEMENT W OR WO ANGIOGRAPHY Diagnosis:       Cerebral aneurysm      Cerebral aneurysm      Aneurysm (HCC)    Scheduled Providers: Bettye Weaver MD Responsible Provider: Dale Sanchez MD    Anesthesia Type: general ASA Status: 3            Anesthesia Type: No value filed.    Denny Phase I: Denny Score: 8    Denny Phase II:      Anesthesia Post Evaluation    Patient location during evaluation: floor  Patient participation: complete - patient participated  Level of consciousness: awake and alert  Airway patency: patent  Nausea & Vomiting: no nausea  Cardiovascular status: blood pressure returned to baseline and hemodynamically stable  Respiratory status: acceptable and spontaneous ventilation  Hydration status: euvolemic  Pain management: adequate    No notable events documented.

## 2025-01-31 NOTE — DISCHARGE SUMMARY
Resident Discharge Summary (Hospitalist)      Patient: Yulissa Andersen 49 y.o. female  : 1975  MRN: [id number]   Account: [id number]   Patient's PCP: Amanda García APRN - CNP    Admit Date: 2025   Discharge Date:   2025    Admitting Physician: No admitting provider for patient encounter.  Discharge Physician: Cristhian Chapman MD       Discharge Diagnoses:  Left ICA terminus aneurysm: S/p successful stent assisted coil embolization with intra-arterial vasodilator injection 2025.  Received 180 mg loading dose of Brilinta at 0939 on , developed bleeding specifically subconjunctival hemorrhages and tongue hematoma, therefore held off maintenance antiplatelet therapy.  Initially used Cardene drip for tight BP control after procedure, drip stopped at 3 AM on .  Continuing aspirin 81 mg daily and Eliquis 5 mg twice daily, and Plavix 75 mg daily  Continuing amlodipine 5 mg daily, carvedilol 25 mg twice daily, hydralazine 100 mg 3 times daily, valsartan 320 mg daily after discharge  Resumed home med hydrochlorothiazide on , will continue after discharge  Reactive leukocytosis, improved: WBC normal upon presentation, trended up to zenith 16, on  down to 11.3, stable at 11.3 on -day of discharge.  Likely reactive in setting of invasive procedure.  Subconjunctival hemorrhages, bilaterally: Complication of Brilinta loading dose, patient initially reporting 1 spot associated with the hemorrhage which is now improving.  .  New onset cough: Patient reporting new cough, COVID/flu test negative and chest x-ray unremarkable.  Managed with supportive treatment.  NAGMA: Mildly low bicarb at 20.  AG WNL at 13.  Encouraging oral intake, repeat BMP, Mg in a few days with PCP follow-up 1 week.  NIDDM 2: HbA1c from 2024 was 6.1% indicating good control on metformin 500 mg twice daily.  Held metformin while inpatient, low-dose SSI with hypoglycemia protocol in place, POCT glucose

## 2025-01-31 NOTE — CARE COORDINATION

## 2025-01-31 NOTE — PLAN OF CARE
Problem: Chronic Conditions and Co-morbidities  Goal: Patient's chronic conditions and co-morbidity symptoms are monitored and maintained or improved  Outcome: Adequate for Discharge     Problem: Discharge Planning  Goal: Discharge to home or other facility with appropriate resources  Outcome: Adequate for Discharge     Problem: Pain  Goal: Verbalizes/displays adequate comfort level or baseline comfort level  Outcome: Adequate for Discharge     Problem: Neurosensory - Adult  Goal: Achieves stable or improved neurological status  Outcome: Adequate for Discharge     Problem: Gastrointestinal - Adult  Goal: Minimal or absence of nausea and vomiting  Outcome: Adequate for Discharge     Problem: Genitourinary - Adult  Goal: Absence of urinary retention  Outcome: Adequate for Discharge

## 2025-01-31 NOTE — PROGRESS NOTES
Spiritual Health History and Assessment/Progress Note  [facility]    (P) Spiritual/Emotional Needs,  ,  ,      Name: Yulissa Andersen MRN: [id number]    Age: 49 y.o.     Sex: female   Language: English   Hinduism: None   Cerebral aneurysm     Date: 1/31/2025            Total Time Calculated: (P) 11 min              Spiritual Assessment continued in [facility] 5K        Referral/Consult From: (P) Multi-disciplinary team   Encounter Overview/Reason: (P) Spiritual/Emotional Needs  Service Provided For: (P) Patient    Marilin, Belief, Meaning:   Patient identifies as spiritual  Family/Friends identify as spiritual      Importance and Influence:  Patient has no beliefs influential to healthcare decision-making identified during this visit  Family/Friends have no beliefs influential to healthcare decision-making identified during this visit    Community:  Patient feels well-supported. Support system includes: Parent/s  Family/Friends No family/friends present    Assessment and Plan of Care:   During my encounter with the 49 yr old patient, I gave the patient a copy of the Advance Directive as requested. I assess the pt's spiritual needs. The pt shared that she would complete after reviewing the documents. The pt was admitted due to cerebral aneurysm.     Patient Interventions include: Facilitated expression of thoughts and feelings  Family/Friends Interventions include: Facilitated expression of thoughts and feelings    Patient Plan of Care: Spiritual Care available upon further referral  Family/Friends Plan of Care: Spiritual Care available upon further referral    Electronically signed by ISAIAH Joyner on 1/31/2025 at 9:09 AM

## 2025-02-03 ENCOUNTER — HOSPITAL ENCOUNTER (OUTPATIENT)
Dept: SPEECH THERAPY | Age: 50
Setting detail: THERAPIES SERIES
End: 2025-02-03
Payer: COMMERCIAL

## 2025-02-04 ENCOUNTER — LAB (OUTPATIENT)
Dept: LAB | Age: 50
End: 2025-02-04

## 2025-02-04 DIAGNOSIS — E87.20 METABOLIC ACIDOSIS: ICD-10-CM

## 2025-02-04 LAB
ANION GAP SERPL CALC-SCNC: 14 MEQ/L (ref 8–16)
BUN SERPL-MCNC: 13 MG/DL (ref 7–22)
CALCIUM SERPL-MCNC: 9.1 MG/DL (ref 8.5–10.5)
CHLORIDE SERPL-SCNC: 101 MEQ/L (ref 98–111)
CO2 SERPL-SCNC: 24 MEQ/L (ref 23–33)
CREAT SERPL-MCNC: 0.8 MG/DL (ref 0.4–1.2)
GFR SERPL CREATININE-BSD FRML MDRD: > 90 ML/MIN/1.73M2
GLUCOSE SERPL-MCNC: 154 MG/DL (ref 70–108)
MAGNESIUM SERPL-MCNC: 2 MG/DL (ref 1.6–2.4)
POTASSIUM SERPL-SCNC: 3.6 MEQ/L (ref 3.5–5.2)
SODIUM SERPL-SCNC: 139 MEQ/L (ref 135–145)

## 2025-02-10 ENCOUNTER — HOSPITAL ENCOUNTER (OUTPATIENT)
Dept: MRI IMAGING | Age: 50
Discharge: HOME | End: 2025-02-10
Payer: COMMERCIAL

## 2025-02-10 DIAGNOSIS — I72.0 CAROTID ANEURYSM, LEFT (HCC): ICD-10-CM

## 2025-02-10 DIAGNOSIS — R47.89 ALTERATION IN SPEECH: ICD-10-CM

## 2025-02-10 PROCEDURE — A9579 GAD-BASE MR CONTRAST NOS,1ML: HCPCS

## 2025-02-10 PROCEDURE — 70553 MRI BRAIN STEM W/O & W/DYE: CPT

## 2025-02-10 PROCEDURE — 6360000004 HC RX CONTRAST MEDICATION

## 2025-02-10 RX ADMIN — GADOTERIDOL 20 ML: 279.3 INJECTION, SOLUTION INTRAVENOUS at 09:01

## 2025-02-11 ENCOUNTER — TELEPHONE (OUTPATIENT)
Dept: NEUROLOGY | Age: 50
End: 2025-02-11

## 2025-02-11 ENCOUNTER — HOSPITAL ENCOUNTER (OUTPATIENT)
Dept: PHYSICAL THERAPY | Age: 50
Setting detail: THERAPIES SERIES
Discharge: HOME | End: 2025-02-11

## 2025-02-11 ENCOUNTER — HOSPITAL ENCOUNTER (OUTPATIENT)
Dept: SPEECH THERAPY | Age: 50
Setting detail: THERAPIES SERIES
End: 2025-02-11
Payer: COMMERCIAL

## 2025-02-11 NOTE — TELEPHONE ENCOUNTER
----- Message from Martita Stallings PA-C sent at 2/11/2025  9:47 AM EST -----  Stephen Garcia,   Can you please let her know that her imaging was unremarkable. Will discuss further at her upcoming appointment.     Thanks,   Martita

## 2025-02-12 ENCOUNTER — APPOINTMENT (OUTPATIENT)
Dept: INTERVENTIONAL RADIOLOGY/VASCULAR | Age: 50
End: 2025-02-12
Attending: PHYSICIAN ASSISTANT
Payer: COMMERCIAL

## 2025-02-12 ENCOUNTER — HOSPITAL ENCOUNTER (EMERGENCY)
Age: 50
Discharge: HOME | End: 2025-02-12
Payer: COMMERCIAL

## 2025-02-12 ENCOUNTER — APPOINTMENT (OUTPATIENT)
Dept: GENERAL RADIOLOGY | Age: 50
End: 2025-02-12
Payer: COMMERCIAL

## 2025-02-12 VITALS
WEIGHT: 200 LBS | SYSTOLIC BLOOD PRESSURE: 137 MMHG | OXYGEN SATURATION: 99 % | HEIGHT: 65 IN | TEMPERATURE: 98.2 F | HEART RATE: 86 BPM | DIASTOLIC BLOOD PRESSURE: 94 MMHG | RESPIRATION RATE: 18 BRPM | BODY MASS INDEX: 33.32 KG/M2

## 2025-02-12 DIAGNOSIS — S80.11XA CONTUSION OF RIGHT LOWER LEG, INITIAL ENCOUNTER: ICD-10-CM

## 2025-02-12 DIAGNOSIS — D64.9 CHRONIC ANEMIA: Primary | ICD-10-CM

## 2025-02-12 LAB
ANION GAP SERPL CALC-SCNC: 11 MEQ/L (ref 8–16)
BASOPHILS ABSOLUTE: 0.1 THOU/MM3 (ref 0–0.1)
BASOPHILS NFR BLD AUTO: 0.6 %
BUN SERPL-MCNC: 20 MG/DL (ref 7–22)
CALCIUM SERPL-MCNC: 10 MG/DL (ref 8.5–10.5)
CHLORIDE SERPL-SCNC: 101 MEQ/L (ref 98–111)
CO2 SERPL-SCNC: 28 MEQ/L (ref 23–33)
CREAT SERPL-MCNC: 0.8 MG/DL (ref 0.4–1.2)
DEPRECATED RDW RBC AUTO: 47.6 FL (ref 35–45)
ECHO BSA: 2.04 M2
EKG ATRIAL RATE: 97 BPM
EKG P AXIS: 79 DEGREES
EKG P-R INTERVAL: 146 MS
EKG Q-T INTERVAL: 360 MS
EKG QRS DURATION: 72 MS
EKG QTC CALCULATION (BAZETT): 457 MS
EKG R AXIS: 46 DEGREES
EKG T AXIS: 2 DEGREES
EKG VENTRICULAR RATE: 97 BPM
EOSINOPHIL NFR BLD AUTO: 3.1 %
EOSINOPHILS ABSOLUTE: 0.3 THOU/MM3 (ref 0–0.4)
ERYTHROCYTE [DISTWIDTH] IN BLOOD BY AUTOMATED COUNT: 14.9 % (ref 11.5–14.5)
GFR SERPL CREATININE-BSD FRML MDRD: > 90 ML/MIN/1.73M2
GLUCOSE SERPL-MCNC: 111 MG/DL (ref 70–108)
HCT VFR BLD AUTO: 29.9 % (ref 37–47)
HGB BLD-MCNC: 9.4 GM/DL (ref 12–16)
IMM GRANULOCYTES # BLD AUTO: 0.03 THOU/MM3 (ref 0–0.07)
IMM GRANULOCYTES NFR BLD AUTO: 0.4 %
LYMPHOCYTES ABSOLUTE: 2.7 THOU/MM3 (ref 1–4.8)
LYMPHOCYTES NFR BLD AUTO: 32.1 %
MCH RBC QN AUTO: 27.3 PG (ref 26–33)
MCHC RBC AUTO-ENTMCNC: 31.4 GM/DL (ref 32.2–35.5)
MCV RBC AUTO: 86.9 FL (ref 81–99)
MONOCYTES ABSOLUTE: 0.6 THOU/MM3 (ref 0.4–1.3)
MONOCYTES NFR BLD AUTO: 7.4 %
NEUTROPHILS ABSOLUTE: 4.7 THOU/MM3 (ref 1.8–7.7)
NEUTROPHILS NFR BLD AUTO: 56.4 %
NRBC BLD AUTO-RTO: 0 /100 WBC
NT-PROBNP SERPL IA-MCNC: < 36 PG/ML (ref 0–124)
OSMOLALITY SERPL CALC.SUM OF ELEC: 282.7 MOSMOL/KG (ref 275–300)
PLATELET # BLD AUTO: 398 THOU/MM3 (ref 130–400)
PMV BLD AUTO: 9.7 FL (ref 9.4–12.4)
POTASSIUM SERPL-SCNC: 3.6 MEQ/L (ref 3.5–5.2)
RBC # BLD AUTO: 3.44 MILL/MM3 (ref 4.2–5.4)
SODIUM SERPL-SCNC: 140 MEQ/L (ref 135–145)
TROPONIN, HIGH SENSITIVITY: 8 NG/L (ref 0–12)
WBC # BLD AUTO: 8.4 THOU/MM3 (ref 4.8–10.8)

## 2025-02-12 PROCEDURE — 93971 EXTREMITY STUDY: CPT

## 2025-02-12 PROCEDURE — 71046 X-RAY EXAM CHEST 2 VIEWS: CPT

## 2025-02-12 PROCEDURE — 80048 BASIC METABOLIC PNL TOTAL CA: CPT

## 2025-02-12 PROCEDURE — 84484 ASSAY OF TROPONIN QUANT: CPT

## 2025-02-12 PROCEDURE — 93005 ELECTROCARDIOGRAM TRACING: CPT | Performed by: EMERGENCY MEDICINE

## 2025-02-12 PROCEDURE — 36415 COLL VENOUS BLD VENIPUNCTURE: CPT

## 2025-02-12 PROCEDURE — 93010 ELECTROCARDIOGRAM REPORT: CPT | Performed by: INTERNAL MEDICINE

## 2025-02-12 PROCEDURE — 83880 ASSAY OF NATRIURETIC PEPTIDE: CPT

## 2025-02-12 PROCEDURE — 99285 EMERGENCY DEPT VISIT HI MDM: CPT

## 2025-02-12 PROCEDURE — 85025 COMPLETE CBC W/AUTO DIFF WBC: CPT

## 2025-02-12 ASSESSMENT — PAIN SCALES - GENERAL: PAINLEVEL_OUTOF10: 5

## 2025-02-12 ASSESSMENT — PAIN DESCRIPTION - ORIENTATION: ORIENTATION: RIGHT

## 2025-02-12 ASSESSMENT — PAIN - FUNCTIONAL ASSESSMENT: PAIN_FUNCTIONAL_ASSESSMENT: 0-10

## 2025-02-12 ASSESSMENT — PAIN DESCRIPTION - LOCATION: LOCATION: LEG

## 2025-02-12 NOTE — ED TRIAGE NOTES
Pt presents to ED with chief complaint of right lower leg pain. Pt saw her PCP this morning who sent her here to be evaluated. Pt reports hx DVT; on eliquis and plavix. Pt reports recent admission for brain aneurysm. Pt also reporting chest tightness and SOB w/ exertion.

## 2025-02-12 NOTE — DISCHARGE INSTRUCTIONS
Call your regular physician to schedule a follow-up appointment within the next 48 hours.  Sooner if needed.    Discharge warning    Please remember that examination and testing performed in the emergency department is not a comprehensive evaluation of all medical conditions and does not replace the need to follow up with your primary care provider.  In the emergency department, we are only able to evaluate your symptoms in the current condition, but symptoms may change or worsen.  Although you are felt safe to be discharged today, if your symptoms persist or change, you need to be re-evaluated by your regular/primary care doctor as soon as possible.  If you are unable to make appointment with your regular doctor, please come back to the ER to be re-evaluated.

## 2025-02-12 NOTE — ED PROVIDER NOTES
[facility] EMERGENCY DEPARTMENT      EMERGENCY MEDICINE     Pt Name: Yulissa Andersen  MRN: [id number]  Birthdate 1975  Date of evaluation: 2/12/2025  Provider: MELISA Capps    CHIEF COMPLAINT       Chief Complaint   Patient presents with    Leg Pain    Shortness of Breath     HISTORY OF PRESENT ILLNESS   Yulissa Andersen is a pleasant 49 y.o. female who presents to the emergency department from from home, by private vehicle for evaluation of right leg pain and shortness of breath with exertion that began 2-3 days ago. Patient reports the right leg pain is localized to her calf and dorsal aspect of her foot. She reports there is a bruise on the lateral aspect of her calf. Patient denies known injury or trauma. She endorses associated numbness and tingling when sitting down for a long periods of time.     Patient also reports shortness of breath with exertion. She reports the shortness of breath is only present while walking. She reports she was recently seen last week by her PCP and given  course of azithromycin for her lungs and ear inflammation, unsure of diagnosis.     History of recent DVT in left posterior tib/peroneal 11/2024. History of CVA 11/2024. She is on Plavix, Eliquis, and ASA. Reports she has not missed a dose of medication. Patient also complains of episodes of mild epistaxis since she has been ill. She reports when she blows her nose, she has intermittent nosebleeds. She denies chest pain, palpitations, nausea, vomiting, abdominal pain, or diarrhea.     PASTMEDICAL HISTORY     Past Medical History:   Diagnosis Date    Coronary artery disease involving native coronary artery of native heart without angina pectoris 10/27/2021    Diabetes mellitus (HCC)     Hypertension     Incomplete bladder emptying     Microscopic hematuria     Osteoarthritis of knee 01/31/2023    Unspecified cerebral artery occlusion with cerebral infarction 1995       Patient Active Problem List   Diagnosis Code

## 2025-02-15 DIAGNOSIS — R00.2 PALPITATIONS: ICD-10-CM

## 2025-02-15 DIAGNOSIS — I10 PRIMARY HYPERTENSION: ICD-10-CM

## 2025-02-17 RX ORDER — CARVEDILOL 25 MG/1
25 TABLET ORAL 2 TIMES DAILY
Qty: 180 TABLET | Refills: 1 | Status: SHIPPED | OUTPATIENT
Start: 2025-02-17

## 2025-02-26 ENCOUNTER — HOSPITAL ENCOUNTER (OUTPATIENT)
Dept: PHYSICAL THERAPY | Age: 50
Setting detail: THERAPIES SERIES
Discharge: HOME | End: 2025-02-26
Payer: COMMERCIAL

## 2025-02-26 ENCOUNTER — HOSPITAL ENCOUNTER (OUTPATIENT)
Dept: SPEECH THERAPY | Age: 50
Setting detail: THERAPIES SERIES
Discharge: HOME | End: 2025-02-26
Payer: COMMERCIAL

## 2025-02-26 PROCEDURE — 92507 TX SP LANG VOICE COMM INDIV: CPT | Performed by: SPEECH-LANGUAGE PATHOLOGIST

## 2025-02-26 PROCEDURE — 97110 THERAPEUTIC EXERCISES: CPT

## 2025-02-26 NOTE — PROGRESS NOTES
medications, finances and appointments, however, still requires some assistance at times.  Patient has not returned to work at this time.  Recommend ongoing speech therapy to address the above goals for improved speech intelligibility during ready tasks and in conversation as well as increased independence with completion of daily tasks and work towards eventual return to work.    Rehabilitation Potential/Prognosis: good  Areas for Improvement: Impaired cognition, Impaired speech, and Impaired voice  Specific Interventions Next Treatment: SOS-Speak up, open mouth, slow down, repetition, reading, coordination/language processing, cognitive tasks       Activity/Treatment Tolerance:  [x]  Patient tolerated treatment well  []  Patient limited by fatigue  []  Patient limited by pain   []  Patient limited by other medical complications  []  Other:     Patient Education:   [x]  HEP/Education Completed: Plan of Care, goals, ongoing for speech strategies, goal met/not met, recommend to continue with ST  []  No new Education completed  []  Reviewed Prior HEP      [x]  Patient verbalized and/or demonstrated understanding of education provided.  []  Patient unable to verbalize and/or demonstrate understanding of education provided.  Will continue education.  []  Barriers to learning: none      PLAN:  Treatment Recommendations:     []  Plan of care initiated.  Plan to see patient 2 times per week for 8 weeks to address the treatment planned outlined above.  []  Continue with current plan of care  [x]  Progress note: 2 x per week x 5 weeks  []  Hold pending physician visit  []  Discharge    Time In 1332   Time Out 1415   Timed Code Minutes: 0 min   Total Treatment Time: 43 min       Cherrie Cabezas M.S. CCC-SLP [id number]

## 2025-02-26 NOTE — PROGRESS NOTES
[facility]  PHYSICAL THERAPY  [] EVALUATION  [] DAILY NOTE (LAND) [] DAILY NOTE (AQUATIC )  [x] PROGRESS NOTE [] DISCHARGE NOTE    [x] OUTPATIENT REHABILITATION CENTER [facility]   [] [place] AMBULATORY CARE [place]    [] [facility]   [] CHARLIE [facility]    Date: 2025  Patient Name:  Yulissa Andersen  : 1975  MRN: [id number]  CSN: [id number]    Referring Practitioner Noa Leal DO [id number]      Diagnosis  Diagnoses       I63.9 (ICD-10-CM) - Cerebral infarction, unspecified           Treatment Diagnosis R26.0  Ataxic Gait  R26.89  Abnormalities of gait and mobility  R26.81  Unsteadiness on feet  R27.9  Unspecified lack of coordination  G81.90 Hemiplegia Affecting Unspecified Side  M62.81 Generalized Weakness   Date of Evaluation 24   Additional Pertinent History Yulissa Andersen has a past medical history of Coronary artery disease involving native coronary artery of native heart without angina pectoris, Diabetes mellitus (HCC), Hypertension, Incomplete bladder emptying, Microscopic hematuria, Osteoarthritis of knee, and Unspecified cerebral artery occlusion with cerebral infarction.  she has a past surgical history that includes cyst removal; hysteroscopy; Hysterectomy (2013); Breast surgery (Left, 02/10/2015); pr exc b9 lesion mrgn xcp sk tg t/a/l 2.1-3.0 cm (N/A, 2018); and DISSECTION GROIN (N/A, 2019).     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  Marcelo Gunderson, DO    Penicillins Hives    Ciprofloxacin Hives    Clindamycin/Lincomycin Rash    Doxycycline Hives    Lisinopril      Attacked kidneys      Medications   Current Outpatient Medications:     carvedilol (COREG) 25 MG tablet, TAKE 1 TABLET BY MOUTH TWICE DAILY, Disp: 180 tablet, Rfl: 1

## 2025-03-10 ASSESSMENT — ENCOUNTER SYMPTOMS
ABDOMINAL PAIN: 0
TROUBLE SWALLOWING: 0
COUGH: 0
VOMITING: 0
SHORTNESS OF BREATH: 0
NAUSEA: 0

## 2025-03-10 NOTE — PROGRESS NOTES
artery. Attenuated flow in the distal branches of the right middle cerebral artery. 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   Diagnostic cerebral angiogram with aneurysm stent assisted coil with Dr. Weaver on 1/29/25  Results include: Left ICA terminus aneurysm, successful stent assisted coil embolization. Intraarterial vasodilators injection  Brain MRI w wo 2/10/25: unremarkable. No evidence of infarct or abnormal enhancement.   She needs to continue triple antithrombotic therapy for 6 months post procedure.   On 7/29/25 can discontinue plavix and remain on ASA and Eliquis.   Patient expressed verbal understanding.   Plan for a repeat cerebral angiogram with Dr. Weaver 6 months after her aneurysm embolization (1/29) - DSA to be scheduled sometime in early August.   Continue PT/OT and SLP therapy on OP basis.  Recommend additional stroke risk factor management per PCP.   Hypertension: on Norvasc, Hydralazine, valsartan and coreg  Type II DM: recommend A1c goal of <7.0  Hyperlipidemia: on Lipitor 40mg   DVT: on eliquis   Recommend routine follow-up with PCP for repeat lipid panel/hemoglobin A1c every 3 months with medication modification, as necessary.   Continue to follow up with Dr. Molina as scheduled for headache management.   Patient was advised that if she develops new/sudden onset of stroke like symptoms to report to the emergency room     This patient was seen and evaluated with Dr. Weaver and he is in agreement with the assessment and plan.    Electronically signed by Martita Stallings PA-C on 3/12/2025 at 6:39 PM

## 2025-03-11 ENCOUNTER — OFFICE VISIT (OUTPATIENT)
Dept: NEUROLOGY | Age: 50
End: 2025-03-11
Payer: COMMERCIAL

## 2025-03-11 VITALS
HEART RATE: 85 BPM | HEIGHT: 65 IN | DIASTOLIC BLOOD PRESSURE: 79 MMHG | SYSTOLIC BLOOD PRESSURE: 124 MMHG | BODY MASS INDEX: 34.66 KG/M2 | WEIGHT: 208 LBS

## 2025-03-11 DIAGNOSIS — I72.0 CAROTID ANEURYSM, LEFT: Primary | ICD-10-CM

## 2025-03-11 DIAGNOSIS — R47.89 ALTERATION IN SPEECH: ICD-10-CM

## 2025-03-11 PROCEDURE — G8417 CALC BMI ABV UP PARAM F/U: HCPCS

## 2025-03-11 PROCEDURE — G8427 DOCREV CUR MEDS BY ELIG CLIN: HCPCS

## 2025-03-11 PROCEDURE — 3078F DIAST BP <80 MM HG: CPT

## 2025-03-11 PROCEDURE — 1036F TOBACCO NON-USER: CPT

## 2025-03-11 PROCEDURE — 3074F SYST BP LT 130 MM HG: CPT

## 2025-03-11 PROCEDURE — 99214 OFFICE O/P EST MOD 30 MIN: CPT

## 2025-03-11 RX ORDER — ALLOPURINOL 300 MG/1
300 TABLET ORAL DAILY
COMMUNITY
Start: 2025-02-21

## 2025-03-11 RX ORDER — VITAMIN B COMPLEX
1 CAPSULE ORAL DAILY
COMMUNITY
Start: 2025-02-20

## 2025-03-11 RX ORDER — PRAZOSIN HYDROCHLORIDE 1 MG/1
1 CAPSULE ORAL NIGHTLY
COMMUNITY
Start: 2025-02-19

## 2025-03-11 RX ORDER — HYDROXYZINE PAMOATE 25 MG/1
25 CAPSULE ORAL 3 TIMES DAILY
COMMUNITY
Start: 2025-02-19

## 2025-03-11 NOTE — PATIENT INSTRUCTIONS
- continue Plavix until 7/29/25 (6 months after procedure)   - You will keep taking asprin and eliquis indefinitely  - We will plan another angiogram 6 months after initial procedure.

## 2025-03-12 ENCOUNTER — HOSPITAL ENCOUNTER (OUTPATIENT)
Dept: SPEECH THERAPY | Age: 50
Setting detail: THERAPIES SERIES
Discharge: HOME | End: 2025-03-12
Payer: COMMERCIAL

## 2025-03-12 ENCOUNTER — HOSPITAL ENCOUNTER (OUTPATIENT)
Dept: PHYSICAL THERAPY | Age: 50
Setting detail: THERAPIES SERIES
Discharge: HOME | End: 2025-03-12
Payer: COMMERCIAL

## 2025-03-12 ENCOUNTER — HOSPITAL ENCOUNTER (OUTPATIENT)
Dept: OCCUPATIONAL THERAPY | Age: 50
Setting detail: THERAPIES SERIES
Discharge: HOME | End: 2025-03-12
Payer: COMMERCIAL

## 2025-03-12 PROCEDURE — 97116 GAIT TRAINING THERAPY: CPT

## 2025-03-12 PROCEDURE — 97110 THERAPEUTIC EXERCISES: CPT

## 2025-03-12 PROCEDURE — 92507 TX SP LANG VOICE COMM INDIV: CPT | Performed by: SPEECH-LANGUAGE PATHOLOGIST

## 2025-03-12 NOTE — PROGRESS NOTES
[facility]  PHYSICAL THERAPY  [] EVALUATION  [x] DAILY NOTE (LAND) [] DAILY NOTE (AQUATIC )  [] PROGRESS NOTE [] DISCHARGE NOTE    [x] OUTPATIENT REHABILITATION CENTER [facility]   [] [place] AMBULATORY CARE [place]    [] [facility]   [] CHARLIE [facility]    Date: 3/12/2025  Patient Name:  Yulissa Andersen  : 1975  MRN: [id number]  CSN: [id number]    Referring Practitioner Noa Leal DO [id number]      Diagnosis  Diagnoses       I63.9 (ICD-10-CM) - Cerebral infarction, unspecified           Treatment Diagnosis R26.0  Ataxic Gait  R26.89  Abnormalities of gait and mobility  R26.81  Unsteadiness on feet  R27.9  Unspecified lack of coordination  G81.90 Hemiplegia Affecting Unspecified Side  M62.81 Generalized Weakness   Date of Evaluation 24   Additional Pertinent History Yulissa Andersen has a past medical history of Coronary artery disease involving native coronary artery of native heart without angina pectoris, Diabetes mellitus (HCC), Hypertension, Incomplete bladder emptying, Microscopic hematuria, Osteoarthritis of knee, and Unspecified cerebral artery occlusion with cerebral infarction.  she has a past surgical history that includes cyst removal; hysteroscopy; Hysterectomy (2013); Breast surgery (Left, 02/10/2015); pr exc b9 lesion mrgn xcp sk tg t/a/l 2.1-3.0 cm (N/A, 2018); and DISSECTION GROIN (N/A, 2019).     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  Marcelo Gunderson,     Penicillins Hives    Ciprofloxacin Hives    Clindamycin/Lincomycin Rash    Doxycycline Hives    Lisinopril      Attacked kidneys      Medications   Current Outpatient Medications:     allopurinol (ZYLOPRIM) 300 MG tablet, Take 1 tablet by mouth daily, Disp: , Rfl:     b complex vitamins

## 2025-03-12 NOTE — PROGRESS NOTES
[facility]  SPEECH THERAPY  [] SPEECH LANGUAGE COGNITIVE EVALUATION  [x] DAILY NOTE   [] PROGRESS NOTE [] DISCHARGE NOTE    [x] OUTPATIENT REHABILITATION CENTER - [place]   [] [facility] CARE [place]    [] [facility]   [] CHARLIE [facility]    Date: 3/12/2025  Patient Name:  Yulissa Andersen  : 1975  MRN: [id number]  CSN: [id number]    Referring Practitioner Noa Leal DO [id number]      Diagnosis  Diagnoses       I63.9 (ICD-10-CM) - Cerebral infarction, unspecified           Treatment Diagnosis R41.89 Other symptoms and signs involving cognitive functions and awareness  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  she has a past surgical history that includes cyst removal; hysteroscopy; Hysterectomy (2013); Breast surgery (Left, 02/10/2015); pr exc b9 lesion mrgn xcp sk tg t/a/l 2.1-3.0 cm (N/A, 2018); and DISSECTION GROIN (N/A, 2019).     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  Marcelo Gunderson,     Penicillins Hives    Clindamycin/Lincomycin Rash    Ciprofloxacin Hives    Doxycycline Hives    Lisinopril      Attacked kidneys      Medications   Current Outpatient Medications:     apixaban (ELIQUIS) 5 MG TABS tablet, Take 1 tablet by mouth 2 times daily, Disp: 60 tablet, Rfl: 1    aspirin 81 MG chewable tablet, Take 1 tablet by mouth daily, Disp:

## 2025-03-12 NOTE — PROGRESS NOTES
and containers.     3/12/25 GOAL NOT MET left  strength 18, 20,19    Patient will report pain at rest no more than 3/10 for improved ease with sleep.  6/10 during sleep.     3/12/25 GOAL NOT MET pain wakes her up \"sometimes it is pretty high.\" Pain can get higher than 6/10     Patient will demonstrate I knowledge of HEP for improved flexibility and strength for toilet transfers.     3/12/25 GOAL MET/CONTINUE with upgrades    5.  Patient will demonstrate improved gross motor coordination with LUE to be able to complete dynavision mode A/60 to greater than 35 hits to be able to turn the steering wheel.   3/12/25 GOAL MET 39 hits REVISED Pt will demonstrate improved gross motor coordination with left UE to be able to complete dynavision mode A/60 to greater than 45 hits to be able to turn the steering wheel     Long Term Goals:  Time Frame: 8 weeks  Patient will complete a cooking task at home without assist.   3/12/25 GOAL PARTIALLY MET Pt reporting she has did a little cooking with supervision   Patient will report ability to sleep with no more than minimal difficulty because of her shoulder.  Sometimes sleeping ok, sometimes difficult.  GOAL NOT MET - CONTINUE  Patient will participate in a driving evaluation to return to driving. GOAL NOT MET - CONTINUE    Patient Education:   []  HEP/Education Completed: Plan of Care, Goals, HEP  Scapular pinches, table slides as established on inpatient rehab and will continue to build upon.  12/24/24: orange band bicep curl, ER  1/9/25: introduced driving simulator  []  No new Education completed  [x]  Reviewed Prior HEP      [x]  Patient verbalized and/or demonstrated understanding of education provided.  []  Patient unable to verbalize and/or demonstrate understanding of education provided.  Will continue education.  [x]  Barriers to learning: none    PLAN:  Treatment Recommendations: Strengthening, Range of Motion, Neuromuscular Re-education, Manual Therapy - Soft Tissue

## 2025-03-14 ENCOUNTER — HOSPITAL ENCOUNTER (OUTPATIENT)
Dept: SPEECH THERAPY | Age: 50
Setting detail: THERAPIES SERIES
Discharge: HOME | End: 2025-03-14
Payer: COMMERCIAL

## 2025-03-14 ENCOUNTER — HOSPITAL ENCOUNTER (OUTPATIENT)
Dept: OCCUPATIONAL THERAPY | Age: 50
Setting detail: THERAPIES SERIES
Discharge: HOME | End: 2025-03-14
Payer: COMMERCIAL

## 2025-03-14 ENCOUNTER — HOSPITAL ENCOUNTER (OUTPATIENT)
Dept: PHYSICAL THERAPY | Age: 50
Setting detail: THERAPIES SERIES
Discharge: HOME | End: 2025-03-14
Payer: COMMERCIAL

## 2025-03-14 PROCEDURE — 92507 TX SP LANG VOICE COMM INDIV: CPT

## 2025-03-14 PROCEDURE — 97112 NEUROMUSCULAR REEDUCATION: CPT

## 2025-03-14 PROCEDURE — 97110 THERAPEUTIC EXERCISES: CPT

## 2025-03-14 NOTE — PROGRESS NOTES
[facility]  SPEECH THERAPY  [] SPEECH LANGUAGE COGNITIVE EVALUATION  [x] DAILY NOTE   [] PROGRESS NOTE [] DISCHARGE NOTE    [x] OUTPATIENT REHABILITATION CENTER - [place]   [] [facility] CARE [place]    [] [facility]   [] CHARLIE [facility]    Date: 3/14/2025  Patient Name:  Yulissa Andersen  : 1975  MRN: [id number]  CSN: [id number]    Referring Practitioner Noa Leal DO [id number]      Diagnosis  Diagnoses       I63.9 (ICD-10-CM) - Cerebral infarction, unspecified           Treatment Diagnosis R41.89 Other symptoms and signs involving cognitive functions and awareness  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  she has a past surgical history that includes cyst removal; hysteroscopy; Hysterectomy (2013); Breast surgery (Left, 02/10/2015); pr exc b9 lesion mrgn xcp sk tg t/a/l 2.1-3.0 cm (N/A, 2018); and DISSECTION GROIN (N/A, 2019).     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  Marcelo Gunderson,     Penicillins Hives    Clindamycin/Lincomycin Rash    Ciprofloxacin Hives    Doxycycline Hives    Lisinopril      Attacked kidneys      Medications   Current Outpatient Medications:     apixaban (ELIQUIS) 5 MG TABS tablet, Take 1 tablet by mouth 2 times daily, Disp: 60 tablet, Rfl: 1    aspirin 81 MG chewable tablet, Take 1 tablet by mouth daily, Disp:

## 2025-03-14 NOTE — PROGRESS NOTES
[facility]  PHYSICAL THERAPY  [] EVALUATION  [x] DAILY NOTE (LAND) [] DAILY NOTE (AQUATIC )  [] PROGRESS NOTE [] DISCHARGE NOTE    [x] OUTPATIENT REHABILITATION CENTER [facility]   [] [place] AMBULATORY CARE [place]    [] [facility]   [] CHARLIE [facility]    Date: 3/14/2025  Patient Name:  Yulissa Andersen  : 1975  MRN: [id number]  CSN: [id number]    Referring Practitioner Noa Leal DO [id number]      Diagnosis  Diagnoses       I63.9 (ICD-10-CM) - Cerebral infarction, unspecified           Treatment Diagnosis R26.0  Ataxic Gait  R26.89  Abnormalities of gait and mobility  R26.81  Unsteadiness on feet  R27.9  Unspecified lack of coordination  G81.90 Hemiplegia Affecting Unspecified Side  M62.81 Generalized Weakness   Date of Evaluation 24   Additional Pertinent History Yulissa Andersen has a past medical history of Coronary artery disease involving native coronary artery of native heart without angina pectoris, Diabetes mellitus (HCC), Hypertension, Incomplete bladder emptying, Microscopic hematuria, Osteoarthritis of knee, and Unspecified cerebral artery occlusion with cerebral infarction.  she has a past surgical history that includes cyst removal; hysteroscopy; Hysterectomy (2013); Breast surgery (Left, 02/10/2015); pr exc b9 lesion mrgn xcp sk tg t/a/l 2.1-3.0 cm (N/A, 2018); and DISSECTION GROIN (N/A, 2019).     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  Marcelo Gunderson,     Penicillins Hives    Ciprofloxacin Hives    Clindamycin/Lincomycin Rash    Doxycycline Hives    Lisinopril      Attacked kidneys      Medications   Current Outpatient Medications:     allopurinol (ZYLOPRIM) 300 MG tablet, Take 1 tablet by mouth daily, Disp: , Rfl:     b complex vitamins

## 2025-03-14 NOTE — PROGRESS NOTES
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  she has a past surgical history that includes cyst removal; hysteroscopy; Hysterectomy (2013); Breast surgery (Left, 02/10/2015); pr exc b9 lesion mrgn xcp sk tg t/a/l 2.1-3.0 cm (N/A, 2018); and DISSECTION GROIN (N/A, 2019).     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  Marcelo Gunderson DO    Penicillins Hives    Ciprofloxacin Hives    Clindamycin/Lincomycin Rash    Doxycycline Hives    Lisinopril      Attacked kidneys      Medications   Current Outpatient Medications:     allopurinol (ZYLOPRIM) 300 MG tablet, Take 1 tablet by mouth daily, Disp: , Rfl:     b complex vitamins capsule, Take 1 capsule by mouth daily, Disp: , Rfl:     hydrOXYzine

## 2025-03-19 ENCOUNTER — HOSPITAL ENCOUNTER (OUTPATIENT)
Dept: PHYSICAL THERAPY | Age: 50
Setting detail: THERAPIES SERIES
Discharge: HOME | End: 2025-03-19
Payer: COMMERCIAL

## 2025-03-19 ENCOUNTER — HOSPITAL ENCOUNTER (OUTPATIENT)
Dept: OCCUPATIONAL THERAPY | Age: 50
Setting detail: THERAPIES SERIES
Discharge: HOME | End: 2025-03-19
Payer: COMMERCIAL

## 2025-03-19 ENCOUNTER — HOSPITAL ENCOUNTER (OUTPATIENT)
Dept: SPEECH THERAPY | Age: 50
Setting detail: THERAPIES SERIES
Discharge: HOME | End: 2025-03-19
Payer: COMMERCIAL

## 2025-03-19 ENCOUNTER — OFFICE VISIT (OUTPATIENT)
Dept: NEUROLOGY | Age: 50
End: 2025-03-19
Payer: COMMERCIAL

## 2025-03-19 VITALS
BODY MASS INDEX: 35.16 KG/M2 | WEIGHT: 211 LBS | DIASTOLIC BLOOD PRESSURE: 80 MMHG | HEIGHT: 65 IN | HEART RATE: 74 BPM | SYSTOLIC BLOOD PRESSURE: 115 MMHG | OXYGEN SATURATION: 99 %

## 2025-03-19 DIAGNOSIS — R53.1 LEFT-SIDED WEAKNESS: ICD-10-CM

## 2025-03-19 DIAGNOSIS — R47.1 DYSARTHRIA: Primary | ICD-10-CM

## 2025-03-19 DIAGNOSIS — R26.89 BALANCE PROBLEM: ICD-10-CM

## 2025-03-19 PROCEDURE — 97110 THERAPEUTIC EXERCISES: CPT

## 2025-03-19 PROCEDURE — 99205 OFFICE O/P NEW HI 60 MIN: CPT | Performed by: PSYCHIATRY & NEUROLOGY

## 2025-03-19 PROCEDURE — 97112 NEUROMUSCULAR REEDUCATION: CPT

## 2025-03-19 PROCEDURE — 3079F DIAST BP 80-89 MM HG: CPT | Performed by: PSYCHIATRY & NEUROLOGY

## 2025-03-19 PROCEDURE — G8427 DOCREV CUR MEDS BY ELIG CLIN: HCPCS | Performed by: PSYCHIATRY & NEUROLOGY

## 2025-03-19 PROCEDURE — 97116 GAIT TRAINING THERAPY: CPT

## 2025-03-19 PROCEDURE — 3074F SYST BP LT 130 MM HG: CPT | Performed by: PSYCHIATRY & NEUROLOGY

## 2025-03-19 PROCEDURE — 97530 THERAPEUTIC ACTIVITIES: CPT

## 2025-03-19 PROCEDURE — G8417 CALC BMI ABV UP PARAM F/U: HCPCS | Performed by: PSYCHIATRY & NEUROLOGY

## 2025-03-19 PROCEDURE — 1036F TOBACCO NON-USER: CPT | Performed by: PSYCHIATRY & NEUROLOGY

## 2025-03-19 PROCEDURE — 92507 TX SP LANG VOICE COMM INDIV: CPT | Performed by: SPEECH-LANGUAGE PATHOLOGIST

## 2025-03-19 NOTE — PATIENT INSTRUCTIONS
Referral to [facility] neurology second opinion re: dysarthria, left sided weakness, balance problem  Continue with current medications  Homocystine level  Follow through with interventional neurology recommendations  Modify risk factors for stroke (blood pressure, cholesterol, diabetes, smoking cessation etc.. Control)  Call with any new symptoms or concerns.

## 2025-03-19 NOTE — PROGRESS NOTES
[facility]  PHYSICAL THERAPY  [] EVALUATION  [x] DAILY NOTE (LAND) [] DAILY NOTE (AQUATIC )  [] PROGRESS NOTE [] DISCHARGE NOTE    [x] OUTPATIENT REHABILITATION CENTER [facility]   [] [place] AMBULATORY CARE [place]    [] [facility]   [] CHARLIE [facility]    Date: 3/19/2025  Patient Name:  Yulissa Andersen  : 1975  MRN: [id number]  CSN: [id number]    Referring Practitioner Noa Leal DO [id number]      Diagnosis  Diagnoses       I63.9 (ICD-10-CM) - Cerebral infarction, unspecified           Treatment Diagnosis R26.0  Ataxic Gait  R26.89  Abnormalities of gait and mobility  R26.81  Unsteadiness on feet  R27.9  Unspecified lack of coordination  G81.90 Hemiplegia Affecting Unspecified Side  M62.81 Generalized Weakness   Date of Evaluation 24   Additional Pertinent History Yulissa Andersen has a past medical history of Coronary artery disease involving native coronary artery of native heart without angina pectoris, Diabetes mellitus (HCC), Hypertension, Incomplete bladder emptying, Microscopic hematuria, Osteoarthritis of knee, and Unspecified cerebral artery occlusion with cerebral infarction.  she has a past surgical history that includes cyst removal; hysteroscopy; Hysterectomy (2013); Breast surgery (Left, 02/10/2015); pr exc b9 lesion mrgn xcp sk tg t/a/l 2.1-3.0 cm (N/A, 2018); and DISSECTION GROIN (N/A, 2019).     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  Marcelo Gunderson,     Penicillins Hives    Ciprofloxacin Hives    Clindamycin/Lincomycin Rash    Doxycycline Hives    Lisinopril      Attacked kidneys      Medications   Current Outpatient Medications:     allopurinol (ZYLOPRIM) 300 MG tablet, Take 1 tablet by mouth daily, Disp: , Rfl:     b complex vitamins

## 2025-03-19 NOTE — PROGRESS NOTES
standing at table, pushing down into ball for circles fwd/bwd 1 10 ea X ^new   Red digit flex for whole hand gripping 1 10  Encouraged 3 sec hold at end range    Red therabar for bends in supination, pronation and twists 1 15 ea Xx    Hand gripper with black spring at 25  2 sets 10     ^Pronation supination with 1 washer on bar 1 15     ^Yellow theraweb to  and pull with left hand 1 15 Xx ^new   Supine isometrics in all movements  5 sec 5  Completed with no pain    Supine ball pushes with bilateral hands  5 sec 10     ^Doorway stretch for pec, hands down  10 sec 3  ^new this date, completed prior to supine to help stretch anterior shoulder for pain management, good stretch noted    Supine Jus. ER orange band, arms up for pull aparts, shoulder extension down to mat from 90 1 10 ea Xx Issued pt. [place] band today as pt. Stated she did not have one at home   Seated bicep curl 1 10  Added to HEP   Seoboglide in sitting - flexion at 45 degrees and 60 degrees, ER at side, horiz abd/add 1 10                          Activities Time    Notes/Comments   Grooved pegboard for left hand fine motor coordination task   In hand manipulation to take pegs out.  Able to hold 8 pegs when taking them out.     CGA to ambulate to speech therapy   To waiting room this date   Tapered pegs in pegboard   Working on in hand manipulation and palm to fingertip translation and in reverse.   Dynavision mode A/60 divided attention with 2 digit numbers  Xx Trial 1 - 37 hits, 10/10 numbers  Trial 2- 41 hits, 10/10 numbers     Dynavision mode A/60  Xx (Full board using ALEX for [facility])  Trial 1-  28 hits  Trial 2 - 28 hits  Trial 3 - 24 hits   Driving Simulation   Reaction time trials - avg 0.48 seconds gas pedal reaction time, 1.02 second braking time, avg distance to stop 174.72 feet.  Warm up drive: maintained appropriate speed of approx 40-45 MPH, 0% time out of lanes.    *increased dizziness during simulated driving tasks but was able to finish

## 2025-03-21 ENCOUNTER — HOSPITAL ENCOUNTER (OUTPATIENT)
Dept: SPEECH THERAPY | Age: 50
Setting detail: THERAPIES SERIES
Discharge: HOME | End: 2025-03-21
Payer: COMMERCIAL

## 2025-03-21 ENCOUNTER — HOSPITAL ENCOUNTER (OUTPATIENT)
Age: 50
Discharge: HOME | End: 2025-03-21
Payer: COMMERCIAL

## 2025-03-21 ENCOUNTER — HOSPITAL ENCOUNTER (OUTPATIENT)
Dept: OCCUPATIONAL THERAPY | Age: 50
Setting detail: THERAPIES SERIES
Discharge: HOME | End: 2025-03-21
Payer: COMMERCIAL

## 2025-03-21 ENCOUNTER — HOSPITAL ENCOUNTER (OUTPATIENT)
Age: 50
End: 2025-03-21
Payer: COMMERCIAL

## 2025-03-21 ENCOUNTER — HOSPITAL ENCOUNTER (OUTPATIENT)
Dept: PHYSICAL THERAPY | Age: 50
Setting detail: THERAPIES SERIES
Discharge: HOME | End: 2025-03-21
Payer: COMMERCIAL

## 2025-03-21 DIAGNOSIS — R47.1 DYSARTHRIA: ICD-10-CM

## 2025-03-21 DIAGNOSIS — R26.89 BALANCE PROBLEM: ICD-10-CM

## 2025-03-21 DIAGNOSIS — R53.1 LEFT-SIDED WEAKNESS: ICD-10-CM

## 2025-03-21 PROCEDURE — 97116 GAIT TRAINING THERAPY: CPT

## 2025-03-21 PROCEDURE — 97110 THERAPEUTIC EXERCISES: CPT

## 2025-03-21 PROCEDURE — 83090 ASSAY OF HOMOCYSTEINE: CPT

## 2025-03-21 PROCEDURE — 36415 COLL VENOUS BLD VENIPUNCTURE: CPT

## 2025-03-21 PROCEDURE — 92507 TX SP LANG VOICE COMM INDIV: CPT | Performed by: SPEECH-LANGUAGE PATHOLOGIST

## 2025-03-21 NOTE — PROGRESS NOTES
needed, Disp: , Rfl:     magnesium oxide (MAG-OX) 400 MG tablet, Take 1 tablet by mouth daily, Disp: , Rfl:     vitamin D (ERGOCALCIFEROL) 48359 units CAPS capsule, Take 1 capsule by mouth once a week, Disp: , Rfl:       Functional Outcome Measure Used UEFS   Functional Outcome Score 7 (11/25/24), 17 (12/27/24)      Insurance: Primary: Payor: [facility] /  /  / ,   Secondary:    Authorization Information PRE CERTIFICATION REQUIRED: YES THRU [employer] -SUBMITTED AUTH FOR OT EVAL -RECEIVED AUTH FOR 1 OT EVAL (93217) FROM 11/25/24-1/9/24 AUTH# [id number] -SCANNED INTO [employer]  INSURANCE THERAPY BENEFIT: Allowed 40 visits per calendar year.  0 visits have been used.. Hard Max..   AQUATIC THERAPY COVERED: Yes  MODALITIES COVERED:  Yes    Received auth for 5 OT visits for CPT codes 53531, 90013, 58341 and 02543 from 11/27/24 through 1/25/25.     Received auth for 4 OT visits for CPT codes 07204, 85248, 38363 and 57127 from 12/31/24 through 1/29/25.     Auth#          [id number]   Tracking#   [id number]     2/20/25  INSURANCE THERAPY BENEFIT: No additional authorization required until after 8th visit of PT/OT/ST EACH per calendar year.  8 visits is a soft max.  Authorization required after 8 visits.  AQUATIC THERAPY COVERED:  Yes   MODALITIES COVERED:  Yes with the exception of Iontophoresis and Hot/Cold Packs.       Initial CPT Codes Requested 97110-Therapeutic Exercise,  97530-Therapeutic Activities, 92263-OD ADL Training, and 27824-Fpwwvkzhgqwqk Re-Education, 06620 - Ultrasound   Progress Note Counter 4/8, PN completed 1/17/25 (Reporting Period: 12/27/24 to 1/17/25)   Recertification Date 4/4/25   Physician Follow-Up 01/22/25   Physician Orders    History of Present Illness Yulissa Andersen is a 49 y.o. female admitted to inpatient rehabilitation from 11/12/2024-11/23/2024 for clinically suspected CVA. During this time, the patient participated in an aggressive multidisciplinary inpatient rehabilitation program

## 2025-03-21 NOTE — PROGRESS NOTES
[facility]  SPEECH THERAPY  [] SPEECH LANGUAGE COGNITIVE EVALUATION  [x] DAILY NOTE   [] PROGRESS NOTE [] DISCHARGE NOTE    [x] OUTPATIENT REHABILITATION CENTER - [place]   [] [facility] CARE [place]    [] [facility]   [] CHARLIE [facility]    Date: 3/21/2025  Patient Name:  Yulissa Andersen  : 1975  MRN: [id number]  CSN: [id number]    Referring Practitioner Noa Leal DO [id number]      Diagnosis  Diagnoses       I63.9 (ICD-10-CM) - Cerebral infarction, unspecified           Treatment Diagnosis R41.89 Other symptoms and signs involving cognitive functions and awareness  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  she has a past surgical history that includes cyst removal; hysteroscopy; Hysterectomy (2013); Breast surgery (Left, 02/10/2015); pr exc b9 lesion mrgn xcp sk tg t/a/l 2.1-3.0 cm (N/A, 2018); DISSECTION GROIN (N/A, 2019); and Cardiac catheterization.     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  Marcelo Gunderson,     Penicillins Hives    Clindamycin/Lincomycin Rash    Ciprofloxacin Hives    Doxycycline Hives    Lisinopril      Attacked kidneys      Medications   Current Outpatient Medications:     apixaban (ELIQUIS) 5 MG TABS tablet, Take 1 tablet by mouth 2 times daily, Disp: 60 tablet, Rfl: 1    aspirin 81 MG chewable tablet, Take 1

## 2025-03-21 NOTE — PROGRESS NOTES
or critical stenosis patient determined to be a good candidate for TNK which was given after initiation of Cardene drip for BP management. After TNK, patient was admitted to ICU for further evaluation management. Patient stay complicated by angioedema thought to be secondary to Vasotec. Patient given Decadron, Benadryl, and FFP x2. MRI of brain reportedly negative. CTA reportedly with a possible filling defect in the distal right middle cerebral artery, 3.3 mm aneurysm arising from the terminal segment of the left internal carotid artery, no evidence of vasospasm.. Echo was obtained reportedly with EF 55-60%, no PFO. EEG was obtained and was reportedly normal. Patient was transferred out of the ICU on 11/9/2024. On day of consultation, patient is seen laying in bed with her son at bedside. Patient reports ongoing left sided vision impairment, left sided weakness and speech impairment. She denies any CP or SOB. No reports of any significant changes to bowel or bladder. Patient is able to provide majority of history but is limited based on language impairments. Prior to admission, patient was reportedly independent with ADLs and mobility without the use of any AD. She was employed an working full time at [employer] as a . She lives with her boyfriend and 2 children (ages 14 and 16). An adult son is in the room who does not reside with them.      SUBJECTIVE: Patient reports that  she is tired today   TREATMENT   Precautions: FALL RISK  - Gait belt    Pain: Denies     \"X” in shaded column indicates activity completed today    “*\" next to exercise/intervention indicates progression   Modalities Parameters/  Location  Notes                     Manual Therapy Time/Technique  Notes                     Exercise/Intervention   Notes   NuStep (seat 9/arms 9) 8 min Level 4* X  UE ONLY 3/21    Review of HEP:  LAQ, HS curl, ankle pumps, hip add iso     Verbal review   LAQ 10  x           Standing:       Heel/toe  2 brk

## 2025-03-22 LAB — HOMOCYSTEINE: 10.4 UMOL/L (ref 0–15)

## 2025-03-24 NOTE — PROGRESS NOTES
WOB  Abdomen: non-distended  Skin: warm and dry, no rash or erythema on exposed skin    Diagnostics:   No results found for this or any previous visit (from the past 24 hours).      Assessment:  Yulissa Andersen is a 49 y.o. female with PMH significant for CAD, DM, HTN, OA, GERD, migraines, and CVA (1995) who was admitted to [facility] acute IPR unit from  11/12/2024-11/23/2024 following a clinically suspected CVA.  Overall, patient is progressing well with her outpatient therapies.  S/p  cerebral angiogram with aneurysm stent assisted coil on 1/29.  She continues with PT/OT/ST 2x weekly. She is currently modified independent with ADLs and mobility without the use of any AD. Discussed need for drivers evaluation prior to return to driving. She does have goal of returning to work next fall () which I believe is reasonable. Neurology has referred her to [facility] for second opinion- she states that this appointment is scheduled for next month. I think this will be valuable given her negative imaging and atypical presentation    Plan:  Continue follow-up with specialists as scheduled  Continue outpatient therapies  Will reach out to OT regarding readiness for drivers evaluation. No driving until after drivers eval   Goal of return to work in August 2025- will continue to monitor for readiness  Will plan to see back in 4 months July 2025 to assess appropriateness for return to work and assess for any accomodation needs  No equipment needs       Return in about 4 months (around 7/26/2025).     No orders of the defined types were placed in this encounter.    Approximately 22 minutes were spent reviewing patient's chart/provider notes, obtaining history from patient, performing physical exam, and creating treatment plan with >50% of time spent face to face with patient providing counseling and education      Noa Leal DO

## 2025-03-26 ENCOUNTER — HOSPITAL ENCOUNTER (OUTPATIENT)
Dept: OCCUPATIONAL THERAPY | Age: 50
Setting detail: THERAPIES SERIES
Discharge: HOME | End: 2025-03-26
Payer: COMMERCIAL

## 2025-03-26 ENCOUNTER — OFFICE VISIT (OUTPATIENT)
Dept: PHYSICAL MEDICINE AND REHAB | Age: 50
End: 2025-03-26
Payer: COMMERCIAL

## 2025-03-26 ENCOUNTER — HOSPITAL ENCOUNTER (OUTPATIENT)
Dept: SPEECH THERAPY | Age: 50
Setting detail: THERAPIES SERIES
Discharge: HOME | End: 2025-03-26
Payer: COMMERCIAL

## 2025-03-26 ENCOUNTER — HOSPITAL ENCOUNTER (OUTPATIENT)
Dept: PHYSICAL THERAPY | Age: 50
Setting detail: THERAPIES SERIES
Discharge: HOME | End: 2025-03-26
Payer: COMMERCIAL

## 2025-03-26 VITALS
DIASTOLIC BLOOD PRESSURE: 70 MMHG | WEIGHT: 210.98 LBS | BODY MASS INDEX: 35.15 KG/M2 | SYSTOLIC BLOOD PRESSURE: 126 MMHG | HEIGHT: 65 IN

## 2025-03-26 DIAGNOSIS — Z74.09 IMPAIRED MOBILITY AND ADLS: ICD-10-CM

## 2025-03-26 DIAGNOSIS — Z78.9 IMPAIRED MOBILITY AND ADLS: ICD-10-CM

## 2025-03-26 DIAGNOSIS — I63.9 CEREBROVASCULAR ACCIDENT (CVA) DETERMINED BY CLINICAL ASSESSMENT (HCC): Primary | ICD-10-CM

## 2025-03-26 DIAGNOSIS — R47.9 SPEECH DISTURBANCE, UNSPECIFIED TYPE: ICD-10-CM

## 2025-03-26 PROCEDURE — G8427 DOCREV CUR MEDS BY ELIG CLIN: HCPCS | Performed by: STUDENT IN AN ORGANIZED HEALTH CARE EDUCATION/TRAINING PROGRAM

## 2025-03-26 PROCEDURE — 99213 OFFICE O/P EST LOW 20 MIN: CPT | Performed by: STUDENT IN AN ORGANIZED HEALTH CARE EDUCATION/TRAINING PROGRAM

## 2025-03-26 PROCEDURE — 97110 THERAPEUTIC EXERCISES: CPT

## 2025-03-26 PROCEDURE — 3074F SYST BP LT 130 MM HG: CPT | Performed by: STUDENT IN AN ORGANIZED HEALTH CARE EDUCATION/TRAINING PROGRAM

## 2025-03-26 PROCEDURE — 1036F TOBACCO NON-USER: CPT | Performed by: STUDENT IN AN ORGANIZED HEALTH CARE EDUCATION/TRAINING PROGRAM

## 2025-03-26 PROCEDURE — 97112 NEUROMUSCULAR REEDUCATION: CPT

## 2025-03-26 PROCEDURE — 3078F DIAST BP <80 MM HG: CPT | Performed by: STUDENT IN AN ORGANIZED HEALTH CARE EDUCATION/TRAINING PROGRAM

## 2025-03-26 PROCEDURE — 92507 TX SP LANG VOICE COMM INDIV: CPT | Performed by: SPEECH-LANGUAGE PATHOLOGIST

## 2025-03-26 PROCEDURE — G8417 CALC BMI ABV UP PARAM F/U: HCPCS | Performed by: STUDENT IN AN ORGANIZED HEALTH CARE EDUCATION/TRAINING PROGRAM

## 2025-03-26 NOTE — PROGRESS NOTES
[facility]  OCCUPATIONAL THERAPY   EVALUATION  [x] DAILY NOTE (LAND) [] DAILY NOTE (AQUATIC ) [] PROGRESS NOTE [] DISCHARGE NOTE    [x] OUTPATIENT REHABILITATION CENTER [facility]   [] [place] AMBULATORY CARE [place]    [] [facility]   [] [facility]    Date: 3/26/2025  Patient Name:  Yulissa Andersen  : 1975  MRN: [id number]  CSN: [id number]    Referring Practitioner Noa Leal DO [id number]      Diagnosis  Diagnoses       I63.9 (ICD-10-CM) - Cerebral infarction, unspecified           Treatment Diagnosis I69.30 Unspecified Sequelae of Cerebral Infarction  G81.90 Hemiplegia Affecting Unspecified Side   Date of Evaluation 24   Additional Pertinent History Yulissa Andersen has a past medical history of Coronary artery disease involving native coronary artery of native heart without angina pectoris, Diabetes mellitus (HCC), Hypertension, Incomplete bladder emptying, Microscopic hematuria, Osteoarthritis of knee, and Unspecified cerebral artery occlusion with cerebral infarction.  she has a past surgical history that includes cyst removal; hysteroscopy; Hysterectomy (2013); Breast surgery (Left, 02/10/2015); pr exc b9 lesion mrgn xcp sk tg t/a/l 2.1-3.0 cm (N/A, 2018); DISSECTION GROIN (N/A, 2019); and Cardiac catheterization.     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  Marcelo Gunderson DO    Penicillins Hives    Ciprofloxacin Hives    Clindamycin/Lincomycin Rash    Doxycycline Hives    Lisinopril      Attacked kidneys      Medications   Current Outpatient Medications:     allopurinol (ZYLOPRIM) 300 MG tablet, Take 1 tablet by mouth daily, Disp: , Rfl:     b complex vitamins capsule, Take 1 capsule by mouth daily,

## 2025-03-26 NOTE — PROGRESS NOTES
Assessment: Initiated with Nustep for warm up followed with standing exercises. Increased repetitions with good tolerance as noted. Continued to require close SBA/CGA at times especially with balance based exercises. Will continue to progress as tolerated.     GOALS:  Patient Goal: Teaching, driving, get back to life as normal     Short Term Goals: 4 weeks  Patient will improve Sheldon score from 24/56 to 40/56 to decrease fall risk and improve mobility.  23/56 decline.  Goal not met.  Continue Goal     Patient will complete 5 sit to/from stand transfers from standard chair without UEs in 30 seconds to improve functional strength for improved safety of transfers. Goal Met.  DC Goal    Patient will ambulate without AD with = heel strike,  straight path and no LOB for safe community mobility.   Intermittent heel strike noted.  Goal not met.  Continue Goal    Long Term Goals: 8 weeks   Patient will improve L strength to 4+/5 for ease with transfers, gait and climbing stairs.  L 4/5 Goal not met.  Continue Goal     Patient will improve Sheldon score to 55/56 to decrease fall risk and improve mobility on various terrain.  Goal not met.  Continue Goal     Patient will be independent and compliant with HEP to achieve above goals.   Reports compliance.  Goal not met.  Continue Goal     Patient will be able to perform sit to stand transfer from 12 in height chair to assist with return to work related tasks at  . Able to perform transfer from 16 height chair .  Goal not met.  Continue Goal     Patient will demonstrate ability to perform L LE SLS for 10 sec without LOB to assist with uneven surface navigation.  Unable to perform SLS.  Goal not met.  Continue Goal       Patient Education:   [x]  HEP/Education Completed: Continue with HEP, exercise progressions  [employer] Access Code:  []  No new Education completed  []  Reviewed Prior HEP      [x]  Patient verbalized and/or demonstrated understanding of education

## 2025-03-26 NOTE — PROGRESS NOTES
tablet by mouth daily, Disp: 30 tablet, Rfl: 1    atorvastatin (LIPITOR) 40 MG tablet, Take 1 tablet by mouth nightly, Disp: 30 tablet, Rfl: 1    hydroCHLOROthiazide (HYDRODIURIL) 25 MG tablet, Take 1 tablet by mouth daily, Disp: 30 tablet, Rfl: 1    polyethylene glycol (GLYCOLAX) 17 g packet, Take 1 packet by mouth daily as needed for Constipation, Disp: , Rfl:     valsartan (DIOVAN) 320 MG tablet, TAKE 1 TABLET BY MOUTH ONCE DAILY, Disp: 90 tablet, Rfl: 3    amLODIPine (NORVASC) 5 MG tablet, Take 1 tablet by mouth daily, Disp: 90 tablet, Rfl: 3    carvedilol (COREG) 25 MG tablet, Take 1 tablet by mouth 2 times daily, Disp: 180 tablet, Rfl: 3    Blood Pressure Monitor KIT, 1 each by Does not apply route once for 1 dose, Disp: 1 kit, Rfl: 0    hydrALAZINE (APRESOLINE) 25 MG tablet, Take 4 tablets by mouth 3 times daily, Disp: , Rfl:     Multiple Vitamins-Minerals (THERAPEUTIC MULTIVITAMIN-MINERALS) tablet, Take 1 tablet by mouth daily, Disp: , Rfl:     traZODone (DESYREL) 50 MG tablet, TAKE 1 TO 2 TABLETS BY MOUTH ONCE DAILY AT BEDTIME, Disp: , Rfl:     omeprazole (PRILOSEC) 20 MG delayed release capsule, Take 1 capsule by mouth daily as needed, Disp: , Rfl:     magnesium oxide (MAG-OX) 400 MG tablet, Take 1 tablet by mouth daily, Disp: , Rfl:     vitamin D (ERGOCALCIFEROL) 59141 units CAPS capsule, Take 1 capsule by mouth once a week, Disp: , Rfl:       [facility]      Functional Outcome Score Level 3  (11/26/24); Level 4 (1/9/25)      Insurance: Primary: Payor: [facility] /  /  / ,   Secondary:    Authorization Information 6/8  INSURANCE THERAPY BENEFIT: No additional authorization required until after 8th visit of PT/OT/ST EACH per calendar year.  8 visits is a soft max.  Authorization required after 8 visits.    Initial CPT Codes Requested 56217 - Speech/Language Therapy  75567 - Cognitive Function Interventions (first 15 min) and 67090 -

## 2025-03-28 ENCOUNTER — HOSPITAL ENCOUNTER (OUTPATIENT)
Dept: SPEECH THERAPY | Age: 50
Setting detail: THERAPIES SERIES
Discharge: HOME | End: 2025-03-28
Payer: COMMERCIAL

## 2025-03-28 ENCOUNTER — HOSPITAL ENCOUNTER (OUTPATIENT)
Dept: OCCUPATIONAL THERAPY | Age: 50
Setting detail: THERAPIES SERIES
Discharge: HOME | End: 2025-03-28
Payer: COMMERCIAL

## 2025-03-28 ENCOUNTER — HOSPITAL ENCOUNTER (OUTPATIENT)
Dept: PHYSICAL THERAPY | Age: 50
Setting detail: THERAPIES SERIES
Discharge: HOME | End: 2025-03-28
Payer: COMMERCIAL

## 2025-03-28 PROCEDURE — 92507 TX SP LANG VOICE COMM INDIV: CPT

## 2025-03-28 PROCEDURE — 97110 THERAPEUTIC EXERCISES: CPT

## 2025-03-28 PROCEDURE — 97112 NEUROMUSCULAR REEDUCATION: CPT

## 2025-03-28 NOTE — PROGRESS NOTES
[facility]  SPEECH THERAPY  [] SPEECH LANGUAGE COGNITIVE EVALUATION  [x] DAILY NOTE   [] PROGRESS NOTE [] DISCHARGE NOTE    [x] OUTPATIENT REHABILITATION CENTER - [place]   [] [facility] CARE [place]    [] [facility]   [] CHARLIE [facility]    Date: 3/28/2025  Patient Name:  Yulissa Andersen  : 1975  MRN: [id number]  CSN: [id number]    Referring Practitioner Noa Leal DO [id number]      Diagnosis  Diagnoses       I63.9 (ICD-10-CM) - Cerebral infarction, unspecified           Treatment Diagnosis R41.89 Other symptoms and signs involving cognitive functions and awareness  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  she has a past surgical history that includes cyst removal; hysteroscopy; Hysterectomy (2013); Breast surgery (Left, 02/10/2015); pr exc b9 lesion mrgn xcp sk tg t/a/l 2.1-3.0 cm (N/A, 2018); DISSECTION GROIN (N/A, 2019); and Cardiac catheterization.     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  Marcelo Gunderson,     Penicillins Hives    Clindamycin/Lincomycin Rash    Ciprofloxacin Hives    Doxycycline Hives    Lisinopril      Attacked kidneys      Medications   Current Outpatient Medications:     apixaban (ELIQUIS) 5 MG TABS tablet, Take 1 tablet by mouth 2 times daily, Disp: 60 tablet, Rfl: 1    aspirin 81 MG chewable tablet, Take 1

## 2025-03-28 NOTE — PROGRESS NOTES
[facility]  PHYSICAL THERAPY  [] EVALUATION  [x] DAILY NOTE (LAND) [] DAILY NOTE (AQUATIC )  [] PROGRESS NOTE [] DISCHARGE NOTE    [x] OUTPATIENT REHABILITATION CENTER [facility]   [] [place] AMBULATORY CARE [place]    [] [facility]   [] CHARLIE [facility]    Date: 3/28/2025  Patient Name:  Yulissa Andersen  : 1975  MRN: [id number]  CSN: [id number]    Referring Practitioner Noa Leal DO [id number]      Diagnosis  Diagnoses       I63.9 (ICD-10-CM) - Cerebral infarction, unspecified           Treatment Diagnosis R26.0  Ataxic Gait  R26.89  Abnormalities of gait and mobility  R26.81  Unsteadiness on feet  R27.9  Unspecified lack of coordination  G81.90 Hemiplegia Affecting Unspecified Side  M62.81 Generalized Weakness   Date of Evaluation 24   Additional Pertinent History Yulissa Andersen has a past medical history of Coronary artery disease involving native coronary artery of native heart without angina pectoris, Diabetes mellitus (HCC), Hypertension, Incomplete bladder emptying, Microscopic hematuria, Osteoarthritis of knee, and Unspecified cerebral artery occlusion with cerebral infarction.  she has a past surgical history that includes cyst removal; hysteroscopy; Hysterectomy (2013); Breast surgery (Left, 02/10/2015); pr exc b9 lesion mrgn xcp sk tg t/a/l 2.1-3.0 cm (N/A, 2018); DISSECTION GROIN (N/A, 2019); and Cardiac catheterization.     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  Marcelo Gunderson, DO    Penicillins Hives    Ciprofloxacin Hives    Clindamycin/Lincomycin Rash    Doxycycline Hives    Lisinopril      Attacked kidneys      Medications   Current Outpatient Medications:     allopurinol (ZYLOPRIM) 300 MG tablet, Take 1 tablet by mouth daily, Disp: , Rfl:
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
## 2025-03-29 NOTE — PROGRESS NOTES
Gastrointestinal:  Negative for abdominal pain, nausea and vomiting.   Musculoskeletal:  Negative for gait problem.   Skin:  Negative for wound.   Neurological:  Positive for speech difficulty, weakness, numbness and headaches. Negative for light-headedness.   Psychiatric/Behavioral:  Negative for confusion.          Medications      Medications Ordered Prior to Encounter          Current Outpatient Medications on File Prior to Visit   Medication Sig Dispense Refill    dulaglutide (TRULICITY) 0.75 MG/0.5ML SOAJ SC injection Inject into the skin        Multiple Vitamin (MULTIVITAMIN) TABS Take by mouth        ASPIRIN LOW DOSE 81 MG chewable tablet TAKE 1 TABLET BY MOUTH ONCE DAILY 90 tablet 1    ELIQUIS 5 MG TABS tablet TAKE 1 TABLET BY MOUTH TWICE DAILY 180 tablet 1    atorvastatin (LIPITOR) 40 MG tablet TAKE 1 TABLET BY MOUTH ONCE DAILY AT night 90 tablet 1    hydroCHLOROthiazide (HYDRODIURIL) 25 MG tablet TAKE 1 TABLET BY MOUTH ONCE DAILY 90 tablet 1    valsartan (DIOVAN) 320 MG tablet TAKE 1 TABLET BY MOUTH ONCE DAILY 90 tablet 3    amLODIPine (NORVASC) 5 MG tablet Take 1 tablet by mouth daily 90 tablet 3    carvedilol (COREG) 25 MG tablet Take 1 tablet by mouth 2 times daily 180 tablet 3    hydrALAZINE (APRESOLINE) 25 MG tablet Take 4 tablets by mouth 3 times daily        Multiple Vitamins-Minerals (THERAPEUTIC MULTIVITAMIN-MINERALS) tablet Take 1 tablet by mouth daily        traZODone (DESYREL) 50 MG tablet TAKE 1 TO 2 TABLETS BY MOUTH ONCE DAILY AT BEDTIME        omeprazole (PRILOSEC) 20 MG delayed release capsule Take 1 capsule by mouth daily as needed        magnesium oxide (MAG-OX) 400 MG tablet Take 1 tablet by mouth daily        vitamin D (ERGOCALCIFEROL) 13056 units CAPS capsule Take 1 capsule by mouth once a week        QULIPTA 60 MG TABS TAKE 1 TABLET BY MOUTH ONCE DAILY AS NEEDED FOR MIGRAINE prevention        Blood Pressure Monitor KIT 1 each by Does not apply route once for 1 dose 1 kit 0      No

## 2025-03-31 ENCOUNTER — APPOINTMENT (OUTPATIENT)
Dept: ULTRASOUND IMAGING | Age: 50
End: 2025-03-31
Payer: COMMERCIAL

## 2025-03-31 ENCOUNTER — APPOINTMENT (OUTPATIENT)
Dept: CT IMAGING | Age: 50
End: 2025-03-31
Payer: COMMERCIAL

## 2025-03-31 ENCOUNTER — HOSPITAL ENCOUNTER (EMERGENCY)
Age: 50
Discharge: HOME | End: 2025-03-31
Attending: EMERGENCY MEDICINE
Payer: COMMERCIAL

## 2025-03-31 VITALS
SYSTOLIC BLOOD PRESSURE: 134 MMHG | HEART RATE: 74 BPM | RESPIRATION RATE: 16 BRPM | DIASTOLIC BLOOD PRESSURE: 86 MMHG | TEMPERATURE: 98 F | BODY MASS INDEX: 34.95 KG/M2 | OXYGEN SATURATION: 98 % | WEIGHT: 210 LBS

## 2025-03-31 DIAGNOSIS — R10.31 RIGHT LOWER QUADRANT ABDOMINAL PAIN: Primary | ICD-10-CM

## 2025-03-31 LAB
ALBUMIN SERPL BCG-MCNC: 4.1 G/DL (ref 3.4–4.9)
ALP SERPL-CCNC: 102 U/L (ref 35–104)
ALT SERPL W/O P-5'-P-CCNC: 9 U/L (ref 10–35)
ANION GAP SERPL CALC-SCNC: 10 MEQ/L (ref 8–16)
AST SERPL-CCNC: 11 U/L (ref 10–35)
BASOPHILS ABSOLUTE: 0 THOU/MM3 (ref 0–0.1)
BASOPHILS NFR BLD AUTO: 0.5 %
BILIRUB SERPL-MCNC: < 0.2 MG/DL (ref 0.3–1.2)
BILIRUB UR QL STRIP.AUTO: NEGATIVE
BUN SERPL-MCNC: 10 MG/DL (ref 8–23)
CALCIUM SERPL-MCNC: 9.3 MG/DL (ref 8.6–10)
CHARACTER UR: CLEAR
CHLORIDE SERPL-SCNC: 103 MEQ/L (ref 98–111)
CO2 SERPL-SCNC: 23 MEQ/L (ref 22–29)
COLOR, UA: YELLOW
CREAT SERPL-MCNC: 0.8 MG/DL (ref 0.5–0.9)
DEPRECATED RDW RBC AUTO: 49.1 FL (ref 35–45)
EOSINOPHIL NFR BLD AUTO: 2.4 %
EOSINOPHILS ABSOLUTE: 0.2 THOU/MM3 (ref 0–0.4)
ERYTHROCYTE [DISTWIDTH] IN BLOOD BY AUTOMATED COUNT: 15.4 % (ref 11.5–14.5)
GFR SERPL CREATININE-BSD FRML MDRD: > 90 ML/MIN/1.73M2
GLUCOSE SERPL-MCNC: 98 MG/DL (ref 74–109)
GLUCOSE UR QL STRIP.AUTO: NEGATIVE MG/DL
HCT VFR BLD AUTO: 32.5 % (ref 37–47)
HGB BLD-MCNC: 10.1 GM/DL (ref 12–16)
HGB UR QL STRIP.AUTO: NEGATIVE
IMM GRANULOCYTES # BLD AUTO: 0.01 THOU/MM3 (ref 0–0.07)
IMM GRANULOCYTES NFR BLD AUTO: 0.1 %
KETONES UR QL STRIP.AUTO: NEGATIVE
LYMPHOCYTES ABSOLUTE: 2.3 THOU/MM3 (ref 1–4.8)
LYMPHOCYTES NFR BLD AUTO: 27.4 %
MCH RBC QN AUTO: 27.4 PG (ref 26–33)
MCHC RBC AUTO-ENTMCNC: 31.1 GM/DL (ref 32.2–35.5)
MCV RBC AUTO: 88.1 FL (ref 81–99)
MONOCYTES ABSOLUTE: 0.7 THOU/MM3 (ref 0.4–1.3)
MONOCYTES NFR BLD AUTO: 8.2 %
NEUTROPHILS ABSOLUTE: 5.2 THOU/MM3 (ref 1.8–7.7)
NEUTROPHILS NFR BLD AUTO: 61.4 %
NITRITE UR QL STRIP: NEGATIVE
NRBC BLD AUTO-RTO: 0 /100 WBC
OSMOLALITY SERPL CALC.SUM OF ELEC: 271 MOSMOL/KG (ref 275–300)
PH UR STRIP.AUTO: 7.5 [PH] (ref 5–9)
PLATELET # BLD AUTO: 426 THOU/MM3 (ref 130–400)
PMV BLD AUTO: 9.7 FL (ref 9.4–12.4)
POTASSIUM SERPL-SCNC: 3.8 MEQ/L (ref 3.5–5.2)
PROT SERPL-MCNC: 7.1 G/DL (ref 6.4–8.3)
PROT UR STRIP.AUTO-MCNC: NEGATIVE MG/DL
RBC # BLD AUTO: 3.69 MILL/MM3 (ref 4.2–5.4)
SODIUM SERPL-SCNC: 136 MEQ/L (ref 135–145)
SP GR UR REFRACT.AUTO: > 1.03 (ref 1–1.03)
UROBILINOGEN, URINE: 0.2 EU/DL (ref 0–1)
WBC # BLD AUTO: 8.4 THOU/MM3 (ref 4.8–10.8)
WBC #/AREA URNS HPF: NEGATIVE /[HPF]

## 2025-03-31 PROCEDURE — 6360000004 HC RX CONTRAST MEDICATION

## 2025-03-31 PROCEDURE — 81003 URINALYSIS AUTO W/O SCOPE: CPT

## 2025-03-31 PROCEDURE — 36415 COLL VENOUS BLD VENIPUNCTURE: CPT

## 2025-03-31 PROCEDURE — 2580000003 HC RX 258

## 2025-03-31 PROCEDURE — 93975 VASCULAR STUDY: CPT

## 2025-03-31 PROCEDURE — 6360000002 HC RX W HCPCS

## 2025-03-31 PROCEDURE — 6370000000 HC RX 637 (ALT 250 FOR IP)

## 2025-03-31 PROCEDURE — 85025 COMPLETE CBC W/AUTO DIFF WBC: CPT

## 2025-03-31 PROCEDURE — 99285 EMERGENCY DEPT VISIT HI MDM: CPT

## 2025-03-31 PROCEDURE — 80053 COMPREHEN METABOLIC PANEL: CPT

## 2025-03-31 PROCEDURE — 96375 TX/PRO/DX INJ NEW DRUG ADDON: CPT

## 2025-03-31 PROCEDURE — 96374 THER/PROPH/DIAG INJ IV PUSH: CPT

## 2025-03-31 PROCEDURE — 74177 CT ABD & PELVIS W/CONTRAST: CPT

## 2025-03-31 PROCEDURE — 76830 TRANSVAGINAL US NON-OB: CPT

## 2025-03-31 RX ORDER — MORPHINE SULFATE 4 MG/ML
4 INJECTION, SOLUTION INTRAMUSCULAR; INTRAVENOUS
Refills: 0 | Status: COMPLETED | OUTPATIENT
Start: 2025-03-31 | End: 2025-03-31

## 2025-03-31 RX ORDER — 0.9 % SODIUM CHLORIDE 0.9 %
1000 INTRAVENOUS SOLUTION INTRAVENOUS ONCE
Status: COMPLETED | OUTPATIENT
Start: 2025-03-31 | End: 2025-03-31

## 2025-03-31 RX ORDER — IOPAMIDOL 755 MG/ML
80 INJECTION, SOLUTION INTRAVASCULAR
Status: COMPLETED | OUTPATIENT
Start: 2025-03-31 | End: 2025-03-31

## 2025-03-31 RX ORDER — HYDROCODONE BITARTRATE AND ACETAMINOPHEN 5; 325 MG/1; MG/1
1 TABLET ORAL EVERY 6 HOURS PRN
Qty: 10 TABLET | Refills: 0 | Status: SHIPPED | OUTPATIENT
Start: 2025-03-31 | End: 2025-04-01

## 2025-03-31 RX ORDER — ONDANSETRON 4 MG/1
4 TABLET, ORALLY DISINTEGRATING ORAL 3 TIMES DAILY PRN
Qty: 15 TABLET | Refills: 0 | Status: SHIPPED | OUTPATIENT
Start: 2025-03-31

## 2025-03-31 RX ORDER — DICYCLOMINE HYDROCHLORIDE 10 MG/1
20 CAPSULE ORAL ONCE
Status: COMPLETED | OUTPATIENT
Start: 2025-03-31 | End: 2025-03-31

## 2025-03-31 RX ORDER — ONDANSETRON 2 MG/ML
4 INJECTION INTRAMUSCULAR; INTRAVENOUS ONCE
Status: COMPLETED | OUTPATIENT
Start: 2025-03-31 | End: 2025-03-31

## 2025-03-31 RX ADMIN — HYDROMORPHONE HYDROCHLORIDE 1 MG: 1 INJECTION, SOLUTION INTRAMUSCULAR; INTRAVENOUS; SUBCUTANEOUS at 16:04

## 2025-03-31 RX ADMIN — SODIUM CHLORIDE 1000 ML: 0.9 INJECTION, SOLUTION INTRAVENOUS at 13:05

## 2025-03-31 RX ADMIN — MORPHINE SULFATE 4 MG: 4 INJECTION, SOLUTION INTRAMUSCULAR; INTRAVENOUS at 12:58

## 2025-03-31 RX ADMIN — DICYCLOMINE HYDROCHLORIDE 20 MG: 10 CAPSULE ORAL at 16:04

## 2025-03-31 RX ADMIN — IOPAMIDOL 80 ML: 755 INJECTION, SOLUTION INTRAVENOUS at 14:04

## 2025-03-31 RX ADMIN — ONDANSETRON 4 MG: 2 INJECTION, SOLUTION INTRAMUSCULAR; INTRAVENOUS at 12:58

## 2025-03-31 ASSESSMENT — PAIN DESCRIPTION - DESCRIPTORS: DESCRIPTORS: ACHING

## 2025-03-31 ASSESSMENT — PAIN DESCRIPTION - ORIENTATION: ORIENTATION: LOWER

## 2025-03-31 ASSESSMENT — PAIN DESCRIPTION - PAIN TYPE: TYPE: ACUTE PAIN

## 2025-03-31 ASSESSMENT — PAIN SCALES - GENERAL: PAINLEVEL_OUTOF10: 5

## 2025-03-31 ASSESSMENT — PAIN - FUNCTIONAL ASSESSMENT
PAIN_FUNCTIONAL_ASSESSMENT: 0-10
PAIN_FUNCTIONAL_ASSESSMENT: NONE - DENIES PAIN

## 2025-03-31 ASSESSMENT — PAIN DESCRIPTION - FREQUENCY: FREQUENCY: CONTINUOUS

## 2025-03-31 ASSESSMENT — PAIN DESCRIPTION - ONSET: ONSET: ON-GOING

## 2025-03-31 ASSESSMENT — PAIN DESCRIPTION - LOCATION: LOCATION: ABDOMEN

## 2025-03-31 NOTE — ED PROVIDER NOTES
Transfer of Care Note:   Physician Signing out: Sapna Fregoso MD  Receiving Physician: Anastacio Aden MD  Sign out time: 1600    Brief history:  49-year-old female with right lower quadrant abdominal pain, concern for appendicitis but CT negative.    Pending workup, UA, pain control, transvaginal ultrasound.    Additional Assessment and results:   I have personally performed a face to face diagnostic evaluation on this patient. The patient's initial evaluation and plan have been discussed with the prior physician who initially evaluated the patient. Nursing Notes, Past Medical Hx, Past Surgical Hx, Social Hx, Allergies, vital signs and Family Hx were all reviewed.      Vitals:    03/31/25 1713   BP: 134/86   Pulse: 74   Resp: 16   Temp:    SpO2: 98%     Physical Exam      Labs Reviewed   CBC WITH AUTO DIFFERENTIAL - Abnormal; Notable for the following components:       Result Value    RBC 3.69 (*)     Hemoglobin 10.1 (*)     Hematocrit 32.5 (*)     MCHC 31.1 (*)     RDW-CV 15.4 (*)     RDW-SD 49.1 (*)     Platelets 426 (*)     All other components within normal limits   COMPREHENSIVE METABOLIC PANEL W/ REFLEX TO MG FOR LOW K - Abnormal; Notable for the following components:    Total Bilirubin <0.2 (*)     ALT 9 (*)     All other components within normal limits   URINALYSIS WITH REFLEX TO CULTURE - Abnormal; Notable for the following components:    Specific Gravity, UA >1.030 (*)     All other components within normal limits   OSMOLALITY - Abnormal; Notable for the following components:    Osmolality Calc 271.0 (*)     All other components within normal limits   ANION GAP   GLOMERULAR FILTRATION RATE, ESTIMATED         Medications   sodium chloride 0.9 % bolus 1,000 mL (1,000 mLs IntraVENous New Bag 3/31/25 1305)   morphine injection 4 mg (4 mg IntraVENous Given 3/31/25 1258)   ondansetron (ZOFRAN) injection 4 mg (4 mg IntraVENous Given 3/31/25 1258)   iopamidol (ISOVUE-370) 76 % injection 80 mL (80 mLs 
tests.               **This report has been created using voice recognition software. It may contain   minor errors which are inherent in voice recognition technology.**            Electronically signed by Dr. Freddy Caballero          ED LABS:  Labs Reviewed   CBC WITH AUTO DIFFERENTIAL - Abnormal; Notable for the following components:       Result Value    RBC 3.69 (*)     Hemoglobin 10.1 (*)     Hematocrit 32.5 (*)     MCHC 31.1 (*)     RDW-CV 15.4 (*)     RDW-SD 49.1 (*)     Platelets 426 (*)     All other components within normal limits   COMPREHENSIVE METABOLIC PANEL W/ REFLEX TO MG FOR LOW K - Abnormal; Notable for the following components:    Total Bilirubin <0.2 (*)     ALT 9 (*)     All other components within normal limits   URINALYSIS WITH REFLEX TO CULTURE - Abnormal; Notable for the following components:    Specific Gravity, UA >1.030 (*)     All other components within normal limits   OSMOLALITY - Abnormal; Notable for the following components:    Osmolality Calc 271.0 (*)     All other components within normal limits   ANION GAP   GLOMERULAR FILTRATION RATE, ESTIMATED       MDM:  Patient had right lower quadrant abdominal pain, underwent CT scan which showed concern for right ovarian cyst.  Awaiting ultrasound results at shift change.    Ultrasound results reviewed, no torsion.  Will recommend close GYN follow-up and prescribe pain medication.    FINAL IMPRESSION      1. Right lower quadrant abdominal pain        Care of this patient was transferred from Dr. Terry to myself at shift change.    Provider:  I personally performed the services described in the documentation, reviewed and edited the documentation which was dictated in my presence, and it accurately records my words and actions.    MERCY JACK MD 3/31/25 7:13 PM       Mercy Jack MD  03/31/25 1355    
height or weight on file to calculate BMI.     Additional Vital Signs:  There were no vitals filed for this visit.    Physical Exam  Vitals and nursing note reviewed.   Constitutional:       General: She is not in acute distress.     Appearance: Normal appearance. She is ill-appearing.   HENT:      Head: Normocephalic and atraumatic.      Mouth/Throat:      Mouth: Mucous membranes are moist.   Eyes:      Extraocular Movements: Extraocular movements intact.      Conjunctiva/sclera: Conjunctivae normal.      Pupils: Pupils are equal, round, and reactive to light.   Cardiovascular:      Rate and Rhythm: Normal rate and regular rhythm.      Pulses: Normal pulses.      Heart sounds: Normal heart sounds. No murmur heard.  Pulmonary:      Effort: Pulmonary effort is normal. No respiratory distress.      Breath sounds: Normal breath sounds. No stridor. No wheezing, rhonchi or rales.   Chest:      Chest wall: No tenderness.   Abdominal:      General: Bowel sounds are normal. There is no distension.      Palpations: Abdomen is soft. There is no shifting dullness, fluid wave or mass.      Tenderness: There is abdominal tenderness in the right lower quadrant, periumbilical area and suprapubic area. There is no right CVA tenderness, left CVA tenderness or guarding. Positive signs include McBurney's sign and obturator sign. Negative signs include Miramontes's sign.   Musculoskeletal:         General: No swelling, tenderness, deformity or signs of injury. Normal range of motion.      Cervical back: Normal range of motion and neck supple. No rigidity or tenderness.      Right lower leg: No edema.      Left lower leg: No edema.   Skin:     General: Skin is warm.      Capillary Refill: Capillary refill takes less than 2 seconds.      Coloration: Skin is not jaundiced or pale.      Findings: No bruising, erythema, lesion or rash.   Neurological:      General: No focal deficit present.      Mental Status: She is alert and oriented to

## 2025-03-31 NOTE — DISCHARGE INSTRUCTIONS
You were seen today in the emergency department because of abdominal pain. Please follow up with PCP, Schedule an appointment with your primary care physician within [ ] for further evaluation and management. Please fellow up with your ( ), and Schedule an appointment with [ ] for further evaluation and management   Self-Care Instructions:  Hydration: Drink plenty of fluids to stay hydrated. unless otherwise directed by your PCP or your specialist.  Diet: Eat balanced meals regularly. Avoid skipping meals and ensure you're consuming a variety of nutrients.  Medication: Take all prescribed medications as directed. Follow the instructions on dosage and frequency carefully. Do not stop or alter your medication without consulting your physician.  When to Seek Immediate Care:  Worsening Symptoms: If you experience worsening of your symptoms, including [ severe chest pain, shortness of breath, fainting, confusion, blood in stool, severe abdominal pain, seizure, new onset of severe headache, weakness or numbness], you need to return immediately to the Emergency Department.   Acknowledgment:  Please acknowledge that you have understood these instructions and follow them carefully. Contact your healthcare provider if you have any questions or concerns.

## 2025-04-01 RX ORDER — HYDROCODONE BITARTRATE AND ACETAMINOPHEN 5; 325 MG/1; MG/1
1 TABLET ORAL EVERY 6 HOURS PRN
Qty: 10 TABLET | Refills: 0 | Status: SHIPPED | OUTPATIENT
Start: 2025-04-01 | End: 2025-04-04

## 2025-04-02 ENCOUNTER — HOSPITAL ENCOUNTER (OUTPATIENT)
Dept: SPEECH THERAPY | Age: 50
Setting detail: THERAPIES SERIES
Discharge: HOME | End: 2025-04-02
Payer: COMMERCIAL

## 2025-04-02 ENCOUNTER — HOSPITAL ENCOUNTER (OUTPATIENT)
Dept: PHYSICAL THERAPY | Age: 50
Setting detail: THERAPIES SERIES
Discharge: HOME | End: 2025-04-02
Payer: COMMERCIAL

## 2025-04-02 PROCEDURE — 92507 TX SP LANG VOICE COMM INDIV: CPT | Performed by: SPEECH-LANGUAGE PATHOLOGIST

## 2025-04-02 PROCEDURE — 97116 GAIT TRAINING THERAPY: CPT

## 2025-04-02 PROCEDURE — 97110 THERAPEUTIC EXERCISES: CPT

## 2025-04-02 PROCEDURE — 97112 NEUROMUSCULAR REEDUCATION: CPT

## 2025-04-02 NOTE — PROGRESS NOTES
* PLEASE SIGN, DATE AND TIME CERTIFICATION BELOW AND RETURN TO [facility] OUTPATIENT REHABILITATION (FAX #: [phone]).  ATTEST/CO-SIGN IF ACCESSING VIA [employer].  THANK YOU.**    I certify that I have examined the patient below and determined that Physical Medicine and Rehabilitation service is necessary and that I approve the established plan of care for up to 90 days or as specifically noted.  Attestation, signature or co-signature of physician indicates approval of certification requirements.    ________________________ ____________  Physician Signature   Date     [facility]  SPEECH THERAPY  [] SPEECH LANGUAGE COGNITIVE EVALUATION  [] DAILY NOTE   [x] PROGRESS NOTE  [] DISCHARGE NOTE    [x] OUTPATIENT REHABILITATION CENTER [facility]   [] [facility] CARE [place]    [] [facility]   [] [facility]    Date: 2025  Patient Name:  Yulissa Andersen  : 1975  MRN: [id number]  CSN: [id number]    Referring Practitioner Noa Leal DO [id number]      Diagnosis  Diagnoses       I63.9 (ICD-10-CM) - Cerebral infarction, unspecified           Treatment Diagnosis R41.89 Other symptoms and signs involving cognitive functions and awareness  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  she has a past surgical history that includes cyst removal; hysteroscopy; Hysterectomy (2013); Breast surgery (Left, 02/10/2015); pr exc b9 lesion mrgn xcp sk tg t/a/l 2.1-3.0 cm (N/A, 2018); DISSECTION GROIN (N/A, 2019); and Cardiac catheterization.     Allergies

## 2025-04-04 ENCOUNTER — HOSPITAL ENCOUNTER (OUTPATIENT)
Dept: OCCUPATIONAL THERAPY | Age: 50
Setting detail: THERAPIES SERIES
Discharge: HOME | End: 2025-04-04
Payer: COMMERCIAL

## 2025-04-04 PROCEDURE — 97110 THERAPEUTIC EXERCISES: CPT

## 2025-04-04 NOTE — PROGRESS NOTES
numbers     Dynavision mode A/60   (Full board using LUE for [employer])  Trial 1-  28 hits  Trial 2 - 28 hits  Trial 3 - 24 hits   Driving Simulation   Reaction time trials - avg 0.48 seconds gas pedal reaction time, 1.02 second braking time, avg distance to stop 174.72 feet.  Warm up drive: maintained appropriate speed of approx 40-45 MPH, 0% time out of lanes.    *increased dizziness during simulated driving tasks but was able to finish    Goal assessment    See below           Specific Interventions Next Treatment: US/ice, estim to L shoulder, ROM, strengthening.      Activity/Treatment Tolerance:  [x]  Patient tolerated treatment well  []  Patient limited by fatigue  []  Patient limited by pain   []  Patient limited by other medical complications  []  Other:     Assessment:  Yulissa is progressing toward goals.  Increased strengthening ex this date as well as reps. Pt with increased soreness. Pt has one more approved visit left. Until next visit, she is going to complete all tasks at home to see how it goes. Will plan discharge at next visit unless she finds a tasks she can not complete.     Areas for Improvement: impaired activity tolerance, impaired coordination, impaired ROM, impaired strength, and pain  Prognosis: good      GOALS:  Patient Goal: Be able to do all the things I could do before.    Short Term Goals:  Time Frame: 4 weeks  Patient will demonstrate AROM L shoulder flexion to 105, abduction to 120, IR thumb tip to waistline for increased ease with putting on a coat.  AROM L shoulder flexion = 108, abduction = 105, IR thumb tip reaches 2\" below waistline.  GOAL MET for flexion, GOAL NOT MET for abduction and IR.   3/12/25 AROM of left shoulder flex 125, abduction 70, IR at waist GOAL NOT MET     Patient will demonstrate L  strength to at least 45# to be able to open food jars and containers.     3/12/25 GOAL NOT MET left  strength 18, 20,19    Patient will report pain at rest no more than 3/10 for

## 2025-04-08 ENCOUNTER — OFFICE VISIT (OUTPATIENT)
Dept: UROLOGY | Age: 50
End: 2025-04-08
Payer: COMMERCIAL

## 2025-04-08 VITALS
HEIGHT: 65 IN | BODY MASS INDEX: 34.82 KG/M2 | DIASTOLIC BLOOD PRESSURE: 70 MMHG | SYSTOLIC BLOOD PRESSURE: 108 MMHG | WEIGHT: 209 LBS

## 2025-04-08 DIAGNOSIS — R31.29 MICROSCOPIC HEMATURIA: Primary | ICD-10-CM

## 2025-04-08 DIAGNOSIS — R33.9 INCOMPLETE BLADDER EMPTYING: ICD-10-CM

## 2025-04-08 LAB
BILIRUBIN, POC: NORMAL
BLOOD URINE, POC: NORMAL
CLARITY, POC: CLEAR
COLOR, POC: YELLOW
GLUCOSE URINE, POC: NORMAL MG/DL
KETONES, POC: NORMAL MG/DL
LEUKOCYTE EST, POC: NORMAL
NITRITE, POC: NORMAL
PH, POC: 6
POST VOID RESIDUAL (PVR): 97 ML
PROTEIN, POC: 30 MG/DL
SPECIFIC GRAVITY, POC: >=1.03
UROBILINOGEN, POC: 0.2 MG/DL

## 2025-04-08 PROCEDURE — 99213 OFFICE O/P EST LOW 20 MIN: CPT | Performed by: NURSE PRACTITIONER

## 2025-04-08 PROCEDURE — 3074F SYST BP LT 130 MM HG: CPT | Performed by: NURSE PRACTITIONER

## 2025-04-08 PROCEDURE — G8427 DOCREV CUR MEDS BY ELIG CLIN: HCPCS | Performed by: NURSE PRACTITIONER

## 2025-04-08 PROCEDURE — 51798 US URINE CAPACITY MEASURE: CPT | Performed by: NURSE PRACTITIONER

## 2025-04-08 PROCEDURE — 1036F TOBACCO NON-USER: CPT | Performed by: NURSE PRACTITIONER

## 2025-04-08 PROCEDURE — 3078F DIAST BP <80 MM HG: CPT | Performed by: NURSE PRACTITIONER

## 2025-04-08 PROCEDURE — 81003 URINALYSIS AUTO W/O SCOPE: CPT | Performed by: NURSE PRACTITIONER

## 2025-04-08 PROCEDURE — G8417 CALC BMI ABV UP PARAM F/U: HCPCS | Performed by: NURSE PRACTITIONER

## 2025-04-08 ASSESSMENT — ENCOUNTER SYMPTOMS
VOMITING: 0
BACK PAIN: 0
ABDOMINAL PAIN: 0
NAUSEA: 0

## 2025-04-08 NOTE — PROGRESS NOTES
[facility] [place] [facility]  [facility] UROLOGY  [address]  [address]  [facility] [address]  Dept: [phone]  Loc: [phone]    Visit Date: 4/8/2025        HPI:     Yulissa Andersen is a 49 y.o. female who presents today for:  Chief Complaint   Patient presents with    Hematuria       HPI  Pt seen in follow up for microscopic hematuria, incomplete emptying, mixed incontinence    Yulissa has a hx of elevated PVR, mixed incontinence, nocturia. Referred to pelvic floor therapy and started on alfuzosin at OV 9/2023. Underwent workup for micro hematuria in 2024 at which time cystoscopy by Dr. Lee was negative and CTU noted bladder wall thickening.      Since last visit pt had a CVA.  She is no longer on alfuzosin and is noted to be taking prazosin for BP.      She reports she is urinating well.  Not wearing any pads, briefs, or liners.  Feels she is emptying well.  Incontinence rare.  No dysuria.  No hematuria.      Current Outpatient Medications   Medication Sig Dispense Refill    ondansetron (ZOFRAN-ODT) 4 MG disintegrating tablet Take 1 tablet by mouth 3 times daily as needed for Nausea or Vomiting 15 tablet 0    allopurinol (ZYLOPRIM) 300 MG tablet Take 1 tablet by mouth daily      b complex vitamins capsule Take 1 capsule by mouth daily      hydrOXYzine pamoate (VISTARIL) 25 MG capsule Take 1 capsule by mouth 3 times daily      prazosin (MINIPRESS) 1 MG capsule Take 1 capsule by mouth nightly      carvedilol (COREG) 25 MG tablet TAKE 1 TABLET BY MOUTH TWICE DAILY 180 tablet 1    hydrALAZINE (APRESOLINE) 100 MG tablet Take 1 tablet by mouth 3 times daily 90 tablet 3    metFORMIN (GLUCOPHAGE-XR) 500 MG extended release tablet Take 2 tablets by mouth daily (with breakfast)      UBRELVY 100 MG TABS TAKE 1 TABLET BY MOUTH AS NEEDED FOR HEADACHE. REPEAT IN TWO HOURS IF NEEDED. MAX DOSE 200 MG IN 24 HOUR PERIOD. LIMIT 10 DAYS PER MONTH.      QULIPTA 60 MG TABS TAKE 1 TABLET BY MOUTH ONCE

## 2025-04-09 ENCOUNTER — HOSPITAL ENCOUNTER (OUTPATIENT)
Dept: OCCUPATIONAL THERAPY | Age: 50
Setting detail: THERAPIES SERIES
Discharge: HOME | End: 2025-04-09
Payer: COMMERCIAL

## 2025-04-09 PROCEDURE — 97110 THERAPEUTIC EXERCISES: CPT

## 2025-04-09 NOTE — DISCHARGE SUMMARY
have one at home  3/26/25 added bilateral rows, ER, shoulder extension, and chest pull aparts, and bicep curls for theraband to HEP  []  No new Education completed  [x]  Reviewed todays measurements and how they compare to previous re-assessment, discussed goals and readiness for discharge.      [x]  Patient verbalized and/or demonstrated understanding of education provided.  []  Patient unable to verbalize and/or demonstrate understanding of education provided.  Will continue education.  [x]  Barriers to learning: none    PLAN:  Treatment Recommendations: Strengthening, Range of Motion, Neuromuscular Re-education, Manual Therapy - Soft Tissue Mobilization, Manual Therapy - Joint Manipulation, Pain Management, Home Exercise Program, Patient Education, and Modalities    []  Plan of care initiated.    []  Continue with current plan of care - Plan to see patient 2x/wk x 8 weeks to address the treatment plan outlined above.  []  Modify plan of care as follows:    []  Hold pending physician visit  [x]  Discharge    Time In 0700   Time Out 0740   Timed Code Minutes: 40  min   Total Treatment Time: 40  min       Electronically Signed by: Deidre Gaming OTR/L [id number]

## 2025-04-11 DIAGNOSIS — I67.1 CEREBRAL ANEURYSM: Primary | ICD-10-CM

## 2025-04-14 RX ORDER — CLOPIDOGREL BISULFATE 75 MG/1
75 TABLET ORAL DAILY
Qty: 100 TABLET | Refills: 0 | Status: SHIPPED | OUTPATIENT
Start: 2025-04-14

## 2025-04-21 ENCOUNTER — HOSPITAL ENCOUNTER (OUTPATIENT)
Dept: SPEECH THERAPY | Age: 50
Setting detail: THERAPIES SERIES
Discharge: HOME | End: 2025-04-21
Payer: COMMERCIAL

## 2025-04-21 PROCEDURE — 92507 TX SP LANG VOICE COMM INDIV: CPT

## 2025-04-23 ENCOUNTER — HOSPITAL ENCOUNTER (OUTPATIENT)
Dept: SPEECH THERAPY | Age: 50
Setting detail: THERAPIES SERIES
Discharge: HOME | End: 2025-04-23
Payer: COMMERCIAL

## 2025-04-23 PROCEDURE — 92507 TX SP LANG VOICE COMM INDIV: CPT | Performed by: SPEECH-LANGUAGE PATHOLOGIST

## 2025-04-23 NOTE — PROGRESS NOTES
[facility]  SPEECH THERAPY  [] SPEECH LANGUAGE COGNITIVE EVALUATION  [x] DAILY NOTE   [] PROGRESS NOTE  [] DISCHARGE NOTE    [x] OUTPATIENT REHABILITATION CENTER - [place]   [] [facility] CARE [place]    [] [facility]   [] 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  she has a past surgical history that includes cyst removal; hysteroscopy; Hysterectomy (2013); Breast surgery (Left, 02/10/2015); pr exc b9 lesion mrgn xcp sk tg t/a/l 2.1-3.0 cm (N/A, 2018); DISSECTION GROIN (N/A, 2019); and Cardiac catheterization.     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  Marcelo Gunderson,     Penicillins Hives    Clindamycin/Lincomycin Rash    Ciprofloxacin Hives    Doxycycline Hives    Lisinopril      Attacked kidneys      Medications   Current Outpatient Medications:     apixaban (ELIQUIS) 5 MG TABS tablet, Take 1 tablet by mouth 2 times daily, Disp: 60 tablet, Rfl: 1    aspirin 81 MG chewable tablet, Take 1

## 2025-04-28 ENCOUNTER — HOSPITAL ENCOUNTER (OUTPATIENT)
Dept: SPEECH THERAPY | Age: 50
Setting detail: THERAPIES SERIES
Discharge: HOME | End: 2025-04-28
Payer: COMMERCIAL

## 2025-04-28 PROCEDURE — 92507 TX SP LANG VOICE COMM INDIV: CPT

## 2025-04-28 NOTE — PROGRESS NOTES
[facility]  SPEECH THERAPY  [] SPEECH LANGUAGE COGNITIVE EVALUATION  [x] DAILY NOTE   [] PROGRESS NOTE  [] DISCHARGE NOTE    [x] OUTPATIENT REHABILITATION CENTER - [place]   [] 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  she has a past surgical history that includes cyst removal; hysteroscopy; Hysterectomy (2013); Breast surgery (Left, 02/10/2015); pr exc b9 lesion mrgn xcp sk tg t/a/l 2.1-3.0 cm (N/A, 2018); DISSECTION GROIN (N/A, 2019); and Cardiac catheterization.     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  Marcelo Gunderson,     Penicillins Hives    Clindamycin/Lincomycin Rash    Ciprofloxacin Hives    Doxycycline Hives    Lisinopril      Attacked kidneys      Medications   Current Outpatient Medications:     apixaban (ELIQUIS) 5 MG TABS tablet, Take 1 tablet by mouth 2 times daily, Disp: 60 tablet, Rfl: 1    aspirin 81 MG chewable tablet, Take 1

## 2025-04-30 ENCOUNTER — HOSPITAL ENCOUNTER (OUTPATIENT)
Dept: SPEECH THERAPY | Age: 50
Setting detail: THERAPIES SERIES
Discharge: HOME | End: 2025-04-30
Payer: COMMERCIAL

## 2025-04-30 PROCEDURE — 92507 TX SP LANG VOICE COMM INDIV: CPT | Performed by: SPEECH-LANGUAGE PATHOLOGIST

## 2025-04-30 NOTE — PROGRESS NOTES
GOAL #1: Patient will improve [facility] NOMS MOTOR SPEECH score from a Level 3 to a Level 5 in order to improve overall speech intelligibility.  ONGOING    Rehabilitation Potential/Prognosis: good  Areas for Improvement: Impaired cognition, Impaired speech, and Impaired voice  Specific Interventions Next Treatment: SOS-Speak up, open mouth, slow down, reading, coordination/language processing, cognitive tasks       Activity/Treatment Tolerance:  [x]  Patient tolerated treatment well  []  Patient limited by fatigue  []  Patient limited by pain   []  Patient limited by other medical complications  []  Other:     Patient Education:   [x]  HEP/Education Completed: Plan of Care, goals, speech strategies, improvement in speech this date  []  No new Education completed  []  Reviewed Prior HEP      [x]  Patient verbalized and/or demonstrated understanding of education provided.  []  Patient unable to verbalize and/or demonstrate understanding of education provided.  Will continue education.  []  Barriers to learning: none      PLAN:  Treatment Recommendations:     []  Plan of care initiated.  Plan to see patient 2 times per week for 8 weeks to address the treatment planned outlined above.  [x]  Continue with current plan of care  []  Progress note: 2 x per week x 5 weeks  []  Hold pending physician visit  []  Discharge    Time In 1000   Time Out 1030   Timed Code Minutes: 0 min   Total Treatment Time: 30 min       Cherrie Cabezas M.S. CCC-SLP [id number]

## 2025-05-05 ENCOUNTER — HOSPITAL ENCOUNTER (OUTPATIENT)
Dept: SPEECH THERAPY | Age: 50
Setting detail: THERAPIES SERIES
Discharge: HOME | End: 2025-05-05
Payer: COMMERCIAL

## 2025-05-05 PROCEDURE — 92507 TX SP LANG VOICE COMM INDIV: CPT

## 2025-05-05 NOTE — PROGRESS NOTES
Continue with current plan of care  []  Progress note: 2 x per week x 5 weeks  []  Hold pending physician visit  []  Discharge    Time In 0945   Time Out 1020   Timed Code Minutes: 0 min   Total Treatment Time: 35 min       Charity Anthony M.S. CCC-SLP [id number]

## 2025-05-07 ENCOUNTER — HOSPITAL ENCOUNTER (OUTPATIENT)
Dept: SPEECH THERAPY | Age: 50
Setting detail: THERAPIES SERIES
Discharge: HOME | End: 2025-05-07
Payer: COMMERCIAL

## 2025-05-07 PROCEDURE — 92507 TX SP LANG VOICE COMM INDIV: CPT | Performed by: SPEECH-LANGUAGE PATHOLOGIST

## 2025-05-07 NOTE — PROGRESS NOTES
short term goals and 1/1 long term goals this treatment period. Patient's speech intelligibility has significantly improved within structured verbal tasks and in conversation. Patient independently verbalizes speech strategies, but may require an occasional cue to utilize speech strategies in conversation, especially when fatigued. Patient's verbal fluency and response time has also improved nicely. Patient self-rates her reading skills, including reading children's books, at a 9 or greater for the past couple of weeks.  Patient's cognitive skills are also steadily improving with occasional min cues to complete functional tasks. Recommend ongoing speech therapy to address use of speech strategies in conversation and cognition as it relates to IADL's/work.      Rehabilitation Potential/Prognosis: good  Areas for Improvement: Impaired cognition, Impaired speech, and Impaired voice  Specific Interventions Next Treatment: use of speech strategies in conversation, cognitive tasks       Activity/Treatment Tolerance:  [x]  Patient tolerated treatment well  []  Patient limited by fatigue  []  Patient limited by pain   []  Patient limited by other medical complications  []  Other:     Patient Education:   [x]  HEP/Education Completed: Plan of Care, progress with goals, speech strategies, recommend further treatment to prepare for return to work  []  No new Education completed  []  Reviewed Prior HEP      [x]  Patient verbalized and/or demonstrated understanding of education provided.  []  Patient unable to verbalize and/or demonstrate understanding of education provided.  Will continue education.  []  Barriers to learning: none      PLAN:  Treatment Recommendations:     []  Plan of care initiated.  Plan to see patient 2 times per week for 8 weeks to address the treatment planned outlined above.  []  Continue with current plan of care  [x]  Progress note: 2 x per week x 4 weeks   []  Hold pending physician visit  []

## 2025-05-12 ENCOUNTER — APPOINTMENT (OUTPATIENT)
Dept: SPEECH THERAPY | Age: 50
End: 2025-05-12
Payer: COMMERCIAL

## 2025-05-14 ENCOUNTER — HOSPITAL ENCOUNTER (OUTPATIENT)
Dept: SPEECH THERAPY | Age: 50
Setting detail: THERAPIES SERIES
Discharge: HOME | End: 2025-05-14
Payer: COMMERCIAL

## 2025-05-14 PROCEDURE — 97129 THER IVNTJ 1ST 15 MIN: CPT | Performed by: SPEECH-LANGUAGE PATHOLOGIST

## 2025-05-14 PROCEDURE — 97130 THER IVNTJ EA ADDL 15 MIN: CPT | Performed by: SPEECH-LANGUAGE PATHOLOGIST

## 2025-05-14 NOTE — PROGRESS NOTES
[facility]  SPEECH THERAPY  [] SPEECH LANGUAGE COGNITIVE EVALUATION  [x] DAILY NOTE   [] PROGRESS NOTE  [] DISCHARGE NOTE    [x] [facility] [place]   [] [facility] CARE [place]    [] [facility]   [] [facility]    Date: 2025  Patient Name:  Yulissa Andersen  : 1975  MRN: [id number]  CSN: [id number]    Referring Practitioner Noa Leal DO [id number]      Diagnosis  Diagnoses       I63.9 (ICD-10-CM) - Cerebral infarction, unspecified           Treatment Diagnosis R41.89 Other symptoms and signs involving cognitive functions and awareness  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  she has a past surgical history that includes cyst removal; hysteroscopy; Hysterectomy (2013); Breast surgery (Left, 02/10/2015); pr exc b9 lesion mrgn xcp sk tg t/a/l 2.1-3.0 cm (N/A, 2018); DISSECTION GROIN (N/A, 2019); and Cardiac catheterization.     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  Marcelo Gunderson,     Penicillins Hives    Clindamycin/Lincomycin Rash    Ciprofloxacin Hives    Doxycycline Hives    Lisinopril      Attacked kidneys      Medications   Current Outpatient Medications:     apixaban (ELIQUIS) 5 MG TABS tablet, Take 1 tablet by mouth 2 times daily, Disp: 60 tablet, Rfl: 1    aspirin 81 MG chewable tablet, Take 1

## 2025-05-19 ENCOUNTER — HOSPITAL ENCOUNTER (OUTPATIENT)
Dept: SPEECH THERAPY | Age: 50
Setting detail: THERAPIES SERIES
Discharge: HOME | End: 2025-05-19
Payer: COMMERCIAL

## 2025-05-19 PROCEDURE — 97129 THER IVNTJ 1ST 15 MIN: CPT

## 2025-05-19 PROCEDURE — 97130 THER IVNTJ EA ADDL 15 MIN: CPT

## 2025-05-19 NOTE — PROGRESS NOTES
[facility]  SPEECH THERAPY  [] SPEECH LANGUAGE COGNITIVE EVALUATION  [x] DAILY NOTE   [] PROGRESS NOTE  [] DISCHARGE NOTE    [x] [facility] [place]   [] [facility] CARE [place]    [] [facility]   [] [facility]    Date: 2025  Patient Name:  Yulissa Andersen  : 1975  MRN: [id number]  CSN: [id number]    Referring Practitioner Noa Leal DO [id number]      Diagnosis  Diagnoses       I63.9 (ICD-10-CM) - Cerebral infarction, unspecified           Treatment Diagnosis R41.89 Other symptoms and signs involving cognitive functions and awareness  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  she has a past surgical history that includes cyst removal; hysteroscopy; Hysterectomy (2013); Breast surgery (Left, 02/10/2015); pr exc b9 lesion mrgn xcp sk tg t/a/l 2.1-3.0 cm (N/A, 2018); DISSECTION GROIN (N/A, 2019); and Cardiac catheterization.     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  Marcelo Gunderson,     Penicillins Hives    Clindamycin/Lincomycin Rash    Ciprofloxacin Hives    Doxycycline Hives    Lisinopril      Attacked kidneys      Medications   Current Outpatient Medications:     apixaban (ELIQUIS) 5 MG TABS tablet, Take 1 tablet by mouth 2 times daily, Disp: 60 tablet, Rfl: 1    aspirin 81 MG chewable tablet, Take 1

## 2025-05-21 ENCOUNTER — HOSPITAL ENCOUNTER (OUTPATIENT)
Dept: SPEECH THERAPY | Age: 50
Setting detail: THERAPIES SERIES
Discharge: HOME | End: 2025-05-21
Payer: COMMERCIAL

## 2025-05-21 PROCEDURE — 97129 THER IVNTJ 1ST 15 MIN: CPT | Performed by: SPEECH-LANGUAGE PATHOLOGIST

## 2025-05-21 NOTE — PROGRESS NOTES
[facility]  SPEECH THERAPY  [] SPEECH LANGUAGE COGNITIVE EVALUATION  [x] DAILY NOTE   [] PROGRESS NOTE  [] DISCHARGE NOTE    [x] [facility] [place]   [] [facility] CARE [place]    [] [facility]   [] [facility]    Date: 2025  Patient Name:  Yulissa Andersen  : 1975  MRN: [id number]  CSN: [id number]    Referring Practitioner Noa Leal DO [id number]      Diagnosis  Diagnoses       I63.9 (ICD-10-CM) - Cerebral infarction, unspecified           Treatment Diagnosis R41.89 Other symptoms and signs involving cognitive functions and awareness  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  she has a past surgical history that includes cyst removal; hysteroscopy; Hysterectomy (2013); Breast surgery (Left, 02/10/2015); pr exc b9 lesion mrgn xcp sk tg t/a/l 2.1-3.0 cm (N/A, 2018); DISSECTION GROIN (N/A, 2019); and Cardiac catheterization.     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  Marcelo Gunderson,     Penicillins Hives    Clindamycin/Lincomycin Rash    Ciprofloxacin Hives    Doxycycline Hives    Lisinopril      Attacked kidneys      Medications   Current Outpatient Medications:     apixaban (ELIQUIS) 5 MG TABS tablet, Take 1 tablet by mouth 2 times daily, Disp: 60 tablet, Rfl: 1    aspirin 81 MG chewable tablet, Take 1

## 2025-05-28 ENCOUNTER — APPOINTMENT (OUTPATIENT)
Dept: SPEECH THERAPY | Age: 50
End: 2025-05-28
Payer: COMMERCIAL

## 2025-05-29 ENCOUNTER — HOSPITAL ENCOUNTER (OUTPATIENT)
Dept: SPEECH THERAPY | Age: 50
Setting detail: THERAPIES SERIES
Discharge: HOME | End: 2025-05-29
Payer: COMMERCIAL

## 2025-05-29 PROCEDURE — 97129 THER IVNTJ 1ST 15 MIN: CPT | Performed by: SPEECH-LANGUAGE PATHOLOGIST

## 2025-05-29 PROCEDURE — 97130 THER IVNTJ EA ADDL 15 MIN: CPT | Performed by: SPEECH-LANGUAGE PATHOLOGIST

## 2025-05-29 NOTE — PROGRESS NOTES
[facility]  SPEECH THERAPY  [] SPEECH LANGUAGE COGNITIVE EVALUATION  [x] DAILY NOTE   [] PROGRESS NOTE  [] DISCHARGE NOTE    [x] OUT[facility] [place]   [] [facility] CARE [place]    [] [facility]   [] [facility]    Date: 2025  Patient Name:  Yulissa Andersen  : 1975  MRN: [id number]  CSN: [id number]    Referring Practitioner Noa Leal DO [id number]      Diagnosis  Diagnoses       I63.9 (ICD-10-CM) - Cerebral infarction, unspecified           Treatment Diagnosis R41.89 Other symptoms and signs involving cognitive functions and awareness  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  she has a past surgical history that includes cyst removal; hysteroscopy; Hysterectomy (2013); Breast surgery (Left, 02/10/2015); pr exc b9 lesion mrgn xcp sk tg t/a/l 2.1-3.0 cm (N/A, 2018); DISSECTION GROIN (N/A, 2019); and Cardiac catheterization.     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  Marcelo Gunderson,     Penicillins Hives    Clindamycin/Lincomycin Rash    Ciprofloxacin Hives    Doxycycline Hives    Lisinopril      Attacked kidneys      Medications   Current Outpatient Medications:     apixaban (ELIQUIS) 5 MG TABS tablet, Take 1 tablet by mouth 2 times daily, Disp: 60 tablet, Rfl: 1    aspirin 81 MG chewable tablet, Take 1

## 2025-05-30 ENCOUNTER — HOSPITAL ENCOUNTER (OUTPATIENT)
Dept: SPEECH THERAPY | Age: 50
Setting detail: THERAPIES SERIES
Discharge: HOME | End: 2025-05-30
Payer: COMMERCIAL

## 2025-05-30 PROCEDURE — 97129 THER IVNTJ 1ST 15 MIN: CPT

## 2025-05-30 PROCEDURE — 97130 THER IVNTJ EA ADDL 15 MIN: CPT

## 2025-05-30 RX ORDER — AMLODIPINE BESYLATE 5 MG/1
5 TABLET ORAL DAILY
Qty: 90 TABLET | Refills: 0 | Status: SHIPPED | OUTPATIENT
Start: 2025-05-30

## 2025-05-30 NOTE — PROGRESS NOTES
when that noise is on I can't even think straight\". Discontinued task this date as lower volume was also ineffective for patient. Recommended patient focus on singular tasks at a time to assist with overall brain healing. Discussed strategies to use to help with completion of tasks including timers and notes to allow for seamless transitions between activities.     Alternating Attention: Patient provided with x2 worksheets. In one worksheet, patient was instructed to view an index of recipes and answer questions regarding what was included within the index. In the second worksheet, patient was given a coffee shop menu and asked to determine the cost of different drinks. Patient given 2 minutes to work on a each task and then ST asked patient to switch to the next task. A total of 6 minutes was completed in which patient demonstrated x1 error on first page within visual scanning and no errors on second page. Did note patient with visible grimacing when switching between tasks. Patient did a great job of writing detailed notes when completing the tasks to assist with transitions; however, processing speed was still slow.       Long Term Goals:  Time Frame for Long-Term Goals: 8 weeks    LONG-TERM GOAL #1: [facility] from a Level 5 to a Level 6 in order to improve overall speech intelligibility. ONGOING    LONG TERM GOAL #2: Patient will report her cognition to be close to baseline for eventual return to work. ONGOING    Rehabilitation Potential/Prognosis: good  Areas for Improvement: Impaired cognition, Impaired speech, and Impaired voice  Specific Interventions Next Treatment: use of speech strategies in conversation, cognitive tasks       Activity/Treatment Tolerance:  [x]  Patient tolerated treatment well  []  Patient limited by fatigue  []  Patient limited by pain   []  Patient limited by other medical complications  []  Other:     Patient Education:   [x]  HEP/Education

## 2025-05-30 NOTE — PROGRESS NOTES
[facility] PHYSICIANS [place] SPECIALTY  [facility] CARDIOLOGY  [address]  [address]  [place] [address]  Dept: [phone]  Dept Fax: [phone]  Loc: [phone]    Visit Date: 6/4/2025    Yulissa Andersen is a 49 y.o. female who presents todayfor:  Chief Complaint   Patient presents with    1 Year Follow Up     Cardiologist: Haley Pitts of HTN, preserved EF, palpitations, CVA    HPI: 1 year f/u  Has had head pressure since Cva. Some mild leg pains. Some intermittent palpitations at times but not severe. No cp or sob much. BP is usually around 105 lately. HR is up and down. She has lost some weight as she doesn't feel like eating as much any more.   Past Surgical History:   Procedure Laterality Date    BREAST SURGERY Left 02/10/2015    I & D Dr. Mac     CARDIAC CATHETERIZATION      CYST REMOVAL      DISSECTION GROIN N/A 09/19/2019    I&D OF PUBIC ABSCESS performed by Tan Mac MD at [facility] OR    HYSTERECTOMY (CERVIX STATUS UNKNOWN)  04/2013     total    HYSTEROSCOPY      VT EXC B9 LESION MRGN XCP SK TG T/A/L 2.1-3.0 CM N/A 06/04/2018    EXCISION OF BACK MASS performed by Tan Mac MD at [facility] OR     Family History   Problem Relation Age of Onset    Heart Disease Mother     High Blood Pressure Mother     Arthritis Mother     High Blood Pressure Sister     No Known Problems Sister     Breast Cancer Maternal Cousin         dx premenapausal     Social History     Tobacco Use    Smoking status: Never     Passive exposure: Never    Smokeless tobacco: Never   Substance Use Topics    Alcohol use: No      Current Outpatient Medications   Medication Sig Dispense Refill    amLODIPine (NORVASC) 5 MG tablet TAKE 1 TABLET BY MOUTH ONCE DAILY 90 tablet 0    clopidogrel (PLAVIX) 75 MG tablet Take 1 tablet by mouth daily 100 tablet 0    ondansetron (ZOFRAN-ODT) 4 MG disintegrating tablet Take 1 tablet by mouth 3 times daily as needed for Nausea or Vomiting 15 tablet 0    allopurinol (ZYLOPRIM)

## 2025-06-02 ENCOUNTER — HOSPITAL ENCOUNTER (OUTPATIENT)
Dept: SPEECH THERAPY | Age: 50
Setting detail: THERAPIES SERIES
Discharge: HOME | End: 2025-06-02
Payer: COMMERCIAL

## 2025-06-02 PROCEDURE — 97130 THER IVNTJ EA ADDL 15 MIN: CPT

## 2025-06-02 PROCEDURE — 97129 THER IVNTJ 1ST 15 MIN: CPT

## 2025-06-02 NOTE — PROGRESS NOTES
intelligibility and is utilizing word finding strategies within instances of anomia. The rate of speech is still labored; however, suspect this to improve with continued use of speech strategies. Within this therapy reporting period, patient has highlighted concerns with her attention and ability to multi-task at home and within sessions. Selective, alternating and divided attention tasks require more assistance and time to complete due to slow processing speed. 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       Rehabilitation Potential/Prognosis: good  Areas for Improvement: Impaired cognition, Impaired speech, and Impaired voice  Specific Interventions Next Treatment: use of speech strategies in conversation, cognitive tasks       Activity/Treatment Tolerance:  [x]  Patient tolerated treatment well  []  Patient limited by fatigue  []  Patient limited by pain   []  Patient limited by other medical complications  []  Other:     Patient Education:   [x]  HEP/Education Completed: Plan of Care, speech strategies in conversation, continue with 'brain game' apps on phone, focus on one task at a time, keep detailed notes, further visits needed  []  No new Education completed  []  Reviewed Prior HEP      [x]  Patient verbalized and/or demonstrated understanding of education provided.  []  Patient unable to verbalize and/or demonstrate understanding of education provided.  Will continue education.  []  Barriers to learning: none      PLAN:  Treatment Recommendations:     []  Plan of care initiated.  Plan to see patient 2 times per week for 8 weeks to address the treatment planned outlined above.  []  Continue with current plan of care  [x]  Progress note: 1-2 x per week x 5 weeks (unable to schedule patient in first week of July)  []  Hold pending physician visit  []  Discharge    Time In 1100   Time Out 1133   Timed Code

## 2025-06-04 ENCOUNTER — OFFICE VISIT (OUTPATIENT)
Dept: CARDIOLOGY CLINIC | Age: 50
End: 2025-06-04
Payer: COMMERCIAL

## 2025-06-04 VITALS
WEIGHT: 200.2 LBS | HEART RATE: 100 BPM | DIASTOLIC BLOOD PRESSURE: 64 MMHG | HEIGHT: 65 IN | SYSTOLIC BLOOD PRESSURE: 124 MMHG | BODY MASS INDEX: 33.36 KG/M2

## 2025-06-04 DIAGNOSIS — R00.2 PALPITATIONS: Primary | ICD-10-CM

## 2025-06-04 DIAGNOSIS — I10 ESSENTIAL HYPERTENSION: ICD-10-CM

## 2025-06-04 PROCEDURE — 3074F SYST BP LT 130 MM HG: CPT | Performed by: STUDENT IN AN ORGANIZED HEALTH CARE EDUCATION/TRAINING PROGRAM

## 2025-06-04 PROCEDURE — 1036F TOBACCO NON-USER: CPT | Performed by: STUDENT IN AN ORGANIZED HEALTH CARE EDUCATION/TRAINING PROGRAM

## 2025-06-04 PROCEDURE — 3078F DIAST BP <80 MM HG: CPT | Performed by: STUDENT IN AN ORGANIZED HEALTH CARE EDUCATION/TRAINING PROGRAM

## 2025-06-04 PROCEDURE — G8427 DOCREV CUR MEDS BY ELIG CLIN: HCPCS | Performed by: STUDENT IN AN ORGANIZED HEALTH CARE EDUCATION/TRAINING PROGRAM

## 2025-06-04 PROCEDURE — 99214 OFFICE O/P EST MOD 30 MIN: CPT | Performed by: STUDENT IN AN ORGANIZED HEALTH CARE EDUCATION/TRAINING PROGRAM

## 2025-06-04 PROCEDURE — G8417 CALC BMI ABV UP PARAM F/U: HCPCS | Performed by: STUDENT IN AN ORGANIZED HEALTH CARE EDUCATION/TRAINING PROGRAM

## 2025-06-10 ENCOUNTER — APPOINTMENT (OUTPATIENT)
Dept: SPEECH THERAPY | Age: 50
End: 2025-06-10
Payer: COMMERCIAL

## 2025-06-13 ENCOUNTER — HOSPITAL ENCOUNTER (OUTPATIENT)
Dept: SPEECH THERAPY | Age: 50
Setting detail: THERAPIES SERIES
Discharge: HOME | End: 2025-06-13
Payer: COMMERCIAL

## 2025-06-13 PROCEDURE — 97130 THER IVNTJ EA ADDL 15 MIN: CPT

## 2025-06-13 PROCEDURE — 97129 THER IVNTJ 1ST 15 MIN: CPT

## 2025-06-13 NOTE — PROGRESS NOTES
back to me\"    Short Term Goals:  Time Frame for Short-Term Goals: 4 weeks    SHORT TERM GOAL #1: NO NEW GOAL.  INTERVENTIONS: N/A    SHORT TERM GOAL #2: NO NEW GOAL.  INTERVENTIONS: N/A    SHORT TERM GOAL #3: NO NEW GOAL.  INTERVENTIONS: N/A    SHORT TERM GOAL #4: Patient will report use of speech strategies in conversations at home without need for repeating self more than 75% of the time when in the community across 3 sessions to support carryover of learned strategies.  INTERVENTIONS: Patient reports she thinks she does not have to repeat herself at least 75% of the time; however, she still feels like she is repeating herself often.     SHORT TERM GOAL #5: NO NEW GOAL.  INTERVENTIONS: N/A     SHORT TERM GOAL #6: Patient will complete executive function, thought organization, attention and short term recall tasks without cues, 85% of the time for successful return to work.   INTERVENTIONS:   Janae's Schedule: Patient provided with x7 times to complete various activities within Janae's day. The activities Janae wants to complete along with miscellaneous information are presented within paragraph format. Patient read the paragraph and then correctly identified the activities for all 7/7 of the time spots without cues.   **Great success    Attention:   - Miscellaneous: Patient reports during her visit with the psychologist in [facility], a sound machine was used during the first part of the visit to block out the waiting room noise. Patient reports she had to ask the psychologist to turn this off so she could hear and understand her as she was unable to process the conversation with this ongoing in the background.  - Alternating Attention: Patient asked to alternate between stating the alphabet and counting (ie: A-1, B-2, etc). Patient completed in 8 minutes with x1 error. Patient did require x7 cues for recall of previously stated letter and number.  **poor working memory skills.    Thought Organization: Patient asked to

## 2025-06-16 ENCOUNTER — HOSPITAL ENCOUNTER (OUTPATIENT)
Dept: SPEECH THERAPY | Age: 50
Setting detail: THERAPIES SERIES
End: 2025-06-16
Payer: COMMERCIAL

## 2025-06-18 NOTE — PROGRESS NOTES
Copied from CRM #[id number]. De La Torre   [facility]  Occupational Therapy  Daily Note    Discharge Recommendations: Home with Outpatient OT and Patient would benefit from continued OT at discharge  Equipment Recommendations: Yes Pt does not own any equiptment at this time; recommend tub/shower bench; monitor for LHAE and BSC.      Time In: 0730  Time Out: 0900  Timed Code Treatment Minutes: 90 Minutes  Minutes: 90          Date: 2024  Patient Name: Yulissa Andersen,   Gender: female      [facility]  MRN: [id number]  : 1975  (49 y.o.)  Referring Practitioner: Noa Leal DO  Diagnosis: Cerebrovascular accident (CVA) determined by clinical assessment (HCC)  Additional Pertinent Hx: Yulissa Andersen  is a 49 y.o. female with PMH significant for CAD, DM, HTN, OA, GERD, migraines, and CVA () who is being admitted to the inpatient rehabilitation unit on 2024. Patient initially presented to [facility] ED on 2024 with complaints of headache and AMS. In the ED, patient's boyfriend reported that she woke up with a severe migraine and associated nausea. Patient reportedly drove herself to PCP where they transferred her to ED due to hypertensive emergency.  She was noted to have deterioration of her mental status. Stroke alert was activated.  Initial NIH was 18 (reported aphasia, partial gaze palsy, bilateral blindness, all 4 extremities drift and hit the bed and left sided sensation deficit). CT of the head and neck was reportedly negative for LVO or critical stenosis patient determined to be a good candidate for TNK which was given after initiation of Cardene drip for BP management.  After TNK, patient was admitted to ICU for further evaluation management. Patient stay complicated by angioedema thought to be secondary to Vasotec. MRI of brain reportedly negative. CTA reportedly with a possible filling defect in the distal right middle cerebral artery, 3.3 mm aneurysm arising from the  Goal 2: Pt will complete a light IADL task with good attention to LUE with supervision to increase independence in home maintainence.  Long Term Goal 3: Pt will complete a simple community reintegration task with supervision to increase independence in going shopping.    Following session, patient left in safe position with all fall risk precautions in place.

## 2025-06-19 ENCOUNTER — HOSPITAL ENCOUNTER (OUTPATIENT)
Dept: SPEECH THERAPY | Age: 50
Setting detail: THERAPIES SERIES
End: 2025-06-19
Payer: COMMERCIAL

## 2025-06-20 ENCOUNTER — HOSPITAL ENCOUNTER (OUTPATIENT)
Dept: SPEECH THERAPY | Age: 50
Setting detail: THERAPIES SERIES
Discharge: HOME | End: 2025-06-20
Payer: COMMERCIAL

## 2025-06-20 PROCEDURE — 97130 THER IVNTJ EA ADDL 15 MIN: CPT | Performed by: SPEECH-LANGUAGE PATHOLOGIST

## 2025-06-20 PROCEDURE — 97129 THER IVNTJ 1ST 15 MIN: CPT | Performed by: SPEECH-LANGUAGE PATHOLOGIST

## 2025-06-20 NOTE — PROGRESS NOTES
Initial CPT Codes Requested 43161 - Speech/Language Therapy  30201 - Cognitive Function Interventions (first 15 min) and 98094 - Cognitive Function Interventions (ea addtl 15 min)   Progress Note Counter 2/10 for progress note (Reporting Period: 6/13/25 )   Recertification Date 7/7/25   Physician Follow-Up 7/30/25 Dr. Leal    Physician Orders    History of Present Illness Patient reporting, \"patient with high blood pressure and head hurting at the doctor's office visit and was sent to the ER. Patient underwent clinical workup for CVA d/t CVA symptoms. Patient was admitted to [facility] with eventual transfer to [facility] where she received IP PT/OT/ST.     Per chart review, \"Patient initially presented to [facility] ED on 11/7/2024 with complaints of headache and AMS. In the ED, patient's boyfriend reported that she woke up with a severe migraine and associated nausea. Patient reportedly drove herself to PCP where they transferred her to ED due to hypertensive emergency. She reportedly received labetalol in room. She was noted to have deterioration of her mental status. Stroke alert was activated. Initial NIH was 18 (reported aphasia, partial gaze palsy, bilateral blindness, all 4 extremities drift and hit the bed and left sided sensation deficit). CT head was reportedly negative. CT of the head and neck was reportedly negative for LVO or critical stenosis patient determined to be a good candidate for TNK which was given after initiation of Cardene drip for BP management. After TNK, patient was admitted to ICU for further evaluation management.\"     Patient recently discharged from [facility] 11/24/24 with OP orders to complete OP PT/OT and ST evaluations.      SUBJECTIVE:  Patient pleasant. No family present.    GOALS:  Patient Goal:  Communicate easier with others; \"I want to get back to me\"    Short Term Goals:  Time Frame for Short-Term Goals: 4 weeks    SHORT TERM GOAL #1: NO NEW GOAL.  INTERVENTIONS: N/A    SHORT TERM

## 2025-06-23 ENCOUNTER — HOSPITAL ENCOUNTER (OUTPATIENT)
Dept: SPEECH THERAPY | Age: 50
Setting detail: THERAPIES SERIES
Discharge: HOME | End: 2025-06-23
Payer: COMMERCIAL

## 2025-06-23 PROCEDURE — 97130 THER IVNTJ EA ADDL 15 MIN: CPT

## 2025-06-23 PROCEDURE — 97129 THER IVNTJ 1ST 15 MIN: CPT

## 2025-06-23 NOTE — PROGRESS NOTES
[facility]  SPEECH THERAPY  [] SPEECH LANGUAGE COGNITIVE EVALUATION  [x] DAILY NOTE   [] PROGRESS NOTE  [] DISCHARGE NOTE    [x] [facility] [place]   [] [facility] CARE [facility]    [] [facility]   [] [facility]    Date: 2025  Patient Name:  Yulissa Andersen  : 1975  MRN: [id number]  CSN: [id number]    Referring Practitioner Noa Leal DO [id number]      Diagnosis  Diagnoses       I63.9 (ICD-10-CM) - Cerebral infarction, unspecified           Treatment Diagnosis R41.89 Other symptoms and signs involving cognitive functions and awareness  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  she has a past surgical history that includes cyst removal; hysteroscopy; Hysterectomy (2013); Breast surgery (Left, 02/10/2015); pr exc b9 lesion mrgn xcp sk tg t/a/l 2.1-3.0 cm (N/A, 2018); DISSECTION GROIN (N/A, 2019); and Cardiac catheterization.     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  Marcelo Gunderson,     Penicillins Hives    Clindamycin/Lincomycin Rash    Ciprofloxacin Hives    Doxycycline Hives    Lisinopril      Attacked kidneys      Medications   Current Outpatient Medications:     apixaban (ELIQUIS) 5 MG TABS tablet, Take 1 tablet by mouth 2 times daily, Disp: 60 tablet, Rfl: 1    aspirin 81 MG chewable tablet, Take 1

## 2025-06-25 ENCOUNTER — TELEPHONE (OUTPATIENT)
Dept: NEUROLOGY | Age: 50
End: 2025-06-25

## 2025-06-25 NOTE — TELEPHONE ENCOUNTER
Informed patient that procedure with Dr. Weaver is scheduled for August 4, 2025 with an arrival of 8am. Patient informed that PAT will call a week prior to discuss all medication.  Information mailed to patients home. Patient has no further questions at this time, encouraged her to call office if questions arise.

## 2025-06-27 ENCOUNTER — HOSPITAL ENCOUNTER (OUTPATIENT)
Dept: SPEECH THERAPY | Age: 50
Setting detail: THERAPIES SERIES
Discharge: HOME | End: 2025-06-27
Payer: COMMERCIAL

## 2025-06-27 ENCOUNTER — TELEPHONE (OUTPATIENT)
Dept: NEUROLOGY | Age: 50
End: 2025-06-27

## 2025-06-27 DIAGNOSIS — I67.1 CEREBRAL ANEURYSM: ICD-10-CM

## 2025-06-27 PROCEDURE — 92507 TX SP LANG VOICE COMM INDIV: CPT

## 2025-06-27 RX ORDER — CLOPIDOGREL BISULFATE 75 MG/1
75 TABLET ORAL DAILY
Qty: 30 TABLET | Refills: 1 | Status: SHIPPED | OUTPATIENT
Start: 2025-06-27

## 2025-06-27 NOTE — PROGRESS NOTES
[facility]  SPEECH THERAPY  [] SPEECH LANGUAGE COGNITIVE EVALUATION  [x] DAILY NOTE   [] PROGRESS NOTE  [] DISCHARGE NOTE    [x] [facility] [place]   [] [facility] CARE [place]    [] [facility]   [] [facility]    Date: 2025  Patient Name:  Yulissa Andersen  : 1975  MRN: [id number]  CSN: [id number]    Referring Practitioner Noa Leal DO [id number]      Diagnosis  Diagnoses       I63.9 (ICD-10-CM) - Cerebral infarction, unspecified           Treatment Diagnosis R41.89 Other symptoms and signs involving cognitive functions and awareness  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  she has a past surgical history that includes cyst removal; hysteroscopy; Hysterectomy (2013); Breast surgery (Left, 02/10/2015); pr exc b9 lesion mrgn xcp sk tg t/a/l 2.1-3.0 cm (N/A, 2018); DISSECTION GROIN (N/A, 2019); and Cardiac catheterization.     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  Marcelo Gunderson,     Penicillins Hives    Clindamycin/Lincomycin Rash    Ciprofloxacin Hives    Doxycycline Hives    Lisinopril      Attacked kidneys      Medications   Current Outpatient Medications:     apixaban (ELIQUIS) 5 MG TABS tablet, Take 1 tablet by mouth 2 times daily, Disp: 60 tablet, Rfl: 1    aspirin 81 MG chewable tablet, Take 1

## 2025-06-27 NOTE — TELEPHONE ENCOUNTER
Refill request received from [facility] for Plavix 75mg.    Last office visit 3/11/25 with Martita and Dr. Weaver.  Notes states \"On 7/29/25 can discontinue plavix and remain on ASA and Eliquis.\"    Patient scheduled for angiogram 8/4/25 with Dr. Weaver.     Please advise, thanks

## 2025-07-07 ENCOUNTER — APPOINTMENT (OUTPATIENT)
Dept: SPEECH THERAPY | Age: 50
End: 2025-07-07
Payer: COMMERCIAL

## 2025-07-11 ENCOUNTER — HOSPITAL ENCOUNTER (OUTPATIENT)
Dept: SPEECH THERAPY | Age: 50
Setting detail: THERAPIES SERIES
Discharge: HOME | End: 2025-07-11
Payer: COMMERCIAL

## 2025-07-11 PROCEDURE — 97129 THER IVNTJ 1ST 15 MIN: CPT | Performed by: SPEECH-LANGUAGE PATHOLOGIST

## 2025-07-11 PROCEDURE — 97130 THER IVNTJ EA ADDL 15 MIN: CPT | Performed by: SPEECH-LANGUAGE PATHOLOGIST

## 2025-07-11 NOTE — PROGRESS NOTES
reportedly negative for LVO or critical stenosis patient determined to be a good candidate for TNK which was given after initiation of Cardene drip for BP management. After TNK, patient was admitted to ICU for further evaluation management.\"     Patient recently discharged from [facility] 11/24/24 with OP orders to complete OP PT/OT and ST evaluations.      SUBJECTIVE:  Patient arrived to this appointment a few minutes late.  Patient a neurology and neuropsychology appointment in [place] this past Monday.  Patient reports she was told that she has bad nerves, but they don't want to do anything about it at this time. Patient reports she did more counseling with the neuropsychologist. Patient reports she has an angiogram locally on 8/4/25 to determine if they need to do any procedures on the aneurysm.     Patient states, \"I'm okay, it's still sometimes just the fine stuff (that she is having difficulty with).\"  Patient reports she does notice an improvement, but she still has difficulty organizing her thoughts while talking to others.  Also reports she doesn't always remember what she has told someone else.     GOALS:  Patient Goal:  Communicate easier with others; \"I want to get back to me\"    Short Term Goals:  Time Frame for Short-Term Goals: 4 weeks    SHORT TERM GOAL #1: NO NEW GOAL.  INTERVENTIONS: N/A    SHORT TERM GOAL #2: NO NEW GOAL.  INTERVENTIONS: N/A    SHORT TERM GOAL #3: NO NEW GOAL.  INTERVENTIONS: N/A    SHORT TERM GOAL #4: Patient will report use of speech strategies in conversations at home without need for repeating self more than 75% of the time when in the community across 3 sessions to support carryover of learned strategies. - GOAL NOT MET. CONTINUE GOAL.  INTERVENTIONS: Patient reports she has to repeat herself when talking to her children about 10% of the time, however, other family members she is having to repeat herself at least 50% of the time, despite being in the same room when talking to

## 2025-07-14 ENCOUNTER — APPOINTMENT (OUTPATIENT)
Dept: GENERAL RADIOLOGY | Age: 50
End: 2025-07-14
Payer: COMMERCIAL

## 2025-07-14 ENCOUNTER — HOSPITAL ENCOUNTER (EMERGENCY)
Age: 50
Discharge: HOME | End: 2025-07-15
Attending: EMERGENCY MEDICINE
Payer: COMMERCIAL

## 2025-07-14 ENCOUNTER — APPOINTMENT (OUTPATIENT)
Dept: CT IMAGING | Age: 50
End: 2025-07-14
Payer: COMMERCIAL

## 2025-07-14 VITALS
HEIGHT: 65 IN | DIASTOLIC BLOOD PRESSURE: 87 MMHG | WEIGHT: 197 LBS | HEART RATE: 79 BPM | OXYGEN SATURATION: 98 % | BODY MASS INDEX: 32.82 KG/M2 | SYSTOLIC BLOOD PRESSURE: 156 MMHG | RESPIRATION RATE: 17 BRPM | TEMPERATURE: 98.2 F

## 2025-07-14 DIAGNOSIS — I10 HYPERTENSION, UNSPECIFIED TYPE: Primary | ICD-10-CM

## 2025-07-14 LAB
ALBUMIN SERPL BCG-MCNC: 4.3 G/DL (ref 3.4–4.9)
ALP SERPL-CCNC: 113 U/L (ref 38–126)
ALT SERPL W/O P-5'-P-CCNC: 13 U/L (ref 10–35)
ANION GAP SERPL CALC-SCNC: 15 MEQ/L (ref 8–16)
AST SERPL-CCNC: 16 U/L (ref 10–35)
BASOPHILS ABSOLUTE: 0 THOU/MM3 (ref 0–0.1)
BASOPHILS NFR BLD AUTO: 0.4 %
BILIRUB SERPL-MCNC: 0.2 MG/DL (ref 0.3–1.2)
BILIRUB UR QL STRIP.AUTO: NEGATIVE
BUN SERPL-MCNC: 11 MG/DL (ref 8–23)
CALCIUM SERPL-MCNC: 9.7 MG/DL (ref 8.6–10)
CHARACTER UR: CLEAR
CHLORIDE SERPL-SCNC: 103 MEQ/L (ref 98–111)
CO2 SERPL-SCNC: 22 MEQ/L (ref 22–29)
COLOR, UA: YELLOW
CREAT SERPL-MCNC: 1 MG/DL (ref 0.5–0.9)
DEPRECATED RDW RBC AUTO: 55.3 FL (ref 35–45)
EOSINOPHIL NFR BLD AUTO: 2.2 %
EOSINOPHILS ABSOLUTE: 0.2 THOU/MM3 (ref 0–0.4)
ERYTHROCYTE [DISTWIDTH] IN BLOOD BY AUTOMATED COUNT: 17.3 % (ref 11.5–14.5)
GFR SERPL CREATININE-BSD FRML MDRD: 70 ML/MIN/1.73M2
GLUCOSE SERPL-MCNC: 90 MG/DL (ref 74–109)
GLUCOSE UR QL STRIP.AUTO: NEGATIVE MG/DL
HCT VFR BLD AUTO: 34.5 % (ref 37–47)
HGB BLD-MCNC: 10.7 GM/DL (ref 12–16)
HGB UR QL STRIP.AUTO: NEGATIVE
IMM GRANULOCYTES # BLD AUTO: 0.02 THOU/MM3 (ref 0–0.07)
IMM GRANULOCYTES NFR BLD AUTO: 0.2 %
KETONES UR QL STRIP.AUTO: NEGATIVE
LIPASE SERPL-CCNC: 23 U/L (ref 13–60)
LYMPHOCYTES ABSOLUTE: 2.4 THOU/MM3 (ref 1–4.8)
LYMPHOCYTES NFR BLD AUTO: 28.7 %
MAGNESIUM SERPL-MCNC: 2.2 MG/DL (ref 1.6–2.6)
MCH RBC QN AUTO: 26.7 PG (ref 26–33)
MCHC RBC AUTO-ENTMCNC: 31 GM/DL (ref 32.2–35.5)
MCV RBC AUTO: 86 FL (ref 81–99)
MONOCYTES ABSOLUTE: 0.5 THOU/MM3 (ref 0.4–1.3)
MONOCYTES NFR BLD AUTO: 6.3 %
NEUTROPHILS ABSOLUTE: 5.1 THOU/MM3 (ref 1.8–7.7)
NEUTROPHILS NFR BLD AUTO: 62.2 %
NITRITE UR QL STRIP: NEGATIVE
NRBC BLD AUTO-RTO: 0 /100 WBC
OSMOLALITY SERPL CALC.SUM OF ELEC: 278.3 MOSMOL/KG (ref 275–300)
PH UR STRIP.AUTO: 6 [PH] (ref 5–9)
PLATELET # BLD AUTO: 494 THOU/MM3 (ref 130–400)
PMV BLD AUTO: 9.6 FL (ref 9.4–12.4)
POTASSIUM SERPL-SCNC: 3.5 MEQ/L (ref 3.5–5.2)
PROT SERPL-MCNC: 7.4 G/DL (ref 6.4–8.3)
PROT UR STRIP.AUTO-MCNC: NEGATIVE MG/DL
RBC # BLD AUTO: 4.01 MILL/MM3 (ref 4.2–5.4)
SODIUM SERPL-SCNC: 140 MEQ/L (ref 135–145)
SP GR UR REFRACT.AUTO: 1.01 (ref 1–1.03)
TROPONIN, HIGH SENSITIVITY: < 6 NG/L (ref 0–12)
TSH SERPL DL<=0.05 MIU/L-ACNC: 0.94 UIU/ML (ref 0.27–4.2)
UROBILINOGEN, URINE: 0.2 EU/DL (ref 0–1)
WBC # BLD AUTO: 8.2 THOU/MM3 (ref 4.8–10.8)
WBC #/AREA URNS HPF: NEGATIVE /[HPF]

## 2025-07-14 PROCEDURE — 70498 CT ANGIOGRAPHY NECK: CPT

## 2025-07-14 PROCEDURE — 85025 COMPLETE CBC W/AUTO DIFF WBC: CPT

## 2025-07-14 PROCEDURE — 70496 CT ANGIOGRAPHY HEAD: CPT

## 2025-07-14 PROCEDURE — 84443 ASSAY THYROID STIM HORMONE: CPT

## 2025-07-14 PROCEDURE — 80053 COMPREHEN METABOLIC PANEL: CPT

## 2025-07-14 PROCEDURE — 6360000004 HC RX CONTRAST MEDICATION: Performed by: EMERGENCY MEDICINE

## 2025-07-14 PROCEDURE — 81003 URINALYSIS AUTO W/O SCOPE: CPT

## 2025-07-14 PROCEDURE — 70450 CT HEAD/BRAIN W/O DYE: CPT

## 2025-07-14 PROCEDURE — 83690 ASSAY OF LIPASE: CPT

## 2025-07-14 PROCEDURE — 36415 COLL VENOUS BLD VENIPUNCTURE: CPT

## 2025-07-14 PROCEDURE — 83735 ASSAY OF MAGNESIUM: CPT

## 2025-07-14 PROCEDURE — 93005 ELECTROCARDIOGRAM TRACING: CPT

## 2025-07-14 PROCEDURE — 71045 X-RAY EXAM CHEST 1 VIEW: CPT

## 2025-07-14 PROCEDURE — 84484 ASSAY OF TROPONIN QUANT: CPT

## 2025-07-14 PROCEDURE — 99285 EMERGENCY DEPT VISIT HI MDM: CPT

## 2025-07-14 RX ORDER — IOPAMIDOL 755 MG/ML
80 INJECTION, SOLUTION INTRAVASCULAR
Status: COMPLETED | OUTPATIENT
Start: 2025-07-14 | End: 2025-07-14

## 2025-07-14 RX ADMIN — IOPAMIDOL 80 ML: 755 INJECTION, SOLUTION INTRAVENOUS at 23:01

## 2025-07-14 ASSESSMENT — PAIN - FUNCTIONAL ASSESSMENT: PAIN_FUNCTIONAL_ASSESSMENT: NONE - DENIES PAIN

## 2025-07-15 LAB
EKG ATRIAL RATE: 71 BPM
EKG P AXIS: 1 DEGREES
EKG P-R INTERVAL: 170 MS
EKG Q-T INTERVAL: 394 MS
EKG QRS DURATION: 74 MS
EKG QTC CALCULATION (BAZETT): 428 MS
EKG R AXIS: 8 DEGREES
EKG T AXIS: 15 DEGREES
EKG VENTRICULAR RATE: 71 BPM

## 2025-07-15 PROCEDURE — 93010 ELECTROCARDIOGRAM REPORT: CPT | Performed by: NUCLEAR MEDICINE

## 2025-07-15 NOTE — DISCHARGE INSTRUCTIONS
You were evaluated emergency room today for high blood pressure.  During your visit here today, your blood pressure was well-controlled without intervention.  We did get imaging of your head given your history, and this revealed no acute or emergency causes as result of your high blood pressure today.  Please continue to take your antihypertensive/blood pressure medicine at home as prescribed, and follow-up with your PCP related to your Emergency Department visit here saying approximately 2 days to make sure that your medication regimen is acceptable.  I would like you to start taking her blood pressure at home twice a day, and keep a log so that you can provide it to your PCP so they can better understanding of your blood pressure throughout the day.  Please contact emergency room if develop any dizziness/lightheadedness, chest pain pressure tightness, shortness of breath.

## 2025-07-15 NOTE — ED TRIAGE NOTES
Pt present to the ED from home with c/c of hypertension. Pt states she was just seen by her family doctor and her BP was in the 190s. Her provider told her to come to the ED for an evaluation. Pt states she has a history of hypertension and takes medications for it. Pt states she has not missed any doses recently.

## 2025-07-15 NOTE — ED PROVIDER NOTES
PATIENT NAME: Yulissa Andersen  MRN: [id number]  : 1975  MORRISON: 2025    I performed a history and physical examination of the patient and discussed management with the Resident. I reviewed the Resident's note and agree with the documented findings and plan of care. Any areas of disagreement are noted on the chart. I was personally present for the key portions of any procedures and have documented in the chart those procedures where I was not present during the key portions. I have reviewed the emergency nurses triage note and agree with the chief complaint, past medical history, past surgical history, allergies, medications, social and family history as documented unless otherwise noted below.    MEDICAL DEDISION MAKING (MDM)     Yulissa Andersen is a 49 y.o. female presents with HTN.     She was seen by PCP today for a routine check, SBP was 190. She had no headache, blurry vision, slurred speech, paresthesia, chest pain, or SOB.    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 Weaver) and migraine. After the coiling she has been having chronic left side residual weakness.    ED workup is reassuring (labs, CT head, CTA head and neck).     BP improves to 156/87.     No evidence patient has hypertensive emergency.    Discharged with PCP follow-up in 1-2 weeks to optimize antihypertensive medications      Vitals:    25 2321   BP: (!) 145/93 (!) 156/87   Pulse: 68 79   Resp: 18 17   Temp: 98.2 °F (36.8 °C)    TempSrc: Oral    SpO2: 99% 98%   Weight: 89.4 kg (197 lb)    Height: 1.651 m (5' 5\")      Labs Reviewed   CBC WITH AUTO DIFFERENTIAL - Abnormal; Notable for the following components:       Result Value    RBC 4.01 (*)     Hemoglobin 10.7 (*)     Hematocrit 34.5 (*)     MCHC 31.0 (*)     RDW-CV 17.3 (*)     RDW-SD 55.3 (*)     Platelets 494 (*)     All other components within normal limits   COMPREHENSIVE

## 2025-07-15 NOTE — ED PROVIDER NOTES
[facility] EMERGENCY DEPARTMENT  EMERGENCY DEPARTMENT ENCOUNTER          Pt Name: Yulissa Andersen  MRN: [id number]  Birthdate 1975  Date of evaluation: 7/14/2025  Physician: Keegan Keene MD  Supervising Attending Physician: Geovani Ohara MD       CHIEF COMPLAINT       Chief Complaint   Patient presents with    Hypertension         HISTORY OF PRESENT ILLNESS    HPI  Yulissa Andersen is a 49 y.o. female who presents to the emergency department from home, by private vehicle for evaluation of hypertension.  The patient endorses that she was at a primary care visit today and they noticed her blood pressures in the 190s.  She has a history of cerebral aneurysm status post coiling in [place] approximately 8 months ago.  They got concerned regarding his high blood pressure, so they sent her to the ED for further evaluation related related to this.  The patient does have a baseline dysarthria, left upper extremity and left lower extremity weakness and gait abnormalities per her report from her significant other and herself ever since his previous treatment aneurysm in November of last year.  The patient is at her baseline currently.  The patient's blood pressure in the ED is systolic in the 140s.  This came down despite no intervention.  She does take antihypertensives, has not missed any doses related to this.  The patient has no other acute complaints at this time.      PAST MEDICAL AND SURGICAL HISTORY     Past Medical History:   Diagnosis Date    Coronary artery disease involving native coronary artery of native heart without angina pectoris 10/27/2021    Diabetes mellitus (HCC)     Hypertension     Incomplete bladder emptying     Microscopic hematuria     Osteoarthritis of knee 01/31/2023    Unspecified cerebral artery occlusion with cerebral infarction 1995       Past Surgical History:   Procedure Laterality Date    BREAST SURGERY Left 02/10/2015    I & D Dr. Mac     CARDIAC

## 2025-07-17 ENCOUNTER — HOSPITAL ENCOUNTER (OUTPATIENT)
Dept: SPEECH THERAPY | Age: 50
Setting detail: THERAPIES SERIES
Discharge: HOME | End: 2025-07-17
Payer: COMMERCIAL

## 2025-07-17 PROCEDURE — 92507 TX SP LANG VOICE COMM INDIV: CPT

## 2025-07-17 NOTE — PROGRESS NOTES
1/4 with min cues.     **Short term recall task: Patient was provided with directions to recall that included x5 key pieces of information.   - Immediate recall: 5/5 independent  - 8 minute delay: 3/5 independent, 2/5 independent   **Good success      Long Term Goals:  Time Frame for Long-Term Goals: 8 weeks    LONG-TERM GOAL #1:GOAL MET. NO NEW GOAL.     LONG TERM GOAL #2: Patient will report her cognition to be close to baseline for eventual return to work.  GOAL NOT MET. CONTINUE GOAL.      Rehabilitation Potential/Prognosis: good  Areas for Improvement: Impaired cognition, Impaired speech, and Impaired voice  Specific Interventions Next Treatment: use of speech strategies in conversation, cognitive tasks       Activity/Treatment Tolerance:  [x]  Patient tolerated treatment well  []  Patient limited by fatigue  []  Patient limited by pain   []  Patient limited by other medical complications  []  Other:     Patient Education:   [x]  HEP/Education Completed: Plan of Care, speech strategies in conversation, memory strategies, importance of always double checking her work, \"accuracy over speed.\"  []  No new Education completed  []  Reviewed Prior HEP      [x]  Patient verbalized and/or demonstrated understanding of education provided.  []  Patient unable to verbalize and/or demonstrate understanding of education provided.  Will continue education.  []  Barriers to learning: none      PLAN:  Treatment Recommendations:     []  Plan of care initiated.  Plan to see patient 2 times per week for 8 weeks to address the treatment planned outlined above.  [x]  Continue with current plan of care  []  Progress note: 1 x per week x 6 weeks  []  Hold pending physician visit  []  Discharge    Time In 0910   Time Out 0930   Timed Code Minutes: 20 min   Total Treatment Time: 20 min       Charity Anthony M.S. CCC-SLP [id number]

## 2025-07-24 ENCOUNTER — HOSPITAL ENCOUNTER (OUTPATIENT)
Dept: SPEECH THERAPY | Age: 50
Setting detail: THERAPIES SERIES
Discharge: HOME | End: 2025-07-24
Payer: COMMERCIAL

## 2025-07-24 DIAGNOSIS — R00.2 PALPITATIONS: ICD-10-CM

## 2025-07-24 DIAGNOSIS — I10 PRIMARY HYPERTENSION: ICD-10-CM

## 2025-07-24 PROCEDURE — 97130 THER IVNTJ EA ADDL 15 MIN: CPT

## 2025-07-24 PROCEDURE — 97129 THER IVNTJ 1ST 15 MIN: CPT

## 2025-07-24 RX ORDER — CARVEDILOL 25 MG/1
25 TABLET ORAL 2 TIMES DAILY
Qty: 180 TABLET | Refills: 3 | Status: SHIPPED | OUTPATIENT
Start: 2025-07-24

## 2025-07-24 NOTE — PROGRESS NOTES
[facility]  SPEECH THERAPY  [] SPEECH LANGUAGE COGNITIVE EVALUATION  [x] DAILY NOTE   [] PROGRESS NOTE  [] DISCHARGE NOTE    [x] [facility] [place]   [] [facility] CARE [place]    [] [facility]   [] [facility]    Date: 2025  Patient Name:  Yulissa Andersen  : 1975  MRN: [id number]  CSN: [id number]    Referring Practitioner Noa Leal DO [id number]      Diagnosis  Diagnoses       I63.9 (ICD-10-CM) - Cerebral infarction, unspecified           Treatment Diagnosis R41.89 Other symptoms and signs involving cognitive functions and awareness  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  she has a past surgical history that includes cyst removal; hysteroscopy; Hysterectomy (2013); Breast surgery (Left, 02/10/2015); pr exc b9 lesion mrgn xcp sk tg t/a/l 2.1-3.0 cm (N/A, 2018); DISSECTION GROIN (N/A, 2019); and Cardiac catheterization.     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  Marcelo Gunderson,     Penicillins Hives    Clindamycin/Lincomycin Rash    Ciprofloxacin Hives    Doxycycline Hives    Lisinopril      Attacked kidneys      Medications   Current Outpatient Medications:     apixaban (ELIQUIS) 5 MG TABS tablet, Take 1 tablet by mouth 2 times daily, Disp: 60 tablet, Rfl: 1    aspirin 81 MG chewable tablet, Take 1

## 2025-07-28 ENCOUNTER — HOSPITAL ENCOUNTER (EMERGENCY)
Age: 50
Discharge: HOME | End: 2025-07-28
Payer: COMMERCIAL

## 2025-07-28 ENCOUNTER — APPOINTMENT (OUTPATIENT)
Dept: GENERAL RADIOLOGY | Age: 50
End: 2025-07-28
Payer: COMMERCIAL

## 2025-07-28 VITALS
TEMPERATURE: 98 F | OXYGEN SATURATION: 98 % | HEART RATE: 91 BPM | WEIGHT: 195 LBS | HEIGHT: 65 IN | BODY MASS INDEX: 32.49 KG/M2 | RESPIRATION RATE: 18 BRPM | DIASTOLIC BLOOD PRESSURE: 82 MMHG | SYSTOLIC BLOOD PRESSURE: 150 MMHG

## 2025-07-28 DIAGNOSIS — S60.211A CONTUSION OF RIGHT WRIST, INITIAL ENCOUNTER: Primary | ICD-10-CM

## 2025-07-28 PROCEDURE — 73110 X-RAY EXAM OF WRIST: CPT

## 2025-07-28 PROCEDURE — 99283 EMERGENCY DEPT VISIT LOW MDM: CPT

## 2025-07-28 ASSESSMENT — PAIN DESCRIPTION - LOCATION: LOCATION: ARM;HAND;WRIST

## 2025-07-28 ASSESSMENT — PAIN SCALES - GENERAL: PAINLEVEL_OUTOF10: 7

## 2025-07-28 ASSESSMENT — PAIN - FUNCTIONAL ASSESSMENT: PAIN_FUNCTIONAL_ASSESSMENT: 0-10

## 2025-07-28 ASSESSMENT — PAIN DESCRIPTION - ORIENTATION: ORIENTATION: RIGHT

## 2025-07-28 NOTE — ED NOTES
Patient presents to the ED with complaints of right arm and wrist pain and swelling. She states that she hit something last night. She also complaints about not being able to move her middle and ring fingers of her right hand. She has no other complaints at this time.

## 2025-07-28 NOTE — ED PROVIDER NOTES
[facility] EMERGENCY DEPARTMENT      EMERGENCY MEDICINE     Pt Name: Yulissa Andersen  MRN: [id number]  Birthdate 1975  Date of evaluation: 7/28/2025  Provider: Roopa Muse PA-C    CHIEF COMPLAINT       Chief Complaint   Patient presents with    Arm Pain     right    Wrist Pain     right     HISTORY OF PRESENT ILLNESS   Yulissa Andersen is a pleasant 49 y.o. female who presents to the emergency department  from home, by private vehicle for evaluation of right-sided wrist/forearm/hand pain.  Patient states she hit her right arm on a piece of furniture last night by accident and has since had some associated pain to the area.  Patient reports he was unable to sleep due to the pain.  Rates her pain a 7 out of 10.  Denies taking any medications for management of pain. Denies any numbness or tingling to the fingertips.  Denies any pain in the elbow.    PASTMEDICAL HISTORY     Past Medical History:   Diagnosis Date    Coronary artery disease involving native coronary artery of native heart without angina pectoris 10/27/2021    Diabetes mellitus (HCC)     Hypertension     Incomplete bladder emptying     Microscopic hematuria     Osteoarthritis of knee 01/31/2023    Unspecified cerebral artery occlusion with cerebral infarction 1995       Patient Active Problem List   Diagnosis Code    Left breast abscess N61.1    Fibrocystic breast changes N60.19    Cellulitis L03.90    Acute kidney injury N17.9    Vomiting R11.10    HTN (hypertension) I10    S/P cardiac cath- 6/22/16-  All coronaries are patent but LAD. there is moderate eccentric plaque noted on the proximal and ostail LAD, giving ostail prox LAD 30% nstenosis, edp 13 mmhg, ef 65%- med R Z98.890    Postoperative or surgical complication, initial encounter T81.9XXA    MRSA cellulitis L03.90, B95.62    S/P debridement Z98.890    Gastroesophageal reflux disease K21.9    Coronary artery disease involving native coronary artery of native heart without angina

## 2025-07-29 RX ORDER — HYDRALAZINE HYDROCHLORIDE 100 MG/1
100 TABLET, FILM COATED ORAL 3 TIMES DAILY
Qty: 90 TABLET | Refills: 3 | Status: SHIPPED | OUTPATIENT
Start: 2025-07-29

## 2025-07-29 NOTE — PROGRESS NOTES
the anterior and lateral aspect of the shoulder.  Pain with Lincoln, negative speeds and Yergason's.  Limited range of motion in all planes.  Tone:  normal  Muscle bulk: within normal limits  Gait: not assessed     Lungs: breathing comfortably on room air without any increased WOB  Abdomen: non-distended  Skin: warm and dry, no rash or erythema on exposed skin    Diagnostics:   No results found for this or any previous visit (from the past 24 hours).      Assessment:  Yulissa Andersen is a 49 y.o. female with PMH significant for CAD, DM, HTN, OA, GERD, migraines, and CVA (1995) who was admitted to [facility] acute IPR unit from  11/12/2024-11/23/2024 following a clinically suspected CVA.  Patient presents today for follow-up.  She is currently following with neurology at [facility].  She is awaiting neuropsych testing regarding cognitive concerns.  Patient is additionally awaiting evaluation sports medicine clinic.  Question impingement based on physical exam.  Will obtain a left shoulder x-ray as 1 has never been obtained.  She may require more advanced imaging but will defer to sports medicine.  Patient additionally has undergone a recent EMG of the left upper extremity which was normal.  Patient did undergo a 's evaluation and tested as safe to return to independent driving.  However, patient with anxiety regarding driving long distances. She is hoping to return to work part-time next month.    Plan:  Continue follow-up with specialists as scheduled  Continue outpatient speech therapy  Agree with neuropsych eval  Will obtain x-rays of the left shoulder.  May require more advanced imaging but will wait reports medicine eval status pending  Patient planning to return to work approximately 3-hour days starting next month.  We did discuss that I think returning on a part-time basis would be the best plan with goal of increasing duration in the future.  Will plan to follow-up with patient in approximately 3 months to follow-up

## 2025-07-30 ENCOUNTER — OFFICE VISIT (OUTPATIENT)
Dept: PHYSICAL MEDICINE AND REHAB | Age: 50
End: 2025-07-30
Payer: COMMERCIAL

## 2025-07-30 VITALS
WEIGHT: 195.11 LBS | DIASTOLIC BLOOD PRESSURE: 80 MMHG | HEIGHT: 65 IN | BODY MASS INDEX: 32.51 KG/M2 | SYSTOLIC BLOOD PRESSURE: 124 MMHG

## 2025-07-30 DIAGNOSIS — Z74.09 IMPAIRED MOBILITY AND ADLS: ICD-10-CM

## 2025-07-30 DIAGNOSIS — M25.512 CHRONIC LEFT SHOULDER PAIN: ICD-10-CM

## 2025-07-30 DIAGNOSIS — R47.9 SPEECH DISTURBANCE, UNSPECIFIED TYPE: Primary | ICD-10-CM

## 2025-07-30 DIAGNOSIS — G89.29 CHRONIC LEFT SHOULDER PAIN: ICD-10-CM

## 2025-07-30 DIAGNOSIS — Z78.9 IMPAIRED MOBILITY AND ADLS: ICD-10-CM

## 2025-07-30 PROCEDURE — 99213 OFFICE O/P EST LOW 20 MIN: CPT | Performed by: STUDENT IN AN ORGANIZED HEALTH CARE EDUCATION/TRAINING PROGRAM

## 2025-07-30 PROCEDURE — 3079F DIAST BP 80-89 MM HG: CPT | Performed by: STUDENT IN AN ORGANIZED HEALTH CARE EDUCATION/TRAINING PROGRAM

## 2025-07-30 PROCEDURE — G8417 CALC BMI ABV UP PARAM F/U: HCPCS | Performed by: STUDENT IN AN ORGANIZED HEALTH CARE EDUCATION/TRAINING PROGRAM

## 2025-07-30 PROCEDURE — 3074F SYST BP LT 130 MM HG: CPT | Performed by: STUDENT IN AN ORGANIZED HEALTH CARE EDUCATION/TRAINING PROGRAM

## 2025-07-30 PROCEDURE — 1036F TOBACCO NON-USER: CPT | Performed by: STUDENT IN AN ORGANIZED HEALTH CARE EDUCATION/TRAINING PROGRAM

## 2025-07-30 PROCEDURE — G8427 DOCREV CUR MEDS BY ELIG CLIN: HCPCS | Performed by: STUDENT IN AN ORGANIZED HEALTH CARE EDUCATION/TRAINING PROGRAM

## 2025-07-31 ENCOUNTER — HOSPITAL ENCOUNTER (OUTPATIENT)
Dept: GENERAL RADIOLOGY | Age: 50
Discharge: HOME | End: 2025-07-31
Payer: COMMERCIAL

## 2025-07-31 ENCOUNTER — RESULTS FOLLOW-UP (OUTPATIENT)
Dept: PHYSICAL MEDICINE AND REHAB | Age: 50
End: 2025-07-31

## 2025-07-31 ENCOUNTER — HOSPITAL ENCOUNTER (OUTPATIENT)
Dept: SPEECH THERAPY | Age: 50
Setting detail: THERAPIES SERIES
Discharge: HOME | End: 2025-07-31
Payer: COMMERCIAL

## 2025-07-31 ENCOUNTER — HOSPITAL ENCOUNTER (OUTPATIENT)
Age: 50
Discharge: HOME | End: 2025-07-31
Payer: COMMERCIAL

## 2025-07-31 DIAGNOSIS — G89.29 CHRONIC LEFT SHOULDER PAIN: ICD-10-CM

## 2025-07-31 DIAGNOSIS — M25.512 CHRONIC LEFT SHOULDER PAIN: ICD-10-CM

## 2025-07-31 PROCEDURE — 97130 THER IVNTJ EA ADDL 15 MIN: CPT

## 2025-07-31 PROCEDURE — 73030 X-RAY EXAM OF SHOULDER: CPT

## 2025-07-31 PROCEDURE — 97129 THER IVNTJ 1ST 15 MIN: CPT

## 2025-07-31 NOTE — PROGRESS NOTES
[facility]  SPEECH THERAPY  [] SPEECH LANGUAGE COGNITIVE EVALUATION  [x] DAILY NOTE   [] PROGRESS NOTE  [] DISCHARGE NOTE    [x] [facility] [place]   [] [facility] CARE [place]    [] [facility]   [] [facility]    Date: 2025  Patient Name:  Yulissa Andersen  : 1975  MRN: [id number]  CSN: [id number]    Referring Practitioner Noa Leal DO [id number]      Diagnosis  Diagnoses       I63.9 (ICD-10-CM) - Cerebral infarction, unspecified           Treatment Diagnosis R41.89 Other symptoms and signs involving cognitive functions and awareness  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  she has a past surgical history that includes cyst removal; hysteroscopy; Hysterectomy (2013); Breast surgery (Left, 02/10/2015); pr exc b9 lesion mrgn xcp sk tg t/a/l 2.1-3.0 cm (N/A, 2018); DISSECTION GROIN (N/A, 2019); and Cardiac catheterization.     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  Marcelo Gunderson,     Penicillins Hives    Clindamycin/Lincomycin Rash    Ciprofloxacin Hives    Doxycycline Hives    Lisinopril      Attacked kidneys      Medications   Current Outpatient Medications:     apixaban (ELIQUIS) 5 MG TABS tablet, Take 1 tablet by mouth 2 times daily, Disp: 60 tablet, Rfl: 1    aspirin 81 MG chewable tablet, Take 1

## 2025-08-01 ENCOUNTER — TELEPHONE (OUTPATIENT)
Dept: NEUROLOGY | Age: 50
End: 2025-08-01

## 2025-08-01 NOTE — TELEPHONE ENCOUNTER
Spoke with patient to confirm procedure with Dr. Weaver for Monday, 8/4/25 with an arrival of 8:00am. Patient verbalized understanding with no further questions or concerns.

## 2025-08-04 ENCOUNTER — HOSPITAL ENCOUNTER (OUTPATIENT)
Age: 50
Discharge: HOME | End: 2025-08-06
Attending: PSYCHIATRY & NEUROLOGY
Payer: COMMERCIAL

## 2025-08-04 VITALS
DIASTOLIC BLOOD PRESSURE: 71 MMHG | RESPIRATION RATE: 18 BRPM | OXYGEN SATURATION: 99 % | WEIGHT: 195 LBS | BODY MASS INDEX: 32.49 KG/M2 | TEMPERATURE: 97.5 F | HEIGHT: 65 IN | HEART RATE: 78 BPM | SYSTOLIC BLOOD PRESSURE: 133 MMHG

## 2025-08-04 DIAGNOSIS — I67.1 CEREBRAL ANEURYSM: Primary | ICD-10-CM

## 2025-08-04 DIAGNOSIS — I72.0 CAROTID ARTERY ANEURYSM: ICD-10-CM

## 2025-08-04 LAB
ABO GROUP BLD: NORMAL
ANION GAP SERPL CALC-SCNC: 14 MEQ/L (ref 8–16)
APTT PPP: 34.7 SECONDS (ref 22–38)
B-HCG SERPL QL: NEGATIVE
BUN SERPL-MCNC: 8 MG/DL (ref 8–23)
CALCIUM SERPL-MCNC: 9.4 MG/DL (ref 8.6–10)
CHLORIDE SERPL-SCNC: 104 MEQ/L (ref 98–111)
CO2 SERPL-SCNC: 22 MEQ/L (ref 22–29)
CREAT SERPL-MCNC: 0.8 MG/DL (ref 0.5–0.9)
DEPRECATED RDW RBC AUTO: 52.8 FL (ref 35–45)
ECHO BSA: 2.01 M2
ERYTHROCYTE [DISTWIDTH] IN BLOOD BY AUTOMATED COUNT: 16.9 % (ref 11.5–14.5)
GFR SERPL CREATININE-BSD FRML MDRD: > 90 ML/MIN/1.73M2
GLUCOSE SERPL-MCNC: 100 MG/DL (ref 74–109)
HCT VFR BLD AUTO: 32.4 % (ref 37–47)
HGB BLD-MCNC: 10.1 GM/DL (ref 12–16)
IAT IGG-SP REAG SERPL QL: NORMAL
INR PPP: 1.14 (ref 0.85–1.13)
MCH RBC QN AUTO: 26.7 PG (ref 26–33)
MCHC RBC AUTO-ENTMCNC: 31.2 GM/DL (ref 32.2–35.5)
MCV RBC AUTO: 85.7 FL (ref 81–99)
PLATELET # BLD AUTO: 429 THOU/MM3 (ref 130–400)
PMV BLD AUTO: 9.8 FL (ref 9.4–12.4)
POTASSIUM SERPL-SCNC: 3.7 MEQ/L (ref 3.5–5.2)
PROTHROMBIN TIME: 13.3 SECONDS (ref 10–13.5)
RBC # BLD AUTO: 3.78 MILL/MM3 (ref 4.2–5.4)
RH BLD: NORMAL
SODIUM SERPL-SCNC: 140 MEQ/L (ref 135–145)
WBC # BLD AUTO: 7 THOU/MM3 (ref 4.8–10.8)

## 2025-08-04 PROCEDURE — 86885 COOMBS TEST INDIRECT QUAL: CPT

## 2025-08-04 PROCEDURE — 36224 PLACE CATH CAROTD ART: CPT

## 2025-08-04 PROCEDURE — 36224 PLACE CATH CAROTD ART: CPT | Performed by: PSYCHIATRY & NEUROLOGY

## 2025-08-04 PROCEDURE — C1894 INTRO/SHEATH, NON-LASER: HCPCS | Performed by: PSYCHIATRY & NEUROLOGY

## 2025-08-04 PROCEDURE — 85027 COMPLETE CBC AUTOMATED: CPT

## 2025-08-04 PROCEDURE — 36223 PLACE CATH CAROTID/INOM ART: CPT

## 2025-08-04 PROCEDURE — 36221 PLACE CATH THORACIC AORTA: CPT

## 2025-08-04 PROCEDURE — 36415 COLL VENOUS BLD VENIPUNCTURE: CPT

## 2025-08-04 PROCEDURE — 7100000010 HC PHASE II RECOVERY - FIRST 15 MIN: Performed by: PSYCHIATRY & NEUROLOGY

## 2025-08-04 PROCEDURE — 2580000003 HC RX 258

## 2025-08-04 PROCEDURE — 85730 THROMBOPLASTIN TIME PARTIAL: CPT

## 2025-08-04 PROCEDURE — 86900 BLOOD TYPING SEROLOGIC ABO: CPT

## 2025-08-04 PROCEDURE — C1887 CATHETER, GUIDING: HCPCS | Performed by: PSYCHIATRY & NEUROLOGY

## 2025-08-04 PROCEDURE — 84703 CHORIONIC GONADOTROPIN ASSAY: CPT

## 2025-08-04 PROCEDURE — 86901 BLOOD TYPING SEROLOGIC RH(D): CPT

## 2025-08-04 PROCEDURE — 6360000004 HC RX CONTRAST MEDICATION: Performed by: PSYCHIATRY & NEUROLOGY

## 2025-08-04 PROCEDURE — C1760 CLOSURE DEV, VASC: HCPCS | Performed by: PSYCHIATRY & NEUROLOGY

## 2025-08-04 PROCEDURE — 85610 PROTHROMBIN TIME: CPT

## 2025-08-04 PROCEDURE — 7100000011 HC PHASE II RECOVERY - ADDTL 15 MIN: Performed by: PSYCHIATRY & NEUROLOGY

## 2025-08-04 PROCEDURE — 36223 PLACE CATH CAROTID/INOM ART: CPT | Performed by: PSYCHIATRY & NEUROLOGY

## 2025-08-04 PROCEDURE — 6360000002 HC RX W HCPCS: Performed by: PSYCHIATRY & NEUROLOGY

## 2025-08-04 PROCEDURE — 80048 BASIC METABOLIC PNL TOTAL CA: CPT

## 2025-08-04 PROCEDURE — C1769 GUIDE WIRE: HCPCS | Performed by: PSYCHIATRY & NEUROLOGY

## 2025-08-04 RX ORDER — ACETAMINOPHEN 325 MG/1
650 TABLET ORAL EVERY 4 HOURS PRN
Status: DISCONTINUED | OUTPATIENT
Start: 2025-08-04 | End: 2025-08-07 | Stop reason: HOSPADM

## 2025-08-04 RX ORDER — SODIUM CHLORIDE 0.9 % (FLUSH) 0.9 %
5-40 SYRINGE (ML) INJECTION EVERY 12 HOURS SCHEDULED
Status: DISCONTINUED | OUTPATIENT
Start: 2025-08-04 | End: 2025-08-07 | Stop reason: HOSPADM

## 2025-08-04 RX ORDER — SODIUM CHLORIDE 9 MG/ML
INJECTION, SOLUTION INTRAVENOUS PRN
Status: DISCONTINUED | OUTPATIENT
Start: 2025-08-04 | End: 2025-08-07 | Stop reason: HOSPADM

## 2025-08-04 RX ORDER — HEPARIN SODIUM 10000 [USP'U]/ML
INJECTION, SOLUTION INTRAVENOUS; SUBCUTANEOUS PRN
Status: COMPLETED | OUTPATIENT
Start: 2025-08-04 | End: 2025-08-04

## 2025-08-04 RX ORDER — FENTANYL CITRATE 50 UG/ML
INJECTION, SOLUTION INTRAMUSCULAR; INTRAVENOUS PRN
Status: COMPLETED | OUTPATIENT
Start: 2025-08-04 | End: 2025-08-04

## 2025-08-04 RX ORDER — MIDAZOLAM HYDROCHLORIDE 2 MG/2ML
INJECTION, SOLUTION INTRAMUSCULAR; INTRAVENOUS PRN
Status: COMPLETED | OUTPATIENT
Start: 2025-08-04 | End: 2025-08-04

## 2025-08-04 RX ORDER — SODIUM CHLORIDE 0.9 % (FLUSH) 0.9 %
5-40 SYRINGE (ML) INJECTION PRN
Status: DISCONTINUED | OUTPATIENT
Start: 2025-08-04 | End: 2025-08-07 | Stop reason: HOSPADM

## 2025-08-04 RX ORDER — SODIUM CHLORIDE 9 MG/ML
INJECTION, SOLUTION INTRAVENOUS CONTINUOUS
Status: DISCONTINUED | OUTPATIENT
Start: 2025-08-04 | End: 2025-08-07 | Stop reason: HOSPADM

## 2025-08-04 RX ADMIN — LIDOCAINE HYDROCHLORIDE 8 ML: 20 INJECTION, SOLUTION INFILTRATION; PERINEURAL at 10:17

## 2025-08-04 RX ADMIN — SODIUM CHLORIDE: 9 INJECTION, SOLUTION INTRAVENOUS at 08:51

## 2025-08-04 RX ADMIN — MIDAZOLAM HYDROCHLORIDE 0.5 MG: 1 INJECTION, SOLUTION INTRAMUSCULAR; INTRAVENOUS at 10:20

## 2025-08-04 RX ADMIN — IOPAMIDOL 35 ML: 612 INJECTION, SOLUTION INTRAVENOUS at 10:30

## 2025-08-04 RX ADMIN — HEPARIN SODIUM 3000 UNITS: 10000 INJECTION, SOLUTION INTRAVENOUS; SUBCUTANEOUS at 10:23

## 2025-08-04 RX ADMIN — FENTANYL CITRATE 50 MCG: 50 INJECTION, SOLUTION INTRAMUSCULAR; INTRAVENOUS at 10:20

## 2025-08-04 ASSESSMENT — ENCOUNTER SYMPTOMS
COUGH: 0
ABDOMINAL PAIN: 0
SHORTNESS OF BREATH: 0
VOMITING: 0
SORE THROAT: 0
RHINORRHEA: 0
NAUSEA: 0

## 2025-08-04 ASSESSMENT — VISUAL ACUITY: VA_NORMAL: 1

## 2025-08-05 ENCOUNTER — HOSPITAL ENCOUNTER (OUTPATIENT)
Dept: SPEECH THERAPY | Age: 50
Setting detail: THERAPIES SERIES
End: 2025-08-05
Payer: COMMERCIAL

## 2025-08-05 ENCOUNTER — TELEPHONE (OUTPATIENT)
Dept: NEUROLOGY | Age: 50
End: 2025-08-05

## 2025-08-07 ENCOUNTER — TRANSCRIBE ORDERS (OUTPATIENT)
Dept: ADMINISTRATIVE | Age: 50
End: 2025-08-07

## 2025-08-07 DIAGNOSIS — Z12.31 ENCOUNTER FOR SCREENING MAMMOGRAM FOR MALIGNANT NEOPLASM OF BREAST: Primary | ICD-10-CM

## 2025-08-07 RX ORDER — IOPAMIDOL 612 MG/ML
INJECTION, SOLUTION INTRAVASCULAR PRN
Status: COMPLETED | OUTPATIENT
Start: 2025-08-04 | End: 2025-08-04

## 2025-08-13 ENCOUNTER — APPOINTMENT (OUTPATIENT)
Dept: INTERVENTIONAL RADIOLOGY/VASCULAR | Age: 50
End: 2025-08-13
Attending: EMERGENCY MEDICINE
Payer: COMMERCIAL

## 2025-08-13 ENCOUNTER — APPOINTMENT (OUTPATIENT)
Dept: CT IMAGING | Age: 50
End: 2025-08-13
Payer: COMMERCIAL

## 2025-08-13 ENCOUNTER — TELEPHONE (OUTPATIENT)
Dept: NEUROLOGY | Age: 50
End: 2025-08-13

## 2025-08-13 ENCOUNTER — HOSPITAL ENCOUNTER (EMERGENCY)
Age: 50
Discharge: HOME | End: 2025-08-13
Attending: EMERGENCY MEDICINE
Payer: COMMERCIAL

## 2025-08-13 VITALS
BODY MASS INDEX: 32.49 KG/M2 | HEART RATE: 74 BPM | HEIGHT: 65 IN | OXYGEN SATURATION: 100 % | RESPIRATION RATE: 17 BRPM | TEMPERATURE: 98.6 F | DIASTOLIC BLOOD PRESSURE: 86 MMHG | WEIGHT: 195 LBS | SYSTOLIC BLOOD PRESSURE: 157 MMHG

## 2025-08-13 DIAGNOSIS — R20.0 NUMBNESS AND TINGLING: Primary | ICD-10-CM

## 2025-08-13 DIAGNOSIS — Z98.890: ICD-10-CM

## 2025-08-13 DIAGNOSIS — Z98.890 HISTORY OF CEREBRAL ANEURYSM REPAIR: ICD-10-CM

## 2025-08-13 DIAGNOSIS — Z86.79 HISTORY OF CEREBRAL ANEURYSM REPAIR: ICD-10-CM

## 2025-08-13 DIAGNOSIS — S30.1XXA HEMATOMA OF RIGHT INGUINAL REGION: ICD-10-CM

## 2025-08-13 DIAGNOSIS — R20.2 NUMBNESS AND TINGLING: Primary | ICD-10-CM

## 2025-08-13 LAB
ANION GAP SERPL CALC-SCNC: 14 MEQ/L (ref 8–16)
BASOPHILS ABSOLUTE: 0 THOU/MM3 (ref 0–0.1)
BASOPHILS NFR BLD AUTO: 0.5 %
BUN SERPL-MCNC: 9 MG/DL (ref 8–23)
CALCIUM SERPL-MCNC: 9.4 MG/DL (ref 8.5–10.5)
CHLORIDE SERPL-SCNC: 111 MEQ/L (ref 98–111)
CO2 SERPL-SCNC: 23 MEQ/L (ref 22–29)
CREAT SERPL-MCNC: 0.7 MG/DL (ref 0.5–0.9)
DEPRECATED RDW RBC AUTO: 51.5 FL (ref 35–45)
ECHO BSA: 2.01 M2
EOSINOPHIL NFR BLD AUTO: 2.5 %
EOSINOPHILS ABSOLUTE: 0.2 THOU/MM3 (ref 0–0.4)
ERYTHROCYTE [DISTWIDTH] IN BLOOD BY AUTOMATED COUNT: 16.4 % (ref 11.5–14.5)
GFR SERPL CREATININE-BSD FRML MDRD: > 90 ML/MIN/1.73M2
GLUCOSE SERPL-MCNC: 95 MG/DL (ref 74–109)
HCT VFR BLD AUTO: 30.3 % (ref 37–47)
HGB BLD-MCNC: 9.4 GM/DL (ref 12–16)
IMM GRANULOCYTES # BLD AUTO: 0.01 THOU/MM3 (ref 0–0.07)
IMM GRANULOCYTES NFR BLD AUTO: 0.2 %
LYMPHOCYTES ABSOLUTE: 2.4 THOU/MM3 (ref 1–4.8)
LYMPHOCYTES NFR BLD AUTO: 37.4 %
MCH RBC QN AUTO: 26.8 PG (ref 26–33)
MCHC RBC AUTO-ENTMCNC: 31 GM/DL (ref 32.2–35.5)
MCV RBC AUTO: 86.3 FL (ref 81–99)
MONOCYTES ABSOLUTE: 0.6 THOU/MM3 (ref 0.4–1.3)
MONOCYTES NFR BLD AUTO: 9.4 %
NEUTROPHILS ABSOLUTE: 3.2 THOU/MM3 (ref 1.8–7.7)
NEUTROPHILS NFR BLD AUTO: 50 %
NRBC BLD AUTO-RTO: 0 /100 WBC
OSMOLALITY SERPL CALC.SUM OF ELEC: 292.8 MOSMOL/KG (ref 275–300)
PLATELET # BLD AUTO: 406 THOU/MM3 (ref 130–400)
PMV BLD AUTO: 9.9 FL (ref 9.4–12.4)
POTASSIUM SERPL-SCNC: 3.4 MEQ/L (ref 3.5–5.2)
RBC # BLD AUTO: 3.51 MILL/MM3 (ref 4.2–5.4)
SODIUM SERPL-SCNC: 148 MEQ/L (ref 135–145)
WBC # BLD AUTO: 6.3 THOU/MM3 (ref 4.8–10.8)

## 2025-08-13 PROCEDURE — 93971 EXTREMITY STUDY: CPT

## 2025-08-13 PROCEDURE — 73706 CT ANGIO LWR EXTR W/O&W/DYE: CPT

## 2025-08-13 PROCEDURE — 99285 EMERGENCY DEPT VISIT HI MDM: CPT

## 2025-08-13 PROCEDURE — 36415 COLL VENOUS BLD VENIPUNCTURE: CPT

## 2025-08-13 PROCEDURE — 85025 COMPLETE CBC W/AUTO DIFF WBC: CPT

## 2025-08-13 PROCEDURE — 70450 CT HEAD/BRAIN W/O DYE: CPT

## 2025-08-13 PROCEDURE — 6360000004 HC RX CONTRAST MEDICATION: Performed by: EMERGENCY MEDICINE

## 2025-08-13 PROCEDURE — 2580000003 HC RX 258: Performed by: EMERGENCY MEDICINE

## 2025-08-13 PROCEDURE — 80048 BASIC METABOLIC PNL TOTAL CA: CPT

## 2025-08-13 RX ORDER — SODIUM CHLORIDE 9 MG/ML
INJECTION, SOLUTION INTRAVENOUS CONTINUOUS
Status: DISCONTINUED | OUTPATIENT
Start: 2025-08-13 | End: 2025-08-13 | Stop reason: HOSPADM

## 2025-08-13 RX ORDER — IOPAMIDOL 755 MG/ML
80 INJECTION, SOLUTION INTRAVASCULAR
Status: COMPLETED | OUTPATIENT
Start: 2025-08-13 | End: 2025-08-13

## 2025-08-13 RX ADMIN — IOPAMIDOL 80 ML: 755 INJECTION, SOLUTION INTRAVENOUS at 19:21

## 2025-08-13 RX ADMIN — SODIUM CHLORIDE: 0.9 INJECTION, SOLUTION INTRAVENOUS at 16:18

## 2025-08-13 ASSESSMENT — PAIN SCALES - GENERAL
PAINLEVEL_OUTOF10: 0

## 2025-08-13 ASSESSMENT — PAIN - FUNCTIONAL ASSESSMENT: PAIN_FUNCTIONAL_ASSESSMENT: 0-10

## 2025-08-14 ENCOUNTER — HOSPITAL ENCOUNTER (OUTPATIENT)
Dept: SPEECH THERAPY | Age: 50
Setting detail: THERAPIES SERIES
Discharge: HOME | End: 2025-08-14
Payer: COMMERCIAL

## 2025-08-14 PROCEDURE — 97130 THER IVNTJ EA ADDL 15 MIN: CPT | Performed by: SPEECH-LANGUAGE PATHOLOGIST

## 2025-08-14 PROCEDURE — 97129 THER IVNTJ 1ST 15 MIN: CPT | Performed by: SPEECH-LANGUAGE PATHOLOGIST

## 2025-08-18 ENCOUNTER — HOSPITAL ENCOUNTER (OUTPATIENT)
Dept: SPEECH THERAPY | Age: 50
Setting detail: THERAPIES SERIES
Discharge: HOME | End: 2025-08-18
Payer: COMMERCIAL

## 2025-08-18 PROCEDURE — 92507 TX SP LANG VOICE COMM INDIV: CPT

## 2025-08-18 PROCEDURE — 97129 THER IVNTJ 1ST 15 MIN: CPT

## 2025-08-26 ENCOUNTER — TELEPHONE (OUTPATIENT)
Dept: NEUROLOGY | Age: 50
End: 2025-08-26

## 2025-08-26 DIAGNOSIS — I10 PRIMARY HYPERTENSION: Primary | ICD-10-CM

## 2025-08-26 RX ORDER — AMLODIPINE BESYLATE 5 MG/1
5 TABLET ORAL DAILY
Qty: 90 TABLET | Refills: 3 | Status: SHIPPED | OUTPATIENT
Start: 2025-08-26 | End: 2025-08-27

## 2025-08-27 ENCOUNTER — HOSPITAL ENCOUNTER (OUTPATIENT)
Dept: SPEECH THERAPY | Age: 50
Setting detail: THERAPIES SERIES
Discharge: HOME | End: 2025-08-27
Payer: COMMERCIAL

## 2025-08-27 DIAGNOSIS — I10 PRIMARY HYPERTENSION: ICD-10-CM

## 2025-08-27 PROCEDURE — 92507 TX SP LANG VOICE COMM INDIV: CPT

## 2025-08-27 RX ORDER — AMLODIPINE BESYLATE 5 MG/1
5 TABLET ORAL DAILY
Qty: 90 TABLET | Refills: 3 | Status: SHIPPED | OUTPATIENT
Start: 2025-08-27

## 2025-09-02 ENCOUNTER — HOSPITAL ENCOUNTER (OUTPATIENT)
Dept: SPEECH THERAPY | Age: 50
Setting detail: THERAPIES SERIES
Discharge: HOME | End: 2025-09-02
Payer: COMMERCIAL

## 2025-09-02 PROCEDURE — 97129 THER IVNTJ 1ST 15 MIN: CPT | Performed by: SPEECH-LANGUAGE PATHOLOGIST

## 2025-09-02 PROCEDURE — 97130 THER IVNTJ EA ADDL 15 MIN: CPT | Performed by: SPEECH-LANGUAGE PATHOLOGIST

## (undated) DEVICE — GLOVE ORANGE PI 7   MSG9070

## (undated) DEVICE — YANKAUER,BULB TIP,W/O VENT,RIGID,STERILE: Brand: MEDLINE

## (undated) DEVICE — GOWN,SIRUS,NON REINFRCD,LARGE,SET IN SL: Brand: MEDLINE

## (undated) DEVICE — SUREFIT, DUAL DISPERSIVE ELECTRODE, CONTACT QUALITY MONITOR: Brand: SUREFIT

## (undated) DEVICE — 3M™ TRANSPORE™ WHITE SURGICAL TAPE 1534-1, 1 INCH X 10 YARD (2,5CM X 9,1M), 12 ROLLS/CARTON 10 CARTONS/CASE: Brand: 3M™ TRANSPORE™

## (undated) DEVICE — SUTURE VCRL SZ 4-0 L27IN ABSRB UD L19MM FS-2 3/8 CIR REV J422H

## (undated) DEVICE — HEAD POSITIONER, SURGICAL: Brand: DEROYAL

## (undated) DEVICE — BREAST HERNIA PACK: Brand: MEDLINE INDUSTRIES, INC.

## (undated) DEVICE — SPONGE GZ W4XL4IN COT 12 PLY TYP VII WVN C FLD DSGN

## (undated) DEVICE — SOLUTION SURG PREP POV IOD 7.5% 4 OZ

## (undated) DEVICE — GARMENT,MEDLINE,DVT,INT,CALF,MED, GEN2: Brand: MEDLINE

## (undated) DEVICE — TUBING, SUCTION, 1/4" X 20', STRAIGHT: Brand: MEDLINE INDUSTRIES, INC.

## (undated) DEVICE — ARM CRADLE: Brand: DEVON

## (undated) DEVICE — 3M™ TRANSPORE™ WHITE SURGICAL TAPE 1534-2, 2 INCH X 10 YARD (5CM X 9,1M), 6 ROLLS/CARTON 10 CARTONS/CASE: Brand: 3M™ TRANSPORE™

## (undated) DEVICE — 4-PORT MANIFOLD: Brand: NEPTUNE 2

## (undated) DEVICE — CONTAINER VACUTAINER ANAER CULTURE SWAB

## (undated) DEVICE — GAUZE,PACKING STRIP,IODOFORM,1/2"X5YD,ST: Brand: CURAD

## (undated) DEVICE — GOWN,SIRUS,NONRNF,SETINSLV,XL,20/CS: Brand: MEDLINE

## (undated) DEVICE — Device

## (undated) DEVICE — Z DISCONTINUED BY MEDLINE USE 2711682 TRAY SKIN PREP DRY W/ PREM GLV

## (undated) DEVICE — GLOVE SURG SZ 65 THK91MIL LTX FREE SYN POLYISOPRENE

## (undated) DEVICE — PREP SOL PVP IODINE 4%  4 OZ/BTL

## (undated) DEVICE — SOLUTION IV 1000ML 0.9% SOD CHL PH 5 INJ USP VIAFLX PLAS

## (undated) DEVICE — BLADE CLIPPER SURG SENSICLIP